# Patient Record
Sex: MALE | Race: BLACK OR AFRICAN AMERICAN | Employment: FULL TIME
[De-identification: names, ages, dates, MRNs, and addresses within clinical notes are randomized per-mention and may not be internally consistent; named-entity substitution may affect disease eponyms.]

---

## 2017-01-04 ENCOUNTER — OFFICE VISIT (OUTPATIENT)
Dept: FAMILY MEDICINE CLINIC | Facility: CLINIC | Age: 37
End: 2017-01-04

## 2017-01-04 VITALS
DIASTOLIC BLOOD PRESSURE: 93 MMHG | BODY MASS INDEX: 25.24 KG/M2 | HEART RATE: 71 BPM | WEIGHT: 170.4 LBS | TEMPERATURE: 97.6 F | SYSTOLIC BLOOD PRESSURE: 149 MMHG | HEIGHT: 69 IN

## 2017-01-04 DIAGNOSIS — J45.50 SEVERE PERSISTENT ASTHMA IN ADULT WITHOUT COMPLICATION: Primary | ICD-10-CM

## 2017-01-04 PROCEDURE — 99213 OFFICE O/P EST LOW 20 MIN: CPT | Performed by: FAMILY MEDICINE

## 2017-01-04 RX ORDER — ALBUTEROL SULFATE 90 UG/1
AEROSOL, METERED RESPIRATORY (INHALATION)
Qty: 18 G | Refills: 3 | Status: SHIPPED | OUTPATIENT
Start: 2017-01-04 | End: 2017-01-17

## 2017-01-04 ASSESSMENT — PATIENT HEALTH QUESTIONNAIRE - PHQ9
SUM OF ALL RESPONSES TO PHQ9 QUESTIONS 1 & 2: 0
2. FEELING DOWN, DEPRESSED OR HOPELESS: 0
SUM OF ALL RESPONSES TO PHQ QUESTIONS 1-9: 0
1. LITTLE INTEREST OR PLEASURE IN DOING THINGS: 0

## 2017-01-04 ASSESSMENT — ENCOUNTER SYMPTOMS
GASTROINTESTINAL NEGATIVE: 1
WHEEZING: 0
COUGH: 0
SHORTNESS OF BREATH: 0

## 2017-02-13 ENCOUNTER — HOSPITAL ENCOUNTER (EMERGENCY)
Age: 37
Discharge: HOME | End: 2017-02-13
Attending: EMERGENCY MEDICINE
Payer: COMMERCIAL

## 2017-02-13 VITALS
OXYGEN SATURATION: 93 % | WEIGHT: 170 LBS | SYSTOLIC BLOOD PRESSURE: 141 MMHG | RESPIRATION RATE: 16 BRPM | BODY MASS INDEX: 25.18 KG/M2 | HEART RATE: 114 BPM | TEMPERATURE: 97.3 F | HEIGHT: 69 IN | DIASTOLIC BLOOD PRESSURE: 87 MMHG

## 2017-02-13 DIAGNOSIS — J45.901 ASTHMA EXACERBATION: Primary | ICD-10-CM

## 2017-02-13 PROCEDURE — 94640 AIRWAY INHALATION TREATMENT: CPT

## 2017-02-13 PROCEDURE — 6370000000 HC RX 637 (ALT 250 FOR IP): Performed by: EMERGENCY MEDICINE

## 2017-02-13 PROCEDURE — 94664 DEMO&/EVAL PT USE INHALER: CPT

## 2017-02-13 PROCEDURE — 6360000002 HC RX W HCPCS: Performed by: EMERGENCY MEDICINE

## 2017-02-13 PROCEDURE — 99284 EMERGENCY DEPT VISIT MOD MDM: CPT

## 2017-02-13 RX ORDER — ALBUTEROL SULFATE 90 UG/1
2 AEROSOL, METERED RESPIRATORY (INHALATION)
Status: DISCONTINUED | OUTPATIENT
Start: 2017-02-13 | End: 2017-02-14 | Stop reason: HOSPADM

## 2017-02-13 RX ORDER — ALBUTEROL SULFATE 90 UG/1
2 AEROSOL, METERED RESPIRATORY (INHALATION) EVERY 6 HOURS PRN
Qty: 1 INHALER | Refills: 2 | Status: SHIPPED | OUTPATIENT
Start: 2017-02-13 | End: 2018-07-31

## 2017-02-13 RX ORDER — PREDNISONE 20 MG/1
TABLET ORAL
Qty: 15 TABLET | Refills: 0 | Status: SHIPPED | OUTPATIENT
Start: 2017-02-13 | End: 2017-02-23

## 2017-02-13 RX ORDER — ALBUTEROL SULFATE 2.5 MG/3ML
5 SOLUTION RESPIRATORY (INHALATION)
Status: DISCONTINUED | OUTPATIENT
Start: 2017-02-13 | End: 2017-02-14 | Stop reason: HOSPADM

## 2017-02-13 RX ORDER — IPRATROPIUM BROMIDE AND ALBUTEROL SULFATE 2.5; .5 MG/3ML; MG/3ML
1 SOLUTION RESPIRATORY (INHALATION)
Status: DISCONTINUED | OUTPATIENT
Start: 2017-02-13 | End: 2017-02-14 | Stop reason: HOSPADM

## 2017-02-13 RX ORDER — PREDNISONE 20 MG/1
60 TABLET ORAL ONCE
Status: COMPLETED | OUTPATIENT
Start: 2017-02-13 | End: 2017-02-13

## 2017-02-13 RX ORDER — DEXAMETHASONE SODIUM PHOSPHATE 10 MG/ML
10 INJECTION INTRAMUSCULAR; INTRAVENOUS ONCE
Status: DISCONTINUED | OUTPATIENT
Start: 2017-02-13 | End: 2017-02-13

## 2017-02-13 RX ADMIN — ALBUTEROL SULFATE 5 MG: 5 SOLUTION RESPIRATORY (INHALATION) at 22:35

## 2017-02-13 RX ADMIN — IPRATROPIUM BROMIDE 0.5 MG: 0.5 SOLUTION RESPIRATORY (INHALATION) at 22:35

## 2017-02-13 RX ADMIN — PREDNISONE 60 MG: 20 TABLET ORAL at 23:06

## 2017-02-13 ASSESSMENT — ENCOUNTER SYMPTOMS
CHEST TIGHTNESS: 1
SHORTNESS OF BREATH: 1
COUGH: 0
ABDOMINAL PAIN: 0
DIARRHEA: 0
WHEEZING: 1
CONSTIPATION: 0
VOMITING: 0
NAUSEA: 0
SORE THROAT: 0

## 2017-03-05 ENCOUNTER — HOSPITAL ENCOUNTER (EMERGENCY)
Age: 37
Discharge: HOME | End: 2017-03-05
Attending: EMERGENCY MEDICINE

## 2017-03-05 VITALS
HEIGHT: 69 IN | WEIGHT: 170 LBS | BODY MASS INDEX: 25.18 KG/M2 | DIASTOLIC BLOOD PRESSURE: 90 MMHG | RESPIRATION RATE: 16 BRPM | HEART RATE: 94 BPM | OXYGEN SATURATION: 98 % | SYSTOLIC BLOOD PRESSURE: 133 MMHG | TEMPERATURE: 98.2 F

## 2017-03-05 DIAGNOSIS — J45.901 ASTHMA EXACERBATION: Primary | ICD-10-CM

## 2017-03-05 PROCEDURE — 6360000002 HC RX W HCPCS: Performed by: EMERGENCY MEDICINE

## 2017-03-05 PROCEDURE — 6370000000 HC RX 637 (ALT 250 FOR IP): Performed by: EMERGENCY MEDICINE

## 2017-03-05 PROCEDURE — 99284 EMERGENCY DEPT VISIT MOD MDM: CPT

## 2017-03-05 PROCEDURE — 94640 AIRWAY INHALATION TREATMENT: CPT

## 2017-03-05 PROCEDURE — 94664 DEMO&/EVAL PT USE INHALER: CPT

## 2017-03-05 RX ORDER — ALBUTEROL SULFATE 2.5 MG/3ML
5 SOLUTION RESPIRATORY (INHALATION)
Status: DISCONTINUED | OUTPATIENT
Start: 2017-03-05 | End: 2017-03-05 | Stop reason: HOSPADM

## 2017-03-05 RX ORDER — ALBUTEROL SULFATE 90 UG/1
1-2 AEROSOL, METERED RESPIRATORY (INHALATION) EVERY 4 HOURS PRN
Qty: 1 INHALER | Refills: 0 | Status: SHIPPED | OUTPATIENT
Start: 2017-03-05 | End: 2017-03-07 | Stop reason: SDUPTHER

## 2017-03-05 RX ORDER — PREDNISONE 20 MG/1
20 TABLET ORAL DAILY
Qty: 20 TABLET | Refills: 0 | Status: SHIPPED | OUTPATIENT
Start: 2017-03-05 | End: 2018-07-02

## 2017-03-05 RX ORDER — ALBUTEROL SULFATE 90 UG/1
2 AEROSOL, METERED RESPIRATORY (INHALATION)
Status: DISCONTINUED | OUTPATIENT
Start: 2017-03-05 | End: 2017-03-05

## 2017-03-05 RX ORDER — PREDNISONE 20 MG/1
60 TABLET ORAL ONCE
Status: COMPLETED | OUTPATIENT
Start: 2017-03-05 | End: 2017-03-05

## 2017-03-05 RX ORDER — IPRATROPIUM BROMIDE AND ALBUTEROL SULFATE 2.5; .5 MG/3ML; MG/3ML
1 SOLUTION RESPIRATORY (INHALATION)
Status: DISCONTINUED | OUTPATIENT
Start: 2017-03-05 | End: 2017-03-05

## 2017-03-05 RX ORDER — ALBUTEROL SULFATE 90 UG/1
2 AEROSOL, METERED RESPIRATORY (INHALATION)
Status: DISCONTINUED | OUTPATIENT
Start: 2017-03-05 | End: 2017-03-05 | Stop reason: HOSPADM

## 2017-03-05 RX ADMIN — ALBUTEROL SULFATE 10 MG: 5 SOLUTION RESPIRATORY (INHALATION) at 18:07

## 2017-03-05 RX ADMIN — IPRATROPIUM BROMIDE 0.5 MG: 0.5 SOLUTION RESPIRATORY (INHALATION) at 18:08

## 2017-03-05 RX ADMIN — PREDNISONE 60 MG: 20 TABLET ORAL at 18:33

## 2017-03-05 ASSESSMENT — ENCOUNTER SYMPTOMS
WHEEZING: 1
SHORTNESS OF BREATH: 1
VOMITING: 0
RHINORRHEA: 0
NAUSEA: 0

## 2017-03-07 ENCOUNTER — OFFICE VISIT (OUTPATIENT)
Dept: FAMILY MEDICINE CLINIC | Facility: CLINIC | Age: 37
End: 2017-03-07

## 2017-03-07 VITALS
HEIGHT: 69 IN | DIASTOLIC BLOOD PRESSURE: 85 MMHG | WEIGHT: 166.2 LBS | HEART RATE: 90 BPM | SYSTOLIC BLOOD PRESSURE: 122 MMHG | BODY MASS INDEX: 24.62 KG/M2 | TEMPERATURE: 98.6 F

## 2017-03-07 DIAGNOSIS — J45.50 SEVERE PERSISTENT ASTHMA IN ADULT WITHOUT COMPLICATION: Primary | ICD-10-CM

## 2017-03-07 PROCEDURE — 99213 OFFICE O/P EST LOW 20 MIN: CPT | Performed by: FAMILY MEDICINE

## 2017-03-07 RX ORDER — MONTELUKAST SODIUM 10 MG/1
10 TABLET ORAL DAILY
Qty: 30 TABLET | Refills: 3 | Status: SHIPPED | OUTPATIENT
Start: 2017-03-07 | End: 2017-11-20 | Stop reason: SDUPTHER

## 2017-03-07 RX ORDER — ALBUTEROL SULFATE 90 UG/1
1-2 AEROSOL, METERED RESPIRATORY (INHALATION) EVERY 4 HOURS PRN
Qty: 1 INHALER | Refills: 3 | Status: SHIPPED | OUTPATIENT
Start: 2017-03-07 | End: 2017-11-20 | Stop reason: SDUPTHER

## 2017-03-07 ASSESSMENT — ENCOUNTER SYMPTOMS
GASTROINTESTINAL NEGATIVE: 1
COUGH: 1
WHEEZING: 1
SHORTNESS OF BREATH: 1

## 2017-03-17 ENCOUNTER — HOSPITAL ENCOUNTER (EMERGENCY)
Age: 37
Discharge: HOME | End: 2017-03-17
Attending: EMERGENCY MEDICINE

## 2017-03-17 VITALS
HEIGHT: 69 IN | TEMPERATURE: 97.3 F | WEIGHT: 170 LBS | SYSTOLIC BLOOD PRESSURE: 149 MMHG | RESPIRATION RATE: 18 BRPM | DIASTOLIC BLOOD PRESSURE: 92 MMHG | HEART RATE: 90 BPM | OXYGEN SATURATION: 98 % | BODY MASS INDEX: 25.18 KG/M2

## 2017-03-17 DIAGNOSIS — K29.00 ACUTE GASTRITIS WITHOUT HEMORRHAGE, UNSPECIFIED GASTRITIS TYPE: Primary | ICD-10-CM

## 2017-03-17 LAB
ABSOLUTE EOS #: 0.1 K/UL (ref 0–0.4)
ABSOLUTE LYMPH #: 3.3 K/UL (ref 1–4.8)
ABSOLUTE MONO #: 0.7 K/UL (ref 0.1–1.2)
ALBUMIN SERPL-MCNC: 4.5 G/DL (ref 3.5–5.2)
ALBUMIN/GLOBULIN RATIO: 1.9 (ref 1–2.5)
ALP BLD-CCNC: 43 U/L (ref 40–129)
ALT SERPL-CCNC: 22 U/L (ref 5–41)
ANION GAP SERPL CALCULATED.3IONS-SCNC: 14 MMOL/L (ref 9–17)
AST SERPL-CCNC: 18 U/L
BASOPHILS # BLD: 0 % (ref 0–2)
BASOPHILS ABSOLUTE: 0 K/UL (ref 0–0.2)
BILIRUB SERPL-MCNC: 0.41 MG/DL (ref 0.3–1.2)
BILIRUBIN DIRECT: 0.13 MG/DL
BILIRUBIN, INDIRECT: 0.28 MG/DL (ref 0–1)
BUN BLDV-MCNC: 15 MG/DL (ref 6–20)
BUN/CREAT BLD: ABNORMAL (ref 9–20)
CALCIUM SERPL-MCNC: 10.5 MG/DL (ref 8.6–10.4)
CHLORIDE BLD-SCNC: 98 MMOL/L (ref 98–107)
CO2: 28 MMOL/L (ref 20–31)
CREAT SERPL-MCNC: 1.28 MG/DL (ref 0.7–1.2)
DIFFERENTIAL TYPE: NORMAL
EOSINOPHILS RELATIVE PERCENT: 1 % (ref 1–4)
GFR AFRICAN AMERICAN: >60 ML/MIN
GFR NON-AFRICAN AMERICAN: >60 ML/MIN
GFR SERPL CREATININE-BSD FRML MDRD: ABNORMAL ML/MIN/{1.73_M2}
GFR SERPL CREATININE-BSD FRML MDRD: ABNORMAL ML/MIN/{1.73_M2}
GLOBULIN: NORMAL G/DL (ref 1.5–3.8)
GLUCOSE BLD-MCNC: 96 MG/DL (ref 70–99)
HCT VFR BLD CALC: 45.4 % (ref 41–53)
HEMOGLOBIN: 15.9 G/DL (ref 13.5–17.5)
LIPASE: 36 U/L (ref 13–60)
LYMPHOCYTES # BLD: 31 % (ref 24–44)
MCH RBC QN AUTO: 28 PG (ref 26–34)
MCHC RBC AUTO-ENTMCNC: 35 G/DL (ref 31–37)
MCV RBC AUTO: 80 FL (ref 80–100)
MONOCYTES # BLD: 7 % (ref 2–11)
PDW BLD-RTO: 13.8 % (ref 12.5–15.4)
PLATELET # BLD: 244 K/UL (ref 140–450)
PLATELET ESTIMATE: NORMAL
PMV BLD AUTO: 10.2 FL (ref 6–12)
POTASSIUM SERPL-SCNC: 4.4 MMOL/L (ref 3.7–5.3)
RBC # BLD: 5.67 M/UL (ref 4.5–5.9)
RBC # BLD: NORMAL 10*6/UL
SEG NEUTROPHILS: 61 % (ref 36–66)
SEGMENTED NEUTROPHILS ABSOLUTE COUNT: 6.5 K/UL (ref 1.8–7.7)
SODIUM BLD-SCNC: 140 MMOL/L (ref 135–144)
TOTAL PROTEIN: 6.9 G/DL (ref 6.4–8.3)
WBC # BLD: 10.6 K/UL (ref 3.5–11)
WBC # BLD: NORMAL 10*3/UL

## 2017-03-17 PROCEDURE — 83690 ASSAY OF LIPASE: CPT

## 2017-03-17 PROCEDURE — 80048 BASIC METABOLIC PNL TOTAL CA: CPT

## 2017-03-17 PROCEDURE — 6370000000 HC RX 637 (ALT 250 FOR IP): Performed by: EMERGENCY MEDICINE

## 2017-03-17 PROCEDURE — 85025 COMPLETE CBC W/AUTO DIFF WBC: CPT

## 2017-03-17 PROCEDURE — 80076 HEPATIC FUNCTION PANEL: CPT

## 2017-03-17 PROCEDURE — G0383 LEV 4 HOSP TYPE B ED VISIT: HCPCS

## 2017-03-17 RX ORDER — FAMOTIDINE 20 MG/1
20 TABLET, FILM COATED ORAL ONCE
Status: COMPLETED | OUTPATIENT
Start: 2017-03-17 | End: 2017-03-17

## 2017-03-17 RX ORDER — FAMOTIDINE 20 MG/1
20 TABLET, FILM COATED ORAL 2 TIMES DAILY
Qty: 60 TABLET | Refills: 0 | Status: SHIPPED | OUTPATIENT
Start: 2017-03-17 | End: 2017-11-28

## 2017-03-17 RX ADMIN — FAMOTIDINE 20 MG: 20 TABLET, FILM COATED ORAL at 16:52

## 2017-03-17 ASSESSMENT — ENCOUNTER SYMPTOMS
VOMITING: 0
NAUSEA: 0
DIARRHEA: 0
CHEST TIGHTNESS: 0
CONSTIPATION: 0
SORE THROAT: 0
SHORTNESS OF BREATH: 0
BLOOD IN STOOL: 0
ABDOMINAL PAIN: 1

## 2017-03-17 ASSESSMENT — PAIN DESCRIPTION - LOCATION: LOCATION: ABDOMEN

## 2017-03-17 ASSESSMENT — PAIN DESCRIPTION - PAIN TYPE: TYPE: ACUTE PAIN

## 2017-03-17 ASSESSMENT — PAIN DESCRIPTION - DESCRIPTORS: DESCRIPTORS: SHARP

## 2017-03-17 ASSESSMENT — PAIN SCALES - GENERAL: PAINLEVEL_OUTOF10: 8

## 2017-03-27 DIAGNOSIS — J45.30 MILD PERSISTENT ASTHMA: ICD-10-CM

## 2017-03-28 RX ORDER — ALBUTEROL SULFATE 2.5 MG/3ML
SOLUTION RESPIRATORY (INHALATION)
Qty: 300 VIAL | Refills: 6 | Status: SHIPPED | OUTPATIENT
Start: 2017-03-28 | End: 2018-07-31

## 2017-04-24 DIAGNOSIS — J45.50 SEVERE PERSISTENT ASTHMA IN ADULT WITHOUT COMPLICATION: Primary | ICD-10-CM

## 2017-04-24 RX ORDER — FLUTICASONE FUROATE AND VILANTEROL 100; 25 UG/1; UG/1
1 POWDER RESPIRATORY (INHALATION) DAILY
Qty: 1 EACH | Refills: 3 | Status: SHIPPED | OUTPATIENT
Start: 2017-04-24 | End: 2017-05-04 | Stop reason: ALTCHOICE

## 2017-05-04 DIAGNOSIS — J45.50 SEVERE PERSISTENT ASTHMA IN ADULT WITHOUT COMPLICATION: Primary | ICD-10-CM

## 2017-07-07 ENCOUNTER — HOSPITAL ENCOUNTER (EMERGENCY)
Age: 37
Discharge: HOME | End: 2017-07-07
Attending: EMERGENCY MEDICINE
Payer: COMMERCIAL

## 2017-07-07 VITALS
SYSTOLIC BLOOD PRESSURE: 139 MMHG | RESPIRATION RATE: 16 BRPM | DIASTOLIC BLOOD PRESSURE: 97 MMHG | OXYGEN SATURATION: 96 % | HEIGHT: 69 IN | WEIGHT: 170 LBS | HEART RATE: 77 BPM | BODY MASS INDEX: 25.18 KG/M2 | TEMPERATURE: 97.9 F

## 2017-07-07 DIAGNOSIS — S05.02XA CORNEAL ABRASION, LEFT, INITIAL ENCOUNTER: Primary | ICD-10-CM

## 2017-07-07 PROCEDURE — 99283 EMERGENCY DEPT VISIT LOW MDM: CPT

## 2017-07-07 PROCEDURE — 6370000000 HC RX 637 (ALT 250 FOR IP): Performed by: EMERGENCY MEDICINE

## 2017-07-07 RX ORDER — ERYTHROMYCIN 5 MG/G
OINTMENT OPHTHALMIC ONCE
Status: COMPLETED | OUTPATIENT
Start: 2017-07-07 | End: 2017-07-07

## 2017-07-07 RX ORDER — PROPARACAINE HYDROCHLORIDE 5 MG/ML
SOLUTION/ DROPS OPHTHALMIC
Status: DISCONTINUED
Start: 2017-07-07 | End: 2017-07-07 | Stop reason: HOSPADM

## 2017-07-07 RX ADMIN — ERYTHROMYCIN: 5 OINTMENT OPHTHALMIC at 15:48

## 2017-07-07 ASSESSMENT — ENCOUNTER SYMPTOMS
RHINORRHEA: 0
EYE PAIN: 1
ABDOMINAL PAIN: 0
EYE DISCHARGE: 1
SORE THROAT: 0
EYE REDNESS: 1
COUGH: 0
VOMITING: 0
DIARRHEA: 0
SHORTNESS OF BREATH: 0
CONSTIPATION: 0
NAUSEA: 0
WHEEZING: 0
PHOTOPHOBIA: 1

## 2017-07-07 ASSESSMENT — PAIN DESCRIPTION - FREQUENCY: FREQUENCY: CONTINUOUS

## 2017-07-07 ASSESSMENT — PAIN SCALES - GENERAL: PAINLEVEL_OUTOF10: 4

## 2017-07-07 ASSESSMENT — PAIN DESCRIPTION - PAIN TYPE: TYPE: ACUTE PAIN

## 2017-07-07 ASSESSMENT — PAIN DESCRIPTION - ORIENTATION: ORIENTATION: LEFT

## 2017-07-07 ASSESSMENT — PAIN DESCRIPTION - DESCRIPTORS: DESCRIPTORS: BURNING

## 2017-07-07 ASSESSMENT — PAIN DESCRIPTION - LOCATION: LOCATION: EYE

## 2017-07-28 RX ORDER — BUDESONIDE AND FORMOTEROL FUMARATE DIHYDRATE 160; 4.5 UG/1; UG/1
AEROSOL RESPIRATORY (INHALATION)
Qty: 10.6 INHALER | Refills: 3 | Status: SHIPPED | OUTPATIENT
Start: 2017-07-28 | End: 2017-11-20 | Stop reason: SDUPTHER

## 2017-07-31 DIAGNOSIS — J45.50 SEVERE PERSISTENT ASTHMA IN ADULT WITHOUT COMPLICATION: ICD-10-CM

## 2017-07-31 RX ORDER — FLUTICASONE FUROATE AND VILANTEROL 100; 25 UG/1; UG/1
POWDER RESPIRATORY (INHALATION) DAILY
Status: CANCELLED | OUTPATIENT
Start: 2017-07-31

## 2017-10-04 ENCOUNTER — TELEPHONE (OUTPATIENT)
Dept: FAMILY MEDICINE CLINIC | Age: 37
End: 2017-10-04

## 2017-11-20 ENCOUNTER — OFFICE VISIT (OUTPATIENT)
Dept: FAMILY MEDICINE CLINIC | Age: 37
End: 2017-11-20
Payer: COMMERCIAL

## 2017-11-20 VITALS
HEART RATE: 77 BPM | DIASTOLIC BLOOD PRESSURE: 82 MMHG | HEIGHT: 69 IN | TEMPERATURE: 96.7 F | SYSTOLIC BLOOD PRESSURE: 138 MMHG | WEIGHT: 178.2 LBS | BODY MASS INDEX: 26.39 KG/M2

## 2017-11-20 DIAGNOSIS — J45.50 SEVERE PERSISTENT ASTHMA IN ADULT WITHOUT COMPLICATION: Primary | ICD-10-CM

## 2017-11-20 PROCEDURE — 99213 OFFICE O/P EST LOW 20 MIN: CPT | Performed by: FAMILY MEDICINE

## 2017-11-20 PROCEDURE — G8419 CALC BMI OUT NRM PARAM NOF/U: HCPCS | Performed by: FAMILY MEDICINE

## 2017-11-20 PROCEDURE — G8427 DOCREV CUR MEDS BY ELIG CLIN: HCPCS | Performed by: FAMILY MEDICINE

## 2017-11-20 PROCEDURE — G8484 FLU IMMUNIZE NO ADMIN: HCPCS | Performed by: FAMILY MEDICINE

## 2017-11-20 PROCEDURE — 1036F TOBACCO NON-USER: CPT | Performed by: FAMILY MEDICINE

## 2017-11-20 RX ORDER — ALBUTEROL SULFATE 2.5 MG/3ML
2.5 SOLUTION RESPIRATORY (INHALATION) ONCE
Status: COMPLETED | OUTPATIENT
Start: 2017-11-20 | End: 2017-11-20

## 2017-11-20 RX ORDER — MONTELUKAST SODIUM 10 MG/1
10 TABLET ORAL DAILY
Qty: 30 TABLET | Refills: 3 | Status: SHIPPED | OUTPATIENT
Start: 2017-11-20 | End: 2018-07-31

## 2017-11-20 RX ORDER — NEBULIZER ACCESSORIES
1 KIT MISCELLANEOUS DAILY PRN
Qty: 1 KIT | Refills: 0 | Status: SHIPPED | OUTPATIENT
Start: 2017-11-20

## 2017-11-20 RX ORDER — BUDESONIDE AND FORMOTEROL FUMARATE DIHYDRATE 160; 4.5 UG/1; UG/1
AEROSOL RESPIRATORY (INHALATION)
Qty: 10.6 INHALER | Refills: 3 | Status: SHIPPED | OUTPATIENT
Start: 2017-11-20 | End: 2018-03-16 | Stop reason: SDUPTHER

## 2017-11-20 RX ORDER — ALBUTEROL SULFATE 90 UG/1
1-2 AEROSOL, METERED RESPIRATORY (INHALATION) EVERY 4 HOURS PRN
Qty: 1 INHALER | Refills: 3 | Status: SHIPPED | OUTPATIENT
Start: 2017-11-20 | End: 2018-03-16 | Stop reason: SDUPTHER

## 2017-11-20 RX ADMIN — ALBUTEROL SULFATE 2.5 MG: 2.5 SOLUTION RESPIRATORY (INHALATION) at 11:19

## 2017-11-20 ASSESSMENT — ENCOUNTER SYMPTOMS
RHINORRHEA: 0
NAUSEA: 0
COUGH: 0
CHEST TIGHTNESS: 0
SHORTNESS OF BREATH: 0
ABDOMINAL PAIN: 0
WHEEZING: 1
VOMITING: 0

## 2017-11-20 NOTE — PROGRESS NOTES
Subjective:      Presented to the office today for:  Chief Complaint   Patient presents with    Asthma     out of inhailer        HPI  Patient is here today with complaints of wheezing. He states this started overnight. Patient has run out of his inhalers, and has been out of it for nearly 2 days. He is currently taking symbicort, singulair, and albuterol as needed. Denies any SOB, dyspnea, or chest tightness. States that he feels \"wheezy. \" he does not have a nebulizer machine at home; states that he misplaced it a year back after he moved. Denies any cough, fevers, chills, nausea, or vomiting. Review of Systems   Constitutional: Negative for chills, diaphoresis and fever. HENT: Negative for congestion and rhinorrhea. Eyes: Negative for visual disturbance. Respiratory: Positive for wheezing. Negative for cough, chest tightness and shortness of breath. Cardiovascular: Negative for palpitations and leg swelling. Gastrointestinal: Negative for abdominal pain, nausea and vomiting. Endocrine: Negative for polydipsia and polyuria. Genitourinary: Negative for dysuria. Musculoskeletal: Negative for arthralgias. Neurological: Negative for dizziness and light-headedness. Objective:    BP (!) 148/95 (Site: Left Arm, Position: Sitting, Cuff Size: Small Adult)   Pulse 77   Temp 96.7 °F (35.9 °C) (Temporal)   Ht 5' 9.02\" (1.753 m)   Wt 178 lb 3.2 oz (80.8 kg)   BMI 26.30 kg/m²    BP Readings from Last 3 Encounters:   11/20/17 (!) 148/95   07/07/17 (!) 139/97   03/17/17 (!) 149/92     Physical Exam   Constitutional: He is oriented to person, place, and time. He appears well-developed and well-nourished. HENT:   Head: Normocephalic and atraumatic. Cardiovascular: Normal rate, regular rhythm and normal heart sounds. Pulmonary/Chest: Effort normal. He has wheezes. He exhibits no tenderness. Wheezes on bilateral lung fields; good exchange noted   Musculoskeletal: He exhibits no edema. Lymphadenopathy:     He has no cervical adenopathy. Neurological: He is alert and oriented to person, place, and time. Lab Results   Component Value Date    WBC 10.6 03/17/2017    HGB 15.9 03/17/2017    HCT 45.4 03/17/2017     03/17/2017    ALT 22 03/17/2017    AST 18 03/17/2017     03/17/2017    K 4.4 03/17/2017    CL 98 03/17/2017    CREATININE 1.28 (H) 03/17/2017    BUN 15 03/17/2017    CO2 28 03/17/2017     Lab Results   Component Value Date    CALCIUM 10.5 (H) 03/17/2017     No results found for: LDLCALC, LDLCHOLESTEROL, LDLDIRECT    Assessment and Plan:    1. Severe persistent asthma in adult without complication  -will give aerosol treatment in the office while patient is here   - albuterol sulfate HFA (PROVENTIL HFA) 108 (90 Base) MCG/ACT inhaler; Inhale 1-2 puffs into the lungs every 4 hours as needed for Wheezing or Shortness of Breath (Space out to every 6 hours as symptoms improve)  Dispense: 1 Inhaler; Refill: 3  - budesonide-formoterol (SYMBICORT) 160-4.5 MCG/ACT AERO; **EITHER THIS OR DULERA ARE COVERED* 2 puffs by mouth twice a day  Dispense: 10.6 Inhaler; Refill: 3  - montelukast (SINGULAIR) 10 MG tablet; Take 1 tablet by mouth daily  Dispense: 30 tablet; Refill: 3  - Respiratory Therapy Supplies (NEBULIZER/TUBING/MOUTHPIECE) KIT; 1 kit by Does not apply route daily as needed (wheezing)  Dispense: 1 kit; Refill: 0  - albuterol (PROVENTIL;VENTOLIN) 2 MG/5ML syrup; Take 5 mLs by mouth 4 times daily  Dispense: 1 Bottle; Refill: 3  - albuterol (PROVENTIL) nebulizer solution 2.5 mg; Take 3 mLs by nebulization once    Pt declined flu, pneumo, and tdap today. [place] received counseling on the following healthy behaviors: nutrition, exercise and medication adherence    Discussed use, benefit, and side effects of prescribed medications. Barriers to medication compliance addressed. All patient questions answered. Pt voiced understanding.      Return in about 3 months

## 2017-11-20 NOTE — PROGRESS NOTES
Visit Information    Have you changed or started any medications since your last visit including any over-the-counter medicines, vitamins, or herbal medicines? no   Have you stopped taking any of your medications? Is so, why? -  no  Are you having any side effects from any of your medications? - no    Have you seen any other physician or provider since your last visit?  no   Have you had any other diagnostic tests since your last visit?  no   Have you been seen in the emergency room and/or had an admission in a hospital since we last saw you?  no   Have you had your routine dental cleaning in the past 6 months?  no     Do you have an active MyChart account? If no, what is the barrier?   No: declined     Patient Care Team:  Kevin Galicia MD as PCP - General (Family Medicine)    Medical History Review  Past Medical, Family, and Social History reviewed and does contribute to the patient presenting condition    Health Maintenance   Topic Date Due    HIV screen  01/17/1996    DTaP/Tdap/Td vaccine (1 - Tdap) 01/17/2000    Pneumococcal med risk (1 of 1 - PPSV23) 01/17/2000    Flu vaccine (1) 09/01/2017

## 2017-11-28 ENCOUNTER — HOSPITAL ENCOUNTER (EMERGENCY)
Age: 37
Discharge: HOME | End: 2017-11-28
Attending: EMERGENCY MEDICINE
Payer: COMMERCIAL

## 2017-11-28 VITALS
WEIGHT: 170 LBS | SYSTOLIC BLOOD PRESSURE: 130 MMHG | TEMPERATURE: 97.5 F | RESPIRATION RATE: 16 BRPM | DIASTOLIC BLOOD PRESSURE: 95 MMHG | HEIGHT: 69 IN | BODY MASS INDEX: 25.18 KG/M2 | OXYGEN SATURATION: 96 % | HEART RATE: 77 BPM

## 2017-11-28 DIAGNOSIS — A64 STI (SEXUALLY TRANSMITTED INFECTION): Primary | ICD-10-CM

## 2017-11-28 PROCEDURE — 6370000000 HC RX 637 (ALT 250 FOR IP): Performed by: STUDENT IN AN ORGANIZED HEALTH CARE EDUCATION/TRAINING PROGRAM

## 2017-11-28 PROCEDURE — 99283 EMERGENCY DEPT VISIT LOW MDM: CPT

## 2017-11-28 PROCEDURE — 96372 THER/PROPH/DIAG INJ SC/IM: CPT

## 2017-11-28 PROCEDURE — 6360000002 HC RX W HCPCS: Performed by: STUDENT IN AN ORGANIZED HEALTH CARE EDUCATION/TRAINING PROGRAM

## 2017-11-28 PROCEDURE — 87491 CHLMYD TRACH DNA AMP PROBE: CPT

## 2017-11-28 PROCEDURE — 87591 N.GONORRHOEAE DNA AMP PROB: CPT

## 2017-11-28 RX ORDER — AZITHROMYCIN 250 MG/1
1000 TABLET, FILM COATED ORAL ONCE
Status: COMPLETED | OUTPATIENT
Start: 2017-11-28 | End: 2017-11-28

## 2017-11-28 RX ORDER — CEFTRIAXONE SODIUM 250 MG/1
250 INJECTION, POWDER, FOR SOLUTION INTRAMUSCULAR; INTRAVENOUS ONCE
Status: COMPLETED | OUTPATIENT
Start: 2017-11-28 | End: 2017-11-28

## 2017-11-28 RX ADMIN — AZITHROMYCIN 1000 MG: 250 TABLET, FILM COATED ORAL at 10:35

## 2017-11-28 RX ADMIN — CEFTRIAXONE SODIUM 250 MG: 250 INJECTION, POWDER, FOR SOLUTION INTRAMUSCULAR; INTRAVENOUS at 10:35

## 2017-11-28 ASSESSMENT — PAIN SCALES - GENERAL: PAINLEVEL_OUTOF10: 3

## 2017-11-28 ASSESSMENT — PAIN DESCRIPTION - PAIN TYPE: TYPE: ACUTE PAIN

## 2017-11-29 LAB
C. TRACHOMATIS DNA ,URINE: ABNORMAL
N. GONORRHOEAE DNA, URINE: NEGATIVE

## 2017-12-06 NOTE — ED PROVIDER NOTES
budesonide-formoterol (SYMBICORT) 160-4.5 MCG/ACT AERO **EITHER THIS OR DULERA ARE COVERED* 2 puffs by mouth twice a day 11/20/17   Nely Blanco MD   montelukast (SINGULAIR) 10 MG tablet Take 1 tablet by mouth daily 11/20/17   Nely Blanco MD   Respiratory Therapy Supplies (NEBULIZER/TUBING/MOUTHPIECE) KIT 1 kit by Does not apply route daily as needed (wheezing) 11/20/17   Nely Blanco MD   albuterol (PROVENTIL;VENTOLIN) 2 MG/5ML syrup Take 5 mLs by mouth 4 times daily 11/20/17   Nely Blanco MD   albuterol (PROVENTIL) (2.5 MG/3ML) 0.083% nebulizer solution inhale contents of 1 vial in nebulizer four times a day if needed 3/28/17   Castillo Reilly MD   predniSONE (DELTASONE) 20 MG tablet Take 1 tablet by mouth daily Take 3 tablets for the first 3 days. Take 2 tablets for the next 3 days. Take 1 tablet for the next 3 days. Take half a tablet for the last 3 days.  3/5/17   Martinez Muñoz MD   albuterol sulfate HFA (PROAIR HFA) 108 (90 BASE) MCG/ACT inhaler Inhale 2 puffs into the lungs every 6 hours as needed for Wheezing 2/13/17 2/20/17  Shirley Schaffer MD   naproxen (NAPROSYN) 375 MG tablet Take 1 tablet by mouth 2 times daily (with meals) 6/23/16   Dequan Bowen PA-C   loratadine (CLARITIN) 10 MG tablet Take 1 tablet by mouth daily 9/3/15   Beulah Owusu MD       REVIEW OF SYSTEMS    (2-9 systems for level 4, 10 or more for level 5)      Review of Systems    PHYSICAL EXAM   (up to 7 for level 4, 8 or more for level 5)      BP (!) 130/95   Pulse 77   Temp 97.5 °F (36.4 °C) (Oral)   Resp 16   Ht 5' 9\" (1.753 m)   Wt 170 lb (77.1 kg)   SpO2 96%   BMI 25.10 kg/m²     Physical Exam    DIFFERENTIAL  DIAGNOSIS     PLAN (LABS / IMAGING / EKG):  Orders Placed This Encounter   Procedures    C.trachomatis N.gonorrhoeae DNA, Urine       MEDICATIONS ORDERED:  Orders Placed This Encounter   Medications    cefTRIAXone (ROCEPHIN) injection 250 mg    azithromycin (ZITHROMAX) tablet 1,000 mg Male  DDX:   phimosis, paraphimosis, priapism, balanitis, posthitis, spermatocele, hydrocele, varicocele, epididymitis, orchitis, prostatitis, testicular torsion, testicular cancer, indirect inguinal hernia, sivan's gangrene, sexually transmitted infections. DIAGNOSTIC RESULTS / EMERGENCY DEPARTMENT COURSE / MDM     LABS:  Results for orders placed or performed during the hospital encounter of 11/28/17   C.trachomatis N.gonorrhoeae DNA, Urine   Result Value Ref Range    C. trachomatis DNA ,Urine (A) NEG     POSITIVE: CHLAMYDIA TRACHOMATIS DNA detected by nucleic acid amplification.     N. gonorrhoeae DNA, Urine NEGATIVE NEG       IMPRESSION:   Exposure to STI    RADIOLOGY:  Not indicated    EMERGENCY DEPARTMENT COURSE:  Physical Exam  Urine GC    PROCEDURES:  None    CONSULTS:  None    FINAL IMPRESSION      1. STI (sexually transmitted infection)         DISPOSITION / PLAN     Disposition: Home    PATIENT REFERRED TO:  Sean Toribio MD  [address] [place] [address] [address] [facility] [facility]. [phone]    Schedule an appointment as soon as possible for a visit   As needed, If symptoms worsen    DISCHARGE MEDICATIONS:  Discharge Medication List as of 11/28/2017 10:26 Margy Ramos MD  Emergency Medicine Resident    (Please note that portions of this note were completed with a voice recognition program.  Efforts were made to edit the dictations but occasionally words are mis-transcribed.)       Samuel Douglas MD  Resident  12/06/17 2750

## 2018-03-16 DIAGNOSIS — J45.50 SEVERE PERSISTENT ASTHMA IN ADULT WITHOUT COMPLICATION: ICD-10-CM

## 2018-03-20 RX ORDER — BUDESONIDE AND FORMOTEROL FUMARATE DIHYDRATE 160; 4.5 UG/1; UG/1
AEROSOL RESPIRATORY (INHALATION)
Qty: 10.2 G | Refills: 3 | Status: SHIPPED | OUTPATIENT
Start: 2018-03-20 | End: 2018-07-02

## 2018-07-01 DIAGNOSIS — J45.50 SEVERE PERSISTENT ASTHMA IN ADULT WITHOUT COMPLICATION: ICD-10-CM

## 2018-07-02 ENCOUNTER — HOSPITAL ENCOUNTER (EMERGENCY)
Age: 38
Discharge: HOME | End: 2018-07-02
Attending: EMERGENCY MEDICINE
Payer: COMMERCIAL

## 2018-07-02 VITALS
DIASTOLIC BLOOD PRESSURE: 96 MMHG | TEMPERATURE: 97.5 F | WEIGHT: 170 LBS | SYSTOLIC BLOOD PRESSURE: 128 MMHG | RESPIRATION RATE: 16 BRPM | OXYGEN SATURATION: 96 % | BODY MASS INDEX: 25.18 KG/M2 | HEART RATE: 76 BPM | HEIGHT: 69 IN

## 2018-07-02 DIAGNOSIS — J45.901 EXACERBATION OF ASTHMA, UNSPECIFIED ASTHMA SEVERITY, UNSPECIFIED WHETHER PERSISTENT: Primary | ICD-10-CM

## 2018-07-02 DIAGNOSIS — J45.50 SEVERE PERSISTENT ASTHMA IN ADULT WITHOUT COMPLICATION: ICD-10-CM

## 2018-07-02 PROCEDURE — 6370000000 HC RX 637 (ALT 250 FOR IP): Performed by: EMERGENCY MEDICINE

## 2018-07-02 PROCEDURE — 94640 AIRWAY INHALATION TREATMENT: CPT

## 2018-07-02 PROCEDURE — 99284 EMERGENCY DEPT VISIT MOD MDM: CPT

## 2018-07-02 PROCEDURE — 94664 DEMO&/EVAL PT USE INHALER: CPT

## 2018-07-02 PROCEDURE — 6360000002 HC RX W HCPCS: Performed by: EMERGENCY MEDICINE

## 2018-07-02 PROCEDURE — S9441 ASTHMA EDUCATION: HCPCS

## 2018-07-02 RX ORDER — PREDNISONE 10 MG/1
TABLET ORAL
Qty: 20 TABLET | Refills: 0 | Status: SHIPPED | OUTPATIENT
Start: 2018-07-02 | End: 2018-07-12

## 2018-07-02 RX ORDER — BUDESONIDE AND FORMOTEROL FUMARATE DIHYDRATE 160; 4.5 UG/1; UG/1
2 AEROSOL RESPIRATORY (INHALATION) 2 TIMES DAILY
Qty: 10.2 G | Refills: 0 | Status: SHIPPED | OUTPATIENT
Start: 2018-07-02 | End: 2018-07-31

## 2018-07-02 RX ORDER — ALBUTEROL SULFATE 90 UG/1
2 AEROSOL, METERED RESPIRATORY (INHALATION) EVERY 6 HOURS PRN
Status: DISCONTINUED | OUTPATIENT
Start: 2018-07-02 | End: 2018-07-02 | Stop reason: HOSPADM

## 2018-07-02 RX ORDER — PREDNISONE 20 MG/1
60 TABLET ORAL ONCE
Status: COMPLETED | OUTPATIENT
Start: 2018-07-02 | End: 2018-07-02

## 2018-07-02 RX ADMIN — ALBUTEROL SULFATE 5 MG: 5 SOLUTION RESPIRATORY (INHALATION) at 12:11

## 2018-07-02 RX ADMIN — PREDNISONE 60 MG: 20 TABLET ORAL at 12:02

## 2018-07-02 RX ADMIN — ALBUTEROL SULFATE 5 MG: 5 SOLUTION RESPIRATORY (INHALATION) at 12:20

## 2018-07-02 RX ADMIN — Medication 2 PUFF: at 12:28

## 2018-07-02 ASSESSMENT — ENCOUNTER SYMPTOMS
SHORTNESS OF BREATH: 1
ABDOMINAL PAIN: 0

## 2018-07-02 NOTE — ED PROVIDER NOTES
Kacie Ashley MD   budesonide-formoterol (SYMBICORT) 160-4.5 MCG/ACT AERO Inhale 2 puffs into the lungs 2 times daily 7/2/18  Yes Yola Lynn MD   VENTOLIN  (90 Base) MCG/ACT inhaler inhale 1 to 2 puffs by mouth every 4 hours if needed for wheezing shortness of breath (SPACE OUT TO EVERY 6 HOURS AS SYMPTOMS IMPROVE) 3/20/18   Lulu Lerma MD   montelukast (SINGULAIR) 10 MG tablet Take 1 tablet by mouth daily 11/20/17   Lulu Lerma MD   Respiratory Therapy Supplies (NEBULIZER/TUBING/MOUTHPIECE) KIT 1 kit by Does not apply route daily as needed (wheezing) 11/20/17   Lulu Lerma MD   albuterol (PROVENTIL;VENTOLIN) 2 MG/5ML syrup Take 5 mLs by mouth 4 times daily 11/20/17   Lulu Lerma MD   albuterol (PROVENTIL) (2.5 MG/3ML) 0.083% nebulizer solution inhale contents of 1 vial in nebulizer four times a day if needed 3/28/17   Germán Garrison MD   albuterol sulfate HFA (PROAIR HFA) 108 (90 BASE) MCG/ACT inhaler Inhale 2 puffs into the lungs every 6 hours as needed for Wheezing 2/13/17 2/20/17  Raegan Meraz MD       REVIEW OF SYSTEMS    (2-9 systems for level 4, 10 or more for level 5)      Review of Systems   Constitutional: Negative for chills and fever. Respiratory: Positive for shortness of breath. Cardiovascular: Negative for chest pain. Gastrointestinal: Negative for abdominal pain. Skin: Negative for wound. Allergic/Immunologic: Positive for environmental allergies. Negative for food allergies. Neurological: Negative for weakness. PHYSICAL EXAM   (up to 7 for level 4, 8 or more for level 5)      INITIAL VITALS:   BP (!) 128/96   Pulse 76   Temp 97.5 °F (36.4 °C) (Oral)   Resp 16   Ht 5' 9\" (1.753 m)   Wt 170 lb (77.1 kg)   SpO2 96%   BMI 25.10 kg/m²     Physical Exam   Constitutional: He is oriented to person, place, and time. He appears well-developed and well-nourished. No distress. HENT:   Head: Normocephalic and atraumatic.    Mouth/Throat: Oropharynx is

## 2018-07-02 NOTE — PROGRESS NOTES
Emergency Department Bronchodilator Assessment    Patient Assessment complete. No admission diagnoses are documented for this encounter. .   Vitals:    07/02/18 1200   BP: (!) 128/96   Pulse:    Resp:    Temp:    SpO2: 96%   . Patients home meds are   Prior to Admission medications    Medication Sig Start Date End Date Taking? Authorizing Provider   SYMBICORT 160-4.5 MCG/ACT AERO inhale 2 puffs by mouth twice a day 3/20/18   Luis Angel Isaac MD   VENTOLIN  (90 Base) MCG/ACT inhaler inhale 1 to 2 puffs by mouth every 4 hours if needed for wheezing shortness of breath (SPACE OUT TO EVERY 6 HOURS AS SYMPTOMS IMPROVE) 3/20/18   Luis Angel Isaac MD   montelukast (SINGULAIR) 10 MG tablet Take 1 tablet by mouth daily 11/20/17   Luis Angel Isaac MD   Respiratory Therapy Supplies (NEBULIZER/TUBING/MOUTHPIECE) KIT 1 kit by Does not apply route daily as needed (wheezing) 11/20/17   Luis Angel Isaac MD   albuterol (PROVENTIL;VENTOLIN) 2 MG/5ML syrup Take 5 mLs by mouth 4 times daily 11/20/17   Luis Angel Isaac MD   albuterol (PROVENTIL) (2.5 MG/3ML) 0.083% nebulizer solution inhale contents of 1 vial in nebulizer four times a day if needed 3/28/17   Mary Patel MD   albuterol sulfate HFA (PROAIR HFA) 108 (90 BASE) MCG/ACT inhaler Inhale 2 puffs into the lungs every 6 hours as needed for Wheezing 2/13/17 2/20/17  Jayden Butler MD   .     Patient's predicted PF is:  541 L/min  Last Charted: Less than 50% = 4    PF before 1st TX PF after 1st TX PF after 2nd TX PF after 3rd TX   150 [] >75% 210 [] >75% 250 [] >75% n/a [] >75%    [] 50-75%  [] 50-75%  [x] 50-75%  [] 50-75%    [x] <50%  [x] <50%  [] <50%  [] <50%       HHN treatment(s) given x 2. Breath Sounds:   LUNG FIELD Pre-Treatment Post-Treatment   RUL whezzw Clear b/l to slight exp wheeze t.o.    RML  wheeze     RLL  wheeze     ROSALINE  wheeze     LLL  wheeze       COMMENTS:  Ran out of Albuterol and Symbicort. Last attact 5 years ago.  Last time taken home meds was

## 2018-07-03 NOTE — TELEPHONE ENCOUNTER
Please address the medication refill and close the encounter. If I can be of assistance, please route to the applicable pool. Thank you. Next Visit Date:  No future appointments.     Health Maintenance   Topic Date Due    HIV screen  01/17/1996    DTaP/Tdap/Td vaccine (1 - Tdap) 01/17/2000    Pneumococcal med risk (1 of 1 - PPSV23) 01/17/2000    Flu vaccine (1) 09/01/2018       No results found for: LABA1C          ( goal A1C is < 7)   No results found for: LABMICR  No results found for: LDLCHOLESTEROL, LDLCALC    (goal LDL is <100)   AST (U/L)   Date Value   03/17/2017 18     ALT (U/L)   Date Value   03/17/2017 22     BUN (mg/dL)   Date Value   03/17/2017 15     BP Readings from Last 3 Encounters:   07/02/18 (!) 128/96   11/28/17 (!) 130/95   11/20/17 138/82          (goal 120/80)    All Future Testing planned in [facility]              Patient Active Problem List:     Severe persistent asthma in adult without complication

## 2018-07-30 DIAGNOSIS — J45.50 SEVERE PERSISTENT ASTHMA IN ADULT WITHOUT COMPLICATION: ICD-10-CM

## 2018-07-31 ENCOUNTER — HOSPITAL ENCOUNTER (EMERGENCY)
Age: 38
Discharge: HOME | End: 2018-07-31
Attending: EMERGENCY MEDICINE
Payer: COMMERCIAL

## 2018-07-31 VITALS
WEIGHT: 170 LBS | HEIGHT: 69 IN | TEMPERATURE: 98.2 F | RESPIRATION RATE: 20 BRPM | OXYGEN SATURATION: 92 % | SYSTOLIC BLOOD PRESSURE: 146 MMHG | HEART RATE: 89 BPM | BODY MASS INDEX: 25.18 KG/M2 | DIASTOLIC BLOOD PRESSURE: 98 MMHG

## 2018-07-31 DIAGNOSIS — J45.30 MILD PERSISTENT ASTHMA: ICD-10-CM

## 2018-07-31 DIAGNOSIS — J45.41 MODERATE PERSISTENT ASTHMA WITH ACUTE EXACERBATION: Primary | ICD-10-CM

## 2018-07-31 DIAGNOSIS — J45.50 SEVERE PERSISTENT ASTHMA IN ADULT WITHOUT COMPLICATION: ICD-10-CM

## 2018-07-31 PROCEDURE — 94640 AIRWAY INHALATION TREATMENT: CPT

## 2018-07-31 PROCEDURE — 99284 EMERGENCY DEPT VISIT MOD MDM: CPT

## 2018-07-31 PROCEDURE — 6370000000 HC RX 637 (ALT 250 FOR IP): Performed by: EMERGENCY MEDICINE

## 2018-07-31 PROCEDURE — 6360000002 HC RX W HCPCS: Performed by: EMERGENCY MEDICINE

## 2018-07-31 RX ORDER — ALBUTEROL SULFATE 90 UG/1
2 AEROSOL, METERED RESPIRATORY (INHALATION)
Status: DISCONTINUED | OUTPATIENT
Start: 2018-07-31 | End: 2018-07-31

## 2018-07-31 RX ORDER — PREDNISONE 20 MG/1
60 TABLET ORAL ONCE
Status: COMPLETED | OUTPATIENT
Start: 2018-07-31 | End: 2018-07-31

## 2018-07-31 RX ORDER — ALBUTEROL SULFATE 90 UG/1
2 AEROSOL, METERED RESPIRATORY (INHALATION)
Status: DISCONTINUED | OUTPATIENT
Start: 2018-07-31 | End: 2018-07-31 | Stop reason: HOSPADM

## 2018-07-31 RX ORDER — MONTELUKAST SODIUM 10 MG/1
10 TABLET ORAL DAILY
Qty: 30 TABLET | Refills: 0 | Status: SHIPPED | OUTPATIENT
Start: 2018-07-31 | End: 2018-10-26 | Stop reason: SDUPTHER

## 2018-07-31 RX ORDER — IPRATROPIUM BROMIDE AND ALBUTEROL SULFATE 2.5; .5 MG/3ML; MG/3ML
1 SOLUTION RESPIRATORY (INHALATION)
Status: DISCONTINUED | OUTPATIENT
Start: 2018-07-31 | End: 2018-07-31

## 2018-07-31 RX ORDER — ALBUTEROL SULFATE 2.5 MG/3ML
5 SOLUTION RESPIRATORY (INHALATION)
Status: DISCONTINUED | OUTPATIENT
Start: 2018-07-31 | End: 2018-07-31 | Stop reason: HOSPADM

## 2018-07-31 RX ORDER — BUDESONIDE AND FORMOTEROL FUMARATE DIHYDRATE 160; 4.5 UG/1; UG/1
2 AEROSOL RESPIRATORY (INHALATION) 2 TIMES DAILY
Qty: 1 INHALER | Refills: 0 | Status: SHIPPED | OUTPATIENT
Start: 2018-07-31 | End: 2018-07-31

## 2018-07-31 RX ORDER — ALBUTEROL SULFATE 2.5 MG/3ML
SOLUTION RESPIRATORY (INHALATION)
Qty: 300 VIAL | Refills: 0 | Status: SHIPPED | OUTPATIENT
Start: 2018-07-31 | End: 2018-10-26 | Stop reason: SDUPTHER

## 2018-07-31 RX ORDER — BUDESONIDE AND FORMOTEROL FUMARATE DIHYDRATE 160; 4.5 UG/1; UG/1
AEROSOL RESPIRATORY (INHALATION)
Qty: 10.2 G | Refills: 0 | Status: SHIPPED | OUTPATIENT
Start: 2018-07-31 | End: 2018-09-20 | Stop reason: SDUPTHER

## 2018-07-31 RX ORDER — PREDNISONE 50 MG/1
50 TABLET ORAL DAILY
Qty: 4 TABLET | Refills: 0 | Status: SHIPPED | OUTPATIENT
Start: 2018-07-31 | End: 2018-10-26

## 2018-07-31 RX ORDER — ALBUTEROL SULFATE 90 UG/1
2 AEROSOL, METERED RESPIRATORY (INHALATION) EVERY 6 HOURS PRN
Qty: 1 INHALER | Refills: 0 | Status: SHIPPED | OUTPATIENT
Start: 2018-07-31 | End: 2018-10-26

## 2018-07-31 RX ADMIN — ALBUTEROL SULFATE 10 MG: 5 SOLUTION RESPIRATORY (INHALATION) at 09:34

## 2018-07-31 RX ADMIN — PREDNISONE 60 MG: 20 TABLET ORAL at 10:10

## 2018-07-31 RX ADMIN — ALBUTEROL SULFATE 10 MG: 5 SOLUTION RESPIRATORY (INHALATION) at 09:22

## 2018-07-31 RX ADMIN — IPRATROPIUM BROMIDE 0.5 MG: 0.5 SOLUTION RESPIRATORY (INHALATION) at 09:22

## 2018-07-31 RX ADMIN — ALBUTEROL SULFATE 2 PUFF: 90 AEROSOL, METERED RESPIRATORY (INHALATION) at 10:51

## 2018-07-31 NOTE — ED PROVIDER NOTES
°C), Pulse: 89, Resp: 20  Physical Exam   Constitutional: He is oriented to person, place, and time. He appears well-developed and well-nourished. HENT:   Head: Normocephalic and atraumatic. Right Ear: External ear normal.   Left Ear: External ear normal.   Eyes: Pupils are equal, round, and reactive to light. Right eye exhibits no discharge. Left eye exhibits no discharge. No scleral icterus. Neck: Normal range of motion. No JVD present. No tracheal deviation present. Cardiovascular: Normal rate and normal heart sounds. Exam reveals no gallop and no friction rub. No murmur heard. Pulmonary/Chest: Effort normal. No respiratory distress. He has wheezes (end expiratory). He has no rales. He exhibits no tenderness. Musculoskeletal: He exhibits no edema or tenderness. Neurological: He is oriented to person, place, and time. Coordination normal.   Skin: Skin is warm and dry. He is not diaphoretic. No pallor. Psychiatric: He has a normal mood and affect. His behavior is normal.       Comments  The pt wheezing improving with breathing treatments known asthma with out inhaler the pt will need doses for home      Vazquez Mc, DO, RDMS.   Attending Emergency Physician          [address] DO  07/31/18 1007

## 2018-07-31 NOTE — PROGRESS NOTES
Inhaler / Aerosol Education        [x] Served spacer    [x] Provided and reviewed booklet   [x] Good return demonstration per patient   [] Aerosolized Medications:     Verbal education has been provided in the use, benefits and possible adverse reactions of aerosolized medications used in the treatment of this patient.     [] Other:

## 2018-07-31 NOTE — TELEPHONE ENCOUNTER
Next Visit Date:  No future appointments. Health Maintenance   Topic Date Due    HIV screen  01/17/1996    DTaP/Tdap/Td vaccine (1 - Tdap) 01/17/2000    Pneumococcal med risk (1 of 1 - PPSV23) 01/17/2000    Flu vaccine (1) 09/01/2018       No results found for: LABA1C          ( goal A1C is < 7)   No results found for: LABMICR  No results found for: LDLCHOLESTEROL, LDLCALC    (goal LDL is <100)   AST (U/L)   Date Value   03/17/2017 18     ALT (U/L)   Date Value   03/17/2017 22     BUN (mg/dL)   Date Value   03/17/2017 15     BP Readings from Last 3 Encounters:   07/31/18 (!) 146/98   07/02/18 (!) 128/96   11/28/17 (!) 130/95          (goal 120/80)    All Future Testing planned in [facility]              Patient Active Problem List:     Severe persistent asthma in adult without complication         Please address the medication refill and close the encounter. If I can be of assistance, please route to the applicable pool. Thank you.

## 2018-08-02 NOTE — ED PROVIDER NOTES
[facility]  ED  Emergency Department Encounter  Emergency Medicine Resident     Pt Name: Elen Dodge  MRN: [id number]  [place] 1980  Date of evaluation: 8/2/18  PCP:  Bean Laboy MD    [address] [place] [address]       Chief Complaint   Patient presents with    Respiratory Distress     out of inhaler since yesterday       HISTORY OF PRESENT ILLNESS  (Location/Symptom, Timing/Onset, Context/Setting, Quality, Duration, Modifying Factors, Severity.)      Elen Dodge is a 41 y.o. male who presents with complaints of worsening shortness of breath and wheezing. Pt has a hx of asthma. He normally has daily steroid inhaler that he uses, plus a albuterol inhaler as needed, albuterol nebulizer at home, Singulair. Patient is a  who works around dust a lot. Usually this causes his asthma to flareup. Patient has been using all of his medications, and ran out of his albuterol inhaler yesterday so it is reported presenting for evaluation. He reports that this exacerbation has been worsening for the past couple days. He has no more albuterol for his home nebulizer either. He reports no fevers or chills no nausea vomiting. Normal urination and defecation. No productive cough. He otherwise feels normal.  He reports at baseline he does not have wheezing, but he currently has extensive wheezing and subjective shortness of breath. PAST MEDICAL / SURGICAL / SOCIAL / FAMILY HISTORY      has a past medical history of Asthma. has no past surgical history on file. Social History     Social History    Marital status:      Spouse name: N/A    Number of children: N/A    Years of education: N/A     Occupational History    Not on file.      Social History Main Topics    Smoking status: Former Smoker     Types: Cigarettes     Quit date: 2/6/2000    Smokeless tobacco: Never Used    Alcohol use Yes      Comment: 1 24oz can of beer daily    Drug use: No    Sexual for environmental allergies and food allergies. Neurological: Negative for syncope and weakness. Hematological: Negative for adenopathy. Does not bruise/bleed easily. Psychiatric/Behavioral: Negative for confusion. The patient is not nervous/anxious. PHYSICAL EXAM   (up to 7 for level 4, 8 or more for level 5)      INITIAL VITALS:   BP (!) 146/98   Pulse 89   Temp 98.2 °F (36.8 °C)   Resp 20   Ht 5' 9\" (1.753 m)   Wt 170 lb (77.1 kg)   SpO2 92%   BMI 25.10 kg/m²     Physical Exam   Constitutional: He is oriented to person, place, and time. He appears well-developed and well-nourished. No distress. HENT:   Head: Normocephalic and atraumatic. Eyes: Conjunctivae and EOM are normal. Pupils are equal, round, and reactive to light. Neck: Normal range of motion. Neck supple. Cardiovascular: Normal rate, regular rhythm, normal heart sounds and intact distal pulses. Exam reveals no gallop and no friction rub. No murmur heard. Pulmonary/Chest: Effort normal. No respiratory distress. He has no wheezes. He has no rhonchi. He has rales (inspiratory and expiratory). Abdominal: Soft. Bowel sounds are normal. He exhibits no distension. There is no tenderness. There is no rebound and no guarding. Musculoskeletal: Normal range of motion. He exhibits no edema or tenderness. Neurological: He is alert and oriented to person, place, and time. Skin: Skin is warm and dry. No rash noted. Psychiatric: He has a normal mood and affect.  His behavior is normal.       DIFFERENTIAL  DIAGNOSIS     PLAN (LABS / IMAGING / EKG):  Orders Placed This Encounter   Procedures    Initiate ED Aerosol Protocol       MEDICATIONS ORDERED:  Orders Placed This Encounter   Medications    DISCONTD: albuterol (PROVENTIL) nebulizer solution 5 mg    DISCONTD: ipratropium-albuterol (DUONEB) nebulizer solution 1 ampule    DISCONTD: albuterol (PROVENTIL) nebulizer solution 5 mg    DISCONTD: albuterol sulfate  (90 2 additional neb treatments given to the pt per RT. Pt still with slight expiratory wheezing on examination, but he reports he feels comfortable and would like to be discharged home. His O2 sats have improved to mid 90s. Discussed findings and treatment plan to pt who voiced understanding and in agreement with the plan. Pt stable and ready for discharge with steroid pulse, singulair, and albuterol. PROCEDURES:  None    CONSULTS:  None    CRITICAL CARE:  None    FINAL IMPRESSION      1. Moderate persistent asthma with acute exacerbation    2. Mild persistent asthma    3. Severe persistent asthma in adult without complication          DISPOSITION / PLAN     DISPOSITION Decision To Discharge 07/31/2018 10:10:15 AM      PATIENT REFERRED TO:  Laura Heart MD  [place] [address]    Call in 2 days  For follow-up and medication refills.       DISCHARGE MEDICATIONS:  Discharge Medication List as of 7/31/2018 10:14 AM      START taking these medications    Details   predniSONE (DELTASONE) 50 MG tablet Take 1 tablet by mouth daily, Disp-4 tablet, R-0Print             Zuleika Russell MD  Emergency Medicine Resident    (Please note that portions of this note were completed with a voice recognition program.  Efforts were made to edit the dictations but occasionally words are mis-transcribed.)       Zuleika Russell MD  Resident  08/07/18 [id number]

## 2018-08-07 ASSESSMENT — ENCOUNTER SYMPTOMS
COUGH: 1
EYE PAIN: 0
SORE THROAT: 0
NAUSEA: 0
VOMITING: 0
WHEEZING: 1
ABDOMINAL PAIN: 0
DIARRHEA: 0
SHORTNESS OF BREATH: 1

## 2018-09-20 DIAGNOSIS — J45.50 SEVERE PERSISTENT ASTHMA IN ADULT WITHOUT COMPLICATION: ICD-10-CM

## 2018-09-20 RX ORDER — BUDESONIDE AND FORMOTEROL FUMARATE DIHYDRATE 160; 4.5 UG/1; UG/1
AEROSOL RESPIRATORY (INHALATION)
Qty: 10.2 G | Refills: 0 | Status: SHIPPED | OUTPATIENT
Start: 2018-09-20 | End: 2018-10-26 | Stop reason: SDUPTHER

## 2018-09-20 NOTE — TELEPHONE ENCOUNTER
Please address the medication refill and close the encounter. If I can be of assistance, please route to the applicable pool. Thank you. Last visit:  Last Med refill:    Next Visit Date:  No future appointments.     Health Maintenance   Topic Date Due    HIV screen  01/17/1996    DTaP/Tdap/Td vaccine (1 - Tdap) 01/17/2000    Pneumococcal med risk (1 of 1 - PPSV23) 01/17/2000    Flu vaccine (1) 09/01/2018       No results found for: LABA1C          ( goal A1C is < 7)   No results found for: LABMICR  No results found for: LDLCHOLESTEROL, LDLCALC    (goal LDL is <100)   AST (U/L)   Date Value   03/17/2017 18     ALT (U/L)   Date Value   03/17/2017 22     BUN (mg/dL)   Date Value   03/17/2017 15     BP Readings from Last 3 Encounters:   07/31/18 (!) 146/98   07/02/18 (!) 128/96   11/28/17 (!) 130/95          (goal 120/80)    All Future Testing planned in [facility]              Patient Active Problem List:     Severe persistent asthma in adult without complication

## 2018-10-26 ENCOUNTER — OFFICE VISIT (OUTPATIENT)
Dept: FAMILY MEDICINE CLINIC | Age: 38
End: 2018-10-26
Payer: COMMERCIAL

## 2018-10-26 VITALS
TEMPERATURE: 97.9 F | DIASTOLIC BLOOD PRESSURE: 88 MMHG | BODY MASS INDEX: 24.17 KG/M2 | HEIGHT: 69 IN | SYSTOLIC BLOOD PRESSURE: 133 MMHG | WEIGHT: 163.2 LBS

## 2018-10-26 DIAGNOSIS — J45.50 SEVERE PERSISTENT ASTHMA IN ADULT WITHOUT COMPLICATION: ICD-10-CM

## 2018-10-26 PROCEDURE — G8420 CALC BMI NORM PARAMETERS: HCPCS | Performed by: STUDENT IN AN ORGANIZED HEALTH CARE EDUCATION/TRAINING PROGRAM

## 2018-10-26 PROCEDURE — 1036F TOBACCO NON-USER: CPT | Performed by: STUDENT IN AN ORGANIZED HEALTH CARE EDUCATION/TRAINING PROGRAM

## 2018-10-26 PROCEDURE — 99213 OFFICE O/P EST LOW 20 MIN: CPT | Performed by: STUDENT IN AN ORGANIZED HEALTH CARE EDUCATION/TRAINING PROGRAM

## 2018-10-26 PROCEDURE — G8427 DOCREV CUR MEDS BY ELIG CLIN: HCPCS | Performed by: STUDENT IN AN ORGANIZED HEALTH CARE EDUCATION/TRAINING PROGRAM

## 2018-10-26 PROCEDURE — G8484 FLU IMMUNIZE NO ADMIN: HCPCS | Performed by: STUDENT IN AN ORGANIZED HEALTH CARE EDUCATION/TRAINING PROGRAM

## 2018-10-26 RX ORDER — ALBUTEROL SULFATE 2.5 MG/3ML
SOLUTION RESPIRATORY (INHALATION)
Qty: 300 VIAL | Refills: 0 | Status: SHIPPED | OUTPATIENT
Start: 2018-10-26 | End: 2019-08-26 | Stop reason: SDUPTHER

## 2018-10-26 RX ORDER — MONTELUKAST SODIUM 10 MG/1
10 TABLET ORAL DAILY
Qty: 30 TABLET | Refills: 3 | Status: SHIPPED | OUTPATIENT
Start: 2018-10-26 | End: 2019-08-26 | Stop reason: SDUPTHER

## 2018-10-26 RX ORDER — BUDESONIDE AND FORMOTEROL FUMARATE DIHYDRATE 160; 4.5 UG/1; UG/1
2 AEROSOL RESPIRATORY (INHALATION) 2 TIMES DAILY
Qty: 1 INHALER | Refills: 3 | Status: SHIPPED | OUTPATIENT
Start: 2018-10-26 | End: 2019-02-18 | Stop reason: SDUPTHER

## 2018-10-26 RX ORDER — ALBUTEROL SULFATE 90 UG/1
2 AEROSOL, METERED RESPIRATORY (INHALATION) EVERY 6 HOURS PRN
Qty: 1 INHALER | Refills: 3 | Status: SHIPPED | OUTPATIENT
Start: 2018-10-26 | End: 2019-03-28 | Stop reason: SDUPTHER

## 2018-10-26 ASSESSMENT — PATIENT HEALTH QUESTIONNAIRE - PHQ9
2. FEELING DOWN, DEPRESSED OR HOPELESS: 0
1. LITTLE INTEREST OR PLEASURE IN DOING THINGS: 0
SUM OF ALL RESPONSES TO PHQ QUESTIONS 1-9: 0
SUM OF ALL RESPONSES TO PHQ9 QUESTIONS 1 & 2: 0
SUM OF ALL RESPONSES TO PHQ QUESTIONS 1-9: 0

## 2018-10-26 ASSESSMENT — ENCOUNTER SYMPTOMS
SHORTNESS OF BREATH: 1
WHEEZING: 0
ABDOMINAL PAIN: 0
COUGH: 1
NAUSEA: 0
STRIDOR: 0
DIARRHEA: 0

## 2019-02-18 DIAGNOSIS — J45.50 SEVERE PERSISTENT ASTHMA IN ADULT WITHOUT COMPLICATION: ICD-10-CM

## 2019-02-19 RX ORDER — BUDESONIDE AND FORMOTEROL FUMARATE DIHYDRATE 160; 4.5 UG/1; UG/1
AEROSOL RESPIRATORY (INHALATION)
Qty: 10.2 G | Refills: 0 | Status: SHIPPED | OUTPATIENT
Start: 2019-02-19 | End: 2019-03-28 | Stop reason: SDUPTHER

## 2019-03-28 ENCOUNTER — OFFICE VISIT (OUTPATIENT)
Dept: FAMILY MEDICINE CLINIC | Age: 39
End: 2019-03-28
Payer: COMMERCIAL

## 2019-03-28 VITALS
BODY MASS INDEX: 24.29 KG/M2 | DIASTOLIC BLOOD PRESSURE: 86 MMHG | HEIGHT: 69 IN | SYSTOLIC BLOOD PRESSURE: 118 MMHG | WEIGHT: 164 LBS

## 2019-03-28 DIAGNOSIS — J45.50 SEVERE PERSISTENT ASTHMA IN ADULT WITHOUT COMPLICATION: Primary | ICD-10-CM

## 2019-03-28 PROCEDURE — G8420 CALC BMI NORM PARAMETERS: HCPCS | Performed by: STUDENT IN AN ORGANIZED HEALTH CARE EDUCATION/TRAINING PROGRAM

## 2019-03-28 PROCEDURE — G8427 DOCREV CUR MEDS BY ELIG CLIN: HCPCS | Performed by: STUDENT IN AN ORGANIZED HEALTH CARE EDUCATION/TRAINING PROGRAM

## 2019-03-28 PROCEDURE — 99211 OFF/OP EST MAY X REQ PHY/QHP: CPT | Performed by: STUDENT IN AN ORGANIZED HEALTH CARE EDUCATION/TRAINING PROGRAM

## 2019-03-28 PROCEDURE — G8484 FLU IMMUNIZE NO ADMIN: HCPCS | Performed by: STUDENT IN AN ORGANIZED HEALTH CARE EDUCATION/TRAINING PROGRAM

## 2019-03-28 PROCEDURE — 99213 OFFICE O/P EST LOW 20 MIN: CPT | Performed by: STUDENT IN AN ORGANIZED HEALTH CARE EDUCATION/TRAINING PROGRAM

## 2019-03-28 PROCEDURE — 1036F TOBACCO NON-USER: CPT | Performed by: STUDENT IN AN ORGANIZED HEALTH CARE EDUCATION/TRAINING PROGRAM

## 2019-03-28 RX ORDER — BUDESONIDE AND FORMOTEROL FUMARATE DIHYDRATE 160; 4.5 UG/1; UG/1
AEROSOL RESPIRATORY (INHALATION)
Qty: 10.2 G | Refills: 0 | Status: SHIPPED | OUTPATIENT
Start: 2019-03-28 | End: 2019-03-28 | Stop reason: SDUPTHER

## 2019-03-28 RX ORDER — ALBUTEROL SULFATE 90 UG/1
2 AEROSOL, METERED RESPIRATORY (INHALATION) EVERY 6 HOURS PRN
Qty: 1 INHALER | Refills: 3 | Status: SHIPPED | OUTPATIENT
Start: 2019-03-28 | End: 2019-08-26 | Stop reason: SDUPTHER

## 2019-03-28 ASSESSMENT — ENCOUNTER SYMPTOMS
ABDOMINAL PAIN: 0
DIARRHEA: 0
CHEST TIGHTNESS: 0
STRIDOR: 0
COUGH: 1
APNEA: 0
ABDOMINAL DISTENTION: 0
PHOTOPHOBIA: 0
SHORTNESS OF BREATH: 1
NAUSEA: 0
EYE DISCHARGE: 0
CHOKING: 0
VOMITING: 0
WHEEZING: 1
CONSTIPATION: 0

## 2019-03-28 ASSESSMENT — PATIENT HEALTH QUESTIONNAIRE - PHQ9
1. LITTLE INTEREST OR PLEASURE IN DOING THINGS: 0
SUM OF ALL RESPONSES TO PHQ9 QUESTIONS 1 & 2: 0
SUM OF ALL RESPONSES TO PHQ QUESTIONS 1-9: 0
SUM OF ALL RESPONSES TO PHQ QUESTIONS 1-9: 0
2. FEELING DOWN, DEPRESSED OR HOPELESS: 0

## 2019-08-26 ENCOUNTER — HOSPITAL ENCOUNTER (EMERGENCY)
Age: 39
Discharge: HOME | End: 2019-08-26
Attending: EMERGENCY MEDICINE

## 2019-08-26 VITALS
HEART RATE: 86 BPM | OXYGEN SATURATION: 94 % | RESPIRATION RATE: 19 BRPM | TEMPERATURE: 98.2 F | SYSTOLIC BLOOD PRESSURE: 125 MMHG | DIASTOLIC BLOOD PRESSURE: 82 MMHG

## 2019-08-26 DIAGNOSIS — J45.50 SEVERE PERSISTENT ASTHMA IN ADULT WITHOUT COMPLICATION: ICD-10-CM

## 2019-08-26 DIAGNOSIS — J45.21 MILD INTERMITTENT ASTHMA WITH EXACERBATION: Primary | ICD-10-CM

## 2019-08-26 PROCEDURE — 6370000000 HC RX 637 (ALT 250 FOR IP): Performed by: STUDENT IN AN ORGANIZED HEALTH CARE EDUCATION/TRAINING PROGRAM

## 2019-08-26 PROCEDURE — 94640 AIRWAY INHALATION TREATMENT: CPT

## 2019-08-26 PROCEDURE — 99284 EMERGENCY DEPT VISIT MOD MDM: CPT

## 2019-08-26 PROCEDURE — 94664 DEMO&/EVAL PT USE INHALER: CPT

## 2019-08-26 RX ORDER — IPRATROPIUM BROMIDE AND ALBUTEROL SULFATE 2.5; .5 MG/3ML; MG/3ML
1 SOLUTION RESPIRATORY (INHALATION)
Status: DISCONTINUED | OUTPATIENT
Start: 2019-08-26 | End: 2019-08-26 | Stop reason: HOSPADM

## 2019-08-26 RX ORDER — ALBUTEROL SULFATE 90 UG/1
2 AEROSOL, METERED RESPIRATORY (INHALATION) EVERY 6 HOURS PRN
Qty: 1 INHALER | Refills: 3 | Status: SHIPPED | OUTPATIENT
Start: 2019-08-26 | End: 2020-03-04 | Stop reason: SDUPTHER

## 2019-08-26 RX ORDER — ALBUTEROL SULFATE 2.5 MG/3ML
SOLUTION RESPIRATORY (INHALATION)
Qty: 300 VIAL | Refills: 0 | Status: SHIPPED | OUTPATIENT
Start: 2019-08-26 | End: 2020-03-04

## 2019-08-26 RX ORDER — MONTELUKAST SODIUM 10 MG/1
10 TABLET ORAL DAILY
Qty: 30 TABLET | Refills: 0 | Status: SHIPPED | OUTPATIENT
Start: 2019-08-26 | End: 2020-03-04 | Stop reason: SDUPTHER

## 2019-08-26 RX ORDER — ALBUTEROL SULFATE 90 UG/1
2 AEROSOL, METERED RESPIRATORY (INHALATION)
Status: DISCONTINUED | OUTPATIENT
Start: 2019-08-26 | End: 2019-08-26 | Stop reason: HOSPADM

## 2019-08-26 RX ADMIN — IPRATROPIUM BROMIDE AND ALBUTEROL SULFATE 1 AMPULE: .5; 3 SOLUTION RESPIRATORY (INHALATION) at 15:24

## 2019-08-26 RX ADMIN — ALBUTEROL SULFATE 2 PUFF: 90 AEROSOL, METERED RESPIRATORY (INHALATION) at 16:27

## 2019-08-26 ASSESSMENT — ENCOUNTER SYMPTOMS
SHORTNESS OF BREATH: 1
ABDOMINAL PAIN: 0
BACK PAIN: 0
VOMITING: 0
NAUSEA: 0
WHEEZING: 1

## 2019-08-26 NOTE — ED PROVIDER NOTES
suicidal ideas. PHYSICAL EXAM   (up to 7 for level 4, 8 or more for level 5)      INITIAL VITALS:  /82   Pulse 86   Temp 98.2 °F (36.8 °C)   Resp 19   SpO2 94%     Physical Exam   Constitutional: He is oriented to person, place, and time. He appears well-developed and well-nourished. No distress. HENT:   Head: Normocephalic and atraumatic. Cardiovascular: Normal rate, regular rhythm and normal heart sounds. No murmur heard. Pulmonary/Chest: Effort normal. No respiratory distress. He has wheezes. Abdominal: Soft. He exhibits no distension. There is no tenderness. Musculoskeletal: Normal range of motion. Neurological: He is alert and oriented to person, place, and time. No cranial nerve deficit. Skin: Skin is warm and dry. He is not diaphoretic. Psychiatric: He has a normal mood and affect. Vitals reviewed.       DIFFERENTIAL  DIAGNOSIS     PLAN (LABS / IMAGING / EKG):  Orders Placed This Encounter   Procedures    Initiate ED Aerosol Protocol    Respiratory care evaluation only    HHN Treatment    HHN Treatment    MDI Treatment       MEDICATIONS ORDERED:  Orders Placed This Encounter   Medications    albuterol (PROVENTIL) nebulizer solution 5 mg    ipratropium-albuterol (DUONEB) nebulizer solution 1 ampule    albuterol sulfate  (90 Base) MCG/ACT inhaler 2 puff    albuterol sulfate HFA (VENTOLIN HFA) 108 (90 Base) MCG/ACT inhaler     Sig: Inhale 2 puffs into the lungs every 6 hours as needed for Wheezing     Dispense:  1 Inhaler     Refill:  3    albuterol (PROVENTIL) (2.5 MG/3ML) 0.083% nebulizer solution     Sig: inhale contents of 1 vial in nebulizer four times a day if needed     Dispense:  300 vial     Refill:  0    montelukast (SINGULAIR) 10 MG tablet     Sig: Take 1 tablet by mouth daily     Dispense:  30 tablet     Refill:  0       DDX: Asthma exacerbation, bronchitis, URI,    DIAGNOSTIC RESULTS / DEPARTMENT COURSE / MDM     LABS:  No results found for this visit on 08/26/19. IMPRESSION: Patient is a well-appearing 60-year-old male presents with wheezing and shortness of breath. Will give breathing treatment provide patient with albuterol inhaler has social work contact the patient for assistance in getting medications and discharge patient home. Patient requesting discharge. Patient was given written and verbalinstructions prior to discharge. Patient understood and agreed. The patient had no further questions. RADIOLOGY:  No results found. EKG  None    All EKG's are interpreted by the Emergency Department Physician who either signs or Co-signsthis chart in the absence of a cardiologist.    EMERGENCY DEPARTMENT COURSE:    Patient reevaluated resting comfortably in bed no acute distress. Lungs clear. Patient provided with Coupons for prescriptions by social work. Patient discharged home with clinic list.    PROCEDURES:  None    CONSULTS:  None      FINAL IMPRESSION      1. Mild intermittent asthma with exacerbation    2.  Severe persistent asthma in adult without complication          DISPOSITION / PLAN     DISPOSITION Decision To Discharge    PATIENT REFERRED TO:  [facility]  [address] [address]  [phone]    As needed      DISCHARGE MEDICATIONS:  Discharge Medication List as of 8/26/2019  4:17 PM          Teodora Deshpande DO  Emergency Medicine Resident    (Please note thatportions of this note were completed with a voice recognition program.  Efforts were made to edit the dictations but occasionally words are mis-transcribed.)       Teodora Deshpande DO  Resident  08/26/19 9525

## 2020-03-04 ENCOUNTER — OFFICE VISIT (OUTPATIENT)
Dept: FAMILY MEDICINE CLINIC | Age: 40
End: 2020-03-04
Payer: MEDICAID

## 2020-03-04 VITALS
SYSTOLIC BLOOD PRESSURE: 118 MMHG | WEIGHT: 165.4 LBS | OXYGEN SATURATION: 96 % | HEART RATE: 85 BPM | HEIGHT: 69 IN | DIASTOLIC BLOOD PRESSURE: 75 MMHG | BODY MASS INDEX: 24.5 KG/M2 | TEMPERATURE: 97.6 F

## 2020-03-04 PROCEDURE — 1036F TOBACCO NON-USER: CPT | Performed by: STUDENT IN AN ORGANIZED HEALTH CARE EDUCATION/TRAINING PROGRAM

## 2020-03-04 PROCEDURE — G8427 DOCREV CUR MEDS BY ELIG CLIN: HCPCS | Performed by: STUDENT IN AN ORGANIZED HEALTH CARE EDUCATION/TRAINING PROGRAM

## 2020-03-04 PROCEDURE — 99213 OFFICE O/P EST LOW 20 MIN: CPT | Performed by: STUDENT IN AN ORGANIZED HEALTH CARE EDUCATION/TRAINING PROGRAM

## 2020-03-04 PROCEDURE — G8484 FLU IMMUNIZE NO ADMIN: HCPCS | Performed by: STUDENT IN AN ORGANIZED HEALTH CARE EDUCATION/TRAINING PROGRAM

## 2020-03-04 PROCEDURE — G8420 CALC BMI NORM PARAMETERS: HCPCS | Performed by: STUDENT IN AN ORGANIZED HEALTH CARE EDUCATION/TRAINING PROGRAM

## 2020-03-04 RX ORDER — MONTELUKAST SODIUM 10 MG/1
10 TABLET ORAL DAILY
Qty: 30 TABLET | Refills: 3 | Status: SHIPPED | OUTPATIENT
Start: 2020-03-04 | End: 2020-06-20 | Stop reason: SDUPTHER

## 2020-03-04 RX ORDER — ALBUTEROL SULFATE 90 UG/1
2 AEROSOL, METERED RESPIRATORY (INHALATION) EVERY 6 HOURS PRN
Qty: 1 INHALER | Refills: 3 | Status: SHIPPED | OUTPATIENT
Start: 2020-03-04 | End: 2020-06-16 | Stop reason: SDUPTHER

## 2020-03-04 RX ORDER — ALBUTEROL SULFATE 2.5 MG/3ML
SOLUTION RESPIRATORY (INHALATION)
Qty: 300 VIAL | Refills: 0 | Status: CANCELLED | OUTPATIENT
Start: 2020-03-04

## 2020-03-04 RX ORDER — BUDESONIDE AND FORMOTEROL FUMARATE DIHYDRATE 160; 4.5 UG/1; UG/1
AEROSOL RESPIRATORY (INHALATION)
Qty: 10.2 G | Refills: 5 | Status: SHIPPED | OUTPATIENT
Start: 2020-03-04 | End: 2020-06-15 | Stop reason: SDUPTHER

## 2020-03-04 ASSESSMENT — PATIENT HEALTH QUESTIONNAIRE - PHQ9
SUM OF ALL RESPONSES TO PHQ QUESTIONS 1-9: 0
1. LITTLE INTEREST OR PLEASURE IN DOING THINGS: 0
2. FEELING DOWN, DEPRESSED OR HOPELESS: 0
SUM OF ALL RESPONSES TO PHQ QUESTIONS 1-9: 0
SUM OF ALL RESPONSES TO PHQ9 QUESTIONS 1 & 2: 0

## 2020-03-04 NOTE — PROGRESS NOTES
Visit Information    Have you changed or started any medications since your last visit including any over-the-counter medicines, vitamins, or herbal medicines? no   Have you stopped taking any of your medications? Is so, why? -  no  Are you having any side effects from any of your medications? - no    Have you seen any other physician or provider since your last visit?  no   Have you had any other diagnostic tests since your last visit?  no   Have you been seen in the emergency room and/or had an admission in a hospital since we last saw you?  no   Have you had your routine dental cleaning in the past 6 months?  no     Do you have an active MyChart account? If no, what is the barrier?   No: declined    Patient Care Team:  Sae Lucas MD as PCP - General (Family Medicine)    Medical History Review  Past Medical, Family, and Social History reviewed and does contribute to the patient presenting condition    Health Maintenance   Topic Date Due    Varicella vaccine (1 of 2 - 2-dose childhood series) 01/17/1982    Pneumococcal 0-64 years Vaccine (1 of 1 - PPSV23) 01/17/1987    DTaP/Tdap/Td vaccine (1 - Tdap) 01/17/1992    HIV screen  01/17/1996    Flu vaccine (1) 09/01/2019    Shingles Vaccine (1 of 2) 01/17/2031    Hepatitis A vaccine  Aged Out    Hepatitis B vaccine  Aged Out    Hib vaccine  Aged Out    Meningococcal (ACWY) vaccine  Aged Out

## 2020-03-04 NOTE — PATIENT INSTRUCTIONS
Thank you for letting us take care of you today. We hope all your questions were addressed. If a question was overlooked or something else comes to mind after you return home, please contact a member of your Care Team listed below. Please make sure you have a routine office visit set up to follow-up on [address]. Your Care Team at [place] [address] is Team #4  Montrell Bishop MD (Faculty)  Baudilio Mckenzie MD (Faculty  Gin MD Zeus (Resident)  Ryan Crocker MD (Resident)  Colletta Littles, MD (Resident)  Rebecca Landis MD (Resident)  Angie Stephen MD (Resident)  DAVID Allen. ,NABIL OBANDO,NABIL HAWTHORNE, NABIL Moe ([address] [facility] office)  [facility] office)  [facility] office)  Joaquim Gonzalez, [place] ([address] )  Sully Llanos [facility] (Clinical Pharmacist)       Office phone number: [phone]    If you need to get in right away due to illness, please be advised we have \"Same Day\" appointments available Monday-Friday. Please call us at [phone] option #3 to schedule your \"Same Day\" appointment.

## 2020-03-04 NOTE — PROGRESS NOTES
Subjective:    Miguel Hughes is a 45 y.o. male with  has a past medical history of Asthma. 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 Current medication regimen: albuterol and symbicort and singulair  Allergy medications: none currently  Using nebulizer/spacer: yes   Albuterol/LISSETTE use per week: 3x/week, Symbicort 2x/day but not using it regularly  Nighttime awakenings per week: none  Interference with activity: sometimes when playing basketball  Total ER and Urgent Care visits since last routine asthma visit: None since August 2019 was provided with breathing treatments and had been provided with albuterol inhalers and contact for assistance with getting the medications. Intubations (total): none  Pulmonology involved in care: none  Last PFT's/Spirometry was many years ago at the hospital  His Peak Flow at the Hospital in August 2019 was 280. Smoking - none; he had smoked 1PPD from 1467-1715 but none since with no exposure to any smoke. Health Maintenance   Depression-PHQ2- 0     Review of Systems  Review of Systems   Constitutional: Negative for activity change, appetite change, chills, diaphoresis, fatigue, fever and unexpected weight change. HENT: Negative for sinus pressure, sinus pain, sore throat and trouble swallowing. Respiratory: Negative for cough, shortness of breath and wheezing. Cardiovascular: Negative for chest pain, palpitations and leg swelling. Gastrointestinal: Negative for abdominal pain, diarrhea, nausea and vomiting. Endocrine: Negative for cold intolerance, polydipsia, polyphagia and polyuria. Genitourinary: Negative for difficulty urinating, flank pain and frequency. Musculoskeletal: Negative for gait problem and joint swelling. Negative for back pain, neck pain and neck stiffness. Skin: Negative for color change and wound. Negative for pallor and rash. Allergic/Immunologic: Negative for environmental allergies and food allergies. Neurological: Negative for light-headedness, numbness and headaches. Psychiatric/Behavioral: Negative for sleep disturbance. Negative for confusion and suicidal ideas. Objective:    /75 (Site: Left Upper Arm, Position: Sitting, Cuff Size: Medium Adult) Comment: machine  Pulse 85   Temp 97.6 °F (36.4 °C) (Oral)   Ht 5' 9.02\" (1.753 m)   Wt 165 lb 6.4 oz (75 kg)   SpO2 96%   BMI 24.41 kg/m²    BP Readings from Last 3 Encounters:   03/04/20 118/75   08/26/19 125/82   03/28/19 118/86       Physical Exam  Constitutional: Patient is oriented to person, place, and time. Patient appears well-developed and well-nourished. No distress. HENT: Head: Normocephalic and atraumatic. Eyes: Pupils are equal, round, and reactive to light. Conjunctivae are normal. Right eye exhibits no discharge. Left eye exhibits no discharge. Cardiovascular: Normal rate, regular rhythm and normal heart sounds. Pulmonary/Chest: Effort normal and breath sounds normal. No respiratory distress. Patient has slight wheezing to posterior lung fields bilaterally. Abdominal: Soft. Bowel sounds are normal. Patient exhibits no distension. There is no tenderness. Musculoskeletal:  Patient exhibits no edema and tenderness. Patient exhibits no deformity. Neurological: Patient is alert and oriented to person, place, and time. Skin: Skin is warm and dry. Patient is not diaphoretic.    Psychiatric: Patient's speech is normal and behavior is normal. Thought content normal. Patient's mood appears anxious. Vitals reviewed. Lab Results   Component Value Date    WBC 10.6 03/17/2017    HGB 15.9 03/17/2017    HCT 45.4 03/17/2017     03/17/2017    ALT 22 03/17/2017    AST 18 03/17/2017     03/17/2017    K 4.4 03/17/2017    CL 98 03/17/2017    CREATININE 1.28 (H) 03/17/2017    BUN 15 03/17/2017    CO2 28 03/17/2017     Lab Results   Component Value Date    CALCIUM 10.5 (H) 03/17/2017     No results found for: LDLCALC, LDLCHOLESTEROL, LDLDIRECT      Assessment and Plan:    1. Severe persistent asthma in adult without complication  - budesonide-formoterol (SYMBICORT) 160-4.5 MCG/ACT AERO; inhale 2 puffs by mouth twice a day  Dispense: 10.2 g; Refill: 5  - montelukast (SINGULAIR) 10 MG tablet; Take 1 tablet by mouth daily  Dispense: 30 tablet; Refill: 3  - albuterol sulfate HFA (VENTOLIN HFA) 108 (90 Base) MCG/ACT inhaler; Inhale 2 puffs into the lungs every 6 hours as needed for Wheezing  Dispense: 1 Inhaler; Refill: 3  - Full PFT Study With Bronchodilator;  Future  -Patient also set up an Asthma Action Plan    Requested Prescriptions     Signed Prescriptions Disp Refills    budesonide-formoterol (SYMBICORT) 160-4.5 MCG/ACT AERO 10.2 g 5     Sig: inhale 2 puffs by mouth twice a day    montelukast (SINGULAIR) 10 MG tablet 30 tablet 3     Sig: Take 1 tablet by mouth daily    albuterol sulfate HFA (VENTOLIN HFA) 108 (90 Base) MCG/ACT inhaler 1 Inhaler 3     Sig: Inhale 2 puffs into the lungs every 6 hours as needed for Wheezing       Medications Discontinued During This Encounter   Medication Reason    albuterol (PROVENTIL) (2.5 MG/3ML) 0.083% nebulizer solution LIST CLEANUP    SYMBICORT 160-4.5 MCG/ACT AERO REORDER    montelukast (SINGULAIR) 10 MG tablet REORDER    albuterol sulfate HFA (VENTOLIN HFA) 108 (90 Base) MCG/ACT inhaler NITAORDER       Austin received counseling on the following healthy behaviors: nutrition, exercise and medication adherence    Discussed use, benefit, and side effects of prescribed medications. Barriers to medication compliance addressed. All patient questions answered. Pt voiced understanding, with use of teach-back method. Return in about 3 months (around 6/4/2020), or if symptoms worsen or fail to improve, for f/u asthma. Health maintenance was discussed with the patient, risk benefits as well as possibly death from not having immunizations up-to-date was discussed and the patient does not want all of their immunizations provided today. Patient is concerned about the cost and will follow up with the insurance company prior to getting any HIV screen or any immunizations including PNA/tetanus and flu vaccines.

## 2020-03-05 NOTE — PROGRESS NOTES
Attending Physician Statement    Wt Readings from Last 3 Encounters:   03/04/20 165 lb 6.4 oz (75 kg)   03/28/19 164 lb (74.4 kg)   10/26/18 163 lb 3.2 oz (74 kg)     Temp Readings from Last 3 Encounters:   03/04/20 97.6 °F (36.4 °C) (Oral)   08/26/19 98.2 °F (36.8 °C)   10/26/18 97.9 °F (36.6 °C) (Temporal)     BP Readings from Last 3 Encounters:   03/04/20 118/75   08/26/19 125/82   03/28/19 118/86     Pulse Readings from Last 3 Encounters:   03/04/20 85   08/26/19 86   07/31/18 89         I have discussed the care of Barnhart Memos, including pertinent history and exam findings with the resident. I have reviewed the key elements of all parts of the encounter with the resident. I agree with the assessment, plan and orders as documented by the resident.   (GE Modifier)

## 2020-03-14 ENCOUNTER — TELEPHONE (OUTPATIENT)
Dept: FAMILY MEDICINE CLINIC | Age: 40
End: 2020-03-14

## 2020-06-16 RX ORDER — ALBUTEROL SULFATE 90 UG/1
AEROSOL, METERED RESPIRATORY (INHALATION)
Qty: 18 G | Refills: 3 | Status: SHIPPED | OUTPATIENT
Start: 2020-06-16 | End: 2020-11-05 | Stop reason: SDUPTHER

## 2020-06-16 NOTE — TELEPHONE ENCOUNTER
Please address the medication refill and close the encounter. If I can be of assistance, please route to the applicable pool. Thank you. Last visit: 03/04/20  Last Med refill: 06/15/20  Does patient have enough medication for 72 hours: No:     Next Visit Date:  No future appointments.     Health Maintenance   Topic Date Due    Varicella vaccine (1 of 2 - 2-dose childhood series) 01/17/1982    Pneumococcal 0-64 years Vaccine (1 of 1 - PPSV23) 01/17/1987    HIV screen  01/17/1996    DTaP/Tdap/Td vaccine (1 - Tdap) 01/17/2000    Flu vaccine (Season Ended) 09/01/2020    Hepatitis A vaccine  Aged Out    Hepatitis B vaccine  Aged Out    Hib vaccine  Aged Out    Meningococcal (ACWY) vaccine  Aged Out       No results found for: LABA1C          ( goal A1C is < 7)   No results found for: LABMICR  No results found for: LDLCHOLESTEROL, LDLCALC    (goal LDL is <100)   AST (U/L)   Date Value   03/17/2017 18     ALT (U/L)   Date Value   03/17/2017 22     BUN (mg/dL)   Date Value   03/17/2017 15     BP Readings from Last 3 Encounters:   03/04/20 118/75   08/26/19 125/82   03/28/19 118/86          (goal 120/80)    All Future Testing planned in [facility]  Lab Frequency Next Occurrence   Full PFT Study With Bronchodilator Once 03/04/2020               Patient Active Problem List:     Severe persistent asthma in adult without complication

## 2020-06-20 ENCOUNTER — HOSPITAL ENCOUNTER (EMERGENCY)
Age: 40
Discharge: HOME | DRG: 141 | End: 2020-06-20
Attending: EMERGENCY MEDICINE
Payer: MEDICAID

## 2020-06-20 VITALS
HEIGHT: 69 IN | DIASTOLIC BLOOD PRESSURE: 100 MMHG | TEMPERATURE: 98 F | OXYGEN SATURATION: 99 % | WEIGHT: 165 LBS | RESPIRATION RATE: 18 BRPM | HEART RATE: 97 BPM | SYSTOLIC BLOOD PRESSURE: 152 MMHG | BODY MASS INDEX: 24.44 KG/M2

## 2020-06-20 PROCEDURE — 94660 CPAP INITIATION&MGMT: CPT

## 2020-06-20 PROCEDURE — 6360000002 HC RX W HCPCS: Performed by: STUDENT IN AN ORGANIZED HEALTH CARE EDUCATION/TRAINING PROGRAM

## 2020-06-20 PROCEDURE — 5A1935Z RESPIRATORY VENTILATION, LESS THAN 24 CONSECUTIVE HOURS: ICD-10-PCS | Performed by: FAMILY MEDICINE

## 2020-06-20 PROCEDURE — 94644 CONT INHLJ TX 1ST HOUR: CPT

## 2020-06-20 PROCEDURE — 6370000000 HC RX 637 (ALT 250 FOR IP): Performed by: STUDENT IN AN ORGANIZED HEALTH CARE EDUCATION/TRAINING PROGRAM

## 2020-06-20 PROCEDURE — 99284 EMERGENCY DEPT VISIT MOD MDM: CPT

## 2020-06-20 PROCEDURE — 94640 AIRWAY INHALATION TREATMENT: CPT

## 2020-06-20 PROCEDURE — 96372 THER/PROPH/DIAG INJ SC/IM: CPT

## 2020-06-20 PROCEDURE — 94645 CONT INHLJ TX EACH ADDL HOUR: CPT

## 2020-06-20 PROCEDURE — 94664 DEMO&/EVAL PT USE INHALER: CPT

## 2020-06-20 PROCEDURE — 94761 N-INVAS EAR/PLS OXIMETRY MLT: CPT

## 2020-06-20 PROCEDURE — 2700000000 HC OXYGEN THERAPY PER DAY

## 2020-06-20 PROCEDURE — 0BH17EZ INSERTION OF ENDOTRACHEAL AIRWAY INTO TRACHEA, VIA NATURAL OR ARTIFICIAL OPENING: ICD-10-PCS | Performed by: FAMILY MEDICINE

## 2020-06-20 PROCEDURE — 96365 THER/PROPH/DIAG IV INF INIT: CPT

## 2020-06-20 PROCEDURE — 6360000002 HC RX W HCPCS

## 2020-06-20 RX ORDER — BUDESONIDE AND FORMOTEROL FUMARATE DIHYDRATE 160; 4.5 UG/1; UG/1
AEROSOL RESPIRATORY (INHALATION)
Qty: 10.2 G | Refills: 0 | Status: ON HOLD | OUTPATIENT
Start: 2020-06-20 | End: 2020-06-24 | Stop reason: SDUPTHER

## 2020-06-20 RX ORDER — ALBUTEROL SULFATE 2.5 MG/3ML
2.5 SOLUTION RESPIRATORY (INHALATION) EVERY 6 HOURS PRN
Status: DISCONTINUED | OUTPATIENT
Start: 2020-06-20 | End: 2020-06-20

## 2020-06-20 RX ORDER — ALBUTEROL SULFATE 90 UG/1
2 AEROSOL, METERED RESPIRATORY (INHALATION) EVERY 4 HOURS PRN
Qty: 18 G | Refills: 0 | Status: SHIPPED | OUTPATIENT
Start: 2020-06-20 | End: 2021-04-28

## 2020-06-20 RX ORDER — TERBUTALINE SULFATE 1 MG/ML
0.25 INJECTION, SOLUTION SUBCUTANEOUS ONCE
Status: COMPLETED | OUTPATIENT
Start: 2020-06-20 | End: 2020-06-20

## 2020-06-20 RX ORDER — PREDNISONE 20 MG/1
60 TABLET ORAL ONCE
Status: COMPLETED | OUTPATIENT
Start: 2020-06-20 | End: 2020-06-20

## 2020-06-20 RX ORDER — MAGNESIUM SULFATE 1 G/100ML
INJECTION INTRAVENOUS
Status: COMPLETED
Start: 2020-06-20 | End: 2020-06-20

## 2020-06-20 RX ORDER — ALBUTEROL SULFATE 90 UG/1
2 AEROSOL, METERED RESPIRATORY (INHALATION) EVERY 6 HOURS PRN
Status: DISCONTINUED | OUTPATIENT
Start: 2020-06-20 | End: 2020-06-20 | Stop reason: HOSPADM

## 2020-06-20 RX ORDER — MONTELUKAST SODIUM 10 MG/1
10 TABLET ORAL DAILY
Qty: 30 TABLET | Refills: 0 | Status: SHIPPED | OUTPATIENT
Start: 2020-06-20 | End: 2021-04-28

## 2020-06-20 RX ORDER — PREDNISONE 10 MG/1
TABLET ORAL
Qty: 20 TABLET | Refills: 0 | Status: ON HOLD | OUTPATIENT
Start: 2020-06-20 | End: 2020-06-24 | Stop reason: SDUPTHER

## 2020-06-20 RX ORDER — ALBUTEROL SULFATE 2.5 MG/3ML
5 SOLUTION RESPIRATORY (INHALATION) EVERY 6 HOURS PRN
Status: DISCONTINUED | OUTPATIENT
Start: 2020-06-20 | End: 2020-06-20 | Stop reason: HOSPADM

## 2020-06-20 RX ORDER — IPRATROPIUM BROMIDE AND ALBUTEROL SULFATE 2.5; .5 MG/3ML; MG/3ML
1 SOLUTION RESPIRATORY (INHALATION) ONCE
Status: DISCONTINUED | OUTPATIENT
Start: 2020-06-20 | End: 2020-06-20

## 2020-06-20 RX ADMIN — MAGNESIUM SULFATE 2 G: 1 INJECTION INTRAVENOUS at 08:20

## 2020-06-20 RX ADMIN — TERBUTALINE SULFATE 0.25 MG: 1 INJECTION SUBCUTANEOUS at 08:33

## 2020-06-20 RX ADMIN — IPRATROPIUM BROMIDE 0.5 MG: 0.5 SOLUTION RESPIRATORY (INHALATION) at 08:02

## 2020-06-20 RX ADMIN — PREDNISONE 60 MG: 20 TABLET ORAL at 07:38

## 2020-06-20 RX ADMIN — ALBUTEROL SULFATE 2 PUFF: 90 AEROSOL, METERED RESPIRATORY (INHALATION) at 10:20

## 2020-06-20 RX ADMIN — ALBUTEROL SULFATE 15 MG/HR: 5 SOLUTION RESPIRATORY (INHALATION) at 08:32

## 2020-06-20 RX ADMIN — ALBUTEROL SULFATE 5 MG: 2.5 SOLUTION RESPIRATORY (INHALATION) at 08:10

## 2020-06-20 RX ADMIN — ALBUTEROL SULFATE 5 MG: 2.5 SOLUTION RESPIRATORY (INHALATION) at 08:04

## 2020-06-20 ASSESSMENT — ENCOUNTER SYMPTOMS
ABDOMINAL PAIN: 0
SORE THROAT: 0
BACK PAIN: 0
VOMITING: 0
NAUSEA: 0
COUGH: 0
SHORTNESS OF BREATH: 1

## 2020-06-20 ASSESSMENT — PULMONARY FUNCTION TESTS
PEFR_L/MIN: 250
PEFR_L/MIN: 240

## 2020-06-20 NOTE — ED PROVIDER NOTES
[facility]  Emergency Department  Faculty Attestation     I performed a history and physical examination of the patient and discussed management with the resident. I reviewed the residents note and agree with the documented findings and plan of care. Any areas of disagreement are noted on the chart. I was personally present for the key portions of any procedures. I have documented in the chart those procedures where I was not present during the key portions. I have reviewed the emergency nurses triage note. I agree with the chief complaint, past medical history, past surgical history, allergies, medications, social and family history as documented unless otherwise noted below. For Physician Assistant/ Nurse Practitioner cases/documentation I have personally evaluated this patient and have completed at least one if not all key elements of the E/M (history, physical exam, and MDM). Additional findings are as noted. Primary Care Physician:  Brian Mitchell MD    Screenings:  [unfilled]    CHIEF COMPLAINT       Chief Complaint   Patient presents with    Shortness of Breath     c/o worsening SOB x2 days stating hx of asthma and has been out of his albuterol and symbicort       RECENT VITALS:   Temp: 98 °F (36.7 °C),  Pulse: 99, Resp: 18, BP: (!) 152/100    LABS:  Labs Reviewed - No data to display    Radiology  No orders to display         Attending Physician Additional  Notes    Dry cough shortness of breath wheezing. No sputum hemoptysis fever chills or sweats. He has a long history of asthma since childhood. He has exacerbations during the summer likely related to pollen. He is out of all his medications. He does have a spacer. No leg pain calf swelling immobility. No history of VTE. On exam he is tachypneic and has mild to moderate rest or distress with accessory muscle use and diffuse wheezing. Expiratory prolongation noted. Skin is warm and dry.   No JVD or gallop. Impression is asthma exacerbation. Plan is prednisone, bronchodilators, reassess. Hardik 54.  Ryan Hightower MD, [facility]  Attending Emergency  Physician                Kerri Jones MD  06/20/20 9324

## 2020-06-20 NOTE — PROGRESS NOTES
Asthma Education        [x] Disease Process    [x] Management   [x] Risk Factors   [x] Signs and Symptoms        [] Control Medications  [x] Rescue Medications   [x] Correct inhaler/spacer technique    [] Written home asthma action plan    Peak Flow management intsructed to patient with zones. Rescue Inhaler and Peak Flow Meter given to patient.

## 2020-06-20 NOTE — PROGRESS NOTES
Emergency Department Bronchodilator Assessment    Patient Assessment complete. No admission diagnoses are documented for this encounter. .   Vitals:    06/20/20 0837   BP:    Pulse: 97   Resp:    Temp:    SpO2: 99%   . Patients home meds are   Prior to Admission medications    Medication Sig Start Date End Date Taking? Authorizing Provider   predniSONE (DELTASONE) 10 MG tablet Take 4 tablets by mouth once daily for 5 days 6/20/20 6/30/20 Yes Kassy Louder, DO   albuterol sulfate  (90 Base) MCG/ACT inhaler Inhale 2 puffs into the lungs every 4 hours as needed for Wheezing 6/20/20  Yes Kassy Louder, DO   budesonide-formoterol [facility]) 160-4.5 MCG/ACT AERO inhale 2 puffs by mouth twice a day 6/20/20  Yes [place] Louder, DO   montelukast (SINGULAIR) 10 MG tablet Take 1 tablet by mouth daily 6/20/20  Yes [place] Louder, DO   albuterol sulfate  (90 Base) MCG/ACT inhaler inhale 2 puffs by mouth every 6 hours if needed for wheezing 6/16/20   Karina Shore MD   Respiratory Therapy Supplies (NEBULIZER/TUBING/MOUTHPIECE) KIT 1 kit by Does not apply route daily as needed (wheezing) 11/20/17   Karina Mckay MD   .     Patient's predicted PF is:  560 L/min  Last Charted: not applicable = 0    PF before 1st TX PF after 1st TX PF after 2nd [address] PF after 3rd TX    [] >75%  [] >75%   250 [] >75%    [] 50-75%  [] 50-75%    [x] 50-75%    [] <50%  [] <50%    [] <50%       HHN treatment(s) given x 2. Placed on continuous aerosol. Condition worsened.     Breath Sounds:   LUNG FIELD Pre-Treatment Post-Treatment   RUL Breath Sounds Pre-Tx RUL: Expiratory Wheezes, Inspiratory Wheezes Breath Sounds Post-Tx RUL: Expiratory Wheezes, Inspiratory Wheezes   RML Breath Sounds Pre-Tx RML: Expiratory Wheezes, Inspiratory Wheezes Breath Sounds Post-Tx RML: Expiratory Wheezes, Inspiratory Wheezes   RLL Breath Sounds Pre-Tx RLL: Expiratory Wheezes, Inspiratory Wheezes Breath Sounds Post-Tx RLL: Expiratory Wheezes, Inspiratory Wheezes   ROSALINE Breath Sounds Pre-Tx ROSALINE: Expiratory Wheezes, Inspiratory Wheezes Breath Sounds Post-Tx ROSALINE: Expiratory Wheezes, Inspiratory Wheezes   LLL Breath Sounds Pre-Tx LLL: Expiratory Wheezes, Inspiratory Wheezes Breath Sounds Post-Tx LLL: Expiratory Wheezes, Inspiratory Wheezes     COMMENTS:  Tri Pod position with poor air exchange  Bronchodilator assessment at level  4  [x]    Bronchodilator Assessment    BRONCHODILATOR ASSESSMENT SCORE  Score 1 2 3 4   Breath Sounds   []  Clear []  Mild Wheezing with good aeration []  Moderate I/E wheezing with adequate aeration [x]  Poor Aeration or diffuse wheezing   Respiratory Rate []  Less than 20 []  20-25 []  Greater than 25  [x]  Greater than 35    Dyspnea []  No SOB  []  SOB with minimal activity []  Speaking in partial sentences []  Acute/ At rest   Peakflow (asthma) []  80 % or greater predicted/PB  []  Unable []  70% or greater predicted/PB  []  Unable []  51%-70% predicted/PB  []  Unable []  Less than 50% predicted/PB  [x]  Unable due to distress   FEV1 % Predicted []  Greater than 69%  []  Unable  []  Less than 50%-69%  []  Unable  []  Less than 35%-49%  []  Unable  []  Less than 35%  []  Unable due to distress     Predicted Peak Expiratory Flow Rate                                       Height (in)  Female       Height (in) Male           Age 06 14 81 95 15 58 83 79 Age            25 529 347 234 069 960 600 516 563  16 13 89 61 32 08 44 68 84   73 284 522 919 029 165 861 789 194 76 291 849 719 599 604 595 286 367 933   72 446 824 455 132 832 884 658 414 88 326 477 985 352 299 237 576 019 075   49 279 549 216 196 390 236 342 809 32 443 836 585 064 032 692 316 128 456   19 270 551 046 745 092 467 001 713 65 917 497 783 060 958 891 536 014 600   44 058 821 881 262 362 825 598 547 58 151 098 364 344 883 296 776 380 057   96 594 738 047 144 873 112 210 065 09 808 970 503 446 293 637 335 323 199   34 244 512 114 046 513 285 387 670 62 658 365 194 825 520 541 005 429 593   21 776 024 983 120 192 235 075 094 64 633 417 089 180 298 795 159 263 728   17 568 932 832 851 580 132 740 607 12 617 949 118 424 810 676 786 050 019   70 153 609 299 314 329 344 359 374 70 353 382 410 439 468 496 525 554 583   75 261 274 289 305 319 334 348 364 75 344 372 400 429 458 487 515 544 573   80 253 266 282 296 312 327 342 356 80 335 362 390 419 448 [phone] 562

## 2020-06-20 NOTE — ED NOTES
Pt sitting in chair, BIPAP remains on. Pt stating his breathing has improved. Call light within reach, will continue to monitor.         Ayana Alas RN  06/20/20 3545

## 2020-06-20 NOTE — ED PROVIDER NOTES
[facility]  ED  Emergency Department Encounter  Emergency Medicine Resident     Pt Name: Silvia Davis  MRN: [id number]  [place] 1980  Date of evaluation: 20  PCP:  Seda Braswell MD    [address] [place] [address]       Chief Complaint   Patient presents with    Shortness of Breath     c/o worsening SOB x2 days stating hx of asthma and has been out of his albuterol and symbicort       HISTORY OFPRESENT ILLNESS  (Location/Symptom, Timing/Onset, Context/Setting, Quality, Duration, Modifying Factors,Severity.)      Silvia Davis is a 45 y.o. male who presents with complaints of shortness of breath. Patient with past medical history of asthma. He states that he has been out of his albuterol and Symbicort for the last 2 days. He states that when he has a Symbicort he rarely ever has to use his rescue inhaler. Denies ever having to be intubated for asthma exacerbation. He denies cough, chest pain, fever, lightheadedness, dizziness, abdominal pain, nausea, vomiting, lower extremity swelling or calf pain. Patient denies any history of DVT/PE, recent surgery, immobilization, hormone replacement, hemoptysis. PAST MEDICAL / SURGICAL / SOCIAL / FAMILY HISTORY      has a past medical history of Asthma. has no past surgical history on file.      Social History     Socioeconomic History    Marital status:      Spouse name: Not on file    Number of children: Not on file    Years of education: Not on file    Highest education level: Not on file   Occupational History    Not on file   Social Needs    Financial resource strain: Not on file    Food insecurity     Worry: Not on file     Inability: Not on file    Transportation needs     Medical: Not on file     Non-medical: Not on file   Tobacco Use    Smoking status: Former Smoker     Types: Cigarettes     Last attempt to quit: 2000     Years since quittin.3    Smokeless tobacco: Never Used   Substance and Sexual Activity    Alcohol use: Yes     Comment: 1 24oz can of beer daily    Drug use: No    Sexual activity: Yes     Partners: Female   Lifestyle    Physical activity     Days per week: Not on file     Minutes per session: Not on file    Stress: Not on file   Relationships    Social connections     Talks on phone: Not on file     Gets together: Not on file     Attends Nondenominational service: Not on file     Active member of club or organization: Not on file     Attends meetings of clubs or organizations: Not on file     Relationship status: Not on file    Intimate partner violence     Fear of current or ex partner: Not on file     Emotionally abused: Not on file     Physically abused: Not on file     Forced sexual activity: Not on file   Other Topics Concern    Not on file   Social History Narrative    Not on file       Family History   Problem Relation Age of Onset    High Blood Pressure Mother     Stroke Father         Allergies:  Patient has no known allergies. Home Medications:  Prior to Admission medications    Medication Sig Start Date End Date Taking?  Authorizing Provider   predniSONE (DELTASONE) 10 MG tablet Take 4 tablets by mouth once daily for 5 days 6/20/20 6/30/20 Yes Bony Mask, DO   albuterol sulfate  (90 Base) MCG/ACT inhaler Inhale 2 puffs into the lungs every 4 hours as needed for Wheezing 6/20/20  Yes Bony Mask, DO   budesonide-formoterol [facility]) 160-4.5 MCG/ACT AERO inhale 2 puffs by mouth twice a day 6/20/20  Yes Bony Mask, DO   montelukast (SINGULAIR) 10 MG tablet Take 1 tablet by mouth daily 6/20/20  Yes Bony Mask, DO   albuterol sulfate  (90 Base) MCG/ACT inhaler inhale 2 puffs by mouth every 6 hours if needed for wheezing 6/16/20   Ina Holm MD   Respiratory Therapy Supplies (NEBULIZER/TUBING/MOUTHPIECE) KIT 1 kit by Does not apply route daily as needed (wheezing) 11/20/17   Jennifer Snell MD       REVIEW OFSYSTEMS    (2-9 systems for level 4, 10 or more for level 5) Review of Systems   Constitutional: Negative for activity change, appetite change, chills, fatigue and fever. HENT: Negative for congestion and sore throat. Eyes: Negative for visual disturbance. Respiratory: Positive for shortness of breath. Negative for cough. Cardiovascular: Negative for chest pain. Gastrointestinal: Negative for abdominal pain, nausea and vomiting. Musculoskeletal: Negative for back pain. Skin: Negative for wound. Neurological: Negative for dizziness, syncope, weakness, light-headedness, numbness and headaches. PHYSICAL EXAM   (up to 7 for level 4, 8 or more forlevel 5)      INITIAL VITALS:   ED Triage Vitals [06/20/20 0731]   BP Temp Temp Source Pulse Resp SpO2 Height Weight   (!) 152/100 98 °F (36.7 °C) Oral 99 18 95 % 5' 9\" (1.753 m) 165 lb (74.8 kg)       Physical Exam  Vitals signs and nursing note reviewed. Constitutional:       General: He is not in acute distress. Appearance: Normal appearance. He is well-developed. He is not diaphoretic. HENT:      Head: Normocephalic and atraumatic. Neck:      Musculoskeletal: Normal range of motion and neck supple. Cardiovascular:      Rate and Rhythm: Normal rate and regular rhythm. Heart sounds: Normal heart sounds. Pulmonary:      Effort: Pulmonary effort is normal. Tachypnea present. Breath sounds: Wheezing present. Comments: Diffuse bilateral expiratory wheezing  Abdominal:      General: There is no distension. Palpations: Abdomen is soft. Tenderness: There is no abdominal tenderness. There is no guarding. Musculoskeletal: Normal range of motion. General: No swelling or tenderness. Skin:     General: Skin is warm and dry. Neurological:      Mental Status: He is alert. Mental status is at baseline. Psychiatric:         Behavior: Behavior normal.         Thought Content: Thought content normal.         DIFFERENTIAL  DIAGNOSIS     PLAN (LABS / IMAGING / EKG):   No orders of the defined types were placed in this encounter. MEDICATIONS ORDERED:  Orders Placed This Encounter   Medications    DISCONTD: ipratropium-albuterol (DUONEB) nebulizer solution 1 ampule    predniSONE (DELTASONE) tablet 60 mg    DISCONTD: albuterol sulfate  (90 Base) MCG/ACT inhaler 2 puff    DISCONTD: albuterol (PROVENTIL) nebulizer solution 2.5 mg    ipratropium (ATROVENT) 0.02 % nebulizer solution 0.5 mg    DISCONTD: albuterol (PROVENTIL) nebulizer solution 5 mg    DISCONTD: magnesium sulfate 2 g in dextrose 5 % 100 mL IVPB    magnesium sulfate 1-5 GM/100ML-% IVPB (premix)     CHARLES MOREIRA: cabinet override    terbutaline (BRETHINE) injection 0.25 mg    DISCONTD: albuterol (PROVENTIL) nebulizer solution    predniSONE (DELTASONE) 10 MG tablet     Sig: Take 4 tablets by mouth once daily for 5 days     Dispense:  20 tablet     Refill:  0    albuterol sulfate  (90 Base) MCG/ACT inhaler     Sig: Inhale 2 puffs into the lungs every 4 hours as needed for Wheezing     Dispense:  18 g     Refill:  0    budesonide-formoterol (SYMBICORT) 160-4.5 MCG/ACT AERO     Sig: inhale 2 puffs by mouth twice a day     Dispense:  10.2 g     Refill:  0    montelukast (SINGULAIR) 10 MG tablet     Sig: Take 1 tablet by mouth daily     Dispense:  30 tablet     Refill:  0         Initial MDM/Plan/ED COURSE:    45 y.o. male who presents with complaints of shortness of breath. On exam patient is well-appearing, nontoxic. Vital signs are within normal limits. On physical exam abdomen is soft, nontender, nondistended. Lungs expiratory wheezing bilaterally, tachypneic. Patient still talking in full sentences. Cardiac regular rate and rhythm. No lower extremity swelling or calf tenderness. Impression is asthma exacerbation. Will have respiratory therapy evaluate the patient for treatment. Will give prednisone.   If symptoms improve we will plan to discharge home with refills of his Symbicort and albuterol, otherwise if no improvement plan for admission. ED Course as of Jun 20 1311   Sat Jun 20, 2020   0814 Called to the room by respiratory. Patient states that he is feeling worse since the breathing treatment started. We will plan for IV placement and magnesium. Patient did receive the prednisone.    [LW]   9275 Patient reevaluated he states he feels much better at this time, would like to try and it off BiPAP and see how he feels. [LW]   [id number] Patient off BiPAP, states that he feels much better, getting another breathing treatment at this time. [LW]   [id number] Patient on continuous treatment at this time. Did recommend to the patient admission to the hospital for asthma exacerbation as she still has wheezing on exam, peak flow less than 50%. Did explain to the patient that we are concerned if he goes home that symptoms may get worse. Patient states that he would still like to try going home. We will plan to have patient ambulate off oxygen and see if he desats. Will give patient refills on his prescriptions including albuterol, Symbicort. We will give him a rescue inhaler on discharge as well as steroid course. Patient understanding of the risks of going, had shared decision making and still requesting discharge at this time. [LW]   [phone] Patient ambulated with RN, sats 92% on room air, 95% at rest.    [LW]      ED Course User Index  [LW] Israel Heart DO      Patient reevaluated just prior to discharge she is talking in full sentences, states that he feels much better and again is comfortable with discharge home. Patient provided with rescue inhaler in hand prior to discharge as well as refills on his prescriptions. He was discharged home with a course of steroids and instructed to complete the entire course.   He was given strict return precautions including any worsening or new symptoms such as fever, worsening shortness of breath, difficulty speaking full sentences or any other new or concerning symptoms. Patient instructed to call PCP and schedule a follow appointment in the next week for reevaluation. Patient agreeable for discharge at this time, questions answered. Patient discharged home in stable condition. DIAGNOSTIC RESULTS / EMERGENCYDEPARTMENT COURSE / MDM     LABS:  Labs Reviewed - No data to display        PROCEDURES:  None    CONSULTS:  None    CRITICAL CARE:  Please see attending note    FINAL IMPRESSION      1. Moderate asthma with exacerbation, unspecified whether persistent    2.  Severe persistent asthma in adult without complication          DISPOSITION / PLAN     DISPOSITION Decision To Discharge 06/20/2020 10:24:05 AM      PATIENT REFERRED TO:  [facility] ED  [address] [facility] [address]  [phone]  Go to   If symptoms worsen    Kirsten Grossman MD  [address] [id number]    In 3 days        DISCHARGE MEDICATIONS:  Discharge Medication List as of 6/20/2020 10:24 AM      START taking these medications    Details   predniSONE (DELTASONE) 10 MG tablet Take 4 tablets by mouth once daily for 5 days, Disp-20 tablet, R-0Print             Adrianne Clemente DO  Emergency Medicine Resident    (Please note that portions of this note were completed with a voice recognition program.Efforts were made to edit the dictations but occasionally words are mis-transcribed.)        Adrianne Clemente DO  Resident  06/20/20 8103

## 2020-06-20 NOTE — PROGRESS NOTES
I was assigned to this patient in error. I did not see this patient or participate in their care.     Cristino Queen DO  6/20/2020 7:28 AM

## 2020-06-22 ENCOUNTER — HOSPITAL ENCOUNTER (INPATIENT)
Age: 40
LOS: 2 days | Discharge: HOME | DRG: 141 | End: 2020-06-24
Attending: EMERGENCY MEDICINE | Admitting: INTERNAL MEDICINE
Payer: MEDICAID

## 2020-06-22 ENCOUNTER — APPOINTMENT (OUTPATIENT)
Dept: CT IMAGING | Age: 40
DRG: 141 | End: 2020-06-22
Payer: MEDICAID

## 2020-06-22 ENCOUNTER — APPOINTMENT (OUTPATIENT)
Dept: GENERAL RADIOLOGY | Age: 40
DRG: 141 | End: 2020-06-22
Payer: MEDICAID

## 2020-06-22 PROBLEM — R41.82 ALTERED MENTAL STATUS: Status: ACTIVE | Noted: 2020-06-22

## 2020-06-22 LAB
ABSOLUTE EOS #: 0.37 K/UL (ref 0–0.4)
ABSOLUTE IMMATURE GRANULOCYTE: 0 K/UL (ref 0–0.3)
ABSOLUTE LYMPH #: 4.96 K/UL (ref 1–4.8)
ABSOLUTE MONO #: 0.87 K/UL (ref 0.1–0.8)
ACETAMINOPHEN LEVEL: <5 UG/ML (ref 10–30)
ALBUMIN SERPL-MCNC: 4.2 G/DL (ref 3.5–5.2)
ALBUMIN/GLOBULIN RATIO: 1.6 (ref 1–2.5)
ALLEN TEST: ABNORMAL
ALLEN TEST: POSITIVE
ALP BLD-CCNC: 55 U/L (ref 40–129)
ALT SERPL-CCNC: 54 U/L (ref 5–41)
AMMONIA: 107 UMOL/L (ref 16–60)
AMPHETAMINE SCREEN URINE: NEGATIVE
ANION GAP SERPL CALCULATED.3IONS-SCNC: 24 MMOL/L (ref 9–17)
ANION GAP: 13 MMOL/L (ref 7–16)
AST SERPL-CCNC: 126 U/L
BARBITURATE SCREEN URINE: NEGATIVE
BASOPHILS # BLD: 0 % (ref 0–2)
BASOPHILS ABSOLUTE: 0 K/UL (ref 0–0.2)
BENZODIAZEPINE SCREEN, URINE: NEGATIVE
BETA-HYDROXYBUTYRATE: 0.06 MMOL/L (ref 0.02–0.27)
BILIRUB SERPL-MCNC: 0.32 MG/DL (ref 0.3–1.2)
BUN BLDV-MCNC: 13 MG/DL (ref 6–20)
BUN/CREAT BLD: ABNORMAL (ref 9–20)
BUPRENORPHINE URINE: ABNORMAL
CALCIUM SERPL-MCNC: 8.9 MG/DL (ref 8.6–10.4)
CANNABINOID SCREEN URINE: POSITIVE
CHLORIDE BLD-SCNC: 100 MMOL/L (ref 98–107)
CO2: 12 MMOL/L (ref 20–31)
COCAINE METABOLITE, URINE: NEGATIVE
CREAT SERPL-MCNC: 1.48 MG/DL (ref 0.7–1.2)
DIFFERENTIAL TYPE: ABNORMAL
EOSINOPHILS RELATIVE PERCENT: 3 % (ref 1–4)
ETHANOL PERCENT: <0.01 %
ETHANOL: <10 MG/DL
FIO2: 40
FIO2: ABNORMAL
GFR AFRICAN AMERICAN: >60 ML/MIN
GFR NON-AFRICAN AMERICAN: 43 ML/MIN
GFR NON-AFRICAN AMERICAN: 53 ML/MIN
GFR SERPL CREATININE-BSD FRML MDRD: 52 ML/MIN
GFR SERPL CREATININE-BSD FRML MDRD: ABNORMAL ML/MIN/{1.73_M2}
GLUCOSE BLD-MCNC: 193 MG/DL (ref 74–100)
GLUCOSE BLD-MCNC: 239 MG/DL (ref 70–99)
HCO3 VENOUS: 22.7 MMOL/L (ref 22–29)
HCT VFR BLD CALC: 46.9 % (ref 40.7–50.3)
HEMOGLOBIN: 15.7 G/DL (ref 13–17)
IMMATURE GRANULOCYTES: 0 %
LACTIC ACID, WHOLE BLOOD: 1.5 MMOL/L (ref 0.7–2.1)
LACTIC ACID, WHOLE BLOOD: 3.3 MMOL/L (ref 0.7–2.1)
LYMPHOCYTES # BLD: 40 % (ref 24–44)
MCH RBC QN AUTO: 27.6 PG (ref 25.2–33.5)
MCHC RBC AUTO-ENTMCNC: 33.5 G/DL (ref 28.4–34.8)
MCV RBC AUTO: 82.6 FL (ref 82.6–102.9)
MDMA URINE: ABNORMAL
METHADONE SCREEN, URINE: NEGATIVE
METHAMPHETAMINE, URINE: ABNORMAL
MODE: ABNORMAL
MODE: ABNORMAL
MONOCYTES # BLD: 7 % (ref 1–7)
MORPHOLOGY: NORMAL
NEGATIVE BASE EXCESS, ART: ABNORMAL (ref 0–2)
NEGATIVE BASE EXCESS, VEN: 7 (ref 0–2)
NRBC AUTOMATED: 0 PER 100 WBC
O2 DEVICE/FLOW/%: ABNORMAL
O2 DEVICE/FLOW/%: ABNORMAL
O2 SAT, VEN: 80 % (ref 60–85)
OPIATES, URINE: NEGATIVE
OXYCODONE SCREEN URINE: NEGATIVE
PATIENT TEMP: ABNORMAL
PATIENT TEMP: ABNORMAL
PCO2, VEN: 60.5 MM HG (ref 41–51)
PDW BLD-RTO: 13.1 % (ref 11.8–14.4)
PH VENOUS: 7.18 (ref 7.32–7.43)
PHENCYCLIDINE, URINE: NEGATIVE
PLATELET # BLD: 285 K/UL (ref 138–453)
PLATELET ESTIMATE: ABNORMAL
PMV BLD AUTO: 13 FL (ref 8.1–13.5)
PO2, VEN: 55.6 MM HG (ref 30–50)
POC CHLORIDE: 109 MMOL/L (ref 98–107)
POC CREATININE: 1.77 MG/DL (ref 0.51–1.19)
POC HCO3: 24.1 MMOL/L (ref 21–28)
POC HEMATOCRIT: 45 % (ref 41–53)
POC HEMOGLOBIN: 15.5 G/DL (ref 13.5–17.5)
POC IONIZED CALCIUM: 1.23 MMOL/L (ref 1.15–1.33)
POC LACTIC ACID: 6.25 MMOL/L (ref 0.56–1.39)
POC O2 SATURATION: 100 % (ref 94–98)
POC PCO2 TEMP: ABNORMAL MM HG
POC PCO2 TEMP: ABNORMAL MM HG
POC PCO2: 36.3 MM HG (ref 35–48)
POC PH TEMP: ABNORMAL
POC PH TEMP: ABNORMAL
POC PH: 7.43 (ref 7.35–7.45)
POC PO2 TEMP: ABNORMAL MM HG
POC PO2 TEMP: ABNORMAL MM HG
POC PO2: 202.2 MM HG (ref 83–108)
POC POTASSIUM: 4.3 MMOL/L (ref 3.5–4.5)
POC SODIUM: 145 MMOL/L (ref 138–146)
POSITIVE BASE EXCESS, ART: 0 (ref 0–3)
POSITIVE BASE EXCESS, VEN: ABNORMAL (ref 0–3)
POTASSIUM SERPL-SCNC: 4.7 MMOL/L (ref 3.7–5.3)
PROPOXYPHENE, URINE: ABNORMAL
RBC # BLD: 5.68 M/UL (ref 4.21–5.77)
RBC # BLD: ABNORMAL 10*6/UL
SALICYLATE LEVEL: <1 MG/DL (ref 3–10)
SAMPLE SITE: ABNORMAL
SAMPLE SITE: ABNORMAL
SARS-COV-2, PCR: NORMAL
SARS-COV-2, RAPID: NOT DETECTED
SARS-COV-2: NORMAL
SEG NEUTROPHILS: 50 % (ref 36–66)
SEGMENTED NEUTROPHILS ABSOLUTE COUNT: 6.2 K/UL (ref 1.8–7.7)
SERUM OSMOLALITY: 309 MOSM/KG (ref 275–295)
SODIUM BLD-SCNC: 136 MMOL/L (ref 135–144)
SOURCE: NORMAL
TCO2 (CALC), ART: 25 MMOL/L (ref 22–29)
TEST INFORMATION: ABNORMAL
TOTAL CK: 894 U/L (ref 39–308)
TOTAL CO2, VENOUS: 25 MMOL/L (ref 23–30)
TOTAL PROTEIN: 6.9 G/DL (ref 6.4–8.3)
TOXIC TRICYCLIC SC,BLOOD: NEGATIVE
TRICYCLIC ANTIDEPRESSANTS, UR: ABNORMAL
TROPONIN INTERP: NORMAL
TROPONIN T: NORMAL NG/ML
TROPONIN, HIGH SENSITIVITY: 14 NG/L (ref 0–22)
TROPONIN, HIGH SENSITIVITY: 19 NG/L (ref 0–22)
TROPONIN, HIGH SENSITIVITY: <6 NG/L (ref 0–22)
WBC # BLD: 12.4 K/UL (ref 3.5–11.3)
WBC # BLD: ABNORMAL 10*3/UL

## 2020-06-22 PROCEDURE — 6360000002 HC RX W HCPCS

## 2020-06-22 PROCEDURE — 72125 CT NECK SPINE W/O DYE: CPT

## 2020-06-22 PROCEDURE — 82140 ASSAY OF AMMONIA: CPT

## 2020-06-22 PROCEDURE — 2700000000 HC OXYGEN THERAPY PER DAY

## 2020-06-22 PROCEDURE — 2580000003 HC RX 258: Performed by: STUDENT IN AN ORGANIZED HEALTH CARE EDUCATION/TRAINING PROGRAM

## 2020-06-22 PROCEDURE — 71045 X-RAY EXAM CHEST 1 VIEW: CPT

## 2020-06-22 PROCEDURE — 36415 COLL VENOUS BLD VENIPUNCTURE: CPT

## 2020-06-22 PROCEDURE — 99291 CRITICAL CARE FIRST HOUR: CPT | Performed by: INTERNAL MEDICINE

## 2020-06-22 PROCEDURE — 85025 COMPLETE CBC W/AUTO DIFF WBC: CPT

## 2020-06-22 PROCEDURE — 96366 THER/PROPH/DIAG IV INF ADDON: CPT

## 2020-06-22 PROCEDURE — 94002 VENT MGMT INPAT INIT DAY: CPT

## 2020-06-22 PROCEDURE — 6360000002 HC RX W HCPCS: Performed by: STUDENT IN AN ORGANIZED HEALTH CARE EDUCATION/TRAINING PROGRAM

## 2020-06-22 PROCEDURE — 6370000000 HC RX 637 (ALT 250 FOR IP): Performed by: INTERNAL MEDICINE

## 2020-06-22 PROCEDURE — 80053 COMPREHEN METABOLIC PANEL: CPT

## 2020-06-22 PROCEDURE — 84484 ASSAY OF TROPONIN QUANT: CPT

## 2020-06-22 PROCEDURE — 31500 INSERT EMERGENCY AIRWAY: CPT

## 2020-06-22 PROCEDURE — 82947 ASSAY GLUCOSE BLOOD QUANT: CPT

## 2020-06-22 PROCEDURE — 2500000003 HC RX 250 WO HCPCS

## 2020-06-22 PROCEDURE — 84132 ASSAY OF SERUM POTASSIUM: CPT

## 2020-06-22 PROCEDURE — 99285 EMERGENCY DEPT VISIT HI MDM: CPT

## 2020-06-22 PROCEDURE — 82803 BLOOD GASES ANY COMBINATION: CPT

## 2020-06-22 PROCEDURE — 70450 CT HEAD/BRAIN W/O DYE: CPT

## 2020-06-22 PROCEDURE — 6360000004 HC RX CONTRAST MEDICATION

## 2020-06-22 PROCEDURE — 80307 DRUG TEST PRSMV CHEM ANLYZR: CPT

## 2020-06-22 PROCEDURE — 87641 MR-STAPH DNA AMP PROBE: CPT

## 2020-06-22 PROCEDURE — 94640 AIRWAY INHALATION TREATMENT: CPT

## 2020-06-22 PROCEDURE — U0002 COVID-19 LAB TEST NON-CDC: HCPCS

## 2020-06-22 PROCEDURE — 2000000000 HC ICU R&B

## 2020-06-22 PROCEDURE — 81001 URINALYSIS AUTO W/SCOPE: CPT

## 2020-06-22 PROCEDURE — 94770 HC ETCO2 MONITOR DAILY: CPT

## 2020-06-22 PROCEDURE — 82565 ASSAY OF CREATININE: CPT

## 2020-06-22 PROCEDURE — 82330 ASSAY OF CALCIUM: CPT

## 2020-06-22 PROCEDURE — 84295 ASSAY OF SERUM SODIUM: CPT

## 2020-06-22 PROCEDURE — 36600 WITHDRAWAL OF ARTERIAL BLOOD: CPT

## 2020-06-22 PROCEDURE — G0480 DRUG TEST DEF 1-7 CLASSES: HCPCS

## 2020-06-22 PROCEDURE — 83605 ASSAY OF LACTIC ACID: CPT

## 2020-06-22 PROCEDURE — 71260 CT THORAX DX C+: CPT

## 2020-06-22 PROCEDURE — 82010 KETONE BODYS QUAN: CPT

## 2020-06-22 PROCEDURE — 82435 ASSAY OF BLOOD CHLORIDE: CPT

## 2020-06-22 PROCEDURE — 83930 ASSAY OF BLOOD OSMOLALITY: CPT

## 2020-06-22 PROCEDURE — 85014 HEMATOCRIT: CPT

## 2020-06-22 PROCEDURE — 93005 ELECTROCARDIOGRAM TRACING: CPT | Performed by: STUDENT IN AN ORGANIZED HEALTH CARE EDUCATION/TRAINING PROGRAM

## 2020-06-22 PROCEDURE — 80074 ACUTE HEPATITIS PANEL: CPT

## 2020-06-22 PROCEDURE — 96365 THER/PROPH/DIAG IV INF INIT: CPT

## 2020-06-22 PROCEDURE — 82550 ASSAY OF CK (CPK): CPT

## 2020-06-22 PROCEDURE — 94761 N-INVAS EAR/PLS OXIMETRY MLT: CPT

## 2020-06-22 PROCEDURE — 87040 BLOOD CULTURE FOR BACTERIA: CPT

## 2020-06-22 RX ORDER — ACETAMINOPHEN 325 MG/1
650 TABLET ORAL EVERY 6 HOURS PRN
Status: DISCONTINUED | OUTPATIENT
Start: 2020-06-22 | End: 2020-06-24 | Stop reason: HOSPADM

## 2020-06-22 RX ORDER — LORAZEPAM 2 MG/ML
INJECTION INTRAMUSCULAR
Status: DISPENSED
Start: 2020-06-22 | End: 2020-06-22

## 2020-06-22 RX ORDER — PROPOFOL 10 MG/ML
10 INJECTION, EMULSION INTRAVENOUS
Status: DISCONTINUED | OUTPATIENT
Start: 2020-06-22 | End: 2020-06-22

## 2020-06-22 RX ORDER — ETOMIDATE 2 MG/ML
20 INJECTION INTRAVENOUS ONCE
Status: DISCONTINUED | OUTPATIENT
Start: 2020-06-22 | End: 2020-06-23

## 2020-06-22 RX ORDER — IPRATROPIUM BROMIDE AND ALBUTEROL SULFATE 2.5; .5 MG/3ML; MG/3ML
1 SOLUTION RESPIRATORY (INHALATION) 4 TIMES DAILY
Status: DISCONTINUED | OUTPATIENT
Start: 2020-06-22 | End: 2020-06-22

## 2020-06-22 RX ORDER — METHYLPREDNISOLONE SODIUM SUCCINATE 40 MG/ML
40 INJECTION, POWDER, LYOPHILIZED, FOR SOLUTION INTRAMUSCULAR; INTRAVENOUS EVERY 8 HOURS
Status: DISCONTINUED | OUTPATIENT
Start: 2020-06-22 | End: 2020-06-24

## 2020-06-22 RX ORDER — PROPOFOL 10 MG/ML
INJECTION, EMULSION INTRAVENOUS
Status: COMPLETED
Start: 2020-06-22 | End: 2020-06-22

## 2020-06-22 RX ORDER — ONDANSETRON 2 MG/ML
INJECTION INTRAMUSCULAR; INTRAVENOUS
Status: DISPENSED
Start: 2020-06-22 | End: 2020-06-22

## 2020-06-22 RX ORDER — ALBUTEROL SULFATE 2.5 MG/3ML
2.5 SOLUTION RESPIRATORY (INHALATION)
Status: DISCONTINUED | OUTPATIENT
Start: 2020-06-22 | End: 2020-06-24 | Stop reason: HOSPADM

## 2020-06-22 RX ORDER — PROMETHAZINE HYDROCHLORIDE 25 MG/1
12.5 TABLET ORAL EVERY 6 HOURS PRN
Status: DISCONTINUED | OUTPATIENT
Start: 2020-06-22 | End: 2020-06-24 | Stop reason: HOSPADM

## 2020-06-22 RX ORDER — HEPARIN SODIUM 5000 [USP'U]/ML
5000 INJECTION, SOLUTION INTRAVENOUS; SUBCUTANEOUS EVERY 8 HOURS SCHEDULED
Status: DISCONTINUED | OUTPATIENT
Start: 2020-06-22 | End: 2020-06-24 | Stop reason: HOSPADM

## 2020-06-22 RX ORDER — FENTANYL CITRATE 50 UG/ML
INJECTION, SOLUTION INTRAMUSCULAR; INTRAVENOUS
Status: DISPENSED
Start: 2020-06-22 | End: 2020-06-22

## 2020-06-22 RX ORDER — SODIUM CHLORIDE 0.9 % (FLUSH) 0.9 %
10 SYRINGE (ML) INJECTION EVERY 12 HOURS SCHEDULED
Status: DISCONTINUED | OUTPATIENT
Start: 2020-06-22 | End: 2020-06-24 | Stop reason: HOSPADM

## 2020-06-22 RX ORDER — ONDANSETRON 2 MG/ML
4 INJECTION INTRAMUSCULAR; INTRAVENOUS EVERY 6 HOURS PRN
Status: DISCONTINUED | OUTPATIENT
Start: 2020-06-22 | End: 2020-06-24 | Stop reason: HOSPADM

## 2020-06-22 RX ORDER — SODIUM CHLORIDE 0.9 % (FLUSH) 0.9 %
10 SYRINGE (ML) INJECTION PRN
Status: DISCONTINUED | OUTPATIENT
Start: 2020-06-22 | End: 2020-06-24 | Stop reason: HOSPADM

## 2020-06-22 RX ORDER — POLYETHYLENE GLYCOL 3350 17 G/17G
17 POWDER, FOR SOLUTION ORAL DAILY PRN
Status: DISCONTINUED | OUTPATIENT
Start: 2020-06-22 | End: 2020-06-24 | Stop reason: HOSPADM

## 2020-06-22 RX ORDER — MIDAZOLAM HYDROCHLORIDE 1 MG/ML
INJECTION INTRAMUSCULAR; INTRAVENOUS
Status: COMPLETED
Start: 2020-06-22 | End: 2020-06-22

## 2020-06-22 RX ORDER — IPRATROPIUM BROMIDE AND ALBUTEROL SULFATE 2.5; .5 MG/3ML; MG/3ML
1 SOLUTION RESPIRATORY (INHALATION) 4 TIMES DAILY
Status: DISCONTINUED | OUTPATIENT
Start: 2020-06-22 | End: 2020-06-24

## 2020-06-22 RX ORDER — ACETAMINOPHEN 650 MG/1
650 SUPPOSITORY RECTAL EVERY 6 HOURS PRN
Status: DISCONTINUED | OUTPATIENT
Start: 2020-06-22 | End: 2020-06-24 | Stop reason: HOSPADM

## 2020-06-22 RX ORDER — SUCCINYLCHOLINE CHLORIDE 20 MG/ML
100 INJECTION INTRAMUSCULAR; INTRAVENOUS ONCE
Status: DISCONTINUED | OUTPATIENT
Start: 2020-06-22 | End: 2020-06-23

## 2020-06-22 RX ORDER — ACETAMINOPHEN 325 MG/1
650 TABLET ORAL EVERY 4 HOURS PRN
Status: DISCONTINUED | OUTPATIENT
Start: 2020-06-22 | End: 2020-06-24 | Stop reason: HOSPADM

## 2020-06-22 RX ORDER — SODIUM CHLORIDE 9 MG/ML
INJECTION, SOLUTION INTRAVENOUS CONTINUOUS
Status: DISCONTINUED | OUTPATIENT
Start: 2020-06-22 | End: 2020-06-24

## 2020-06-22 RX ORDER — IPRATROPIUM BROMIDE AND ALBUTEROL SULFATE 2.5; .5 MG/3ML; MG/3ML
1 SOLUTION RESPIRATORY (INHALATION) EVERY 6 HOURS
Status: DISCONTINUED | OUTPATIENT
Start: 2020-06-22 | End: 2020-06-22

## 2020-06-22 RX ADMIN — Medication 25 MCG/HR: at 08:09

## 2020-06-22 RX ADMIN — PROPOFOL INJECTABLE EMULSION 50 MCG/KG/MIN: 10 INJECTION, EMULSION INTRAVENOUS at 09:29

## 2020-06-22 RX ADMIN — IPRATROPIUM BROMIDE AND ALBUTEROL SULFATE 1 AMPULE: .5; 3 SOLUTION RESPIRATORY (INHALATION) at 19:36

## 2020-06-22 RX ADMIN — MIDAZOLAM HYDROCHLORIDE 2 MG: 1 INJECTION, SOLUTION INTRAMUSCULAR; INTRAVENOUS at 07:26

## 2020-06-22 RX ADMIN — IOHEXOL 75 ML: 350 INJECTION, SOLUTION INTRAVENOUS at 09:01

## 2020-06-22 RX ADMIN — IPRATROPIUM BROMIDE AND ALBUTEROL SULFATE 1 AMPULE: .5; 3 SOLUTION RESPIRATORY (INHALATION) at 14:02

## 2020-06-22 RX ADMIN — SODIUM CHLORIDE: 9 INJECTION, SOLUTION INTRAVENOUS at 11:12

## 2020-06-22 RX ADMIN — PROPOFOL 30 MCG/KG/MIN: 10 INJECTION, EMULSION INTRAVENOUS at 07:30

## 2020-06-22 RX ADMIN — PROPOFOL INJECTABLE EMULSION 30 MCG/KG/MIN: 10 INJECTION, EMULSION INTRAVENOUS at 13:34

## 2020-06-22 RX ADMIN — METHYLPREDNISOLONE SODIUM SUCCINATE 40 MG: 40 INJECTION, POWDER, FOR SOLUTION INTRAMUSCULAR; INTRAVENOUS at 21:06

## 2020-06-22 RX ADMIN — HEPARIN SODIUM 5000 UNITS: 5000 INJECTION INTRAVENOUS; SUBCUTANEOUS at 21:06

## 2020-06-22 RX ADMIN — METHYLPREDNISOLONE SODIUM SUCCINATE 40 MG: 40 INJECTION, POWDER, FOR SOLUTION INTRAMUSCULAR; INTRAVENOUS at 12:56

## 2020-06-22 RX ADMIN — IPRATROPIUM BROMIDE AND ALBUTEROL SULFATE 1 AMPULE: .5; 3 SOLUTION RESPIRATORY (INHALATION) at 15:41

## 2020-06-22 RX ADMIN — PROPOFOL INJECTABLE EMULSION 30 MCG/KG/MIN: 10 INJECTION, EMULSION INTRAVENOUS at 07:30

## 2020-06-22 RX ADMIN — HEPARIN SODIUM 5000 UNITS: 5000 INJECTION INTRAVENOUS; SUBCUTANEOUS at 14:02

## 2020-06-22 RX ADMIN — SODIUM CHLORIDE: 9 INJECTION, SOLUTION INTRAVENOUS at 21:04

## 2020-06-22 ASSESSMENT — ENCOUNTER SYMPTOMS
SINUS PRESSURE: 0
SHORTNESS OF BREATH: 0
ABDOMINAL PAIN: 0
NAUSEA: 0
EYE ITCHING: 0
WHEEZING: 0
COUGH: 0
EYE REDNESS: 0
VOMITING: 0

## 2020-06-22 ASSESSMENT — PAIN SCALES - GENERAL
PAINLEVEL_OUTOF10: 0
PAINLEVEL_OUTOF10: 0

## 2020-06-22 ASSESSMENT — PULMONARY FUNCTION TESTS
PIF_VALUE: 20
PIF_VALUE: 40

## 2020-06-22 NOTE — ED PROVIDER NOTES
[facility]  ED  Emergency Department Encounter  Emergency Medicine Resident     Pt Name: Keyanna Zambrano  MRN: [id number]  [place] 1980  Date ofevaluation: 6/22/20  PCP:  David Phillips MD    [address] [place] [address]       Chief Complaint   Patient presents with    Altered Mental Status     HISTORY OF PRESENT ILLNESS  (Location/Symptom, Timing/Onset, Context/Setting, Quality, Duration, Modifying Factors, Severity.)      Keyanna Zambrano is a 45 y.o. male who presents with EMS for altered mental status. Per EMS report patient woke up complaining of shortness of breath, family went to call EMS when they returned they found patient unresponsive. Patient was given 2 mg of intranasal Narcan followed by 2 mg of IV Narcan by EMS for possible drug overdose. Patient also with questionable seizure-like activity and was given 2 mg of Versed. On arrival patient is awake however not following commands, agitated, with abnormal breathing pattern. He is moaning, moving all extremities however no purposeful movements. Only history of asthma per chart. REVIEW OF SYSTEMS    (2-9 systems for level 4, 10 or more for level 5)      Review of Systems   Unable to perform ROS: Mental status change     PAST MEDICAL / SURGICAL /SOCIAL / FAMILY HISTORY      has a past medical history of Asthma. has no past surgical history on file.     Social History     Socioeconomic History    Marital status:      Spouse name: Not on file    Number of children: Not on file    Years of education: Not on file    Highest education level: Not on file   Occupational History    Not on file   Social Needs    Financial resource strain: Not on file    Food insecurity     Worry: Not on file     Inability: Not on file    Transportation needs     Medical: Not on file     Non-medical: Not on file   Tobacco Use    Smoking status: Former Smoker     Types: Cigarettes     Last attempt to quit: 2/6/2000     Years since quitting: 20.3    Smokeless tobacco: Never Used   Substance and Sexual Activity    Alcohol use: Yes     Comment: 1 24oz can of beer daily    Drug use: No    Sexual activity: Yes     Partners: Female   Lifestyle    Physical activity     Days per week: Not on file     Minutes per session: Not on file    Stress: Not on file   Relationships    Social connections     Talks on phone: Not on file     Gets together: Not on file     Attends Sikhism service: Not on file     Active member of club or organization: Not on file     Attends meetings of clubs or organizations: Not on file     Relationship status: Not on file    Intimate partner violence     Fear of current or ex partner: Not on file     Emotionally abused: Not on file     Physically abused: Not on file     Forced sexual activity: Not on file   Other Topics Concern    Not on file   Social History Narrative    Not on file     Family History   Problem Relation Age of Onset    High Blood Pressure Mother     Stroke Father      Portions of the past medical history, past surgical history, social history, and family history were discussed and reviewed with thepatient/family and is included in HPI if pertinent. ALLERGIES / IMMUNIZATIONS / HOME MEDICATIONS     Allergies: Patient has no known allergies. IMMUNIZATIONS      There is no immunization history on file for this patient. Home Medications:  Prior to Admission medications    Medication Sig Start Date End Date Taking?  Authorizing Provider   predniSONE (DELTASONE) 10 MG tablet Take 4 tablets by mouth once daily for 5 days 6/20/20 6/30/20  Devonte Hollins DO   albuterol sulfate  (90 Base) MCG/ACT inhaler Inhale 2 puffs into the lungs every 4 hours as needed for Wheezing 6/20/20   Devonte Hollins DO   budesonide-formoterol [facility]) 160-4.5 MCG/ACT AERO inhale 2 puffs by mouth twice a day 6/20/20   Devonte Hollins DO   montelukast (SINGULAIR) 10 MG tablet Take 1 tablet by mouth daily 6/20/20   Devonte Hollins DO   albuterol sulfate  (90 Base) MCG/ACT inhaler inhale 2 puffs by mouth every 6 hours if needed for wheezing 6/16/20   Jodi Ward MD   Respiratory Therapy Supplies (NEBULIZER/TUBING/MOUTHPIECE) KIT 1 kit by Does not apply route daily as needed (wheezing) 11/20/17   Marilee Tracy MD     PHYSICAL EXAM   (up to 7 for level 4, 8 or more for level 5)      INITIAL VITALS:    height is 5' 9\" (1.753 m) and weight is 176 lb 5.9 oz (80 kg). His axillary temperature is 97.9 °F (36.6 °C). His blood pressure is 128/94 (abnormal) and his pulse is 77. His respiration is 17 and oxygen saturation is 100%. Physical Exam  Vitals signs reviewed. Constitutional:       Appearance: He is well-developed. He is diaphoretic. Comments: Eyes open however not tracking, moving all extremities however no purposeful movements, moaning, agitated   HENT:      Head: Normocephalic and atraumatic. Eyes:      Comments: Icteric sclera   Neck:      Musculoskeletal: Neck supple. Cardiovascular:      Rate and Rhythm: Regular rhythm. Tachycardia present. Heart sounds: Normal heart sounds. Pulmonary:      Comments: Abnormal respiratory pattern w/ shallow breathing, lungs w/  mild intermittent end expiratory wheezes  Abdominal:      Palpations: Abdomen is soft. There is no mass. Tenderness: There is no abdominal tenderness. Musculoskeletal:         General: No deformity. Skin:     General: Skin is warm.    Neurological:      Comments: Eyes open w/ no tracking, agitated, not responding to visual or verbal stimuli, moving all extremities however non purposeful, not following commands, moaning       Vitals:    Vitals:    06/22/20 0946 06/22/20 0951 06/22/20 0956 06/22/20 1021   BP: 132/80 134/79 132/80 (!) 128/94   Pulse: 73 74 70 77   Resp: 16 16 16 17   Temp:       TempSrc:       SpO2: 100% 100% 100% 100%   Weight:       Height:         DIFFERENTIAL  DIAGNOSIS     PLAN (LABS / IMAGING / EKG):  Orders Placed This Encounter   Procedures    CT HEAD WO CONTRAST    CT CERVICAL SPINE WO CONTRAST    XR CHEST PORTABLE    CT CHEST W CONTRAST    CBC WITH AUTO DIFFERENTIAL    Comprehensive Metabolic Panel    Troponin    TOX SCR, BLD, ED    DRUG SCREEN MULTI URINE    AMMONIA    Hemoglobin and hematocrit, blood    SODIUM (POC)    POTASSIUM (POC)    CHLORIDE (POC)    CALCIUM, IONIC (POC)    COVID-19    Beta-Hydroxybutyrate    Osmolality    URINALYSIS WITH MICROSCOPIC    Inpatient consult to Critical Care    Mechanical ventilation    End Tidal CO2 Continuous    Pulse oximetry, continuous    Initiate Oxygen Therapy Protocol    ABG draw    POC Blood Gas and Chemistry    Venous Blood Gas, POC    Creatinine W/GFR Point of Care    Lactic Acid, POC    POCT Glucose    Anion Gap (Calc) POC    EKG 12 Lead    PATIENT STATUS (FROM ED OR OR/PROCEDURAL) Inpatient    Restraints non-violent or non-self-destructive     Plan of care is reviewed and discussed with the family/ patient when able. Family/ Patient consents to treatment and plan if able to do so.     MEDICATIONS ORDERED:  Orders Placed This Encounter   Medications    LORazepam (ATIVAN) 2 MG/ML injection     ZEUS MILLER: cabinet override    propofol 1000 MG/100ML injection     Deandra Mina: cabinet override    succinylcholine (ANECTINE) injection 100 mg    etomidate (AMIDATE) injection 20 mg    propofol injection    ondansetron (ZOFRAN) 4 MG/2ML injection     Marii Trinidad: cabinet override    fentaNYL (SUBLIMAZE) 100 MCG/2ML injection     Jesus Qiu: cabinet override    DISCONTD: midazolam (VERSED) 1 mg/mL in D5W infusion    midazolam (VERSED) 2 MG/2ML injection     Marii Trinidad: cabinet override    fentaNYL (SUBLIMAZE) 100 MCG/2ML injection     Whitney MOREIRA: cabinet override    fentaNYL 20 mcg/mL Infusion    iohexol (OMNIPAQUE 350) solution 75 mL    propofol 1000 MG/100ML injection     Sawthi Arrington: cabinet override     DIAGNOSTIC RESULTS     LABS:  Results for orders placed or performed during the hospital encounter of 06/22/20   CBC WITH AUTO DIFFERENTIAL   Result Value Ref Range    WBC 12.4 (H) 3.5 - 11.3 k/uL    RBC 5.68 4.21 - 5.77 m/uL    Hemoglobin 15.7 13.0 - 17.0 g/dL    Hematocrit 46.9 40.7 - 50.3 %    MCV 82.6 82.6 - 102.9 fL    MCH 27.6 25.2 - 33.5 pg    MCHC 33.5 28.4 - 34.8 g/dL    RDW 13.1 11.8 - 14.4 %    Platelets 677 540 - 449 k/uL    MPV 13.0 8.1 - 13.5 fL    NRBC Automated 0.0 0.0 per 100 WBC    Differential Type NOT REPORTED     WBC Morphology NOT REPORTED     RBC Morphology NOT REPORTED     Platelet Estimate NOT REPORTED     Immature Granulocytes 0 0 %    Seg Neutrophils 50 36 - 66 %    Lymphocytes 40 24 - 44 %    Monocytes 7 1 - 7 %    Eosinophils % 3 1 - 4 %    Basophils 0 0 - 2 %    Absolute Immature Granulocyte 0.00 0.00 - 0.30 k/uL    Segs Absolute 6.20 1.8 - 7.7 k/uL    Absolute Lymph # 4.96 (H) 1.0 - 4.8 k/uL    Absolute Mono # 0.87 (H) 0.1 - 0.8 k/uL    Absolute Eos # 0.37 0.0 - 0.4 k/uL    Basophils Absolute 0.00 0.0 - 0.2 k/uL    Morphology Normal    Comprehensive Metabolic Panel   Result Value Ref Range    Glucose 239 (H) 70 - 99 mg/dL    BUN 13 6 - 20 mg/dL    CREATININE 1.48 (H) 0.70 - 1.20 mg/dL    Bun/Cre Ratio NOT REPORTED 9 - 20    Calcium 8.9 8.6 - 10.4 mg/dL    Sodium 136 135 - 144 mmol/L    Potassium 4.7 3.7 - 5.3 mmol/L    Chloride 100 98 - 107 mmol/L    CO2 12 (L) 20 - 31 mmol/L    Anion Gap 24 (H) 9 - 17 mmol/L    Alkaline Phosphatase 55 40 - 129 U/L    ALT 54 (H) 5 - 41 U/L     (H) <40 U/L    Total Bilirubin 0.32 0.3 - 1.2 mg/dL    Total Protein 6.9 6.4 - 8.3 g/dL    Alb 4.2 3.5 - 5.2 g/dL    Albumin/Globulin Ratio 1.6 1.0 - 2.5    GFR Non-African American 53 (L) >60 mL/min    GFR African American >60 >60 mL/min    GFR Comment          GFR Staging NOT REPORTED    Troponin   Result Value Ref Range    Troponin, High Sensitivity <6 0 - 22 ng/L    Troponin T NOT REPORTED <0.03 ng/mL    Troponin Interp NOT REPORTED    TOX SCR, BLD, ED   Result Value Ref Range    Acetaminophen Level <5 (L) 10 - 30 ug/mL    Ethanol <10 <10 mg/dL    Ethanol percent <8.665 <5.716 %    Salicylate Lvl <1 (L) 3 - 10 mg/dL    Toxic Tricyclic Sc,Blood NEGATIVE NEGATIVE   DRUG SCREEN MULTI URINE   Result Value Ref Range    Amphetamine Screen, Ur NEGATIVE NEGATIVE    Barbiturate Screen, Ur NEGATIVE NEGATIVE    Benzodiazepine Screen, Urine NEGATIVE NEGATIVE    Cocaine Metabolite, Urine NEGATIVE NEGATIVE    Methadone Screen, Urine NEGATIVE NEGATIVE    Opiates, Urine NEGATIVE NEGATIVE    Phencyclidine, Urine NEGATIVE NEGATIVE    Propoxyphene, Urine NOT REPORTED NEGATIVE    Cannabinoid Scrn, Ur POSITIVE (A) NEGATIVE    Oxycodone Screen, Ur NEGATIVE NEGATIVE    Methamphetamine, Urine NOT REPORTED NEGATIVE    Tricyclic Antidepressants, Urine NOT REPORTED NEGATIVE    MDMA, Urine NOT REPORTED NEGATIVE    Buprenorphine Urine NOT REPORTED NEGATIVE    Test Information       Assay provides medical screening only. The absence of expected drug(s) and/or metabolite(s) may indicate diluted or adulterated urine, limitations of testing or timing of collection.    AMMONIA   Result Value Ref Range    Ammonia 107 (H) 16 - 60 umol/L   Hemoglobin and hematocrit, blood   Result Value Ref Range    POC Hemoglobin 15.5 13.5 - 17.5 g/dL    POC Hematocrit 45 41 - 53 %   SODIUM (POC)   Result Value Ref Range    POC Sodium 145 138 - 146 mmol/L   POTASSIUM (POC)   Result Value Ref Range    POC Potassium 4.3 3.5 - 4.5 mmol/L   CHLORIDE (POC)   Result Value Ref Range    POC Chloride 109 (H) 98 - 107 mmol/L   CALCIUM, IONIC (POC)   Result Value Ref Range    POC Ionized Calcium 1.23 1.15 - 1.33 mmol/L   Osmolality   Result Value Ref Range    Serum Osmolality 309 (H) 275 - 295 mOsm/kg   Venous Blood Gas, POC   Result Value Ref Range    pH, Rafael 7.183 (LL) 7.320 - 7.430    pCO2, Rafael 60.5 (H) 41.0 - 51.0 mm Hg    pO2, Rafael 55.6 (H) 30.0 - 50.0 mm Hg    HCO3, Venous 22.7 22.0 - 29.0 mmol/L    Total CO2, Venous 25 23.0 - 30.0 mmol/L    Negative Base Excess, Rafael 7 (H) 0.0 - 2.0    Positive Base Excess, Rafael NOT REPORTED 0.0 - 3.0    O2 Sat, Rafael 80 60.0 - 85.0 %    O2 Device/Flow/% NOT REPORTED     Deejay Test NOT REPORTED     Sample Site NOT REPORTED     Mode NOT REPORTED     FIO2 NOT REPORTED     Pt Temp NOT REPORTED     POC pH Temp NOT REPORTED     POC pCO2 Temp NOT REPORTED mm Hg    POC pO2 Temp NOT REPORTED mm Hg   Creatinine W/GFR Point of Care   Result Value Ref Range    POC Creatinine 1.77 (H) 0.51 - 1.19 mg/dL    GFR Comment 52 (L) >60 mL/min    GFR Non- 43 (L) >60 mL/min    GFR Comment         Lactic Acid, POC   Result Value Ref Range    POC Lactic Acid 6.25 (H) 0.56 - 1.39 mmol/L   POCT Glucose   Result Value Ref Range    POC Glucose 193 (H) 74 - 100 mg/dL   Anion Gap (Calc) POC   Result Value Ref Range    Anion Gap 13 7 - 16 mmol/L     Labs Reviewed   CBC WITH AUTO DIFFERENTIAL - Abnormal; Notable for the following components:       Result Value    WBC 12.4 (*)     Absolute Lymph # 4.96 (*)     Absolute Mono # 0.87 (*)     All other components within normal limits   COMPREHENSIVE METABOLIC PANEL - Abnormal; Notable for the following components:    Glucose 239 (*)     CREATININE 1.48 (*)     CO2 12 (*)     Anion Gap 24 (*)     ALT 54 (*)      (*)     GFR Non- 53 (*)     All other components within normal limits   TOX SCR, BLD, ED - Abnormal; Notable for the following components:    Acetaminophen Level <5 (*)     Salicylate Lvl <1 (*)     All other components within normal limits   URINE DRUG SCREEN - Abnormal; Notable for the following components:    Cannabinoid Scrn, Ur POSITIVE (*)     All other components within normal limits   AMMONIA - Abnormal; Notable for the following components:    Ammonia 107 (*)     All other components within normal limits   CHLORIDE (POC) - Abnormal; Notable for the following components:    POC Chloride 109 (*) All other components within normal limits   OSMOLALITY - Abnormal; Notable for the following components:    Serum Osmolality 309 (*)     All other components within normal limits   VENOUS BLOOD GAS, POINT OF CARE - Abnormal; Notable for the following components:    pH, Rafael 7.183 (*)     pCO2, Rafael 60.5 (*)     pO2, Rafael 55.6 (*)     Negative Base Excess, Rafael 7 (*)     All other components within normal limits   CREATININE W/GFR POINT OF CARE - Abnormal; Notable for the following components:    POC Creatinine 1.77 (*)     GFR Comment 52 (*)     GFR Non- 43 (*)     All other components within normal limits   LACTIC ACID,POINT OF CARE - Abnormal; Notable for the following components:    POC Lactic Acid 6.25 (*)     All other components within normal limits   POCT GLUCOSE - Abnormal; Notable for the following components:    POC Glucose 193 (*)     All other components within normal limits   TROPONIN   HGB/HCT   SODIUM (POC)   POTASSIUM (POC)   CALCIUM, IONIC (POC)   COVID-19   BETA-HYDROXYBUTYRATE   URINALYSIS WITH MICROSCOPIC   POC BLOOD GAS AND CHEMISTRY   ANION GAP (CALC) POC     RADIOLOGY:  Ct Head Wo Contrast    Result Date: 6/22/2020  EXAMINATION: CT OF THE HEAD WITHOUT CONTRAST  6/22/2020 7:58 am TECHNIQUE: CT of the head was performed without the administration of intravenous contrast. Dose modulation, iterative reconstruction, and/or weight based adjustment of the mA/kV was utilized to reduce the radiation dose to as low as reasonably achievable. COMPARISON: None. HISTORY: ORDERING SYSTEM PROVIDED HISTORY: AMS TECHNOLOGIST PROVIDED HISTORY: AMS FINDINGS: BRAIN/VENTRICLES: The ventricular system is normal in appearance. No evidence of mass effect or midline shift. No area of abnormal attenuation of brain parenchyma is identified. No abnormal extra-axial fluid collections are identified. ORBITS: The visualized portion of the orbits demonstrate no acute abnormality.  SINUSES: The visualized paranasal sinuses and mastoid air cells demonstrate no acute abnormality. There is minimal mucosal thickening within the left maxillary sinus with 1.3 cm rounded area of low attenuation inferiorly. The rounded lesion may represent mucous retention cyst or polyp. SOFT TISSUES/SKULL:  No acute abnormality of the visualized skull or soft tissues. Endotracheal and nasogastric tubes are in place. No evidence of acute intracranial abnormality. Ct Chest W Contrast    Result Date: 6/22/2020  EXAMINATION: CT OF THE CHEST WITH CONTRAST 6/22/2020 8:55 am TECHNIQUE: CT of the chest was performed with the administration of intravenous contrast. Multiplanar reformatted images are provided for review. Dose modulation, iterative reconstruction, and/or weight based adjustment of the mA/kV was utilized to reduce the radiation dose to as low as reasonably achievable. 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 Given its stability, benign etiology such as esophageal duplication cyst or bronchogenic cyst is considered. There is mass effect upon the esophagus. Lungs/pleura: Lungs are clear without focal airspace consolidation, sizeable pleural effusion or pneumothorax. No discrete nodules or masses. Virgilio Brinks Upper Abdomen: Unremarkable Soft Tissues/Bones: No acute osseous or soft tissue abnormality identified. Stable superior soft renal mass as compared to previous study of 03/17/2006. Given its stability, benign etiology such as esophageal duplication cyst or bronchogenic cyst is considered. There is similar mass effect upon the esophagus.      Ct Cervical Spine Wo mild deviation of the trachea towards the right. Consider chest CT correlation. EKG  EKG Interpretation    Interpreted by emergency department physician    Rhythm: sinus tachycardia  Rate: normal  Axis: normal  Ectopy: none  Conduction: normal  ST Segments: no acute change  T Waves: no acute change  Q Waves: none    Clinical Impression: non-specific EKG and sinus tachycardia    Jake Gould    All EKG's are interpreted by the Emergency Department Physician who either signs or Co-signs this chart in the absence of a cardiologist.    ED BEDSIDE ULTRASOUND:   Not indicated    Shamir Neves / MDM   26-year-old male brought in by EMS for altered mental status. Was given versed by EMS for questionable seizure activity. On arrival patient is awake however unresponsive, not tracking, is moving all extremities however no purposeful movements, is moaning and agitated, he is very diaphoretic, with abnormal breathing pattern, shallow breathing. Patient was observed for short period of time however decision was made to intubate patient as he continued to breathe abnormally and was a danger to himself, requiring multiple nurses to hold him down due to agitation. Will obtain CT head CT cervical spine, chest x-ray, altered mental status work-up/labs. Had a conversation with patient's sister who is here. She states that patient lives with her family. Patient ran out of the room and told pts mother to call EMS and stated that he could not breathe. When mother returned to the room she found patient on the floor facedown unresponsive. EMS instructed them to perform CPR which was done by patient's 6year-old niece. Patient did have an episode of urinary incontinence, she states that they also noticed some blood in his mouth. Patient does have a history of asthma otherwise he has no other medical history. He does not use any drugs to her knowledge.   She states that patient does drink alcohol very frequently however she is unsure of the quantity, she states that he typically does have a few beers after work. He did not have any alcohol yesterday. No history of seizures. Work-up reveals elevated AST and ammonia, likely due to chronic alcohol intake. Otherwise no acute findings to point to a clinical etiology of his presentation. Drug screen is negative other than marijuana. Ethanol level is negative. CT head, cervical spine are negative. Chest x-ray shows a superior mediastinal mass, a CT was obtained for further evaluation, no acute findings. Patient admitted to critical care. PROCEDURES:  Procedures   Patient Name: Tasha Hurd   Medical Record Number: [id number]  Date: 6/22/2020    Room/Bed: JENNIFER/JENNIFER  Intubation Procedure Note  Indication: potential airway compromise    Consent: Unable to be obtained due to patient's condition. Medications Used: etomidate intravenously and succinycholine intravenously    Procedure: The patient was placed in the appropriate position. Cricoid pressure was not required. Intubation was performed by indirect laryngoscopy using a bronchoscope and a 7.5 cuffed endotracheal tube. The cuff was then inflated and the tube was secured appropriately at a distance of 25 cm to the lower lip. Initial confirmation of placement included bilateral breath sounds, an end tidal CO2 detector, tube fogging and adequate pulse oximetry reading. A chest x-ray to verify correct placement of the tube showed appropriate tube position. The patient tolerated the procedure well. Complications: None    CONSULTS:  IP CONSULT TO CRITICAL CARE    CRITICAL CARE:  See attending note    IMPRESSION      1. Altered mental status, unspecified altered mental status type        DISPOSITION / [address] Admitted 06/22/2020 09:55:51 AM    This patient was evaluated in the Emergency Department for symptoms described in the history of present illness.  The patient was evaluated in the context of the global COVID-19 pandemic, which necessitated consideration that the patient might be at risk for infection with the SARS-CoV-2 virus that causes COVID-19. Institutional protocols and algorithms that pertain to the evaluation of patients at risk for COVID-19 are in a state of rapid change based on information released by regulatory bodies including the CDC and federal and state organizations. These policies and algorithms were followed during the patient's care in the ED. If discharged, the patient was instructed to return to the emergency department with any worsening symptoms, if new symptoms arise, or if they have any other concerns. Patient was instructed not to drive home if discharged today and received pain medications or other mind-altering medications while here.     PATIENT REFERRED TO:  Jairo Aleman MD  [place] [address]  [phone]          DISCHARGE MEDICATIONS:  New Prescriptions    No medications on file     Montrell Younger DO  Emergency Medicine Resident    (Please note that portions of this note were completed with a voice recognition program.  Efforts were made to edit the dictations but occasionally words are mis-transcribed.)      Montrell Younger DO  06/22/20 2271

## 2020-06-22 NOTE — ED TRIAGE NOTES
Pt presents to ED via EMS for AMS. Per report pt told family this morning he can't breath to call 911. After family called 46 and went back to pt, he was found face down, unresponsive. Per EMS they pt 2 narcan IN and 2 narcan IV, also gave 2 of versed for what they thought was seizure activity. On arrival pt remains altered, not responding and thrashing around. Dr. Orquidea Lowry and Dr. Loli Danielle at bedside.

## 2020-06-22 NOTE — ED NOTES
Patient intubated with 7.5 ETT 25 @lip. Good color change with fogging in tube and bilateral breath sounds.      Ronda Contreras RN  06/22/20 [id number]

## 2020-06-22 NOTE — ED NOTES
Fentanyl 100 mcg given per verbal order from Dr. Rodríguez Simons.       Amos Tracy, RN  06/22/20 8670

## 2020-06-22 NOTE — PLAN OF CARE
Problem: Nutrition  Goal: Optimal nutrition therapy  Outcome: Ongoing  Note: Nutrition Problem: Inadequate oral intake  Intervention: Food and/or Nutrient Delivery: Start nutrition as able. If TF needed, suggest Semi-elemental formula goal 55 mL/hr while on propofol at current rate.    Nutritional Goals: meet % of estimated nutrition needs

## 2020-06-22 NOTE — PROGRESS NOTES
Order obtained for extubation. Positive leak test.  SpO2 of 100 on 30% FiO2. Patient extubated and placed on room air. Post extubation SpO2 is 99% with HR  104 bpm and RR 14 breaths/min. Patient had strong cough that was productive of clear  and white sputum. Extubation Well tolerated by patient. .   Breath Sounds: wheezes    RAN ANDRADE   2:25 PM   RAN ANDRADE, PPatient Assessment complete. Altered mental status [R41.82] . Vitals:    06/22/20 1404   BP:    Pulse:    Resp: 12   Temp:    SpO2: 100%   . Patients home meds are   Prior to Admission medications    Medication Sig Start Date End Date Taking?  Authorizing Provider   predniSONE (DELTASONE) 10 MG tablet Take 4 tablets by mouth once daily for 5 days 6/20/20 6/30/20  Lola Dinashley, DO   albuterol sulfate  (90 Base) MCG/ACT inhaler Inhale 2 puffs into the lungs every 4 hours as needed for Wheezing 6/20/20   Lola Saeed, DO   budesonide-formoterol [facility]) 160-4.5 MCG/ACT AERO inhale 2 puffs by mouth twice a day 6/20/20   Lola Dinashley, DO   montelukast (SINGULAIR) 10 MG tablet Take 1 tablet by mouth daily 6/20/20   Lolarahat Saeed, DO   albuterol sulfate  (90 Base) MCG/ACT inhaler inhale 2 puffs by mouth every 6 hours if needed for wheezing 6/16/20   Jessee Modi MD   Respiratory Therapy Supplies (NEBULIZER/TUBING/MOUTHPIECE) KIT 1 kit by Does not apply route daily as needed (wheezing) 11/20/17   Nuris Madden MD   .      Assessment       RR 14  Breath Sounds: wheezes      · Bronchodilator assessment at level  3  · Hyperinflation assessment at level   · Secretion Management assessment at level    ·   · []    Bronchodilator Assessment  BRONCHODILATOR ASSESSMENT SCORE  Score 0 1 2 3 4 5   Breath Sounds   []  Patient Baseline []  No Wheeze good aeration []  Faint, scattered wheezing, good aeration [x]  Expiratory Wheezing and or moderately diminished []  Insp/Exp wheeze and/or very diminished []  Insp/Exp and/ or marked

## 2020-06-22 NOTE — ED PROVIDER NOTES
[facility] ED  eMERGENCY dEPARTMENT eNCOUnter   Attending Attestation     Pt Name: Jovani Michel  MRN: [id number]  Valariegfsorin 1980  Date of evaluation: 6/22/20       Jovani Michel is a 45 y.o. male who presents with Altered Mental Status      History: Patient presents with altered mental status. Patient apparently woke up and then was not acting appropriately and may have had a seizure for EMS. Patient presents to the emergency department with abnormal breathing pattern. Patient is occasionally moaning and moving around. Patient is not making any purposeful movements or actions. Patient is not responding. Patient has no history of seizure. Patient only history of asthma per chart. Exam: Heart rate elevated. Lungs are wheezy bilaterally. Abdomen is soft, nontender. Patient is extremely diaphoretic. Patient seems to be taking short shallow respirations    Patient intubated after short period of observation. Patient was a danger to himself and was not protecting his airway. Plan for altered mental status work-up including head CT neck CT, urine, chest x-ray, ammonia,  plan for admission. I performed a history and physical examination of the patient and discussed management with the resident. I reviewed the residents note and agree with the documented findings and plan of care. Any areas of disagreement are noted on the chart. I was personally present for the key portions of any procedures. I have documented in the chart those procedures where I was not present during the key portions. I have personally reviewed all images and agree with the resident's interpretation. I have reviewed the emergency nurses triage note. I agree with the chief complaint, past medical history, past surgical history, allergies, medications, social and family history as documented unless otherwise noted below.  Documentation of the HPI, Physical Exam and Medical Decision Making performed by medical students or scribes is based on my personal performance of the HPI, PE and MDM. For Phys Assistant/ Nurse Practitioner cases/documentation I have had a face to face evaluation of this patient and have completed at least one if not all key elements of the E/M (history, physical exam, and MDM). Additional findings are as noted. For APC cases I have personally evaluated and examined the patient in conjunction with the APC and agree with the treatment plan and disposition of the patient as recorded by the APC.     Ella Mckeon MD  Attending Emergency  Physician       Nichole Denton MD  06/22/20 9756

## 2020-06-22 NOTE — H&P
Critical Care - History and Physical Examination    Patient's name:  Poppy [facility] Record Number: [id number]  Patient's account/billing number: [de-identified]  Patient's YOB: 1980  Age: 45 y.o. Date of Admission: 6/22/2020  6:45 AM  Reason of ICU admission:   Date of History and Physical Examination: 6/22/2020      Primary Care Physician: David Phillips MD  Attending Physician:    Code Status: Full Code    Chief complaint:     Reason for ICU admission: Acute respiratory failure      History Of Present Illness:   History was obtained from chart review. PMH: Asthma, on Symbicort, albuterol, Singulair    Keyanna Zambrano is a 45 y.o. male presented to the ER with altered mental status. As per chart review the patient woke up this morning, complained of shortness of breath, family called the EMS, when the EMS arrived the patient was found unresponsive, he was given 2 mg of intranasal Narcan followed by 2 mg of IV Narcan for possible drug overdose. Patient also had possible seizure-like activity, had bladder incontinence, was given 2 mg of Versed. On arrival to ED the patient was agitated, was not following commands, had abdominal breathing pattern, was intubated for airway protection. In the ER patient's labs were sodium 136, potassium 4.7, anion gap metabolic acidosis with anion gap 24, bicarb 12, creatinine 1.48, glucose 239, troponin XIX, ALT 54, , beta hydroxybutyrate 0.06, tox, blood group negative, U tox positive for cannabinoids, mild leukocytosis WBC 12.4, rapid COVID 19 negative. 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 In the ICU patient has been extubated. Doing fine on room air.   As per the patient he reported that he had sudden onset of shortness of breath, reports having seasonal allergies, reports being similar to his previous asthma attack. Denied any fever, cough, chest pain, shortness of breath. Past Medical History:        Diagnosis Date    Asthma        Past Surgical History:  No past surgical history on file. Allergies:    No Known Allergies      Home Medications:   Prior to Admission medications    Medication Sig Start Date End Date Taking? Authorizing Provider   predniSONE (DELTASONE) 10 MG tablet Take 4 tablets by mouth once daily for 5 days 6/20/20 6/30/20  Rosalino Horowitz, DO   albuterol sulfate  (90 Base) MCG/ACT inhaler Inhale 2 puffs into the lungs every 4 hours as needed for Wheezing 6/20/20   [facility] DO   budesonide-formoterol [facility]) 160-4.5 MCG/ACT AERO inhale 2 puffs by mouth twice a day 6/20/20   Rosalino Horowitz, DO   montelukast (SINGULAIR) 10 MG tablet Take 1 tablet by mouth daily 6/20/20   [facility], DO   albuterol sulfate  (90 Base) MCG/ACT inhaler inhale 2 puffs by mouth every 6 hours if needed for wheezing 6/16/20   Jairo Aleman MD   Respiratory Therapy Supplies (NEBULIZER/TUBING/MOUTHPIECE) KIT 1 kit by Does not apply route daily as needed (wheezing) 11/20/17   Thanh Williamson MD       Social History:   TOBACCO:   reports that he quit smoking about 20 years ago. His smoking use included cigarettes. He has never used smokeless tobacco.  ETOH:   reports current alcohol use. DRUGS:  reports no history of drug use. OCCUPATION:      Family History:       Problem Relation Age of Onset    High Blood Pressure Mother     Stroke Father            REVIEW OF SYSTEMS (ROS):  Review of Systems   Constitutional: Negative for chills and fever. HENT: Negative for sinus pressure. Eyes: Negative for redness and itching. Respiratory: Negative for cough, shortness of breath and wheezing. Cardiovascular: Negative for chest pain. Gastrointestinal: Negative for abdominal pain, nausea and vomiting. Musculoskeletal: Negative. Neurological: Negative for tremors, weakness, light-headedness and headaches. Physical Exam:    Vitals: BP (!) 136/93   Pulse 66   Temp 98.2 °F (36.8 °C) (Oral)   Resp 12   Ht 5' 9\" (1.753 m)   Wt 162 lb 0.6 oz (73.5 kg)   SpO2 100%   BMI 23.93 kg/m²     Body weight:   Wt Readings from Last 3 Encounters:   06/22/20 162 lb 0.6 oz (73.5 kg)   06/20/20 165 lb (74.8 kg)   03/04/20 165 lb 6.4 oz (75 kg)       Body Mass Index : Body mass index is 23.93 kg/m². PHYSICAL EXAMINATION :  Constitutional: Resting comfortably on room air   EENT: PERRLA,   Neck: Supple, symmetrical, trachea midline, no adenopathy, thyroid symmetric, no jvd skin normal  Respiratory: clear to auscultation, no wheezes   Cardiovascular: regular rate and rhythm, normal S1, S2, no murmur noted and 2+ pulses throughout  Abdomen: soft, nontender, nondistended, no masses or organomegaly  Extremities:  peripheral pulses normal, no pedal edema, no clubbing or cyanosis      Laboratory findings:-    CBC:   Recent Labs     06/22/20  0711   WBC 12.4*   HGB 15.7        BMP:    Recent Labs     06/22/20  0711 06/22/20  0727     --    K 4.7  --      --    CO2 12*  --    BUN 13  --    CREATININE 1.48* 1.77*   GLUCOSE 239*  --      S. Calcium:  Recent Labs     06/22/20  0711   CALCIUM 8.9     S. Ionized Calcium:No results for input(s): IONCA in the last 72 hours. S. Magnesium:No results for input(s): MG in the last 72 hours. S. Phosphorus:No results for input(s): PHOS in the last 72 hours. S. Glucose:  Recent Labs     06/22/20  0727   POCGLU 193*     Glycosylated hemoglobin A1C: No results for input(s): LABA1C in the last 72 hours. INR: No results for input(s): INR in the last 72 hours. Hepatic functions:   Recent Labs     06/22/20  0711   ALKPHOS 55   ALT 54*   *   PROT 6.9   BILITOT 0.32   LABALBU 4.2     Pancreatic functions:No results for input(s): LACTA, AMYLASE in the last 72 hours.   S. Lactic Acid: No results for input(s): LACTA in the last 72 hours. Cardiac enzymes:  Recent Labs     06/22/20  1214   CKTOTAL 894*     BNP:No results for input(s): BNP in the last 72 hours. Lipid profile: No results for input(s): CHOL, TRIG, HDL, LDLCALC in the last 72 hours. Invalid input(s): LDL  Blood Gases: No results found for: PH, PCO2, PO2, HCO3, O2SAT  Thyroid functions: No results found for: TSH     Urinalysis:     Microbiology:  Cultures during this admission:     Blood cultures:                 [] None drawn      [] Negative             []  Positive (Details:  )  Urine Culture:                   [] None drawn      [] Negative             []  Positive (Details:  )  Sputum Culture:               [] None drawn       [] Negative             []  Positive (Details:  )   Endotracheal aspirate:     [] None drawn       [] Negative             []  Positive (Details:  )         -----------------------------------------------------------------         Assessment and Plan     Patient Active Problem List   Diagnosis    Severe persistent asthma in adult without complication    Altered mental status             Plan:  · Acute asthma exacerbation  Continue albuterol, bronchodilators, Solu-Medrol 40 q8 hrly      Lactic acidosis  IV fluids, trend lactic acid    Mild leukocytosis  Afebrile  F/u blood culture, urine culture    U tox negative    DULCE MARIA  Continue IVf, avoid nephrotoxic drugs.     Elroy Claire MD  Internal Medicine, PGY2  [facility]               6/22/2020, 2:48 PM

## 2020-06-22 NOTE — PROGRESS NOTES
Date: 6/22/2020  Time: 0658  Patient identity confirmed:  Yes  Indications: AMS  Preoxygenation: BVM with 100% O2  Laryngoscope size and type Glidescope  Airway introducer used: No  Evac: Yes  Tube size:a 7.5 cuffed  Number of attempts:1   Cords visualized:  [x] Clearly  [] Poorly  Breath sounds present bilaterally: Yes - bilateral expiratory wheezing  ETCO2   [x] Positive   Tube secured at 25 at bottom lip  BP: BP: (!) 154/79    Notes:     Procedure performed by: Dr Govind Leung  7:09 AM

## 2020-06-22 NOTE — ED PROVIDER NOTES
[facility]  FACULTY HANDOFF       Handoff taken on the following patient from prior Attending Physician:  Pt Name: Gaurav Sotomayor  PCP:  Isidro Chaudhari MD    Attestation  I was available and discussed any additional care issues that arose and coordinated the management plans with the resident(s) caring for the patient during my duty period. Any areas of disagreement with resident's documentation of care or procedures are noted on the chart. I was personally present for the key portions of any/all procedures during my duty period. I have documented in the chart those procedures where I was not present during the key portions.          CHIEF COMPLAINT       Chief Complaint   Patient presents with    Altered Mental Status         CURRENT MEDICATIONS     Previous Medications  Previous Medications    ALBUTEROL SULFATE  (90 BASE) MCG/ACT INHALER    inhale 2 puffs by mouth every 6 hours if needed for wheezing    ALBUTEROL SULFATE  (90 BASE) MCG/ACT INHALER    Inhale 2 puffs into the lungs every 4 hours as needed for Wheezing    BUDESONIDE-FORMOTEROL (SYMBICORT) 160-4.5 MCG/ACT AERO    inhale 2 puffs by mouth twice a day    MONTELUKAST (SINGULAIR) 10 MG TABLET    Take 1 tablet by mouth daily    PREDNISONE (DELTASONE) 10 MG TABLET    Take 4 tablets by mouth once daily for 5 days    RESPIRATORY THERAPY SUPPLIES (NEBULIZER/TUBING/MOUTHPIECE) KIT    1 kit by Does not apply route daily as needed (wheezing)       Encounter Medications  Orders Placed This Encounter   Medications    LORazepam (ATIVAN) 2 MG/ML injection     ZUES MILLER: cabinet override    propofol 1000 MG/100ML injection     Deandra Mina: cabinet override    succinylcholine (ANECTINE) injection 100 mg    etomidate (AMIDATE) injection 20 mg    propofol injection    ondansetron (ZOFRAN) 4 MG/2ML injection     Jonny Giles: cabinet override    fentaNYL (SUBLIMAZE) 100 MCG/2ML injection     Ekaterina Mirza: cabinet override    DISCONTD: midazolam (VERSED) 1 mg/mL in D5W infusion    midazolam (VERSED) 2 MG/2ML injection     Zuleyka Mistry: cabinet override    fentaNYL (SUBLIMAZE) 100 MCG/2ML injection     MOREIRA, CHARLES: cabinet override    fentaNYL 20 mcg/mL Infusion       ALLERGIES     has No Known Allergies. RECENT VITALS:    ,  Pulse: 119, Resp: 24, BP: 127/83    RADIOLOGY:   XR CHEST PORTABLE   Preliminary Result   1. No acute cardiopulmonary process   2. Endotracheal tube and enteric tube appear adequately positioned   3. Superior mediastinal mass appears to have mildly increased in size   compared to previous exam of September 2016. Consider chest CT correlation.          CT HEAD WO CONTRAST    (Results Pending)   CT CERVICAL SPINE WO CONTRAST    (Results Pending)       LABS:  Labs Reviewed   CBC WITH AUTO DIFFERENTIAL - Abnormal; Notable for the following components:       Result Value    WBC 12.4 (*)     All other components within normal limits   COMPREHENSIVE METABOLIC PANEL - Abnormal; Notable for the following components:    Glucose 239 (*)     CREATININE 1.48 (*)     CO2 12 (*)     Anion Gap 24 (*)     ALT 54 (*)      (*)     GFR Non- 53 (*)     All other components within normal limits   TOX SCR, BLD, ED - Abnormal; Notable for the following components:    Acetaminophen Level <5 (*)     Salicylate Lvl <1 (*)     All other components within normal limits   URINE DRUG SCREEN - Abnormal; Notable for the following components:    Cannabinoid Scrn, Ur POSITIVE (*)     All other components within normal limits   AMMONIA - Abnormal; Notable for the following components:    Ammonia 107 (*)     All other components within normal limits   CHLORIDE (POC) - Abnormal; Notable for the following components:    POC Chloride 109 (*)     All other components within normal limits   VENOUS BLOOD GAS, POINT OF CARE - Abnormal; Notable for the following components:    pH, Rafael 7.183 (*)     pCO2, Rafael 60.5 (*)     pO2, Rafael 55.6 (*)     Negative Base Excess, Rafael 7 (*)     All other components within normal limits   CREATININE W/GFR POINT OF CARE - Abnormal; Notable for the following components:    POC Creatinine 1.77 (*)     GFR Comment 52 (*)     GFR Non- 43 (*)     All other components within normal limits   LACTIC ACID,POINT OF CARE - Abnormal; Notable for the following components:    POC Lactic Acid 6.25 (*)     All other components within normal limits   POCT GLUCOSE - Abnormal; Notable for the following components:    POC Glucose 193 (*)     All other components within normal limits   TROPONIN   HGB/HCT   SODIUM (POC)   POTASSIUM (POC)   CALCIUM, IONIC (POC)   POC BLOOD GAS AND CHEMISTRY   ANION GAP (CALC) POC           PLAN/ TASKS OUTSTANDING           (Please note that portions of this note were completed with a voice recognition program.  Efforts were made to edit the dictations but occasionally words are mis-transcribed.)    Zhou MD, F.A.C.E.P.   Attending Emergency Physician       Navneet Fernández MD  06/22/20 1604

## 2020-06-22 NOTE — PLAN OF CARE
Problem: OXYGENATION/RESPIRATORY FUNCTION  Goal: Patient will maintain patent airway  Outcome: Ongoing  Goal: Patient will achieve/maintain normal respiratory rate/effort  Respiratory rate and effort will be within normal limits for the patient  Outcome: Ongoing    Problem: MECHANICAL VENTILATION  Goal: Patient will maintain patent airway  Outcome: Ongoing  Goal: Oral health is maintained or improved  Outcome: Ongoing  Goal: ET tube will be managed safely  Outcome: Ongoing  Goal: Ability to express needs and understand communication  Outcome: Ongoing  Goal: Mobility/activity is maintained at optimum level for patient  Outcome: Ongoing    Problem: ASPIRATION PRECAUTIONS  Goal: Patients risk of aspiration is minimized  Outcome: Ongoing    Problem: SKIN INTEGRITY  Goal: Skin integrity is maintained or improved  Outcome: Ongoing                   BRONCHOSPASM/BRONCHOCONSTRICTION     [x]         IMPROVE AERATION/BREATH SOUNDS  [x]   ADMINISTER BRONCHODILATOR THERAPY AS APPROPRIATE  [x]   ASSESS BREATH SOUNDS  [x]   IMPLEMENT AEROSOL/MDI PROTOCOL  [x]   PATIENT EDUCATION AS NEEDED   Patient admitted on Mechanical Ventilator Protocol. Patients height measured at 69\" for an IBW 70.7    Patient placed on the ventilator on settings as charted on flowsheeet. Ventilator Bronchodilator assessment    Breath sounds: clear, diminished  Inspiratory Pressure: 25  Plateau Pressure: 22    Patient assessed at level 2          []    Bronchodilator Assessment    BRONCHODILATOR ASSESSMENT SCORE  Score 0 (Home) 1 2 3 4   Breath Sounds   []  Chronic Ventilator: Patient at baseline []  Mild Wheezes/ Clear [x]  Intermittent wheezes with good air entry []  Bilateral/unilateral wheezing with diminished air entry []  Insp/Exp wheeze and/or poor aeration   Ventilator Pressures   []  Chronic Ventilator []  Insp. Pressure less than 25 cm H20 []  Insp. Pressure less than 25 cm H20 [x]  Insp. Pressure exceeds 25 cm H20 []  Insp.  Pressure exceeds 30 cm H20   Plateau Pressure []  NA   [x]  Plateau Pressure less than 4  [x]  Plateau Pressure less than or equal to 5 []  Plateau Pressure greater than or equal to 6 []  Plateau Pressure greater than or equal to [address]  10:47 AM

## 2020-06-22 NOTE — PLAN OF CARE
Problem: Restraint Use - Nonviolent/Non-Self-Destructive Behavior:  Goal: Absence of restraint-related injury  Description: Absence of restraint-related injury  Outcome: Met This Shift     Problem: Nutrition  Goal: Optimal nutrition therapy  6/22/2020 1745 by Christina Gentile RN  Outcome: Ongoing     Problem: Discharge Planning:  Goal: Discharged to appropriate level of care  Description: Discharged to appropriate level of care  Outcome: Ongoing     Problem:  Activity Intolerance:  Goal: Ability to perform activities of daily living will improve  Description: Ability to perform activities of daily living will improve  Outcome: Ongoing

## 2020-06-22 NOTE — PROGRESS NOTES
Nutrition Assessment     Type and Reason for Visit: Initial(vent )    Nutrition Recommendations: Start nutrition as able- if TF, suggest Semi-elemental formula with goal rate of 55 mL/hr while on propofol at current rate. Will continue to follow/monitor plans. Nutrition Assessment: Pt admitted with PATITO BLACKBURN. Pt is currently intubated. Meds/Labs reviewed. Noted hyperglycemia; pt is currently on solu-medrol.      Malnutrition Assessment:  · Malnutrition Status: Insufficient data    Nutrition Risk Level: High    Nutrition Needs:  · Estimated Daily Total Kcal: 3387-2082 kcal/day   · Estimated Daily Protein (g): 110 g pro/day     Nutrition Diagnosis:   · Problem: Inadequate oral intake  · Etiology: related to Impaired respiratory function-inability to consume food     Signs and symptoms:  as evidenced by NPO status due to medical condition    Objective Information:  · Current Nutrition Therapies:  · Oral Diet Orders: NPO   · Tube Feeding (TF) Orders: None   · Additional Calories: Propofol at 13.2 ml/lt=466 kcal/day   · Anthropometric Measures:  · Ht: 5' 9\" (175.3 cm)   · Current Body Wt: 162 lb 0.6 oz (73.5 kg)  · Ideal Body Wt: 160 lb (72.6 kg), % Ideal Body 101%  · BMI Classification: BMI 18.5 - 24.9 Normal Weight    Nutrition Interventions:   Continue NPO  Continued Inpatient Monitoring, Education Not Indicated    Nutrition Evaluation:   · Evaluation: Goals set   · Goals: meet % of estimated nutrition needs    · Monitoring: Nutrition Progression, Weight, Pertinent Labs      Electronically signed by Carson Ferrell RD, LD on 6/22/20 at 1:58 PM EDT    Contact Number: [phone]

## 2020-06-23 ENCOUNTER — APPOINTMENT (OUTPATIENT)
Dept: GENERAL RADIOLOGY | Age: 40
DRG: 141 | End: 2020-06-23
Payer: MEDICAID

## 2020-06-23 PROBLEM — J45.901 ACUTE ASTHMA EXACERBATION: Status: ACTIVE | Noted: 2020-06-23

## 2020-06-23 PROBLEM — N17.9 AKI (ACUTE KIDNEY INJURY) (HCC): Status: ACTIVE | Noted: 2020-06-23

## 2020-06-23 LAB
-: ABNORMAL
ABSOLUTE EOS #: <0.03 K/UL (ref 0–0.44)
ABSOLUTE IMMATURE GRANULOCYTE: 0.05 K/UL (ref 0–0.3)
ABSOLUTE LYMPH #: 1.19 K/UL (ref 1.1–3.7)
ABSOLUTE MONO #: 0.35 K/UL (ref 0.1–1.2)
AMORPHOUS: ABNORMAL
ANION GAP SERPL CALCULATED.3IONS-SCNC: 13 MMOL/L (ref 9–17)
BACTERIA: ABNORMAL
BASOPHILS # BLD: 0 % (ref 0–2)
BASOPHILS ABSOLUTE: <0.03 K/UL (ref 0–0.2)
BILIRUBIN URINE: NEGATIVE
BUN BLDV-MCNC: 7 MG/DL (ref 6–20)
BUN/CREAT BLD: ABNORMAL (ref 9–20)
CALCIUM SERPL-MCNC: 8.3 MG/DL (ref 8.6–10.4)
CASTS UA: ABNORMAL /LPF (ref 0–8)
CHLORIDE BLD-SCNC: 106 MMOL/L (ref 98–107)
CO2: 19 MMOL/L (ref 20–31)
COLOR: YELLOW
CREAT SERPL-MCNC: 1.04 MG/DL (ref 0.7–1.2)
CRYSTALS, UA: ABNORMAL /HPF
DIFFERENTIAL TYPE: ABNORMAL
DIRECT EXAM: NORMAL
EKG ATRIAL RATE: 115 BPM
EKG P AXIS: 70 DEGREES
EKG P-R INTERVAL: 140 MS
EKG Q-T INTERVAL: 348 MS
EKG QRS DURATION: 92 MS
EKG QTC CALCULATION (BAZETT): 481 MS
EKG R AXIS: 68 DEGREES
EKG T AXIS: 51 DEGREES
EKG VENTRICULAR RATE: 115 BPM
EOSINOPHILS RELATIVE PERCENT: 0 % (ref 1–4)
EPITHELIAL CELLS UA: ABNORMAL /HPF (ref 0–5)
GFR AFRICAN AMERICAN: >60 ML/MIN
GFR NON-AFRICAN AMERICAN: >60 ML/MIN
GFR SERPL CREATININE-BSD FRML MDRD: ABNORMAL ML/MIN/{1.73_M2}
GFR SERPL CREATININE-BSD FRML MDRD: ABNORMAL ML/MIN/{1.73_M2}
GLUCOSE BLD-MCNC: 135 MG/DL (ref 70–99)
GLUCOSE URINE: ABNORMAL
HAV IGM SER IA-ACNC: NONREACTIVE
HCT VFR BLD CALC: 42.7 % (ref 40.7–50.3)
HEMOGLOBIN: 14.3 G/DL (ref 13–17)
HEPATITIS B CORE IGM ANTIBODY: NONREACTIVE
HEPATITIS B SURFACE ANTIGEN: NONREACTIVE
HEPATITIS C ANTIBODY: NONREACTIVE
IMMATURE GRANULOCYTES: 1 %
KETONES, URINE: NEGATIVE
LACTIC ACID, WHOLE BLOOD: 1.3 MMOL/L (ref 0.7–2.1)
LACTIC ACID, WHOLE BLOOD: 1.4 MMOL/L (ref 0.7–2.1)
LEUKOCYTE ESTERASE, URINE: NEGATIVE
LYMPHOCYTES # BLD: 15 % (ref 24–43)
Lab: NORMAL
MCH RBC QN AUTO: 27.8 PG (ref 25.2–33.5)
MCHC RBC AUTO-ENTMCNC: 33.5 G/DL (ref 28.4–34.8)
MCV RBC AUTO: 82.9 FL (ref 82.6–102.9)
MONOCYTES # BLD: 4 % (ref 3–12)
MUCUS: ABNORMAL
NITRITE, URINE: NEGATIVE
NRBC AUTOMATED: 0 PER 100 WBC
OTHER OBSERVATIONS UA: ABNORMAL
PDW BLD-RTO: 13.3 % (ref 11.8–14.4)
PH UA: 5 (ref 5–8)
PLATELET # BLD: 186 K/UL (ref 138–453)
PLATELET ESTIMATE: ABNORMAL
PMV BLD AUTO: 12.3 FL (ref 8.1–13.5)
POTASSIUM SERPL-SCNC: 4.6 MMOL/L (ref 3.7–5.3)
PROTEIN UA: ABNORMAL
RBC # BLD: 5.15 M/UL (ref 4.21–5.77)
RBC # BLD: ABNORMAL 10*6/UL
RBC UA: ABNORMAL /HPF (ref 0–4)
RENAL EPITHELIAL, UA: ABNORMAL /HPF
SEG NEUTROPHILS: 80 % (ref 36–65)
SEGMENTED NEUTROPHILS ABSOLUTE COUNT: 6.45 K/UL (ref 1.5–8.1)
SODIUM BLD-SCNC: 138 MMOL/L (ref 135–144)
SPECIFIC GRAVITY UA: 1.02 (ref 1–1.03)
SPECIMEN DESCRIPTION: NORMAL
TRICHOMONAS: ABNORMAL
TURBIDITY: ABNORMAL
URINE HGB: ABNORMAL
UROBILINOGEN, URINE: NORMAL
WBC # BLD: 8 K/UL (ref 3.5–11.3)
WBC # BLD: ABNORMAL 10*3/UL
WBC UA: ABNORMAL /HPF (ref 0–5)
YEAST: ABNORMAL

## 2020-06-23 PROCEDURE — 2580000003 HC RX 258: Performed by: STUDENT IN AN ORGANIZED HEALTH CARE EDUCATION/TRAINING PROGRAM

## 2020-06-23 PROCEDURE — 71045 X-RAY EXAM CHEST 1 VIEW: CPT

## 2020-06-23 PROCEDURE — 93010 ELECTROCARDIOGRAM REPORT: CPT | Performed by: INTERNAL MEDICINE

## 2020-06-23 PROCEDURE — 99223 1ST HOSP IP/OBS HIGH 75: CPT | Performed by: FAMILY MEDICINE

## 2020-06-23 PROCEDURE — 80048 BASIC METABOLIC PNL TOTAL CA: CPT

## 2020-06-23 PROCEDURE — 6370000000 HC RX 637 (ALT 250 FOR IP): Performed by: STUDENT IN AN ORGANIZED HEALTH CARE EDUCATION/TRAINING PROGRAM

## 2020-06-23 PROCEDURE — 6360000002 HC RX W HCPCS: Performed by: STUDENT IN AN ORGANIZED HEALTH CARE EDUCATION/TRAINING PROGRAM

## 2020-06-23 PROCEDURE — 6370000000 HC RX 637 (ALT 250 FOR IP): Performed by: INTERNAL MEDICINE

## 2020-06-23 PROCEDURE — 83605 ASSAY OF LACTIC ACID: CPT

## 2020-06-23 PROCEDURE — 2700000000 HC OXYGEN THERAPY PER DAY

## 2020-06-23 PROCEDURE — 6360000002 HC RX W HCPCS: Performed by: INTERNAL MEDICINE

## 2020-06-23 PROCEDURE — 94640 AIRWAY INHALATION TREATMENT: CPT

## 2020-06-23 PROCEDURE — 94761 N-INVAS EAR/PLS OXIMETRY MLT: CPT

## 2020-06-23 PROCEDURE — 85025 COMPLETE CBC W/AUTO DIFF WBC: CPT

## 2020-06-23 PROCEDURE — 36415 COLL VENOUS BLD VENIPUNCTURE: CPT

## 2020-06-23 PROCEDURE — 2060000000 HC ICU INTERMEDIATE R&B

## 2020-06-23 PROCEDURE — 99233 SBSQ HOSP IP/OBS HIGH 50: CPT | Performed by: INTERNAL MEDICINE

## 2020-06-23 RX ORDER — MAGNESIUM SULFATE 1 G/100ML
1 INJECTION INTRAVENOUS ONCE
Status: COMPLETED | OUTPATIENT
Start: 2020-06-23 | End: 2020-06-23

## 2020-06-23 RX ORDER — MONTELUKAST SODIUM 10 MG/1
10 TABLET ORAL DAILY
Status: DISCONTINUED | OUTPATIENT
Start: 2020-06-23 | End: 2020-06-24 | Stop reason: HOSPADM

## 2020-06-23 RX ORDER — BUDESONIDE AND FORMOTEROL FUMARATE DIHYDRATE 160; 4.5 UG/1; UG/1
2 AEROSOL RESPIRATORY (INHALATION) 2 TIMES DAILY
Status: DISCONTINUED | OUTPATIENT
Start: 2020-06-23 | End: 2020-06-24 | Stop reason: HOSPADM

## 2020-06-23 RX ORDER — GUAIFENESIN 600 MG/1
600 TABLET, EXTENDED RELEASE ORAL 2 TIMES DAILY
Status: DISCONTINUED | OUTPATIENT
Start: 2020-06-23 | End: 2020-06-24 | Stop reason: HOSPADM

## 2020-06-23 RX ADMIN — MAGNESIUM SULFATE HEPTAHYDRATE 1 G: 1 INJECTION, SOLUTION INTRAVENOUS at 03:43

## 2020-06-23 RX ADMIN — HEPARIN SODIUM 5000 UNITS: 5000 INJECTION INTRAVENOUS; SUBCUTANEOUS at 08:05

## 2020-06-23 RX ADMIN — BUDESONIDE AND FORMOTEROL FUMARATE DIHYDRATE 2 PUFF: 160; 4.5 AEROSOL RESPIRATORY (INHALATION) at 11:05

## 2020-06-23 RX ADMIN — METHYLPREDNISOLONE SODIUM SUCCINATE 40 MG: 40 INJECTION, POWDER, FOR SOLUTION INTRAMUSCULAR; INTRAVENOUS at 21:24

## 2020-06-23 RX ADMIN — SODIUM CHLORIDE, PRESERVATIVE FREE 10 ML: 5 INJECTION INTRAVENOUS at 21:34

## 2020-06-23 RX ADMIN — IPRATROPIUM BROMIDE AND ALBUTEROL SULFATE 1 AMPULE: .5; 3 SOLUTION RESPIRATORY (INHALATION) at 15:47

## 2020-06-23 RX ADMIN — IPRATROPIUM BROMIDE AND ALBUTEROL SULFATE 1 AMPULE: .5; 3 SOLUTION RESPIRATORY (INHALATION) at 20:30

## 2020-06-23 RX ADMIN — GUAIFENESIN 600 MG: 600 TABLET, EXTENDED RELEASE ORAL at 21:25

## 2020-06-23 RX ADMIN — BUDESONIDE AND FORMOTEROL FUMARATE DIHYDRATE 2 PUFF: 160; 4.5 AEROSOL RESPIRATORY (INHALATION) at 20:32

## 2020-06-23 RX ADMIN — METHYLPREDNISOLONE SODIUM SUCCINATE 40 MG: 40 INJECTION, POWDER, FOR SOLUTION INTRAMUSCULAR; INTRAVENOUS at 14:19

## 2020-06-23 RX ADMIN — SODIUM CHLORIDE, PRESERVATIVE FREE 10 ML: 5 INJECTION INTRAVENOUS at 08:05

## 2020-06-23 RX ADMIN — MONTELUKAST SODIUM 10 MG: 10 TABLET, FILM COATED ORAL at 14:17

## 2020-06-23 RX ADMIN — HEPARIN SODIUM 5000 UNITS: 5000 INJECTION INTRAVENOUS; SUBCUTANEOUS at 21:24

## 2020-06-23 RX ADMIN — GUAIFENESIN 600 MG: 600 TABLET, EXTENDED RELEASE ORAL at 10:00

## 2020-06-23 RX ADMIN — SODIUM CHLORIDE, PRESERVATIVE FREE 10 ML: 5 INJECTION INTRAVENOUS at 21:25

## 2020-06-23 RX ADMIN — METHYLPREDNISOLONE SODIUM SUCCINATE 40 MG: 40 INJECTION, POWDER, FOR SOLUTION INTRAMUSCULAR; INTRAVENOUS at 03:40

## 2020-06-23 RX ADMIN — IPRATROPIUM BROMIDE AND ALBUTEROL SULFATE 1 AMPULE: .5; 3 SOLUTION RESPIRATORY (INHALATION) at 11:05

## 2020-06-23 RX ADMIN — ALBUTEROL SULFATE 2.5 MG: 2.5 SOLUTION RESPIRATORY (INHALATION) at 03:08

## 2020-06-23 RX ADMIN — IPRATROPIUM BROMIDE AND ALBUTEROL SULFATE 1 AMPULE: .5; 3 SOLUTION RESPIRATORY (INHALATION) at 07:48

## 2020-06-23 RX ADMIN — HEPARIN SODIUM 5000 UNITS: 5000 INJECTION INTRAVENOUS; SUBCUTANEOUS at 15:00

## 2020-06-23 ASSESSMENT — ENCOUNTER SYMPTOMS
DIARRHEA: 0
ABDOMINAL PAIN: 0
WHEEZING: 1
SHORTNESS OF BREATH: 0
NAUSEA: 0
VOMITING: 0

## 2020-06-23 ASSESSMENT — PAIN SCALES - GENERAL: PAINLEVEL_OUTOF10: 0

## 2020-06-23 NOTE — PROGRESS NOTES
Corky Signs, DO   albuterol sulfate  (90 Base) MCG/ACT inhaler Inhale 2 puffs into the lungs every 4 hours as needed for Wheezing 6/20/20  Yes Kassy Germainer, DO   budesonide-formoterol [facility]) 160-4.5 MCG/ACT AERO inhale 2 puffs by mouth twice a day 6/20/20  Yes Ple[place] Louder, DO   montelukast (SINGULAIR) 10 MG tablet Take 1 tablet by mouth daily 6/20/20  Yes Kassy Louder, DO   albuterol sulfate  (90 Base) MCG/ACT inhaler inhale 2 puffs by mouth every 6 hours if needed for wheezing 6/16/20   Karina Shore MD   Respiratory Therapy Supplies (NEBULIZER/TUBING/MOUTHPIECE) KIT 1 kit by Does not apply route daily as needed (wheezing) 11/20/17   Karina Mckay MD       ALLERGIES:      Patient has no known allergies. SOCIAL HISTORY:      reports that he quit smoking about 20 years ago. His smoking use included cigarettes. He has never used smokeless tobacco. He reports current alcohol use. He reports that he does not use drugs. REVIEW OF SYSTEMS:     Review of Systems   Constitutional: Negative for chills and fever. HENT: Negative. Respiratory: Positive for wheezing. Negative for shortness of breath. Cardiovascular: Negative for chest pain. Gastrointestinal: Negative for abdominal pain, diarrhea, nausea and vomiting. Genitourinary: Negative for difficulty urinating and dysuria. Musculoskeletal: Negative. Neurological: Negative for dizziness and headaches. PHYSICAL EXAM:     Vitals:    06/23/20 0308 06/23/20 0400 06/23/20 0523 06/23/20 0750   BP:  131/83     Pulse:  83     Resp: 12 14  13   Temp:       TempSrc:       SpO2: 93% 97%  91%   Weight:   161 lb 13.1 oz (73.4 kg)    Height:             Intake/Output Summary (Last 24 hours) at 6/23/2020 0920  Last data filed at 6/23/2020 0800  Gross per 24 hour   Intake 1820.62 ml   Output 2165 ml   Net -344.38 ml       Physical Exam  Vitals signs and nursing note reviewed. Constitutional:       Appearance: Normal appearance.    HENT: Head: Normocephalic and atraumatic. Eyes:      Extraocular Movements: Extraocular movements intact. Comments: Subconjunctival hemorrhage left eye   Cardiovascular:      Rate and Rhythm: Normal rate and regular rhythm. Pulses: Normal pulses. Heart sounds: Normal heart sounds. No murmur. No gallop. Pulmonary:      Effort: Pulmonary effort is normal. No respiratory distress. Breath sounds: Wheezing (Bilateral expiratory wheezes in all lung fields) present. No rales. Abdominal:      General: Bowel sounds are normal. There is no distension. Palpations: Abdomen is soft. Tenderness: There is no abdominal tenderness. There is no guarding. Musculoskeletal:      Right lower leg: No edema. Left lower leg: No edema. Neurological:      General: No focal deficit present. Mental Status: He is alert. Comments: Patient is alert oriented, conversational and appropriate           DIAGNOSTICS:      Laboratory Testing:    Recent Results (from the past 24 hour(s))   COVID-19    Collection Time: 06/22/20 10:26 AM   Result Value Ref Range    SARS-CoV-2          SARS-CoV-2, Rapid Not Detected Not Detected    Source . NASOPHARYNGEAL SWAB     SARS-CoV-2, PCR         Arterial Blood Gas, POC    Collection Time: 06/22/20 11:13 AM   Result Value Ref Range    POC pH 7.431 7.350 - 7.450    POC pCO2 36.3 35.0 - 48.0 mm Hg    POC PO2 202.2 (H) 83.0 - 108.0 mm Hg    POC HCO3 24.1 21.0 - 28.0 mmol/L    TCO2 (calc), Art 25 22.0 - 29.0 mmol/L    Negative Base Excess, Art NOT REPORTED 0.0 - 2.0    Positive Base Excess, Art 0 0.0 - 3.0    POC O2  (H) 94.0 - 98.0 %    O2 Device/Flow/% Adult Ventilator     Deejay Test POSITIVE     Sample Site Left Radial Artery     Mode NOT REPORTED     FIO2 40.0     Pt Temp NOT REPORTED     POC pH Temp NOT REPORTED     POC pCO2 Temp NOT REPORTED mm Hg    POC pO2 Temp NOT REPORTED mm Hg   Troponin    Collection Time: 06/22/20 12:14 PM   Result Value Ref Range Troponin, High Sensitivity 19 0 - 22 ng/L    Troponin T NOT REPORTED <0.03 ng/mL    Troponin Interp NOT REPORTED    CK    Collection Time: 06/22/20 12:14 PM   Result Value Ref Range    Total  (H) 39 - 308 U/L   Culture, Blood 1    Collection Time: 06/22/20 12:14 PM   Result Value Ref Range    Specimen Description . BLOOD     Special Requests RT HAND 1 ML     Culture NO GROWTH 17 HOURS    MRSA by PCR    Collection Time: 06/22/20  1:31 PM   Result Value Ref Range    Specimen Description . NASAL SWAB     Special Requests NOT REPORTED     Direct Exam       NEGATIVE:  MRSA DNA not detected by nucleic acid amplification. Results should be used as an adjunct to nosocomial control efforts to identify patients needing enhanced precautions. The test is not intended to identify patients with staphylococcal infections. Results should not be used to guide or monitor treatment for MRSA infections.    Troponin    Collection Time: 06/22/20  2:52 PM   Result Value Ref Range    Troponin, High Sensitivity 14 0 - 22 ng/L    Troponin T NOT REPORTED <0.03 ng/mL    Troponin Interp NOT REPORTED    LACTIC ACID, WHOLE BLOOD    Collection Time: 06/22/20  5:43 PM   Result Value Ref Range    Lactic Acid, Whole Blood 3.3 (H) 0.7 - 2.1 mmol/L   LACTIC ACID, WHOLE BLOOD    Collection Time: 06/22/20 11:40 PM   Result Value Ref Range    Lactic Acid, Whole Blood 1.5 0.7 - 2.1 mmol/L   LACTIC ACID, WHOLE BLOOD    Collection Time: 06/23/20  4:12 AM   Result Value Ref Range    Lactic Acid, Whole Blood 1.4 0.7 - 2.1 mmol/L   Basic Metabolic Panel w/ Reflex to MG    Collection Time: 06/23/20  4:12 AM   Result Value Ref Range    Glucose 135 (H) 70 - 99 mg/dL    BUN 7 6 - 20 mg/dL    CREATININE 1.04 0.70 - 1.20 mg/dL    Bun/Cre Ratio NOT REPORTED 9 - 20    Calcium 8.3 (L) 8.6 - 10.4 mg/dL    Sodium 138 135 - 144 mmol/L    Potassium 4.6 3.7 - 5.3 mmol/L    Chloride 106 98 - 107 mmol/L    CO2 19 (L) 20 - the orbits demonstrate no acute abnormality. SINUSES: The visualized paranasal sinuses and mastoid air cells demonstrate no acute abnormality. There is minimal mucosal thickening within the left maxillary sinus with 1.3 cm rounded area of low attenuation inferiorly. The rounded lesion may represent mucous retention cyst or polyp. SOFT TISSUES/SKULL:  No acute abnormality of the visualized skull or soft tissues. Endotracheal and nasogastric tubes are in place. No evidence of acute intracranial abnormality. Ct Chest W Contrast    Result Date: 6/22/2020  EXAMINATION: CT OF THE CHEST WITH CONTRAST 6/22/2020 8:55 am TECHNIQUE: CT of the chest was performed with the administration of intravenous contrast. Multiplanar reformatted images are provided for review. Dose modulation, iterative reconstruction, and/or weight based adjustment of the mA/kV was utilized to reduce the radiation dose to as low as reasonably achievable. 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 Given its stability, benign etiology such as esophageal duplication cyst or bronchogenic cyst is considered. There is mass effect upon the esophagus. Lungs/pleura: Lungs are clear without focal airspace consolidation, sizeable pleural effusion or pneumothorax. No discrete nodules or masses. Angelo Lianne Upper Abdomen: Unremarkable Soft Tissues/Bones: No acute osseous or soft tissue abnormality identified. Stable superior soft renal mass as compared to previous study of 03/17/2006. Given its stability, benign etiology such as esophageal duplication cyst or bronchogenic cyst is considered.   There is similar mass effect upon the esophagus. Ct Cervical Spine Wo Contrast    Result Date: 6/22/2020  EXAMINATION: CT OF THE CERVICAL SPINE WITHOUT CONTRAST 6/22/2020 7:58 am TECHNIQUE: CT of the cervical spine was performed without the administration of intravenous contrast. Multiplanar reformatted images are provided for review. Dose modulation, iterative reconstruction, and/or weight based adjustment of the mA/kV was utilized to reduce the radiation dose to as low as reasonably achievable. COMPARISON: None. HISTORY: ORDERING SYSTEM PROVIDED HISTORY: AMS TECHNOLOGIST PROVIDED HISTORY: AMS FINDINGS: No acute fracture. No subluxation. No significant degenerative changes. Endotracheal and nasogastric tubes are noted. No definite prevertebral soft tissue swelling. Paratracheal mass is incompletely evaluated. No acute traumatic findings within the cervical spine. Paratracheal mass is incompletely imaged and may represent adenopathy. Xr Chest Portable    Result Date: 6/22/2020  EXAMINATION: ONE XRAY VIEW OF THE CHEST 6/22/2020 7:54 am COMPARISON: September 11, 2016 HISTORY: ORDERING SYSTEM PROVIDED HISTORY: AMS, verify tube placement TECHNOLOGIST PROVIDED HISTORY: AMS, verify tube placement Reason for Exam: AMS, verify tube placement FINDINGS: Endotracheal tube is positioned 5.3 cm above the level of the sergey. Enteric tube extends below the diaphragm, with distal tip in the region of the gastric body and side port below the gastroesophageal junction. The cardiomediastinal silhouette is normal in size. There is redemonstration of a superior mediastinal mass, which appears mildly increased in size compared to the prior exam and exerts some mass effect on the trachea. The lungs are clear. No pleural effusion or pneumothorax is present. 1. No acute cardiopulmonary process. 2. Endotracheal tube and enteric tube appear adequately positioned.  3. Superior mediastinal mass appears to have mildly increased in size compared to previous exam of September 2016. This results in mild deviation of the trachea towards the right. Consider chest CT correlation. ASSESSMENT:       Principal Problem:    Altered mental status  Active Problems:    Acute asthma exacerbation    DULCE MARIA (acute kidney injury) ([address]  Resolved Problems:    * No resolved hospital problems.  *      PLAN:     Patient status: Transfer from ICU to progressive Unit    *Hypoxic, hypercapnic respiratory failure secondary to asthma exacerbation  - Extubated 6/22/2020  -IV Solu-Medrol 40 mg every 8 hours  -DuoNeb 4 times daily, albuterol PRN  -Restarted home Symbicort 2 puffs twice daily  -Singulair 10 mg daily  -status post 1 dose of magnesium  - on nasal cannula  - Respiratory culture pending    *DULCE MARIA secondary to rhabdomyolysis  -Creatinine 1.04 today, down from 1.48 yesterday  -moderate urine hemoglobin on urinalysis  -normal saline 100 cc an hour    *Acute toxic metabolic encephalopathy-improving  -Likely secondary to respiratory failure  -blood cultures no growth to date  -lactic acid initially elevated greater than 3 has been trending down    *Alcohol use  -Patient drinks 1 tall can of beer daily  -No history of alcohol withdrawal      DVT prophylaxis: heparin (porcine) injection 5,000 TID  GI prophylaxis: Protonix 40 mg daily      Consultations:   Consults: IP CONSULT TO CRITICAL CARE  PT/OT    Nursing:  Vital signs per unit routine  Continuous Pulse Oximetry   BIPAP as needed  RT Evaluation & Treat   DIET GENERAL     The patient's medical condition (specify asthma exacerbation and respiratory failure) would be significantly and directly threatened if care was provided in a less intensive setting      Above plan discussed with the patient , who agreed to the above plan     Plan will be discussed with the attending, MD Gladys Melendez Aas, MD  Family Medicine Resident  6/23/2020 9:20 AM   Attending Physician Statement  I have discussed the care of Austin Kwon, including pertinent history and exam findings,  with the resident. I have seen and examined the patient and the key elements of all parts of the encounter have been performed by me. I agree with the assessment, plan and orders as documented by the resident. (Suraj Conde)  Patient seen and examined along with the resident physicians. Admitted for Hypoxic and Hypercapnic Respiratory failure secondary to Asthma exacerbation and admitted under Critical care. Initially Intubated and then extubated and today care transferred to [facility]. Also has Acute kidney injury and mild Rhabdomylisis and slowly improving. Continue Oxygen/IV steroids/beta Agonists and breathing treatment and gentle Hydration. Hopefully Discharge planning for Tomorrow if doing good and issues resolved. Cultures negative so far. Vinny Davis.

## 2020-06-23 NOTE — PROGRESS NOTES
SUZE FOSTER, [facility] Assessment complete. Altered mental status [R41.82] . Vitals:    06/23/20 0750   BP:    Pulse:    Resp: 13   Temp:    SpO2: 91%   . Patients home meds are   Prior to Admission medications    Medication Sig Start Date End Date Taking?  Authorizing Provider   predniSONE (DELTASONE) 10 MG tablet Take 4 tablets by mouth once daily for 5 days 6/20/20 6/30/20 Yes Lola Dinning, DO   albuterol sulfate  (90 Base) MCG/ACT inhaler Inhale 2 puffs into the lungs every 4 hours as needed for Wheezing 6/20/20  Yes Lola Dinning, DO   budesonide-formoterol [facility]) 160-4.5 MCG/ACT AERO inhale 2 puffs by mouth twice a day 6/20/20  Yes Lola Dinning, DO   montelukast (SINGULAIR) 10 MG tablet Take 1 tablet by mouth daily 6/20/20  Yes Lola Dinning, DO   albuterol sulfate  (90 Base) MCG/ACT inhaler inhale 2 puffs by mouth every 6 hours if needed for wheezing 6/16/20   Jessee Modi MD   Respiratory Therapy Supplies (NEBULIZER/TUBING/MOUTHPIECE) KIT 1 kit by Does not apply route daily as needed (wheezing) 11/20/17   Nuris Madden MD   .  Recent Surgical History: noneAssessment BS end exp.wheeze        [id number] Sounds:end exp. wheeze    · Bronchodilator assessment at level  2  · Hyperinflation assessment at level   · Secretion Management assessment at level    ·   · []    Bronchodilator Assessment  BRONCHODILATOR ASSESSMENT SCORE  Score 0 1 2 3 4 5   Breath Sounds   []  Patient Baseline []  No Wheeze good aeration [x]  Faint, scattered wheezing, good aeration []  Expiratory Wheezing and or moderately diminished []  Insp/Exp wheeze and/or very diminished []  Insp/Exp and/ or marked distress   Respiratory Rate   []  Patient Baseline []  Less than 20 [x]  Less than 20 []  20-25 []  Greater than 25 []  Greater than 25   Peak flow % of Pred or PB []  NA   []  Greater than 90%  []  81-90% []  71-80% []  Less than or equal to 70%  or unable to perform []  Unable due to Respiratory Distress   Dyspnea re []  Patient Baseline []  No SOB [x]  No SOB []  SOB on exertion []  SOB min activity []  At rest/acute   e FEV% Predicted       []  NA []  Above 69%  []  Unable []  Above 60-69%  []  Unable []  Above 50-59%  []  Unable []  Above 35-49%  []  Unable []  Less than 35%  []  Unable                 []  Hyperinflation Assessment  Score 1 2 3   CXR and Breath Sounds   []  Clear []  No atelectasis  Basilar aeration []  Atelectasis or absent basilar breath sounds   Incentive Spirometry Volume  (Per IBW)   []  Greater than or equal to 15ml/Kg []  less than 15ml/Kg []  less than 15ml/Kg   Surgery within last 2 weeks []  None or general   []  Abdominal or thoracic surgery  []  Abdominal or thoracic   Chronic Pulmonary Historyre []  No []  Yes []  Yes     []  Secretion Management Assessment  Score 1 2 3   Bilateral Breath Sounds   []  Occasional Rhonchi []  Scattered Rhonchi []  Course Rhonchi and/or poor aeration   Sputum    []  Small amount of thin secretions []  Moderate amount of viscous secretions []  Copius, Viscious Yellow/ Secretions   CXR as reported by physician []  clear  []  Unavailable []  Infiltrates and/or consolidation  []  Unavailable []  Mucus Plugging and or lobar consolidation  []  Unavailable   Cough []  Strong, productive cough []  Weak productive cough []  No cough or weak non-productive cough   SUZE FOSTER  9:11 AM                            FEMALE                                  MALE                            FEV1 Predicted Normal Values                        FEV1 Predicted Normal Values          Age                                     Height in Feet and Inches       Age                                     Height in Feet and Inches       4' 11\" 5' 1\" 5' 3\" 5' 5\" 5' 7\" 5' 9\" 5' 11\" 6' 1\"  4' 11\" 5' 1\" 5' 3\" 5' 5\" 5' 7\" 5' 9\" 5' 11\" 6' 1\"   42 - 45 2.49 2.66 2.84 3.03 3.22 3.42 3.62 3.83 42 - 45 2.82 3.03 3.26 3.49 3.72 3.96 4.22 4.47   46 - 49 2.40 2.57 2.76 2.94 3.14 3.33 3.54 3.75 46 - 49 2.70 2. 92 3.14 3.37 3.61 3.85 4.10 4.36   50 - 53 2.31 2.48 2.66 2.85 3.04 3.24 3.45 3.66 50 - 53 2.58 2.80 3.02 3.25 3.49 3.73 3.98 4.24   54 - 57 2.21 2.38 2.57 2.75 2.95 3.14 3.35 3.56 54 - 57 2.46 2.67 2.89 3.12 3.36 3.60 3.85 4.11   58 - 61 2.10 2.28 2.46 2.65 2.84 3.04 3.24 3.45 58 - 61 2.32 2.54 2.76 2.99 3.23 3.47 3.72 3.98   62 - 65 1.99 2.17 2.35 2.54 2.73 2.93 3.13 3.34 62 - 65 2.19 2.40 2.62 2.85 3.09 3.33 3.58 3.84   66 - 69 1.88 2.05 2.23 2.42 2.61 2.81 3.02 3.23 66 - 69 2.04 2.26 2.48 2.71 2.95 3.19 3.44 3.70   70+ 1.82 1.99 2.17 2.36 2.55 2.75 2.95 3.16 70+ 1.97 2.19 2.41 2.64 2.87 3.12 3.37 3.62             Predicted Peak Expiratory Flow Rate                                       Height (in)  Female       Height (in) Male           Age 64 62 64 63 57 71 78 74 Age            21 344 357 372 387 402 417 432 446  60 62 64 66 68 70 72 74 76   25 337 352 366 381 396 411 426 441 25 447 476 505 533 562 591 619 648 677   30 329 344 359 374 389 404 419 434 30 437 466 494 523 552 580 609 638 667   35 322 337 351 366 381 396 411 426 35 426 455 484 512 541 570 598 627 657   40 314 329 344 359 374 389 404 419 40 416 445 473 502 531 559 588 617 647   45 307 322 336 351 366 381 396 411 45 405 434 463 491 520 549 577 606 636   50 299 314 329 344 359 374 389 404 50 395 424 452 481 510 538 567 596 625   55 292 307 321 336 351 366 381 396 55 384 413 442 470 499 528 556 585 615   60 284 299 314 329 344 359 374 389 60 374 403 431 460 489 517 546 575 605   65 277 292 306 321 336 351 366 381 65 363 392 421 449 478 507 535 564 594   70 269 284 299 314 329 344 359 374 70 353 382 410 439 468 496 525 554 583   75 261 274 289 305 319 334 348 364 75 344 372 400 429 458 487 515 544 573   80 253 266 282 296 312 327 342 356 80 335 362 390 419 448 476 505 534 562           BRONCHOSPASM/BRONCHOCONSTRICTION     [x]         IMPROVE AERATION/BREATH SOUNDS  [x]   ADMINISTER BRONCHODILATOR THERAPY AS APPROPRIATE  [x]   ASSESS BREATH SOUNDS  [x]   IMPLEMENT AEROSOL/MDI PROTOCOL  [x]   PATIENT EDUCATION AS NEEDED

## 2020-06-23 NOTE — DISCHARGE INSTR - COC
Continuity of Care Form    Patient Name: Amos Javed   :  1980  MRN:  [id number]    Admit date:  2020  Discharge date:  ***    Code Status Order: Full Code   Advance Directives:   Advance Care Flowsheet Documentation     Date/Time Healthcare Directive Type of Healthcare Directive Copy in [address] Agent's Name Healthcare Agent's Phone Number    20 1739  No, patient does not have an advance directive for healthcare treatment -- -- -- -- --          Admitting Physician:  Catherine Valenzuela MD  PCP: Jodi Ward MD    Discharging Nurse: [facility] Unit/Room#: 7095/2505-60  Discharging Unit Phone Number: ***    Emergency Contact:   Extended Emergency Contact Information  Primary Emergency Contact: Ingris Holman  Address: [address] [place] [address], [address] [place] [address]  Home Phone: [phone]  Relation: Parent  Secondary Emergency Contact: Laya Perales  Home Phone: [phone]  Relation: Brother/Sister    Past Surgical History:  No past surgical history on file. Immunization History: There is no immunization history on file for this patient. Active Problems:  Patient Active Problem List   Diagnosis Code    Severe persistent asthma in adult without complication I63.37    Altered mental status R41.82    Acute asthma exacerbation J45. 0    DULCE MARIA (acute kidney injury) ([facility] N17.9       Isolation/Infection:   Isolation          No Isolation        Patient Infection Status     Infection Onset Added Last Indicated Last Indicated By Review Planned Expiration Resolved Resolved By    None active    Resolved    COVID-19 Rule Out 20 COVID-19 (Ordered)   20 Rule-Out Test Resulted          Nurse Assessment:  Last Vital Signs: /83   Pulse 83   Temp 99 °F (37.2 °C) (Oral)   Resp 13   Ht 5' 9\" (1.753 m)   Wt 161 lb 13.1 oz (73.4 kg)   SpO2 91%   BMI 23.90 kg/m²     Last documented pain score (0-10 scale): Pain Level: 0  Last Weight:   Wt Readings from Last 1 Encounters:   20 161 lb 13.1 oz (73.4 kg)     Mental Status:  {IP PT MENTAL STATUS:}    IV Access:  { SUMEET IV ACCESS:242459869}    Nursing Mobility/ADLs:  Walking   {CHP DME LMCX:180015248}  Transfer  {CHP DME ACDK:800934992}  Bathing  {CHP DME AVB}  Dressing  {CHP DME TXNU:487634724}  Toileting  {CHP DME TPAM:818519329}  Feeding  {CHP DME FQAX:306630226}  Med Admin  {P DME THCR:553476957}  Med Delivery   { SUMEET MED Delivery:238598178}    Wound Care Documentation and Therapy:        Elimination:  Continence:   · Bowel: {YES / P}  · Bladder: {YES / LO:09853}  Urinary Catheter: {Urinary Catheter:218733639}   Colostomy/Ileostomy/Ileal Conduit: {YES / UR:27080}       Date of Last BM: ***    Intake/Output Summary (Last 24 hours) at 2020 1058  Last data filed at 2020 0800  Gross per 24 hour   Intake 1820.62 ml   Output 2165 ml   Net -344.38 ml     I/O last 3 completed shifts: In: 1820.6 [P.O.:400;  I.V.:1420.6]  Out: 1465 [Urine:1065; Emesis/NG output:400]    Safety Concerns:     [employer] Safety Concerns:032202131}    Impairments/Disabilities:      [employer] Impairments/Disabilities:173878976}    Nutrition Therapy:  Current Nutrition Therapy:   [employer] Diet List:137136178}    Routes of Feeding: {P DME Other Feedings:764089199}  Liquids: {Slp liquid thickness:63534}  Daily Fluid Restriction: {CHP DME Yes amt example:917423802}  Last Modified Barium Swallow with Video (Video Swallowing Test): {Done Not Done PJXS:765991171}    Treatments at the Time of Hospital Discharge:   Respiratory Treatments: ***  Oxygen Therapy:  {Therapy; copd oxygen:24692}  Ventilator:    { CC Vent GEFK:457990008}    Rehab Therapies: {THERAPEUTIC INTERVENTION:1831811809}  Weight Bearing Status/Restrictions: [address] Weight Bearin}  Other Medical Equipment (for information only, NOT a DME order):  {EQUIPMENT:733917859}  Other Treatments: ***    Patient's personal belongings (please select all that are sent with patient):  {CHP DME Belongings:941098835}    RN SIGNATURE:  {Esignature:757127061}    CASE MANAGEMENT/SOCIAL WORK SECTION    Inpatient Status Date: ***    Readmission Risk Assessment Score:  Readmission Risk              Risk of Unplanned Readmission:        14           Discharging to Facility/ Agency   · Name:   · Address:  · Phone:  · Fax:    Dialysis Facility (if applicable)   · Name:  · Address:  · Dialysis Schedule:  · Phone:  · Fax:    / signature: {Esignature:886123313}    PHYSICIAN SECTION    Prognosis: Fair    Condition at Discharge: Stable    Rehab Potential (if transferring to Rehab): Fair    Recommended Labs or Other Treatments After Discharge:     Physician Certification: I certify the above information and transfer of Maria R Loyd  is necessary for the continuing treatment of the diagnosis listed and that he requires [address] for greater 30 days.      Update Admission H&P: No change in H&P    PHYSICIAN SIGNATURE:  Electronically signed by Marilyn Angulo MD on 6/23/20 at 10:59 AM EDT

## 2020-06-23 NOTE — PROGRESS NOTES
Critical care team - Resident sign-out to medicine service      Date and time: 6/23/2020 3:22 AM  Patient's name:  [address] Record Number: [id number]  Patient's account/billing number: [de-identified]  Patient's YOB: 1980  Age: 45 y.o. Date of Admission: 6/22/2020  6:45 AM  Length of stay during current admission: 1    Primary Care Physician: Yadira Newton MD    Code Status: Full Code    Mode of physician to physician communication:        [x] Via telephone   [] In person     Date and time of sign-out: 6/23/2020 3:22 AM    Accepting Internal Medicine resident:    Accepting Medicine team: IM Team Intermed    Accepting team's attending: Dr. Weathers    Patient's current ICU Bed:  115     Patient's assigned bed on floor:  421        [] Med-Surg Monitored [x] Step-down       [] Psychiatry ICU       [] Psych floor     Reason for ICU admission:     Intentional drug overdose    ICU course summary:     76-year-old male came to the ER with altered mentation. As per the EMS, patient unresponsive given total of 4 mg IV Narcan with response. Patient also had possible seizure-like activity, bladder incontinence. Patient also agitated and not following commands and intubated to protect airway. Initial labs significant for anion gap metabolic acidosis, AKA with creatinine 1.48, U tox positive for cannabinoids. CT head and chest unremarkable. Later this afternoon patient got extubated. Weaned off ventilation successfully. Currently on nasal cannula for asthma exacerbation. Patient stable enough to go to the stepdown.       Procedures during patient's ICU stay:     None    Current Vitals:     BP (!) 146/94   Pulse 93   Temp 99 °F (37.2 °C) (Oral)   Resp 12   Ht 5' 9\" (1.753 m)   Wt 162 lb 0.6 oz (73.5 kg)   SpO2 93%   BMI 23.93 kg/m²       Cultures:     Blood cultures:                 [] None drawn      [] Negative             []  Positive (Details:  )  Urine Culture:                   [] None drawn      [] Negative             []  Positive (Details:  )  Sputum Culture:               [] None drawn       [] Negative             []  Positive (Details:  )   Endotracheal aspirate:     [] None drawn       [] Negative             []  Positive (Details:  )       Consults:     None    Assessment:     Patient Active Problem List    Diagnosis Date Noted    Altered mental status 06/22/2020    Severe persistent asthma in adult without complication 89/09/1871         Recommended Follow-up:     1. Acute toxic metabolic encephalopathy: Resolved. Likely secondary to hypoxemia and hypercapnia. Intubated to preserve the airway. Extubated successfully. On nasal cannula. 2. Acute asthma exacerbation: Continue Solu-Medrol every 8 hours, continue breathing treatments DuoNeb 4 times daily and albuterol as needed. Given 1 dose of mag. Currently on nasal cannula. Can escalate to BiPAP if needed. 3. Chronic superior mediastinal mass: Can be monitored for now  4. DULCE MARIA: Likely due to rhabdomyolysis, decreased oral intake. Continue to trend creatinine. Avoid nephrotoxins. Strict intake output. Resolving. 5. Chronic alcohol use: Currently on folate and multivitamins. Continue to monitor for alcohol withdrawal.      Above mentioned assessment and plan was discussed by me with the admitting medicine resident. The medicine team assigned to the patient by medicine admitting resident will be following up the patient from now onwards on the floor.          Jose Williamson MD, PGY-1  Internal Medicine Residency Program  [facility]  6/23/2020 3:51 AM

## 2020-06-23 NOTE — PROGRESS NOTES
purulent sputum  Denies chest pain hemoptysis fever night sweats  No nausea vomiting diarrhea abdominal pain and no GERD symptoms. Positive for nasal congestion postnasal drip.       Review of Systems -   CONSTITUTIONAL:  negative for  fevers, chills, sweats, fatigue, anorexia, and weight loss  EYES:  negative for  double vision, blurred vision, dry eyes, eye discharge, visual disturbance, irritation, redness, and icterus  HEENT:  negative for  tinnitus, earaches, nasal congestion, epistaxis, sore mouth, sore throat, hoarseness, and voice change  RESPIRATORY: Positive for  dry cough, dyspnea, negative for cough with sputum, wheezing, hemoptysis, chest pain, pleuritic pain, and cyanosis  CARDIOVASCULAR:  negative for  chest pain, dyspnea on exertion, palpitations, orthopnea, PND, exertional chest pressure/discomfort, fatigue, early saiety, edema, syncope  GASTROINTESTINAL:  negative for nausea, vomiting, diarrhea, constipation, abdominal pain, jaundice, dysphagia, reflux, odynophagia, hematemesis, and hemtochezia  GENITOURINARY:  negative for frequency, dysuria, urinary incontinence, and hematuria  HEMATOLOGIC/LYMPHATIC:  negative for easy bruising, bleeding, lymphadenopathy, and petechiae  ALLERGIC/IMMUNOLOGIC:  negative for recurrent infections, urticaria, hay fever, angioedema, and anaphylaxis  ENDOCRINE:  negative for heat intolerance, cold intolerance, tremor, weight changes, and change in bowel habits  MUSCULOSKELETAL:  negative for  myalgias, arthralgias, joint swelling, stiff joints, decreased range of motion, and muscle weakness  NEUROLOGICAL:  negative for headaches, dizziness, seizures, memory problems, speech problems, visual disturbance, coordination problems, gait problems, tremor, dysphagia, weakness, numbness, syncope, and tingling  BEHAVIOR/PSYCH:  negative for poor appetite, decreased sleep, increased sleep, decreased energy level, increased energy level, poor concentration, increased agitation, and anxiety        Physical Exam:  Vitals: BP (!) 144/94   Pulse 78   Temp 99.7 °F (37.6 °C) (Temporal)   Resp 14   Ht 5' 9\" (1.753 m)   Wt 161 lb 13.1 oz (73.4 kg)   SpO2 98%   BMI 23.90 kg/m²   24 hour intake/output:    Intake/Output Summary (Last 24 hours) at 6/23/2020 1753  Last data filed at 6/23/2020 0800  Gross per 24 hour   Intake 1178.62 ml   Output 1400 ml   Net -221.38 ml     Last 3 weights:   Wt Readings from Last 3 Encounters:   06/23/20 161 lb 13.1 oz (73.4 kg)   06/20/20 165 lb (74.8 kg)   03/04/20 165 lb 6.4 oz (75 kg)       General appearance: alert and cooperative with exam  Physical Examination:   General appearance - alert, well appearing, and in no distress, normal appearing weight and acyanotic, in no respiratory distress  Mental status - alert, oriented to person, place, and time  Eyes -he has subconjunctival hemorrhage present in left eye, pupils equal and reactive, extraocular eye movements intact, sclera anicteric  Ears - right ear normal, left ear normal  Nose - normal and patent, no erythema, discharge or polyps  Mouth - mucous membranes moist, pharynx normal without lesions  Neck - supple, no significant adenopathy  Chest - no tachypnea, retractions or cyanosis, bilateral symmetrical chest movement, normal resonance on percussion, air entry is bilaterally present with prolonged expiration and mild expiratory wheezing bilaterally no crackles present  Heart - normal rate, regular rhythm, normal S1, S2, no murmurs, rubs, clicks or gallops  Abdomen - soft, nontender, nondistended, no masses or organomegaly  Neurological - alert, oriented, normal speech, no focal findings or movement disorder noted}  Extremities - peripheral pulses normal, no pedal edema, no clubbing or cyanosis  Skin - normal coloration and turgor, no rashes, no suspicious skin lesions noted     Diet:  DIET GENERAL;    Medications:Current Inpatient    Scheduled Meds:   guaiFENesin  600 mg Oral BID    montelukast  10 mg PH, PCO2, PO2, HCO3, O2SAT  Thyroid: No results found for: TSH   Urinalysis:   Recent Labs     06/22/20  0711   BACTERIA NOT REPORTED   COLORU YELLOW   PHUR 5.0   PROTEINU 2+*   RBCUA 0 TO 2   SPECGRAV 1.023   BILIRUBINUR NEGATIVE   NITRU NEGATIVE   WBCUA 10 TO 20   LEUKOCYTESUR NEGATIVE   GLUCOSEU 2+*       CULTURES:    CXR  06/22/2020  No infiltrate endotracheal tube about 5 cm above sergey      CT Scans  CT scan chest 06/22/2020. No infiltrate no consolidation no pneumothorax or pleural effusion. Assessment and Plan:    Status asthmaticus/near fatal asthma. Acute asthma exacerbation. Acute hypoxic hypercapnic respiratory failure. Persistent asthma severity is not known likely at least moderate persistent asthma. Allergic rhinitis. DULCE MARIA resolved. Lactic acidosis secondary to acute exacerbation resolved. History of non-compliance with ICS/LABA  Poor insight of asthma. Plan and recommendation:    Continue with IV steroids today. Resume Symbicort 160/4.52 puff twice daily. Continue with Singulair. Resume Claritin as an outpatient. Continue with DuoNeb aerosol at this time. Wean O2 to keep saturation greater than 92%. Okay for ambulation. Continue with IV fluid today. No need to follow-up lactic acid. Flonase nasal spray and Claritin as needed.     I have a long discussion with patient about asthma, severity, compliance with medication role of inhaled corticosteroids in controlling asthma and asthma control and risk of untreated asthma especially in his case as he likely have near fatal asthma requiring intubation, I have discussed with him about use of inhalers technique and that he has to use Symbicort every day 2 puff twice daily and to use albuterol as needed, I have also discussed with him about using Flonase nasal spray continue Singulair daily and to use Claritin as needed and avoid allergens and triggers and irritants and smoke advised regular follow-up with his physician after discharge    Jud Corral MD      6/23/2020, 5:53 PM    Pulmonary & Critical Care    Please note that this chart was generated using voice recognition Dragon dictation software. Although every effort was made to ensure the accuracy of this automated transcription, some errors in transcription may have occurred.

## 2020-06-24 VITALS
OXYGEN SATURATION: 95 % | HEART RATE: 82 BPM | HEIGHT: 69 IN | WEIGHT: 160.72 LBS | BODY MASS INDEX: 23.8 KG/M2 | RESPIRATION RATE: 18 BRPM | SYSTOLIC BLOOD PRESSURE: 135 MMHG | TEMPERATURE: 98.4 F | DIASTOLIC BLOOD PRESSURE: 104 MMHG

## 2020-06-24 LAB
ABSOLUTE EOS #: <0.03 K/UL (ref 0–0.44)
ABSOLUTE EOS #: NORMAL K/UL (ref 0–0.44)
ABSOLUTE IMMATURE GRANULOCYTE: 0.08 K/UL (ref 0–0.3)
ABSOLUTE IMMATURE GRANULOCYTE: NORMAL K/UL (ref 0–0.3)
ABSOLUTE LYMPH #: 1.42 K/UL (ref 1.1–3.7)
ABSOLUTE LYMPH #: NORMAL K/UL (ref 1.1–3.7)
ABSOLUTE MONO #: 0.78 K/UL (ref 0.1–1.2)
ABSOLUTE MONO #: NORMAL K/UL (ref 0.1–1.2)
ANION GAP SERPL CALCULATED.3IONS-SCNC: 13 MMOL/L (ref 9–17)
BASOPHILS # BLD: 0 % (ref 0–2)
BASOPHILS # BLD: NORMAL % (ref 0–2)
BASOPHILS ABSOLUTE: <0.03 K/UL (ref 0–0.2)
BASOPHILS ABSOLUTE: NORMAL K/UL (ref 0–0.2)
BUN BLDV-MCNC: 12 MG/DL (ref 6–20)
BUN/CREAT BLD: ABNORMAL (ref 9–20)
CALCIUM SERPL-MCNC: 8.9 MG/DL (ref 8.6–10.4)
CHLORIDE BLD-SCNC: 102 MMOL/L (ref 98–107)
CO2: 21 MMOL/L (ref 20–31)
CREAT SERPL-MCNC: 1.15 MG/DL (ref 0.7–1.2)
DIFFERENTIAL TYPE: ABNORMAL
DIFFERENTIAL TYPE: NORMAL
EOSINOPHILS RELATIVE PERCENT: 0 % (ref 1–4)
EOSINOPHILS RELATIVE PERCENT: NORMAL % (ref 1–4)
GFR AFRICAN AMERICAN: >60 ML/MIN
GFR NON-AFRICAN AMERICAN: >60 ML/MIN
GFR SERPL CREATININE-BSD FRML MDRD: ABNORMAL ML/MIN/{1.73_M2}
GFR SERPL CREATININE-BSD FRML MDRD: ABNORMAL ML/MIN/{1.73_M2}
GLUCOSE BLD-MCNC: 116 MG/DL (ref 70–99)
HCT VFR BLD CALC: 44.4 % (ref 40.7–50.3)
HCT VFR BLD CALC: NORMAL % (ref 40.7–50.3)
HEMOGLOBIN: 15.2 G/DL (ref 13–17)
HEMOGLOBIN: NORMAL G/DL (ref 13–17)
IMMATURE GRANULOCYTES: 1 %
IMMATURE GRANULOCYTES: NORMAL %
LYMPHOCYTES # BLD: 15 % (ref 24–43)
LYMPHOCYTES # BLD: NORMAL % (ref 24–43)
MCH RBC QN AUTO: 27.7 PG (ref 25.2–33.5)
MCH RBC QN AUTO: NORMAL PG (ref 25.2–33.5)
MCHC RBC AUTO-ENTMCNC: 34.2 G/DL (ref 28.4–34.8)
MCHC RBC AUTO-ENTMCNC: NORMAL G/DL (ref 28.4–34.8)
MCV RBC AUTO: 80.9 FL (ref 82.6–102.9)
MCV RBC AUTO: NORMAL FL (ref 82.6–102.9)
MONOCYTES # BLD: 8 % (ref 3–12)
MONOCYTES # BLD: NORMAL % (ref 3–12)
NRBC AUTOMATED: 0 PER 100 WBC
NRBC AUTOMATED: NORMAL PER 100 WBC
PDW BLD-RTO: 13.1 % (ref 11.8–14.4)
PDW BLD-RTO: NORMAL % (ref 11.8–14.4)
PLATELET # BLD: 257 K/UL (ref 138–453)
PLATELET # BLD: NORMAL K/UL (ref 138–453)
PLATELET ESTIMATE: ABNORMAL
PLATELET ESTIMATE: NORMAL
PLATELET, FLUORESCENCE: NORMAL K/UL (ref 138–453)
PLATELET, IMMATURE FRACTION: NORMAL % (ref 1.1–10.3)
PMV BLD AUTO: 11.9 FL (ref 8.1–13.5)
PMV BLD AUTO: NORMAL FL (ref 8.1–13.5)
POTASSIUM SERPL-SCNC: 4.9 MMOL/L (ref 3.7–5.3)
RBC # BLD: 5.49 M/UL (ref 4.21–5.77)
RBC # BLD: ABNORMAL 10*6/UL
RBC # BLD: NORMAL 10*6/UL
RBC # BLD: NORMAL M/UL (ref 4.21–5.77)
SEG NEUTROPHILS: 76 % (ref 36–65)
SEG NEUTROPHILS: NORMAL % (ref 36–65)
SEGMENTED NEUTROPHILS ABSOLUTE COUNT: 7.32 K/UL (ref 1.5–8.1)
SEGMENTED NEUTROPHILS ABSOLUTE COUNT: NORMAL K/UL (ref 1.5–8.1)
SODIUM BLD-SCNC: 136 MMOL/L (ref 135–144)
TOTAL CK: 509 U/L (ref 39–308)
WBC # BLD: 9.6 K/UL (ref 3.5–11.3)
WBC # BLD: ABNORMAL 10*3/UL
WBC # BLD: NORMAL 10*3/UL
WBC # BLD: NORMAL K/UL (ref 3.5–11.3)

## 2020-06-24 PROCEDURE — 99239 HOSP IP/OBS DSCHRG MGMT >30: CPT | Performed by: FAMILY MEDICINE

## 2020-06-24 PROCEDURE — 85025 COMPLETE CBC W/AUTO DIFF WBC: CPT

## 2020-06-24 PROCEDURE — 6370000000 HC RX 637 (ALT 250 FOR IP): Performed by: STUDENT IN AN ORGANIZED HEALTH CARE EDUCATION/TRAINING PROGRAM

## 2020-06-24 PROCEDURE — 2580000003 HC RX 258: Performed by: STUDENT IN AN ORGANIZED HEALTH CARE EDUCATION/TRAINING PROGRAM

## 2020-06-24 PROCEDURE — 82550 ASSAY OF CK (CPK): CPT

## 2020-06-24 PROCEDURE — 6360000002 HC RX W HCPCS: Performed by: STUDENT IN AN ORGANIZED HEALTH CARE EDUCATION/TRAINING PROGRAM

## 2020-06-24 PROCEDURE — 85055 RETICULATED PLATELET ASSAY: CPT

## 2020-06-24 PROCEDURE — 6370000000 HC RX 637 (ALT 250 FOR IP): Performed by: INTERNAL MEDICINE

## 2020-06-24 PROCEDURE — 94640 AIRWAY INHALATION TREATMENT: CPT

## 2020-06-24 PROCEDURE — 99232 SBSQ HOSP IP/OBS MODERATE 35: CPT | Performed by: INTERNAL MEDICINE

## 2020-06-24 PROCEDURE — 36415 COLL VENOUS BLD VENIPUNCTURE: CPT

## 2020-06-24 PROCEDURE — 80048 BASIC METABOLIC PNL TOTAL CA: CPT

## 2020-06-24 PROCEDURE — 94761 N-INVAS EAR/PLS OXIMETRY MLT: CPT

## 2020-06-24 RX ORDER — IPRATROPIUM BROMIDE AND ALBUTEROL SULFATE 2.5; .5 MG/3ML; MG/3ML
1 SOLUTION RESPIRATORY (INHALATION) EVERY 4 HOURS PRN
Status: DISCONTINUED | OUTPATIENT
Start: 2020-06-24 | End: 2020-06-24 | Stop reason: HOSPADM

## 2020-06-24 RX ORDER — BUDESONIDE AND FORMOTEROL FUMARATE DIHYDRATE 160; 4.5 UG/1; UG/1
AEROSOL RESPIRATORY (INHALATION)
Qty: 10.2 G | Refills: 1 | Status: SHIPPED | OUTPATIENT
Start: 2020-06-24 | End: 2020-12-01

## 2020-06-24 RX ORDER — SIMETHICONE 80 MG
80 TABLET,CHEWABLE ORAL EVERY 6 HOURS PRN
Status: DISCONTINUED | OUTPATIENT
Start: 2020-06-24 | End: 2020-06-24 | Stop reason: HOSPADM

## 2020-06-24 RX ORDER — GUAIFENESIN 600 MG/1
600 TABLET, EXTENDED RELEASE ORAL 2 TIMES DAILY
Qty: 20 TABLET | Refills: 0 | Status: SHIPPED | OUTPATIENT
Start: 2020-06-24 | End: 2020-07-04

## 2020-06-24 RX ORDER — SIMETHICONE 80 MG
80 TABLET,CHEWABLE ORAL EVERY 6 HOURS PRN
Qty: 120 TABLET | Refills: 0 | Status: SHIPPED | OUTPATIENT
Start: 2020-06-24 | End: 2020-07-24

## 2020-06-24 RX ORDER — PREDNISONE 20 MG/1
40 TABLET ORAL DAILY
Status: DISCONTINUED | OUTPATIENT
Start: 2020-06-24 | End: 2020-06-24 | Stop reason: HOSPADM

## 2020-06-24 RX ORDER — PREDNISONE 10 MG/1
TABLET ORAL
Qty: 20 TABLET | Refills: 0 | Status: SHIPPED | OUTPATIENT
Start: 2020-06-24 | End: 2020-07-02

## 2020-06-24 RX ADMIN — BUDESONIDE AND FORMOTEROL FUMARATE DIHYDRATE 2 PUFF: 160; 4.5 AEROSOL RESPIRATORY (INHALATION) at 07:35

## 2020-06-24 RX ADMIN — IPRATROPIUM BROMIDE AND ALBUTEROL SULFATE 1 AMPULE: .5; 3 SOLUTION RESPIRATORY (INHALATION) at 07:35

## 2020-06-24 RX ADMIN — SODIUM CHLORIDE, PRESERVATIVE FREE 10 ML: 5 INJECTION INTRAVENOUS at 10:13

## 2020-06-24 RX ADMIN — SIMETHICONE 80 MG: 80 TABLET, CHEWABLE ORAL at 16:26

## 2020-06-24 RX ADMIN — PREDNISONE 40 MG: 20 TABLET ORAL at 12:40

## 2020-06-24 RX ADMIN — MONTELUKAST SODIUM 10 MG: 10 TABLET, FILM COATED ORAL at 10:12

## 2020-06-24 RX ADMIN — SODIUM CHLORIDE, PRESERVATIVE FREE 10 ML: 5 INJECTION INTRAVENOUS at 10:14

## 2020-06-24 RX ADMIN — GUAIFENESIN 600 MG: 600 TABLET, EXTENDED RELEASE ORAL at 10:12

## 2020-06-24 RX ADMIN — HEPARIN SODIUM 5000 UNITS: 5000 INJECTION INTRAVENOUS; SUBCUTANEOUS at 06:23

## 2020-06-24 RX ADMIN — METHYLPREDNISOLONE SODIUM SUCCINATE 40 MG: 40 INJECTION, POWDER, FOR SOLUTION INTRAMUSCULAR; INTRAVENOUS at 06:23

## 2020-06-24 ASSESSMENT — ENCOUNTER SYMPTOMS
ABDOMINAL PAIN: 0
WHEEZING: 0
SHORTNESS OF BREATH: 0
NAUSEA: 0
EYE PAIN: 0
VOMITING: 0
COUGH: 0
DIARRHEA: 0
EYE REDNESS: 1

## 2020-06-24 ASSESSMENT — PAIN SCALES - GENERAL
PAINLEVEL_OUTOF10: 0
PAINLEVEL_OUTOF10: 0

## 2020-06-24 NOTE — DISCHARGE SUMMARY
Department of [address] [place] [address]    Discharge Summary      NAME:  Amos Javed  :  1980  MRN:  [id number]    Admit date:  2020  Discharge date:  2020    Admitting Physician:  Catherine Valenzuela MD    Primary Diagnosis on Admission:   Present on Admission:   Altered mental status   Acute asthma exacerbation   DULCE MARIA (acute kidney injury) ([facility] [address]      Secondary Diagnoses:  does not have any pertinent problems on file. Admission Condition:  serious     Discharged Condition: good    A NOTE FOR PCP: This patient had 1 reading of elevated blood pressure. Please follow-up and check blood pressure in the office. He does not have a history of hypertension. Hospital Course: The patient was admitted for the management of status asthmaticus. Patient was found passed out in his room by his mother. He was brought to the ED, patient was agitated and combative and was intubated to protect his airway. He was extubated later that same day and moved out of ICU, he was transferred to the family medicine service. Patient states that he has asthma, only takes his Symbicort as needed. States that the pollen lately has been making his breathing very difficult, when he was at home he could not breathe and passed out and hit his head. Patient was treated with IV Solu-Medrol, Symbicort, duo nebs. He was restarted on his Singulair. Patient will go home with prednisone taper. Patient also had DULCE MARIA secondary to rhabdo from the fall. Patient was on IV normal saline, kidney function improved to baseline. Today on day of discharge pt feels better with no further complaints. Vitals and Labs are at pts baseline. All consultants involved during this admission are agreeable to d/c.     Consults:  pulmonary/intensive care    Significant Diagnostic/theraputic interventions: Intubation, IV steroids      Disposition:   home    Instructions to Patient: Follow-up primary care, take all meds as prescribed. Take Symbicort every day. Follow up with Marva Elliott MD in  1 week    Discharge Medications:     Allyssa Melendez   Home Medication Instructions EVV:[id number]    Printed on:06/24/20 1915   Medication Information                      albuterol sulfate  (90 Base) MCG/ACT inhaler  inhale 2 puffs by mouth every 6 hours if needed for wheezing             albuterol sulfate  (90 Base) MCG/ACT inhaler  Inhale 2 puffs into the lungs every 4 hours as needed for Wheezing             budesonide-formoterol (SYMBICORT) 160-4.5 MCG/ACT AERO  inhale 2 puffs by mouth twice a day             guaiFENesin (MUCINEX) 600 MG extended release tablet  Take 1 tablet by mouth 2 times daily for 10 days             montelukast (SINGULAIR) 10 MG tablet  Take 1 tablet by mouth daily             predniSONE (DELTASONE) 10 MG tablet  Take 4 tablets by mouth daily for 2 days, THEN 3 tablets daily for 2 days, THEN 2 tablets daily for 2 days, THEN 1 tablet daily for 2 days. Respiratory Therapy Supplies (NEBULIZER/TUBING/MOUTHPIECE) KIT  1 kit by Does not apply route daily as needed (wheezing)             simethicone (MYLICON) 80 MG chewable tablet  Take 1 tablet by mouth every 6 hours as needed for Flatulence               Send Copies to: Marva Elliott MD      Note that over 30 minutes was spent in preparing discharge papers, discussing discharge with patient and family, medication review, etc.    Signed:   Ju Sim MD Family Medicine Resident  6/24/2020, 12:26 PM

## 2020-06-24 NOTE — PLAN OF CARE
Problem: Nutrition  Goal: Optimal nutrition therapy  Outcome: Completed     Problem: Discharge Planning:  Goal: Discharged to appropriate level of care  Description: Discharged to appropriate level of care  Outcome: Completed     Problem:  Activity Intolerance:  Goal: Ability to perform activities of daily living will improve  Description: Ability to perform activities of daily living will improve  Outcome: Completed

## 2020-06-24 NOTE — PLAN OF CARE
Pt remains free from physical injury this shift. Nonskid socks in place, bed locked and in lowest position, adequate lighting provided. Pt up with assist any time she needs to exit bed. Adequate fluid intake provided, intake encouraged during hourly rounding. Pt's pain assessed frequently with hourly rounding; assessed all pain characteristics including level, type, location, frequency, and onset. Pt medicated by RN per PRN orders. Non-pharmacologic interventions offered to pt as well. Pt states pain is tolerable at this time. Full skin assessment completed this shift. No new skin breakdown at this time. Pt repositioned q2h and prn. Pt kept clean and dry. Gel mattress in place on bed, heels floated. Pt remains free from accidental physical injury at this time. Pt assessed for risk for falls, fall precautions in place. Safe environment maintained. Bed locked and in lowest position, call light within reach. ID band correct and in place. Pt educated on safe transfer methods, maintains steady gait during independent ambulation. Pt instructed on safety devices, voiced complete understanding. Pt's airway remains patent this shift. Pt positioned with HOB elevated for maximum ventilatory efficiency. Breath sounds auscultated in all lobes; lungs clear and diminished at this time. Adequate fluid intake provided to help liquify secretions. Intake and output monitored. Pt encouraged to cough and deep breathe and use IS. Will continue to monitor.   Electronically signed by Dirk Shone, RN on 6/24/2020 at 4:27 AM

## 2020-06-24 NOTE — PROGRESS NOTES
3. 61 3.85 4.10 4.36   50 - 53 2.31 2.48 2.66 2.85 3.04 3.24 3.45 3.66 50 - 53 2.58 2.80 3.02 3.25 3.49 3.73 3.98 4.24   54 - 57 2.21 2.38 2.57 2.75 2.95 3.14 3.35 3.56 54 - 57 2.46 2.67 2.89 3.12 3.36 3.60 3.85 4.11   58 - 61 2.10 2.28 2.46 2.65 2.84 3.04 3.24 3.45 58 - 61 2.32 2.54 2.76 2.99 3.23 3.47 3.72 3.98   62 - 65 1.99 2.17 2.35 2.54 2.73 2.93 3.13 3.34 62 - 65 2.19 2.40 2.62 2.85 3.09 3.33 3.58 3.84   66 - 69 1.88 2.05 2.23 2.42 2.61 2.81 3.02 3.23 66 - 69 2.04 2.26 2.48 2.71 2.95 3.19 3.44 3.70   70+ 1.82 1.99 2.17 2.36 2.55 2.75 2.95 3.16 70+ 1.97 2.19 2.41 2.64 2.87 3.12 3.37 3.62             Predicted Peak Expiratory Flow Rate                                       Height (in)  Female       Height (in) Male           Age 64 62 64 63 57 71 78 74 Age            21 344 357 372 387 402 417 432 446  60 62 64 66 68 70 72 74 76   25 337 352 366 381 396 411 426 441 25 447 476 505 533 562 591 619 648 677   30 329 344 359 374 389 404 419 434 30 437 466 494 523 552 580 609 638 667   35 322 337 351 366 381 396 411 426 35 426 455 484 512 541 570 598 627 657   40 314 329 344 359 374 389 404 419 40 416 445 473 502 531 559 588 617 647   45 307 322 336 351 366 381 396 411 45 405 434 463 491 520 549 577 606 636   50 299 314 329 344 359 374 389 404 50 395 424 452 481 510 538 567 596 625   55 292 307 321 336 351 366 381 396 55 384 413 442 470 499 528 556 585 615   60 284 299 314 329 344 359 374 389 60 374 403 431 460 489 517 546 575 605   65 277 292 306 321 336 351 366 381 65 363 392 421 449 478 507 535 564 594   70 269 284 299 314 329 344 359 374 70 353 382 410 439 468 496 525 554 583   75 261 274 289 305 319 334 348 364 75 344 372 400 429 458 487 515 544 573   80 253 266 282 296 312 327 342 356 80 335 362 390 419 448 476 505 534 562           BRONCHOSPASM/BRONCHOCONSTRICTION     [x]         IMPROVE AERATION/BREATH SOUNDS  [x]   ADMINISTER BRONCHODILATOR THERAPY AS APPROPRIATE  [x]   ASSESS BREATH SOUNDS  [x]   IMPLEMENT AEROSOL/MDI PROTOCOL  [x]   PATIENT EDUCATION AS NEEDED

## 2020-06-24 NOTE — CARE COORDINATION
Consult received this date for d/c planning  Chart reviewed. Noted +tox screen for marijuana  Pt present with AMS,SOB  Met with pt this date was alert and oriented  Pt states he smokes marijuana 2-3x/weekly and drinks 1 tall can of beer every other day. No prior rehab. Pt declined rehab and resources at this time. Insurance is [employer].
Discharge [facility] Case Management Department  Written by: Kike Monae RN    Patient Name: Ghazal Avery  Attending Provider: Derrell Loera MD  Admit Date: 2020  6:45 AM  MRN: [id number]  Account: [de-identified]                     : 1980  Discharge Date:  20       Disposition: home    Kike Monae RN
ICU Transfer    Sign out received from Dr. Mayito Celeste, ICU resident. Briefly, this is a 45 yr old male who presented with intentional drug OD. Received 2mg Narcan x2 with intermittent improvement-- patient required intubation for airway protection. Was extubated a few hours later. Urine Tox: positive for cannabinoids only  DULCE MARIA on presentation and mild rhabdo: improved with IV fluid resuscitation    Patient does have history of asthma, developed expiratory wheezing and mild tachypnea a few hours ago. Received IV Mag and is currently on IV solumedrol, placed on 2L NC-- no hypoxia, oxygen maintained at 90-99%. Patient has history of daily alcohol use: no s/s of withdrawal at present, can add CIWA protocol if/when needed. Is on thiamine/folic acid/mtv    Previous days notes, labs, and vitals reviewed. Stable at time of transfer. Full H&P to follow from our service.
Altered mental status [R41.82]     Treatment Plan of Care:     Tests/Procedures still needed:     Barriers to Discharge:     Readmission Risk              Risk of Unplanned Readmission:        14            Patient goals/Treatment Preferences/Transitional Plan:     Referrals Made:     Follow Up needed:

## 2020-06-24 NOTE — PROGRESS NOTES
PULMONARY PROGRESS NOTE      Patient:  Albertina Mccann  YOB: 1980    MRN: [id number]     Acct: [de-identified]     Admit date: 6/22/2020    REASON FOR INITIAL CONSULT:-   Acute hypoxic respiratory failure/status asthmaticus/near fatal asthma    Pt seen and Chart reviewed. Patient was initially admitted to medical ICU with history of acute shortness of breath/altered mental status of/syncope presented to emergency room had Narcan intubated in the emergency room, acute hypoxemic hypercapnic respiratory failure likely secondary to status asthmaticus asthma exacerbation initial labs were consistent with metabolic acidosis/lactic acidosis there was also concern about seizure, DULCE MARIA with mildly elevated CK, urine drug screen was positive for cannabinoids only, lactic acid improved with hydration, initial venous blood gas shows 7.1 8/60/56 he was extubated in the ICU the same day and was treated with bronchodilators IV steroids, he does have a history of alcohol use but denies heavy alcohol use. 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 He denies smoking cigarettes    He is hemodynamically stable he is off oxygen and maintaining saturation 96% on RA. He is able to ambulate to the bathroom without getting shortness of breath.     Subjective:   Shortness of breath is much better denies shortness of breath at rest and on ambulation to bathroom. He denies wheezing and denies distress. He denies sputum production, mild cough. Denies purulent sputum  Denies chest pain hemoptysis fever night sweats  No nausea vomiting diarrhea abdominal pain and no GERD symptoms. Positive for nasal congestion postnasal drip.       Review of Systems -   CONSTITUTIONAL:  negative for  fevers, chills, sweats, fatigue, anorexia, and weight loss  EYES:  negative for  double vision, blurred vision, dry eyes, eye discharge, visual disturbance, irritation, redness, and icterus  HEENT:  negative for  tinnitus, earaches, nasal congestion, epistaxis, sore mouth, sore throat, hoarseness, and voice change  RESPIRATORY: Positive for mild dry cough, dyspnea, negative for cough with sputum, wheezing, hemoptysis, chest pain, pleuritic pain, and cyanosis  CARDIOVASCULAR:  negative for  chest pain, dyspnea on exertion, palpitations, orthopnea, PND, exertional chest pressure/discomfort, fatigue, early saiety, edema, syncope  GASTROINTESTINAL:  negative for nausea, vomiting, diarrhea, constipation, abdominal pain, jaundice, dysphagia, reflux, odynophagia, hematemesis, and hemtochezia  GENITOURINARY:  negative for frequency, dysuria, urinary incontinence, and hematuria  HEMATOLOGIC/LYMPHATIC:  negative for easy bruising, bleeding, lymphadenopathy, and petechiae  ALLERGIC/IMMUNOLOGIC:  negative for recurrent infections, urticaria, hay fever, angioedema, and anaphylaxis  ENDOCRINE:  negative for heat intolerance, cold intolerance, tremor, weight changes, and change in bowel habits  MUSCULOSKELETAL:  negative for  myalgias, arthralgias, joint swelling, stiff joints, decreased range of motion, and muscle weakness  NEUROLOGICAL:  negative for headaches, dizziness, seizures, memory problems, speech problems, visual disturbance, coordination problems, gait problems, tremor, dysphagia, weakness, numbness, syncope, and tingling  BEHAVIOR/PSYCH:  negative for poor appetite, decreased sleep, increased sleep, decreased energy level, increased energy level, poor concentration, increased agitation, and anxiety        Physical Exam:  Vitals: /81   Pulse 77   Temp 98.3 °F (36.8 °C) (Oral)   Resp 12   Ht 5' 9\" (1.753 m)   Wt 160 lb 11.5 oz (72.9 kg)   SpO2 99%   BMI 23.73 kg/m²   24 hour intake/output:    Intake/Output Summary (Last 24 hours) at 6/24/2020 1125  Last data filed at 6/24/2020 0630  Gross per 24 hour   Intake 680 ml   Output 950 ml   Net -270 ml     Last 3 weights:   Wt Readings from Last 3 Encounters:   06/24/20 160 lb 11.5 oz (72.9 kg)   06/20/20 165 lb (74.8 kg)   03/04/20 165 lb 6.4 oz (75 kg)       General appearance: alert and cooperative with exam  Physical Examination:   General appearance - alert, well appearing, and in no distress, normal appearing weight and acyanotic, in no respiratory distress  Mental status - alert, oriented to person, place, and time  Eyes -he has subconjunctival hemorrhage present in left eye, pupils equal and reactive, extraocular eye movements intact, sclera anicteric  Ears - right ear normal, left ear normal  Nose - normal and patent, no erythema, discharge or polyps  Mouth - mucous membranes moist, pharynx normal without lesions  Neck - supple, no significant adenopathy  Chest - no tachypnea, retractions or cyanosis, bilateral symmetrical chest movement, normal resonance on percussion, air entry is bilaterally present with prolonged expiration and no expiratory wheezing bilaterally no crackles present  Heart - normal rate, regular rhythm, normal S1, S2, no murmurs, rubs, clicks or gallops  Abdomen - soft, nontender, nondistended, no masses or organomegaly  Neurological - alert, oriented, normal speech, no focal findings or movement disorder noted  Extremities - peripheral pulses normal, no pedal edema, no clubbing or cyanosis  Skin - normal coloration and turgor, no rashes, no suspicious skin lesions noted     Diet:  DIET GENERAL;    Medications:Current Inpatient    Scheduled Meds:   predniSONE  40 mg Oral Daily    guaiFENesin  600 mg Oral BID    montelukast  10 mg Oral Daily    budesonide-formoterol  2 puff Inhalation BID    sodium chloride flush  10 mL Intravenous 2 times per day    sodium chloride flush  10 mL Intravenous 2 times per day    heparin (porcine)  5,000 Units Subcutaneous 3 times per day     Continuous Infusions:    PRN Meds:ipratropium-albuterol, sodium chloride flush, acetaminophen, sodium chloride flush, acetaminophen **OR** acetaminophen, polyethylene glycol, promethazine **OR** ondansetron, albuterol    Objective:    CBC:   Recent Labs     06/23/20  0412 06/24/20  0640 06/24/20  0728   WBC 8.0 DISREGARD RESULT. SPECIMEN CLOTTED. 9.6   HGB 14.3 DISREGARD RESULT. SPECIMEN CLOTTED. 15.2    DISREGARD RESULT. SPECIMEN CLOTTED. 257     BMP:    Recent Labs     06/22/20  0711 06/22/20  0727 06/23/20  0412 06/24/20  0640     --  138 136   K 4.7  --  4.6 4.9     --  106 102   CO2 12*  --  19* 21   BUN 13  --  7 12   CREATININE 1.48* 1.77* 1.04 1.15   GLUCOSE 239*  --  135* 116*     Calcium:  Recent Labs     06/24/20  0640   CALCIUM 8.9     Ionized Calcium:No results for input(s): IONCA in the last 72 hours. Magnesium:No results for input(s): MG in the last 72 hours. Phosphorus:No results for input(s): PHOS in the last 72 hours. BNP:No results for input(s): BNP in the last 72 hours. Glucose:  Recent Labs     06/22/20  0727   POCGLU 193*     HgbA1C: No results for input(s): LABA1C in the last 72 hours. INR: No results for input(s): INR in the last 72 hours. Hepatic:   Recent Labs     06/22/20  0711   ALKPHOS 55   ALT 54*   *   PROT 6.9   BILITOT 0.32   LABALBU 4.2     Amylase and Lipase:No results for input(s): LACTA, AMYLASE in the last 72 hours. Lactic Acid: No results for input(s): LACTA in the last 72 hours.   CARDIAC ENZYMES:  Recent Labs     06/22/20  1214 06/24/20  5042 CKTOTAL 894* 509*     BNP: No results for input(s): BNP in the last 72 hours. Lipids: No results for input(s): CHOL, TRIG, HDL, LDLCALC in the last 72 hours. Invalid input(s): LDL  ABGs: No results found for: PH, PCO2, PO2, HCO3, O2SAT  Thyroid: No results found for: TSH   Urinalysis:   Recent Labs     06/22/20  0711   BACTERIA NOT REPORTED   COLORU YELLOW   PHUR 5.0   PROTEINU 2+*   RBCUA 0 TO 2   SPECGRAV 1.023   BILIRUBINUR NEGATIVE   NITRU NEGATIVE   WBCUA 10 TO 20   LEUKOCYTESUR NEGATIVE   GLUCOSEU 2+*       CULTURES:    CXR  06/22/2020  No infiltrate endotracheal tube about 5 cm above sergey      CT Scans  CT scan chest 06/22/2020. No infiltrate no consolidation no pneumothorax or pleural effusion. Assessment and Plan:    Status asthmaticus/near fatal asthma. Acute asthma exacerbation. Acute hypoxic hypercapnic respiratory failure. Persistent asthma severity is not known likely at least moderate persistent asthma. Allergic rhinitis. DULCE MARIA resolved. Lactic acidosis secondary to acute exacerbation resolved. History of non-compliance with ICS/LABA  Poor insight of asthma. Plan and recommendation:    Continue with oral steroids and oral prednisone taper. Continue Symbicort 160/4.52 puff twice daily. Continue with Singulair. Resume Claritin as an outpatient. Continue with DuoNeb aerosol at this time and on discharge albuterol as needed. He is maintaining saturation on room air  Okay for ambulation. IV fluids if needed per primary service  Flonase nasal spray daily and Claritin as needed.     I have a long discussion with patient about asthma today again, severity, compliance with medication role of inhaled corticosteroids in controlling asthma and asthma control and risk of untreated asthma especially in his case as he likely have near fatal asthma requiring intubation, I have discussed with him about use of inhalers technique and that he has to use Symbicort every day 2 puff twice daily and to use albuterol as needed, I have also discussed with him about using Flonase nasal spray continue Singulair daily and to use Claritin as needed and avoid allergens and triggers and irritants and smoke advised regular follow-up with his physician after discharge    Okay to discharge from pulmonary standpoint, discussed with primary team.     Geraldo Gotti MD      6/24/2020, 11:25 AM    Pulmonary & Critical Care    Please note that this chart was generated using voice recognition Dragon dictation software. Although every effort was made to ensure the accuracy of this automated transcription, some errors in transcription may have occurred.

## 2020-06-24 NOTE — PROGRESS NOTES
Department of Family Medicine  Daily Progress Note  [facility].    Date:   6/24/2020  Patient name:  Vinay Sarkar  Date of admission:  6/22/2020  6:45 AM  MRN:   [id number]  YOB: 1980    SUBJECTIVE     Patient was seen and examined at bedside this AM. No acute events overnight. Patient denied any CP, SOB, fever, chills, nausea, vomiting or diarrhea. He is breathing comfortably on room air. Tolerating diet, good urine output. Patient denied any new complaints or concerns. Case was discussed with nursing staff. Review of Systems   Constitutional: Negative for chills, diaphoresis and fever. HENT: Negative. Eyes: Positive for redness (Subconjunctival hemorrhage status post fall). Negative for pain and visual disturbance. Respiratory: Negative for cough, shortness of breath and wheezing. Cardiovascular: Negative for chest pain. Gastrointestinal: Negative for abdominal pain, diarrhea, nausea and vomiting. Genitourinary: Negative for difficulty urinating and dysuria. Musculoskeletal: Negative. Neurological: Negative for dizziness and headaches. OBJECTIVE   /83   Pulse 77   Temp 99.6 °F (37.6 °C) (Temporal)   Resp 17   Ht 5' 9\" (1.753 m)   Wt 160 lb 11.5 oz (72.9 kg)   SpO2 97%   BMI 23.73 kg/m²      Physical Exam  Vitals signs and nursing note reviewed. Constitutional:       General: He is not in acute distress. Appearance: Normal appearance. He is not ill-appearing or diaphoretic. HENT:      Head: Normocephalic and atraumatic. Eyes:      Extraocular Movements: Extraocular movements intact. Comments: Left eye subconjunctival hemorrhage   Cardiovascular:      Rate and Rhythm: Normal rate and regular rhythm. Pulses: Normal pulses. Heart sounds: Normal heart sounds. No murmur. No gallop. Pulmonary:      Effort: Pulmonary effort is normal. No respiratory distress. Breath sounds: Rhonchi present.  No wheezing or respiratory failure- resolved    DVT: Heparin subcu  GI ppx: Protonix  Code: Full  Diet: General  Dispo: Planning to discharge today    This plan will be discussed with the rounding attending: Marilyn Angulo MD.      Ila Neville MD   Family Medicine Resident Physician  6/24/2020 7:28 AM Attending Physician Statement  I have discussed the care of Austin Kwon, including pertinent history and exam findings,  with the resident. I have seen and examined the patient and the key elements of all parts of the encounter have been performed by me. I agree with the assessment, plan and orders as documented by the resident. ([address] [place] [address])  Patient seen and examined along with the resident physicians. Discussed his Asthma with the Pulmonologist.  Was admitted for Respiratory failure due to Asthma exacerbation,Acute Kidney injury with mild Rhabdomylisis which are all improved. Would be discharged Home today on oral medications with oral steroids in tapering doses and advised to follow-up with his PCP as Outpatient in 01 weeks time. Marilyn Angulo.

## 2020-06-24 NOTE — PLAN OF CARE
Problem: RESPIRATORY  Intervention: Respiratory assessment  Note: BRONCHOSPASM/BRONCHOCONSTRICTION   [x]  IMPROVE AERATION/BREATH SOUNDS  [x]   ADMINISTER BRONCHODILATOR THERAPY AS APPROPRIATE  [x]   ASSESS BREATH SOUNDS  [x]   IMPLEMENT AEROSOL/MDI PROTOCOL  [x]   PATIENT EDUCATION AS NEEDED

## 2020-06-25 ENCOUNTER — TELEPHONE (OUTPATIENT)
Dept: FAMILY MEDICINE CLINIC | Age: 40
End: 2020-06-25

## 2020-06-25 NOTE — TELEPHONE ENCOUNTER
[facility] Transitions Initial Follow Up Call    Call within 2 business days of discharge: Yes     Patient: Emerita Simmons Patient : 1980 MRN: [id number]    [unfilled]    RARS: Readmission Risk Score: 12       Spoke with: First attempt to rech pt phone rings busy times 2 tries. Will try again. Discharge department/facility: [address] [place] [address] services provided:  Obtained and reviewed discharge summary and/or continuity of care documents    Follow Up  No future appointments.     Susannah Blackmon LPN

## 2020-06-28 LAB
CULTURE: NORMAL
CULTURE: NORMAL
Lab: NORMAL
Lab: NORMAL
SPECIMEN DESCRIPTION: NORMAL
SPECIMEN DESCRIPTION: NORMAL

## 2020-07-10 RX ORDER — ALBUTEROL SULFATE 90 UG/1
AEROSOL, METERED RESPIRATORY (INHALATION)
Qty: 18 G | Refills: 0 | OUTPATIENT
Start: 2020-07-10

## 2020-08-04 RX ORDER — BUDESONIDE AND FORMOTEROL FUMARATE DIHYDRATE 160; 4.5 UG/1; UG/1
AEROSOL RESPIRATORY (INHALATION)
Qty: 10.2 G | Refills: 1 | OUTPATIENT
Start: 2020-08-04

## 2020-09-21 RX ORDER — MONTELUKAST SODIUM 10 MG/1
10 TABLET ORAL DAILY
Qty: 30 TABLET | Refills: 0 | OUTPATIENT
Start: 2020-09-21

## 2020-11-05 RX ORDER — ALBUTEROL SULFATE 90 UG/1
AEROSOL, METERED RESPIRATORY (INHALATION)
Qty: 18 G | Refills: 5 | Status: SHIPPED | OUTPATIENT
Start: 2020-11-05 | End: 2021-04-07

## 2020-12-01 RX ORDER — BUDESONIDE AND FORMOTEROL FUMARATE DIHYDRATE 160; 4.5 UG/1; UG/1
AEROSOL RESPIRATORY (INHALATION)
Qty: 10.2 G | Refills: 1 | Status: SHIPPED | OUTPATIENT
Start: 2020-12-01 | Stop reason: SDUPTHER

## 2020-12-01 NOTE — TELEPHONE ENCOUNTER
Escribe Request for pending medication. Last Visit Date: 3/4/2020  Next Visit Date:  No future appointments. Health Maintenance   Topic Date Due    Varicella vaccine (1 of 2 - 2-dose childhood series) 01/17/1982    HIV screen  01/17/1996    DTaP/Tdap/Td vaccine (1 - Tdap) 01/17/2000    Flu vaccine (1) 09/01/2020    Hepatitis A vaccine  Aged Out    Hepatitis B vaccine  Aged Out    Hib vaccine  Aged Out    Meningococcal (ACWY) vaccine  Aged Out    Pneumococcal 0-64 years Vaccine  Aged Out       No results found for: LABA1C          ( goal A1C is < 7)   No results found for: LABMICR  No results found for: LDLCHOLESTEROL, LDLCALC    (goal LDL is <100)   AST (U/L)   Date Value   06/22/2020 126 (H)     ALT (U/L)   Date Value   06/22/2020 54 (H)     BUN (mg/dL)   Date Value   06/24/2020 12     BP Readings from Last 3 Encounters:   06/24/20 (!) 135/104   06/20/20 (!) 152/100   03/04/20 118/75          (goal 120/80)    All Future Testing planned in [facility]  Lab Frequency Next Occurrence   Full PFT Study With Bronchodilator Once 03/04/2020       Next Visit Date:  No future appointments.       Patient Active Problem List:     Severe persistent asthma in adult without complication     Altered mental status     Acute asthma exacerbation     DULCE MARIA (acute kidney injury) ([facility] [address]

## 2020-12-15 RX ORDER — MONTELUKAST SODIUM 10 MG/1
10 TABLET ORAL DAILY
Qty: 30 TABLET | Refills: 0 | OUTPATIENT
Start: 2020-12-15

## 2020-12-21 RX ORDER — MONTELUKAST SODIUM 10 MG/1
10 TABLET ORAL DAILY
Qty: 30 TABLET | Refills: 0 | OUTPATIENT
Start: 2020-12-21

## 2021-01-11 DIAGNOSIS — J45.50 SEVERE PERSISTENT ASTHMA IN ADULT WITHOUT COMPLICATION: ICD-10-CM

## 2021-01-12 RX ORDER — BUDESONIDE AND FORMOTEROL FUMARATE DIHYDRATE 160; 4.5 UG/1; UG/1
AEROSOL RESPIRATORY (INHALATION)
Qty: 10.2 G | Refills: 1 | Status: SHIPPED | OUTPATIENT
Start: 2021-01-12 | End: 2021-03-02

## 2021-01-12 NOTE — TELEPHONE ENCOUNTER
Last visit:   Last Med refill:   Does patient have enough medication for 72 hours: No:     Next Visit Date:  No future appointments. Health Maintenance   Topic Date Due    Varicella vaccine (1 of 2 - 2-dose childhood series) 01/17/1982    Pneumococcal 0-64 years Vaccine (1 of 1 - PPSV23) 01/17/1987    HIV screen  01/17/1996    DTaP/Tdap/Td vaccine (1 - Tdap) 01/17/2000    Flu vaccine (1) 09/01/2020    Hepatitis C screen  Completed    Hepatitis A vaccine  Aged Out    Hepatitis B vaccine  Aged Out    Hib vaccine  Aged Out    Meningococcal (ACWY) vaccine  Aged Out       No results found for: LABA1C          ( goal A1C is < 7)   No results found for: LABMICR  No results found for: LDLCHOLESTEROL, LDLCALC    (goal LDL is <100)   AST (U/L)   Date Value   06/22/2020 126 (H)     ALT (U/L)   Date Value   06/22/2020 54 (H)     BUN (mg/dL)   Date Value   06/24/2020 12     BP Readings from Last 3 Encounters:   06/24/20 (!) 135/104   06/20/20 (!) 152/100   03/04/20 118/75          (goal 120/80)    All Future Testing planned in [facility]  Lab Frequency Next Occurrence   Full PFT Study With Bronchodilator Once 03/04/2020               Patient Active Problem List:     Severe persistent asthma in adult without complication     Altered mental status     Acute asthma exacerbation     DULCE MARIA (acute kidney injury) ([facility] [address]           Please address the medication refill and close the encounter. If I can be of assistance, please route to the applicable pool. Thank you.

## 2021-03-02 DIAGNOSIS — J45.50 SEVERE PERSISTENT ASTHMA IN ADULT WITHOUT COMPLICATION: ICD-10-CM

## 2021-03-02 RX ORDER — BUDESONIDE AND FORMOTEROL FUMARATE DIHYDRATE 160; 4.5 UG/1; UG/1
AEROSOL RESPIRATORY (INHALATION)
Qty: 10.2 G | Refills: 1 | Status: SHIPPED | OUTPATIENT
Start: 2021-03-02 | End: 2021-04-07

## 2021-04-07 DIAGNOSIS — J45.50 SEVERE PERSISTENT ASTHMA IN ADULT WITHOUT COMPLICATION: ICD-10-CM

## 2021-04-07 RX ORDER — ALBUTEROL SULFATE 90 UG/1
AEROSOL, METERED RESPIRATORY (INHALATION)
Qty: 18 G | Refills: 5 | Status: SHIPPED | OUTPATIENT
Start: 2021-04-07 | End: 2021-05-04 | Stop reason: SDUPTHER

## 2021-04-07 RX ORDER — BUDESONIDE AND FORMOTEROL FUMARATE DIHYDRATE 160; 4.5 UG/1; UG/1
AEROSOL RESPIRATORY (INHALATION)
Qty: 10.2 G | Refills: 1 | Status: SHIPPED | OUTPATIENT
Start: 2021-04-07 | End: 2021-05-04 | Stop reason: SDUPTHER

## 2021-04-07 NOTE — TELEPHONE ENCOUNTER
Last visit:   Last Med refill:   Does patient have enough medication for 72 hours: No:     Next Visit Date:  No future appointments. Health Maintenance   Topic Date Due    Varicella vaccine (1 of 2 - 2-dose childhood series) Never done    Pneumococcal 0-64 years Vaccine (1 of 1 - PPSV23) Never done    HIV screen  Never done    COVID-19 Vaccine (1) Never done    DTaP/Tdap/Td vaccine (1 - Tdap) Never done    Lipid screen  Never done    Flu vaccine (Season Ended) 09/01/2021    Hepatitis C screen  Completed    Hepatitis A vaccine  Aged Out    Hepatitis B vaccine  Aged Out    Hib vaccine  Aged Out    Meningococcal (ACWY) vaccine  Aged Out       No results found for: LABA1C          ( goal A1C is < 7)   No results found for: LABMICR  No results found for: LDLCHOLESTEROL, LDLCALC    (goal LDL is <100)   AST (U/L)   Date Value   06/22/2020 126 (H)     ALT (U/L)   Date Value   06/22/2020 54 (H)     BUN (mg/dL)   Date Value   06/24/2020 12     BP Readings from Last 3 Encounters:   06/24/20 (!) 135/104   06/20/20 (!) 152/100   03/04/20 118/75          (goal 120/80)    All Future Testing planned in [facility]  Lab Frequency Next Occurrence               Patient Active Problem List:     Severe persistent asthma in adult without complication     Altered mental status     Acute asthma exacerbation     DULCE MARIA (acute kidney injury) ([facility] [address]           Please address the medication refill and close the encounter. If I can be of assistance, please route to the applicable pool. Thank you.

## 2021-04-28 ENCOUNTER — HOSPITAL ENCOUNTER (EMERGENCY)
Age: 41
Discharge: HOME | End: 2021-04-28
Attending: EMERGENCY MEDICINE
Payer: MEDICAID

## 2021-04-28 ENCOUNTER — APPOINTMENT (OUTPATIENT)
Dept: GENERAL RADIOLOGY | Age: 41
End: 2021-04-28
Payer: MEDICAID

## 2021-04-28 VITALS
TEMPERATURE: 97.9 F | HEART RATE: 100 BPM | RESPIRATION RATE: 18 BRPM | BODY MASS INDEX: 26.43 KG/M2 | WEIGHT: 179 LBS | OXYGEN SATURATION: 97 %

## 2021-04-28 DIAGNOSIS — J45.41 MODERATE PERSISTENT ASTHMA WITH EXACERBATION: Primary | ICD-10-CM

## 2021-04-28 DIAGNOSIS — J45.50 SEVERE PERSISTENT ASTHMA IN ADULT WITHOUT COMPLICATION: ICD-10-CM

## 2021-04-28 PROCEDURE — 71045 X-RAY EXAM CHEST 1 VIEW: CPT

## 2021-04-28 PROCEDURE — 99283 EMERGENCY DEPT VISIT LOW MDM: CPT

## 2021-04-28 PROCEDURE — 6370000000 HC RX 637 (ALT 250 FOR IP): Performed by: EMERGENCY MEDICINE

## 2021-04-28 PROCEDURE — 94640 AIRWAY INHALATION TREATMENT: CPT

## 2021-04-28 PROCEDURE — 94664 DEMO&/EVAL PT USE INHALER: CPT

## 2021-04-28 RX ORDER — ALBUTEROL SULFATE 90 UG/1
2 AEROSOL, METERED RESPIRATORY (INHALATION) EVERY 6 HOURS PRN
Status: DISCONTINUED | OUTPATIENT
Start: 2021-04-28 | End: 2021-04-28 | Stop reason: HOSPADM

## 2021-04-28 RX ORDER — PREDNISONE 20 MG/1
40 TABLET ORAL ONCE
Status: COMPLETED | OUTPATIENT
Start: 2021-04-28 | End: 2021-04-28

## 2021-04-28 RX ORDER — PREDNISONE 20 MG/1
40 TABLET ORAL DAILY
Qty: 8 TABLET | Refills: 0 | Status: SHIPPED | OUTPATIENT
Start: 2021-04-28 | End: 2021-05-02

## 2021-04-28 RX ORDER — ALBUTEROL SULFATE 90 UG/1
1-2 AEROSOL, METERED RESPIRATORY (INHALATION) EVERY 4 HOURS PRN
Qty: 1 INHALER | Refills: 5 | Status: SHIPPED | OUTPATIENT
Start: 2021-04-28 | End: 2021-05-04 | Stop reason: SDUPTHER

## 2021-04-28 RX ORDER — MONTELUKAST SODIUM 10 MG/1
10 TABLET ORAL NIGHTLY
Qty: 30 TABLET | Refills: 0 | Status: SHIPPED | OUTPATIENT
Start: 2021-04-28 | End: 2021-05-25

## 2021-04-28 RX ORDER — IPRATROPIUM BROMIDE AND ALBUTEROL SULFATE 2.5; .5 MG/3ML; MG/3ML
1 SOLUTION RESPIRATORY (INHALATION) EVERY 4 HOURS PRN
Status: DISCONTINUED | OUTPATIENT
Start: 2021-04-28 | End: 2021-04-28 | Stop reason: HOSPADM

## 2021-04-28 RX ADMIN — IPRATROPIUM BROMIDE AND ALBUTEROL SULFATE 1 AMPULE: .5; 3 SOLUTION RESPIRATORY (INHALATION) at 16:53

## 2021-04-28 RX ADMIN — PREDNISONE 40 MG: 20 TABLET ORAL at 17:04

## 2021-04-28 ASSESSMENT — ENCOUNTER SYMPTOMS
CONSTIPATION: 0
ABDOMINAL PAIN: 0
SHORTNESS OF BREATH: 1
VOMITING: 0
NAUSEA: 0
DIARRHEA: 0
SORE THROAT: 0

## 2021-04-28 NOTE — ED PROVIDER NOTES
[facility] ED  Emergency Department Encounter  EmergencyMedicine Resident     Pt Name:Austin Wilson  MRN: [id number]  [place] 1980  Date of evaluation: 4/28/21  PCP:  Kristan López MD    [address] [place] [address]       Chief Complaint   Patient presents with    Shortness of Breath     hx of asthma, requesting advair       HISTORY OF PRESENT ILLNESS  (Location/Symptom, Timing/Onset, Context/Setting, Quality, Duration, Modifying Factors, Severity.)      Caryl Wilson is a 37 y.o. male who presents to the emergency department with shortness of breath with history of asthma. 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 He was attempting to get his inhaler because he was feeling shortness of breath for the past couple of days. He denies fever, chills, HEENT symptoms, vision changes, chest pain, abdominal pain, nausea or vomiting, or problems with urination or bowel movements or any other recent sick symptoms. History of prior asthma exacerbation necessitating intubation back in June of last year and the patient states his primary trigger is pollen. He has been out of his Singulair home medication for \"a while\" and has not seen a primary care doctor in a long time. He does not smoke. PAST MEDICAL / SURGICAL / SOCIAL / FAMILY HISTORY      has a past medical history of DULCE MARIA (acute kidney injury) ([facility] [address] and Asthma. has no past surgical history on file.     Social History     Socioeconomic History    Marital status:      Spouse name: Not on file    Number of children: Not on file    Years of education: Not on file    Highest education level: Not on file   Occupational History    Not on file   Social Needs    Financial resource strain: Not on file    Food insecurity     Worry: Not on file     Inability: Not on file   [employer] needs     Medical: Not on file     Non-medical: Not on file   Tobacco Use    Smoking status: Former Smoker     Types: Cigarettes     Quit date: 2000     Years since quittin.2    Smokeless tobacco: Never Used   Substance and Sexual Activity    Alcohol use: Yes     Comment: 1 24oz can of beer daily    Drug use: No    Sexual activity: Yes     Partners: Female   Lifestyle    Physical activity     Days per week: Not on file     Minutes per session: Not on file    Stress: Not on file   Relationships    Social connections     Talks on phone: Not on file     Gets together: Not on file     Attends Voodoo service: Not on file     Active member of club or organization: Not on file     Attends meetings of clubs or organizations: Not on file     Relationship status: Not on file    Intimate partner violence     Fear of current or ex partner: Not on file     Emotionally abused: Not on file     Physically abused: Not on file     Forced sexual activity: Not on file   Other Topics Concern    Not on file   Social History Narrative    Not on file       Family History   Problem Relation Age of Onset    High Blood Pressure Mother     Stroke Father        Allergies:  Patient has no known allergies. Home Medications:  Prior to Admission medications    Medication Sig Start Date End Date Taking? Authorizing Provider   predniSONE (DELTASONE) 20 MG tablet Take 2 tablets by mouth daily for 4 days 21 Yes Susan Martinez MD   montelukast (SINGULAIR) 10 MG tablet Take 1 tablet by mouth nightly 21 Yes Susan Martinez MD   albuterol sulfate HFA (PROVENTIL HFA) 108 (90 Base) MCG/ACT inhaler Inhale 1-2 puffs into the lungs every 4 hours as needed for Wheezing or Shortness of Breath (Space out to every 6 hours as symptoms improve) Space out to every 6 hours as symptoms improve.  21 Yes Susan Martinez MD   albuterol sulfate  (90 Base) MCG/ACT inhaler inhale 2 movements intact. Conjunctiva/sclera: Conjunctivae normal.   Neck:      Musculoskeletal: Normal range of motion and neck supple. Trachea: No tracheal deviation. Cardiovascular:      Rate and Rhythm: Normal rate and regular rhythm. Heart sounds: Normal heart sounds. No murmur. No friction rub. No gallop. Comments: Patient is initially tachycardic during interview but his resolved and heart rate is in the 80s during auscultation  Pulmonary:      Effort: Pulmonary effort is normal. No respiratory distress. Breath sounds: Wheezing present. Comments: Audible wheezes from across the room with the patient speaking in 5-7 word sentences before stopping to take of breath. He does not desaturate while he is sitting up at the edge of the bed. Auscultation of the lungs demonstrates reduced air movement with diffuse wheezes bilaterally. Abdominal:      General: Abdomen is flat. There is no distension. Palpations: Abdomen is soft. There is no mass. Tenderness: There is no abdominal tenderness. There is no guarding or rebound. Musculoskeletal: Normal range of motion. General: No swelling, deformity or signs of injury. Skin:     General: Skin is warm and dry. Capillary Refill: Capillary refill takes less than 2 seconds. Coloration: Skin is not jaundiced. Findings: No bruising or lesion. Neurological:      General: No focal deficit present. Mental Status: He is alert and oriented to person, place, and time. Mental status is at baseline. Motor: No abnormal muscle tone.          DIFFERENTIAL  DIAGNOSIS     PLAN (LABS / IMAGING / EKG):  Orders Placed This Encounter   Procedures    XR CHEST PORTABLE    Respiratory care evaluation only    Initiate ED RT Aerosol protocol    HHN Treatment    MDI Treatment       MEDICATIONS ORDERED:  Orders Placed This Encounter   Medications    predniSONE (DELTASONE) tablet 40 mg    predniSONE (DELTASONE) 20 MG tablet     Sig: Take 2 tablets by mouth daily for 4 days     Dispense:  8 tablet     Refill:  0    montelukast (SINGULAIR) 10 MG tablet     Sig: Take 1 tablet by mouth nightly     Dispense:  30 tablet     Refill:  0    albuterol sulfate HFA (PROVENTIL HFA) 108 (90 Base) MCG/ACT inhaler     Sig: Inhale 1-2 puffs into the lungs every 4 hours as needed for Wheezing or Shortness of Breath (Space out to every 6 hours as symptoms improve) Space out to every 6 hours as symptoms improve. Dispense:  1 Inhaler     Refill:  5    ipratropium-albuterol (DUONEB) nebulizer solution 1 ampule    albuterol sulfate  (90 Base) MCG/ACT inhaler 2 puff       DDX: Shannon Mitchell is a 37 y.o. male who presents to the emergency department with shortness of breath and wheezing. Differential diagnosis includes asthma exacerbation, pneumonia    DIAGNOSTIC RESULTS / EMERGENCY DEPARTMENT COURSE / [facility]   LAB RESULTS:  No results found for this visit on 04/28/21. IMPRESSION: Shannon Mitchell is a 37 y.o. male who presents to the emergency department with shortness of breath and wheezing. On examination he is afebrile, vital signs demonstrate resolved tachycardia and no tachypnea and examination demonstrates a well-appearing male of stated age with normal heart sounds and diffuse wheezes bilaterally with reduced air movement. Patient is speaking in full sentences but symptoms do appear consistent with asthma exacerbation. We will obtain chest x-ray to exclude focal consolidation, treat with nebulizers and steroids, anticipate discharge if patient improves. RADIOLOGY:  Xr Chest Portable    Result Date: 4/28/2021  EXAMINATION: ONE XRAY VIEW OF THE CHEST 4/28/2021 4:39 pm COMPARISON: 06/23/2020 HISTORY: ORDERING SYSTEM PROVIDED HISTORY: SOA, asthma TECHNOLOGIST PROVIDED HISTORY: SOA, asthma FINDINGS: Single portable frontal view of the chest is submitted for review. The cardiac silhouette is normal in size.   Lung parenchyma is clear without focal airspace consolidation, sizeable pleural effusion, or pneumothorax. Trachea is midline. Visualized osseous structures and soft tissues are grossly intact. No acute cardiopulmonary pathology. EKG  None    All EKG's are interpreted by the Emergency Department Physician who either signs or co-signs this chart in the absence of a cardiologist.    EMERGENCY DEPARTMENT COURSE:  ED Course as of Apr 28 1747 Wed Apr 28, 2021   1659 On reevaluation patient resting comfortably, taking an albuterol treatment and oxygen saturation has already improved from 95 to 98%. [TS]   4506 Auscultation of the lungs demonstrates improved breath sounds with some persistent wheezing. Counseled patient extensively on return precautions, verbalized understanding and agreement with plan. Will discharge patient with prescription for singular, albuterol, prednisone. [TS]      ED Course User Index  [TS] Megan Lawrence MD       PROCEDURES:  None    CONSULTS:  None    CRITICAL CARE:  Please see attending note. FINAL IMPRESSION      1. Moderate persistent asthma with exacerbation    2. Severe persistent asthma in adult without complication          DISPOSITION / PLAN     DISPOSITION Decision To Discharge 04/28/2021 05:37:39 PM      PATIENT REFERRED TO:  Melissa Lew MD  [address] [place] [address]  [phone]    Schedule an appointment as soon as possible for a visit in 1 week  For followup    [facility] ED  [address] [facility] [address] [phone]  Go to   As needed, If symptoms worsen      DISCHARGE MEDICATIONS:  New Prescriptions    ALBUTEROL SULFATE HFA (PROVENTIL HFA) 108 (90 BASE) MCG/ACT INHALER    Inhale 1-2 puffs into the lungs every 4 hours as needed for Wheezing or Shortness of Breath (Space out to every 6 hours as symptoms improve) Space out to every 6 hours as symptoms improve.     MONTELUKAST (SINGULAIR) 10 MG TABLET    Take 1 tablet by mouth nightly PREDNISONE (DELTASONE) 20 MG TABLET    Take 2 tablets by mouth daily for 4 days       Magali Elizabeth MD  Emergency Medicine Resident    This patient was evaluated in the Emergency Department for symptoms described in the history of present illness. He/she was evaluated in the context of the global COVID-19 pandemic, which necessitated consideration that the patient might be at risk for infection with the SARS-CoV-2 virus that causes COVID-19. Institutional protocols and algorithms that pertain to the evaluation of patients at risk for COVID-19 are in a state of rapid change based on information released by regulatory bodies including the CDC and federal and state organizations. These policies and algorithms were followed during the patient's care in the ED.     (Please note that portions of thisnote were completed with a voice recognition program.  Efforts were made to edit the dictations but occasionally words are mis-transcribed.)       Magali Elizabeth MD  Resident  04/28/21 0173

## 2021-04-28 NOTE — ED PROVIDER NOTES
[facility]     Emergency Department     Faculty Attestation    I performed a history and physical examination of the patient and discussed management with the resident. I reviewed the residents note and agree with the documented findings and plan of care. Any areas of disagreement are noted on the chart. I was personally present for the key portions of any procedures. I have documented in the chart those procedures where I was not present during the key portions. I have reviewed the emergency nurses triage note. I agree with the chief complaint, past medical history, past surgical history, allergies, medications, social and family history as documented unless otherwise noted below. For Physician Assistant/ Nurse Practitioner cases/documentation I have personally evaluated this patient and have completed at least one if not all key elements of the E/M (history, physical exam, and MDM). Additional findings are as noted. I have personally seen and evaluated the patient. I find the patient's history and physical exam are consistent with the NP/PA documentation. I agree with the care provided, treatment rendered, disposition and follow-up plan. 46-year-old male with a history of asthma, previously well controlled on Advair (no longer covered by his insurance), now on Symbicort and albuterol presenting with shortness of breath. 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 No cough or fever. Patient did require intubation last year. He is on Singulair for allergies, which typically exacerbate his asthma. Exam:  General: Sitting on the bed, awake, alert and in no acute distress  CV: normal rate and regular rhythm  Lungs: Expiratory wheezing across all lung fields. Speaking in sentences, no conversational dyspnea. Saturating 95% on room air.     Plan:  Steroids, aerosol therapy, chest x-ray    Signed out to oncoming physician pending reevaluation after aerosol therapy. Please see their note for the remainder of this patient's plan of care.         Darylene Perch, MD   Attending Emergency  Physician    (Please note that portions of this note were completed with a voice recognition program. Efforts were made to edit the dictations but occasionally words are mis-transcribed.)              Darylene Perch, MD  04/28/21 2988

## 2021-05-03 DIAGNOSIS — J45.50 SEVERE PERSISTENT ASTHMA IN ADULT WITHOUT COMPLICATION: ICD-10-CM

## 2021-05-03 NOTE — TELEPHONE ENCOUNTER
E-scribe request for budasonide-formoterol. Please review and e-scribe if applicable.      Last Visit Date:  03/04/2020  Next Visit Date:  Visit date not found    No results found for: LABA1C          ( goal A1C is < 7)   No results found for: LABMICR  No results found for: LDLCHOLESTEROL, LDLCALC    (goal LDL is <100)   AST (U/L)   Date Value   06/22/2020 126 (H)     ALT (U/L)   Date Value   06/22/2020 54 (H)     BUN (mg/dL)   Date Value   06/24/2020 12     BP Readings from Last 3 Encounters:   06/24/20 (!) 135/104   06/20/20 (!) 152/100   03/04/20 118/75          (goal 120/80)        Patient Active Problem List:     Severe persistent asthma in adult without complication     Altered mental status     Acute asthma exacerbation     DULCE MARIA (acute kidney injury) ([facility] [address]      ----Vadim Navarrete

## 2021-05-04 ENCOUNTER — OFFICE VISIT (OUTPATIENT)
Dept: FAMILY MEDICINE CLINIC | Age: 41
End: 2021-05-04
Payer: MEDICAID

## 2021-05-04 VITALS
TEMPERATURE: 98.2 F | BODY MASS INDEX: 26.57 KG/M2 | HEART RATE: 89 BPM | WEIGHT: 179.4 LBS | OXYGEN SATURATION: 92 % | DIASTOLIC BLOOD PRESSURE: 92 MMHG | SYSTOLIC BLOOD PRESSURE: 128 MMHG | HEIGHT: 69 IN

## 2021-05-04 DIAGNOSIS — Z13.1 ENCOUNTER FOR SCREENING FOR DIABETES MELLITUS: ICD-10-CM

## 2021-05-04 DIAGNOSIS — J45.50 SEVERE PERSISTENT ASTHMA IN ADULT WITHOUT COMPLICATION: ICD-10-CM

## 2021-05-04 DIAGNOSIS — Z13.220 SCREENING FOR HYPERLIPIDEMIA: ICD-10-CM

## 2021-05-04 PROCEDURE — 99213 OFFICE O/P EST LOW 20 MIN: CPT | Performed by: STUDENT IN AN ORGANIZED HEALTH CARE EDUCATION/TRAINING PROGRAM

## 2021-05-04 PROCEDURE — G8419 CALC BMI OUT NRM PARAM NOF/U: HCPCS | Performed by: STUDENT IN AN ORGANIZED HEALTH CARE EDUCATION/TRAINING PROGRAM

## 2021-05-04 PROCEDURE — 99211 OFF/OP EST MAY X REQ PHY/QHP: CPT | Performed by: FAMILY MEDICINE

## 2021-05-04 PROCEDURE — G8427 DOCREV CUR MEDS BY ELIG CLIN: HCPCS | Performed by: STUDENT IN AN ORGANIZED HEALTH CARE EDUCATION/TRAINING PROGRAM

## 2021-05-04 PROCEDURE — 1036F TOBACCO NON-USER: CPT | Performed by: STUDENT IN AN ORGANIZED HEALTH CARE EDUCATION/TRAINING PROGRAM

## 2021-05-04 RX ORDER — ALBUTEROL SULFATE 90 UG/1
1-2 AEROSOL, METERED RESPIRATORY (INHALATION) EVERY 4 HOURS PRN
Qty: 1 INHALER | Refills: 5 | Status: SHIPPED | OUTPATIENT
Start: 2021-05-04 | End: 2021-09-21 | Stop reason: SDUPTHER

## 2021-05-04 RX ORDER — BUDESONIDE AND FORMOTEROL FUMARATE DIHYDRATE 160; 4.5 UG/1; UG/1
AEROSOL RESPIRATORY (INHALATION)
Qty: 10.2 G | Refills: 1 | Status: SHIPPED | OUTPATIENT
Start: 2021-05-04 | End: 2021-09-21

## 2021-05-04 RX ORDER — ALBUTEROL SULFATE 90 UG/1
2 AEROSOL, METERED RESPIRATORY (INHALATION) EVERY 4 HOURS PRN
Qty: 18 G | Refills: 5 | Status: SHIPPED | OUTPATIENT
Start: 2021-05-04

## 2021-05-04 ASSESSMENT — PATIENT HEALTH QUESTIONNAIRE - PHQ9
SUM OF ALL RESPONSES TO PHQ QUESTIONS 1-9: 0
2. FEELING DOWN, DEPRESSED OR HOPELESS: 0
SUM OF ALL RESPONSES TO PHQ QUESTIONS 1-9: 0
SUM OF ALL RESPONSES TO PHQ9 QUESTIONS 1 & 2: 0

## 2021-05-04 ASSESSMENT — ENCOUNTER SYMPTOMS
STRIDOR: 0
APNEA: 0
ALLERGIC/IMMUNOLOGIC COMMENTS: POLLENS
CHEST TIGHTNESS: 1
SHORTNESS OF BREATH: 1
WHEEZING: 1
CHOKING: 0
GASTROINTESTINAL NEGATIVE: 1
COUGH: 1

## 2021-05-04 NOTE — PROGRESS NOTES
Subjective:    Gino Montoya is a 37 y.o. male with  has a past medical history of DULCE MARIA (acute kidney injury) ([address] and Asthma. Family History   Problem Relation Age of Onset    High Blood Pressure Mother     Stroke Father        Presented tothe office today for:  Chief Complaint   Patient presents with    Asthma     trouble breathing, went [facility] on  4/28/21     Orders     need for nebulizer machine     Health Maintenance     declined varicella,covid, tdap and  pneumo vaccine        Asthma  He complains of cough, shortness of breath and wheezing. Pertinent negatives include no appetite change or fever. His past medical history is significant for asthma. 37years old male patient with past medical Hx of Asthma came to the office today for Symbicort inhaler prescription after losing it at work on 28 April 2021. He went to the ER on that same day complaining of shortness of breathing where he received breathing treatments with subsequent relief of his symptoms. Chest x-ray was negative at that visit. Patient has not been not using his inhaler since losing it and had shortness of breathing and wheezing associated with chest tightness and dry coughing as a result for the past few days. Patient was not in distress on presentation here in the clinic and was to able converse without any issues. Audible wheezing was noted. No chest pain, no fever, no chills, no abdominal pain, no nausea no vomiting no change in bowel habit negative urinary symptoms. No leg swelling no calf pain. Review of Systems   Constitutional: Negative for activity change, appetite change, chills, diaphoresis, fatigue, fever and unexpected weight change. HENT: Negative. Respiratory: Positive for cough, chest tightness, shortness of breath and wheezing. Negative for apnea, choking and stridor. Cardiovascular: Negative. Gastrointestinal: Negative. Allergic/Immunologic: Positive for environmental allergies. Pollens        Objective:    BP (!) 128/92 (Site: Left Upper Arm, Position: Sitting, Cuff Size: Medium Adult) Comment: manual  Pulse 89   Temp 98.2 °F (36.8 °C) (Oral)   Ht 5' 9\" (1.753 m)   Wt 179 lb 6.4 oz (81.4 kg)   SpO2 92%   BMI 26.49 kg/m²    BP Readings from Last 3 Encounters:   05/04/21 (!) 128/92   06/24/20 (!) 135/104   06/20/20 (!) 152/100     Physical Exam  Constitutional:       General: He is not in acute distress. Appearance: Normal appearance. He is normal weight. He is not ill-appearing, toxic-appearing or diaphoretic. HENT:      Nose: No congestion or rhinorrhea. Neck:      Musculoskeletal: Normal range of motion and neck supple. Cardiovascular:      Rate and Rhythm: Normal rate and regular rhythm. Heart sounds: No murmur. No friction rub. No gallop. Pulmonary:      Effort: Pulmonary effort is normal. No respiratory distress. Breath sounds: No stridor. Wheezing present. No rhonchi or rales. Chest:      Chest wall: No tenderness. Abdominal:      General: Abdomen is flat. There is no distension. Palpations: Abdomen is soft. Tenderness: There is no abdominal tenderness. Lymphadenopathy:      Cervical: No cervical adenopathy. Neurological:      Mental Status: He is alert. Lab Results   Component Value Date    WBC 9.6 06/24/2020    HGB 15.2 06/24/2020    HCT 44.4 06/24/2020     06/24/2020    ALT 54 (H) 06/22/2020     (H) 06/22/2020     06/24/2020    K 4.9 06/24/2020     06/24/2020    CREATININE 1.15 06/24/2020    BUN 12 06/24/2020    CO2 21 06/24/2020     Lab Results   Component Value Date    CALCIUM 8.9 06/24/2020     No results found for: LDLCALC, LDLCHOLESTEROL, LDLDIRECT    and Plan:      1. Severe persistent asthma in adult without complication    -No signs of distress, no fever, no concern for pneumonia or other lung etiologies. Bilateral wheezing on chest auscultation.  Lost his inhaler (symbicort) and has not been using. Refilling of his prescription as below:  - budesonide-formoterol (SYMBICORT) 160-4.5 MCG/ACT AERO; inhale 2 puffs by mouth twice a day  Dispense: 10.2 g; Refill: 1  - albuterol sulfate  (90 Base) MCG/ACT inhaler; Inhale 2 puffs into the lungs every 4 hours as needed for Wheezing  Dispense: 18 g; Refill: 5    2. Screening for hyperlipidemia    - Lipid Panel; Future    3. Encounter for screening for diabetes mellitus    - Glucose, Fasting; Future    4. Health maintenance discussed with patient regarding getting the vaccines and testing for HIV, patient reported he will most likely get those next visit in the office. Requested Prescriptions     Signed Prescriptions Disp Refills    budesonide-formoterol (SYMBICORT) 160-4.5 MCG/ACT AERO 10.2 g 1     Sig: inhale 2 puffs by mouth twice a day    albuterol sulfate  (90 Base) MCG/ACT inhaler 18 g 5     Sig: Inhale 2 puffs into the lungs every 4 hours as needed for Wheezing    albuterol sulfate HFA (PROVENTIL HFA) 108 (90 Base) MCG/ACT inhaler 1 Inhaler 5     Sig: Inhale 1-2 puffs into the lungs every 4 hours as needed for Wheezing or Shortness of Breath (Space out to every 6 hours as symptoms improve) Space out to every 6 hours as symptoms improve. Medications Discontinued During This Encounter   Medication Reason    albuterol sulfate  (90 Base) MCG/ACT inhaler REORDER    budesonide-formoterol (SYMBICORT) 160-4.5 MCG/ACT AERO REORDER    albuterol sulfate HFA (PROVENTIL HFA) 108 (90 Base) MCG/ACT inhaler REORDER       No follow-ups on file. Austin received counseling on the following healthy behaviors: medication adherence  Reviewed prior labs and health maintenance  Continue current medications, diet and exercise. Discussed use, benefit, and side effects of prescribed medications. Barriers to medication compliance addressed.    Patient given educational materials - see patient instructions  Was a self-tracking

## 2021-05-04 NOTE — PROGRESS NOTES
Visit Information    Have you changed or started any medications since your last visit including any over-the-counter medicines, vitamins, or herbal medicines? no   Have you stopped taking any of your medications? Is so, why? -  no  Are you having any side effects from any of your medications? - no    Have you seen any other physician or provider since your last visit?  no   Have you had any other diagnostic tests since your last visit?  no   Have you been seen in the emergency room and/or had an admission in a hospital since we last saw you?  yes - William Villarreal 4/28/21    Have you had your routine dental cleaning in the past 6 months?  no     Do you have an active MyChart account? If no, what is the barrier?   No: inactive     Patient Care Team:  Laney Villavicencio MD as PCP - General (Emergency Medicine)    Medical History Review  Past Medical, Family, and Social History reviewed and does not contribute to the patient presenting condition    Health Maintenance   Topic Date Due    Varicella vaccine (1 of 2 - 2-dose childhood series) Never done    Pneumococcal 0-64 years Vaccine (1 of 1 - PPSV23) Never done    HIV screen  Never done    COVID-19 Vaccine (1) Never done    DTaP/Tdap/Td vaccine (1 - Tdap) Never done    Lipid screen  Never done    Diabetes screen  Never done    Flu vaccine (Season Ended) 09/01/2021    Hepatitis C screen  Completed    Hepatitis A vaccine  Aged Out    Hepatitis B vaccine  Aged Out    Hib vaccine  Aged Out    Meningococcal (ACWY) vaccine  Aged Out

## 2021-05-06 RX ORDER — BUDESONIDE AND FORMOTEROL FUMARATE DIHYDRATE 160; 4.5 UG/1; UG/1
AEROSOL RESPIRATORY (INHALATION)
Qty: 10.2 G | Refills: 1 | Status: SHIPPED | OUTPATIENT
Start: 2021-05-06 | End: 2021-09-21

## 2021-05-25 RX ORDER — MONTELUKAST SODIUM 10 MG/1
10 TABLET ORAL NIGHTLY
Qty: 90 TABLET | Refills: 0 | Status: SHIPPED | OUTPATIENT
Start: 2021-05-25 | End: 2021-09-21 | Stop reason: SDUPTHER

## 2021-05-25 NOTE — TELEPHONE ENCOUNTER
E-scribe request wil. Please review and e-scribe if applicable.      Last Visit Date: 5/4/2021  Next Visit Date:  6/15/2021    No results found for: LABA1C          ( goal A1C is < 7)   No results found for: LABMICR  No results found for: LDLCHOLESTEROL, LDLCALC    (goal LDL is <100)   AST (U/L)   Date Value   06/22/2020 126 (H)     ALT (U/L)   Date Value   06/22/2020 54 (H)     BUN (mg/dL)   Date Value   06/24/2020 12     BP Readings from Last 3 Encounters:   05/04/21 (!) 128/92   06/24/20 (!) 135/104   06/20/20 (!) 152/100          (goal 120/80)        Patient Active Problem List:     Severe persistent asthma in adult without complication     Altered mental status     Acute asthma exacerbation     DULCE MARIA (acute kidney injury) ([facility] [address]

## 2021-07-26 RX ORDER — MONTELUKAST SODIUM 10 MG/1
10 TABLET ORAL NIGHTLY
Qty: 90 TABLET | Refills: 0 | Status: CANCELLED | OUTPATIENT
Start: 2021-07-26 | End: 2021-08-25

## 2021-08-11 RX ORDER — MONTELUKAST SODIUM 10 MG/1
10 TABLET ORAL NIGHTLY
Qty: 90 TABLET | Refills: 0 | OUTPATIENT
Start: 2021-08-11 | End: 2021-09-10

## 2021-08-11 NOTE — TELEPHONE ENCOUNTER
E-scribe request for montelukast. Please review and e-scribe if applicable.      Last Visit Date:  05/04/2021  Next Visit Date:  Visit date not found    No results found for: LABA1C          ( goal A1C is < 7)   No results found for: LABMICR  No results found for: LDLCHOLESTEROL, LDLCALC    (goal LDL is <100)   AST (U/L)   Date Value   06/22/2020 126 (H)     ALT (U/L)   Date Value   06/22/2020 54 (H)     BUN (mg/dL)   Date Value   06/24/2020 12     BP Readings from Last 3 Encounters:   05/04/21 (!) 128/92   06/24/20 (!) 135/104   06/20/20 (!) 152/100          (goal 120/80)        Patient Active Problem List:     Severe persistent asthma in adult without complication     Altered mental status     Acute asthma exacerbation     DULCE MARIA (acute kidney injury) ([facility]      ----Jc Brumfield

## 2021-09-21 ENCOUNTER — OFFICE VISIT (OUTPATIENT)
Dept: FAMILY MEDICINE CLINIC | Age: 41
End: 2021-09-21
Payer: MEDICAID

## 2021-09-21 VITALS
BODY MASS INDEX: 26.28 KG/M2 | HEART RATE: 89 BPM | WEIGHT: 177.4 LBS | HEIGHT: 69 IN | DIASTOLIC BLOOD PRESSURE: 94 MMHG | SYSTOLIC BLOOD PRESSURE: 136 MMHG

## 2021-09-21 DIAGNOSIS — J45.30 MILD PERSISTENT ASTHMA WITHOUT COMPLICATION: Primary | ICD-10-CM

## 2021-09-21 PROBLEM — J45.901 ACUTE ASTHMA EXACERBATION: Status: RESOLVED | Noted: 2020-06-23 | Resolved: 2021-09-21

## 2021-09-21 PROBLEM — N17.9 AKI (ACUTE KIDNEY INJURY) (HCC): Status: RESOLVED | Noted: 2020-06-23 | Resolved: 2021-09-21

## 2021-09-21 PROBLEM — R41.82 ALTERED MENTAL STATUS: Status: RESOLVED | Noted: 2020-06-22 | Resolved: 2021-09-21

## 2021-09-21 PROCEDURE — 1036F TOBACCO NON-USER: CPT | Performed by: STUDENT IN AN ORGANIZED HEALTH CARE EDUCATION/TRAINING PROGRAM

## 2021-09-21 PROCEDURE — G8427 DOCREV CUR MEDS BY ELIG CLIN: HCPCS | Performed by: STUDENT IN AN ORGANIZED HEALTH CARE EDUCATION/TRAINING PROGRAM

## 2021-09-21 PROCEDURE — 99213 OFFICE O/P EST LOW 20 MIN: CPT | Performed by: STUDENT IN AN ORGANIZED HEALTH CARE EDUCATION/TRAINING PROGRAM

## 2021-09-21 PROCEDURE — G8419 CALC BMI OUT NRM PARAM NOF/U: HCPCS | Performed by: STUDENT IN AN ORGANIZED HEALTH CARE EDUCATION/TRAINING PROGRAM

## 2021-09-21 RX ORDER — BUDESONIDE AND FORMOTEROL FUMARATE DIHYDRATE 160; 4.5 UG/1; UG/1
AEROSOL RESPIRATORY (INHALATION)
Qty: 10.2 G | Refills: 1 | Status: CANCELLED | OUTPATIENT
Start: 2021-09-21

## 2021-09-21 RX ORDER — MONTELUKAST SODIUM 10 MG/1
10 TABLET ORAL NIGHTLY
Qty: 90 TABLET | Refills: 5 | Status: SHIPPED | OUTPATIENT
Start: 2021-09-21 | End: 2021-10-21

## 2021-09-21 RX ORDER — ALBUTEROL SULFATE 90 UG/1
1-2 AEROSOL, METERED RESPIRATORY (INHALATION) EVERY 4 HOURS PRN
Qty: 1 EACH | Refills: 5 | Status: SHIPPED | OUTPATIENT
Start: 2021-09-21 | End: 2021-10-21

## 2021-09-21 ASSESSMENT — ENCOUNTER SYMPTOMS
WHEEZING: 0
SINUS PAIN: 0
SHORTNESS OF BREATH: 0
COLOR CHANGE: 0
ABDOMINAL DISTENTION: 0
SINUS PRESSURE: 0
NAUSEA: 0
VOICE CHANGE: 0
VOMITING: 0
COUGH: 0
ABDOMINAL PAIN: 0

## 2021-09-21 NOTE — PROGRESS NOTES
Visit Information    Have you changed or started any medications since your last visit including any over-the-counter medicines, vitamins, or herbal medicines? no   Have you stopped taking any of your medications? Is so, why? -  no  Are you having any side effects from any of your medications? - no    Have you seen any other physician or provider since your last visit?  no   Have you had any other diagnostic tests since your last visit?  no   Have you been seen in the emergency room and/or had an admission in a hospital since we last saw you?  no   Have you had your routine dental cleaning in the past 6 months?  no     Do you have an active MyChart account? If no, what is the barrier? No: declined    Patient Care Team:  Flako Chin MD as PCP - General (Emergency Medicine)    Medical History Review  Past Medical, Family, and Social History reviewed and does not.  contribute to the patient presenting condition    Health Maintenance   Topic Date Due    Varicella vaccine (1 of 2 - 2-dose childhood series) Never done    Pneumococcal 0-64 years Vaccine (1 of 2 - PPSV23) Never done    COVID-19 Vaccine (1) Never done    HIV screen  Never done    DTaP/Tdap/Td vaccine (1 - Tdap) Never done    Lipid screen  Never done    Diabetes screen  Never done    Flu vaccine (1) Never done    Hepatitis C screen  Completed    Hepatitis A vaccine  Aged Out    Hepatitis B vaccine  Aged Out    Hib vaccine  Aged Out    Meningococcal (ACWY) vaccine  Aged Out

## 2021-09-21 NOTE — PROGRESS NOTES
Subjective:    Almita Bee is a 37 y.o. male with  has a past medical history of DULCE MARIA (acute kidney injury) ([address] and Asthma. Presented to the office today for:  Chief Complaint   Patient presents with    Medication Refill       HPI    Asthma  Patient is presenting today for follow-up of his asthma and medication refills. Patient is currently on Symbicort, albuterol, Singulair.   - Patient states that [address] works better for him than Symbicort and is requesting changing Symbicort to Advair  Denies any recent flareups. Denies any nighttime awakenings due to shortness of breath. Symptoms well controlled with current medications      Review of Systems   Constitutional: Negative for fatigue, fever and unexpected weight change. HENT: Negative for sinus pressure, sinus pain and voice change. Eyes: Negative for visual disturbance. Respiratory: Negative for cough, shortness of breath and wheezing. Cardiovascular: Negative for chest pain, palpitations and leg swelling. Gastrointestinal: Negative for abdominal distention, abdominal pain, nausea and vomiting. Endocrine: Negative for polydipsia, polyphagia and polyuria. Genitourinary: Negative for difficulty urinating, flank pain and frequency. Skin: Negative for color change, rash and wound. Neurological: Negative for dizziness, light-headedness, numbness and headaches. Psychiatric/Behavioral: Negative for confusion and decreased concentration. The patient is not nervous/anxious.                   The patient has a   Family History   Problem Relation Age of Onset    High Blood Pressure Mother     Stroke Father        Objective:    BP (!) 136/94 (Site: Right Upper Arm, Position: Sitting, Cuff Size: Medium Adult)   Pulse 89   Ht 5' 9\" (1.753 m)   Wt 177 lb 6.4 oz (80.5 kg)   BMI 26.20 kg/m²    BP Readings from Last 3 Encounters:   09/21/21 (!) 136/94   05/04/21 (!) 128/92   06/24/20 (!) 135/104       Physical Exam  Constitutional: Appearance: He is well-developed. HENT:      Head: Normocephalic and atraumatic. Eyes:      Pupils: Pupils are equal, round, and reactive to light. Cardiovascular:      Rate and Rhythm: Normal rate and regular rhythm. Heart sounds: Normal heart sounds. No murmur heard. No friction rub. No gallop. Pulmonary:      Effort: Pulmonary effort is normal. No respiratory distress. Breath sounds: Normal breath sounds. No wheezing or rales. Chest:      Chest wall: No tenderness. Abdominal:      General: Bowel sounds are normal. There is no distension. Palpations: Abdomen is soft. Tenderness: There is no abdominal tenderness. Skin:     General: Skin is warm. Findings: No erythema or rash. Neurological:      Mental Status: He is alert and oriented to person, place, and time. Lab Results   Component Value Date    WBC 9.6 06/24/2020    HGB 15.2 06/24/2020    HCT 44.4 06/24/2020     06/24/2020    ALT 54 (H) 06/22/2020     (H) 06/22/2020     06/24/2020    K 4.9 06/24/2020     06/24/2020    CREATININE 1.15 06/24/2020    BUN 12 06/24/2020    CO2 21 06/24/2020     Lab Results   Component Value Date    CALCIUM 8.9 06/24/2020     No results found for: LDLCALC, LDLCHOLESTEROL, LDLDIRECT    Assessment and Plan:    1. Mild persistent asthma without complication  -Well-controlled  -Symbicort changed to Advair  - montelukast (SINGULAIR) 10 MG tablet; Take 1 tablet by mouth nightly  Dispense: 90 tablet; Refill: 5  - albuterol sulfate HFA (PROVENTIL HFA) 108 (90 Base) MCG/ACT inhaler; Inhale 1-2 puffs into the lungs every 4 hours as needed for Wheezing or Shortness of Breath (Space out to every 6 hours as symptoms improve) Space out to every 6 hours as symptoms improve. Dispense: 1 each; Refill: 5  - fluticasone-salmeterol (ADVAIR DISKUS) 100-50 MCG/DOSE diskus inhaler; Inhale 1 puff into the lungs every 12 hours  Dispense: 60 each;  Refill: 3          Requested Prescriptions     Signed Prescriptions Disp Refills    montelukast (SINGULAIR) 10 MG tablet 90 tablet 5     Sig: Take 1 tablet by mouth nightly    albuterol sulfate HFA (PROVENTIL HFA) 108 (90 Base) MCG/ACT inhaler 1 each 5     Sig: Inhale 1-2 puffs into the lungs every 4 hours as needed for Wheezing or Shortness of Breath (Space out to every 6 hours as symptoms improve) Space out to every 6 hours as symptoms improve.  fluticasone-salmeterol (ADVAIR DISKUS) 100-50 MCG/DOSE diskus inhaler 60 each 3     Sig: Inhale 1 puff into the lungs every 12 hours       Medications Discontinued During This Encounter   Medication Reason    budesonide-formoterol (SYMBICORT) 160-4.5 MCG/ACT AERO LIST CLEANUP    budesonide-formoterol (SYMBICORT) 160-4.5 MCG/ACT AERO LIST CLEANUP    albuterol sulfate HFA (PROVENTIL HFA) 108 (90 Base) MCG/ACT inhaler REORDER    montelukast (SINGULAIR) 10 MG tablet REORDER       Austin received counseling on the following healthy behaviors: nutrition, exercise and medication adherence    Discussed use,benefit, and side effects of prescribed medications. Barriers to medication compliance addressed. All patient questions answered. Pt voiced understanding. Return in about 6 months (around 3/21/2022). Disclaimer: Some orall of this note was transcribed using voice-recognition software. This may cause typographical errors occasionally. Although all effort is made to fix these errors, please do not hesitate to contact our office if there [place] concern with the understanding of this note.

## 2021-09-21 NOTE — PROGRESS NOTES
Attending Physician Statement  I have discussed the care of Lyssa Lombardo 37 y.o. male, including pertinent history and exam findings, with the resident Dr. Jasper Lepe MD.    History and Exam:   Chief Complaint   Patient presents with    Medication Refill       Past Medical History:   Diagnosis Date    DULCE MARIA (acute kidney injury) ([address] 6/23/2020    Asthma      Allergies   Allergen Reactions    Seasonal       I have seen and examined the patient and the key elements of the encounter have been performed by me. BP Readings from Last 3 Encounters:   09/21/21 (!) 136/94   05/04/21 (!) 128/92   06/24/20 (!) 135/104     BP (!) 136/94 (Site: Right Upper Arm, Position: Sitting, Cuff Size: Medium Adult)   Pulse 89   Ht 5' 9\" (1.753 m)   Wt 177 lb 6.4 oz (80.5 kg)   BMI 26.20 kg/m²   Lab Results   Component Value Date    WBC 9.6 06/24/2020    HGB 15.2 06/24/2020    HCT 44.4 06/24/2020     06/24/2020    ALT 54 (H) 06/22/2020     (H) 06/22/2020     06/24/2020    K 4.9 06/24/2020     06/24/2020    CREATININE 1.15 06/24/2020    BUN 12 06/24/2020    CO2 21 06/24/2020     Lab Results   Component Value Date    LABALBU 4.2 06/22/2020     No results found for: IRON, TIBC, FERRITIN  No results found for: LDLCALC, LDLCHOLESTEROL, LDLDIRECT  I agree with the assessment, plan and the diagnosis of    Diagnosis Orders   1. Mild persistent asthma without complication  montelukast (SINGULAIR) 10 MG tablet    albuterol sulfate HFA (PROVENTIL HFA) 108 (90 Base) MCG/ACT inhaler    fluticasone-salmeterol (ADVAIR DISKUS) 100-50 MCG/DOSE diskus inhaler    . I agree with orders as documented by the resident. More than 25 minutes spent  in face to face encounter with the patient and more than half in counseling. Patient's questions were answered. Patient Voiced understanding to the counseling. Return in about 6 months (around 3/21/2022).    (GC Modifier)-Dr. Lynette Ramon MD

## 2022-01-10 DIAGNOSIS — J45.30 MILD PERSISTENT ASTHMA WITHOUT COMPLICATION: ICD-10-CM

## 2022-01-10 NOTE — TELEPHONE ENCOUNTER
Last visit: 09/21/21  Last Med refill: 09/21/21  Does patient have enough medication for 72 hours: No:     Next Visit Date:  No future appointments. Health Maintenance   Topic Date Due    Varicella vaccine (1 of 2 - 2-dose childhood series) Never done    Pneumococcal 0-64 years Vaccine (1 of 2 - PPSV23) Never done    HIV screen  Never done    DTaP/Tdap/Td vaccine (1 - Tdap) Never done    Diabetes screen  Never done    Lipid screen  Never done    COVID-19 Vaccine (1) 06/30/2022 (Originally 1/17/1985)    Flu vaccine (1) 09/21/2022 (Originally 9/1/2021)    Depression Screen  05/04/2022    Hepatitis C screen  Completed    Hepatitis A vaccine  Aged Out    Hepatitis B vaccine  Aged Out    Hib vaccine  Aged Out    Meningococcal (ACWY) vaccine  Aged Out       No results found for: LABA1C          ( goal A1C is < 7)   No results found for: LABMICR  No results found for: LDLCHOLESTEROL, LDLCALC    (goal LDL is <100)   AST (U/L)   Date Value   06/22/2020 126 (H)     ALT (U/L)   Date Value   06/22/2020 54 (H)     BUN (mg/dL)   Date Value   06/24/2020 12     BP Readings from Last 3 Encounters:   09/21/21 (!) 136/94   05/04/21 (!) 128/92   06/24/20 (!) 135/104          (goal 120/80)    All Future Testing planned in [facility]  Lab Frequency Next Occurrence   Glucose, Fasting Once 05/04/2022   Lipid Panel Once 05/04/2022               Patient Active Problem List:     Severe persistent asthma in adult without complication         Please address the medication refill and close the encounter. If I can be of assistance, please route to the applicable pool. Thank you.

## 2022-01-20 ENCOUNTER — HOSPITAL ENCOUNTER (EMERGENCY)
Age: 42
Discharge: HOME | End: 2022-01-20
Attending: EMERGENCY MEDICINE
Payer: MEDICAID

## 2022-01-20 VITALS
SYSTOLIC BLOOD PRESSURE: 162 MMHG | OXYGEN SATURATION: 96 % | WEIGHT: 175 LBS | DIASTOLIC BLOOD PRESSURE: 88 MMHG | BODY MASS INDEX: 25.84 KG/M2 | RESPIRATION RATE: 17 BRPM | HEART RATE: 87 BPM | TEMPERATURE: 98 F

## 2022-01-20 DIAGNOSIS — J06.9 VIRAL URI: Primary | ICD-10-CM

## 2022-01-20 PROCEDURE — 99282 EMERGENCY DEPT VISIT SF MDM: CPT

## 2022-01-20 PROCEDURE — U0005 INFEC AGEN DETEC AMPLI PROBE: HCPCS

## 2022-01-20 PROCEDURE — U0003 INFECTIOUS AGENT DETECTION BY NUCLEIC ACID (DNA OR RNA); SEVERE ACUTE RESPIRATORY SYNDROME CORONAVIRUS 2 (SARS-COV-2) (CORONAVIRUS DISEASE [COVID-19]), AMPLIFIED PROBE TECHNIQUE, MAKING USE OF HIGH THROUGHPUT TECHNOLOGIES AS DESCRIBED BY CMS-2020-01-R: HCPCS

## 2022-01-20 RX ORDER — IBUPROFEN 400 MG/1
400 TABLET ORAL EVERY 6 HOURS PRN
Qty: 40 TABLET | Refills: 0 | Status: SHIPPED | OUTPATIENT
Start: 2022-01-20 | End: 2022-01-30

## 2022-01-20 ASSESSMENT — ENCOUNTER SYMPTOMS
COUGH: 0
ABDOMINAL PAIN: 0
RHINORRHEA: 1
DIARRHEA: 0
WHEEZING: 0
VOMITING: 0
BACK PAIN: 0
PHOTOPHOBIA: 0
NAUSEA: 0

## 2022-01-20 NOTE — ED PROVIDER NOTES
[facility] [address] ED  Faculty Attestation    I performed a history and physical examination of the patient and discussed management with the resident. I reviewed the residents note and agree with the documented findings and plan of care. Any areas of disagreement are noted on the chart. I was personally present for the key portions of any procedures and the inclusive time noted in any critical care statement. I have documented in the chart those procedures where I was not present during the key portions. I have reviewed the emergency nurses triage note. I agree with the chief complaint, past medical history, past surgical history, allergies, medications, social, and family history as documented unless otherwise noted below.        This is a 45-year-old male who presents to the emergency department because he thinks he may have sleep apnea  Patient describes that he has had recent nasal congestion  Several days ago he had a mild sore throat and some possible chills  No measured fever   No other HEENT complaints  He states that for the last 3 days he has woken up in the middle of the night feeling as if he needed to catch his breath  He states that he did some Internet research and believes that he may have sleep apnea and so presented to the emergency department  Patient does have a history of asthma which he states is very well controlled on Advair  He denies any shortness of breath throughout the day   Patient is not short of breath at this time  No cough or wheezing  No chest pain or palpitations   No GI/ complaints  No lower extremity edema or calf pain  He is currently asymptomatic  Review of systems otherwise negative  He has no additional concerns at this time    On examination he appears in no acute distress  Nursing notes and vital signs reviewed  Heart is regular in rate and rhythm   Lungs are clear to auscultation  Normal work of breathing  Posterior oropharynx is clear  Oral mucosa is moist  Neck is supple without discomfort or lymphadenopathy  Normal peripheral perfusion and skin turgor  No peripheral edema    Blood work or imaging indicated  Outpatient COVID test ordered given his recent symptoms  We did discuss that we are not able to make the diagnosis of sleep apnea here in the emergency department and he will need to follow-up with his primary care provider for this issue  He does not need a breathing treatment or steroids at this time  Reassurance provided  Stable for discharge      Magali Aldridge DO  Attending Emergency Physician        Luly Perez DO  01/20/22 1986

## 2022-01-20 NOTE — ED NOTES
Pt to the ER with c/o sore throat,chills and nasal congestion. Pt reports sore throat is resolving. Symptoms worse at night. Afebrile upon arrival. Has h/o asthma. Denies chest pain, no SOB. , no cough or headache. No acute distress noted, rr even and NL.  Dr. Giana Meeks at the bedside to evaluate the pt          Priya Greene RN  01/20/22 [id number]

## 2022-01-20 NOTE — ED PROVIDER NOTES
[facility]  ED  Emergency Department Encounter  EmergencyMedicine Resident     Pt Name:Austin Floyd  MRN: [id number]  [place] 1980  Date of evaluation: 1/20/22  PCP:  Orlando Pruitt MD    This patient was evaluated in the Emergency Department for symptoms described in the history of present illness. The patient was evaluated in the context of the global COVID-19 pandemic, which necessitated consideration that the patient might be at risk for infection with the SARS-CoV-2 virus that causes COVID-19. Institutional protocols and algorithms that pertain to the evaluation of patients at risk for COVID-19 are in a state of rapid change based on information released by regulatory bodies including the CDC and federal and state organizations. These policies and algorithms were followed during the patient's care in the ED. CHIEF COMPLAINT       Chief Complaint   Patient presents with    URI     sore throat, nasal congestion. Symptoms worse at night. Has h/o asthma       HISTORY OF PRESENT ILLNESS  (Location/Symptom, Timing/Onset, Context/Setting, Quality, Duration, Modifying Factors, Severity.)      Renae Vasquez is a 40 y.o. male who presents with complaints of dyspnea at night and has woken patient up for the last 3 nights multiple times, rhinorrhea, nasal congestion. Patient notes sore throat 4 days ago that is since resolved. No drooling or neck pain. No change in voice. Patient has history of asthma for which he uses Advair and denies any shortness of breath during the day, wheezing. No chest pain. Notes he is not COVID vaccinated. Does note some subjective chills. PAST MEDICAL / SURGICAL / SOCIAL / FAMILY HISTORY      has a past medical history of DULCE MARIA (acute kidney injury) ([facility] [address] and Asthma. has no past surgical history on file.       Social History     Socioeconomic History    Marital status:      Spouse name: Not on file    Number of children: Not on file  Years of education: Not on file    Highest education level: Not on file   Occupational History    Not on file   Tobacco Use    Smoking status: Former Smoker     Types: Cigarettes     Quit date: 2000     Years since quittin.9    Smokeless tobacco: Never Used   Substance and Sexual Activity    Alcohol use: Yes     Comment: 1 24oz can of beer daily    Drug use: No    Sexual activity: Yes     Partners: Female   Other Topics Concern    Not on file   Social History Narrative    Not on file     Social Determinants of Health     Financial Resource Strain:     Difficulty of Paying Living Expenses: Not on file   Food Insecurity:     Worried About Running Out of Food in the Last Year: Not on file    Christian of Food in the Last Year: Not on file   Transportation Needs:     Lack of Transportation (Medical): Not on file    Lack of Transportation (Non-Medical):  Not on file   Physical Activity:     Days of Exercise per Week: Not on file    Minutes of Exercise per Session: Not on file   Stress:     Feeling of Stress : Not on file   Social Connections:     Frequency of Communication with Friends and Family: Not on file    Frequency of Social Gatherings with Friends and Family: Not on file    Attends Cheondo[employer] Services: Not on file    Active Member of [address] [place] [address] [employer] or Organizations: Not on file    Attends Club or Organization Meetings: Not on file    Marital Status: Not on file   Intimate Partner Violence:     Fear of Current or Ex-Partner: Not on file    Emotionally Abused: Not on file    Physically Abused: Not on file    Sexually Abused: Not on file   Housing Stability:     Unable to Pay for Housing in the Last Year: Not on file    Number of [place] in the Last Year: Not on file    Unstable Housing in the Last Year: Not on file       Family History   Problem Relation Age of Onset    High Blood Pressure Mother     Stroke Father        Allergies:  Seasonal    Home Medications:  Prior to Admission medications    Medication Sig Start Date End Date Taking? Authorizing Provider   ibuprofen (IBU) 400 MG tablet Take 1 tablet by mouth every 6 hours as needed for Pain 1/20/22 1/30/22 Yes Eblert Chavarria MD   fluticasone-salmeterol (ADVAIR) 100-50 MCG/DOSE diskus inhaler inhale 1 puff by mouth and INTO THE LUNGS every 12 hours 1/10/22   Bina Flood MD   montelukast (SINGULAIR) 10 MG tablet Take 1 tablet by mouth nightly 9/21/21 10/21/21  Monet Delarosa MD   albuterol sulfate HFA (PROVENTIL HFA) 108 (90 Base) MCG/ACT inhaler Inhale 1-2 puffs into the lungs every 4 hours as needed for Wheezing or Shortness of Breath (Space out to every 6 hours as symptoms improve) Space out to every 6 hours as symptoms improve. 9/21/21 10/21/21  Monet Delarosa MD   albuterol sulfate  (90 Base) MCG/ACT inhaler Inhale 2 puffs into the lungs every 4 hours as needed for Wheezing 5/4/21   Mo'Julia Land MD   budesonide-formoterol (SYMBICORT) 160-4.5 MCG/ACT AERO inhale 2 puffs by mouth twice a day 12/1/20   Alex Joseph MD   simethicone (MYLICON) 80 MG chewable tablet Take 1 tablet by mouth every 6 hours as needed for Flatulence 6/24/20 7/24/20  Alva Weir MD   Respiratory Therapy Supplies (NEBULIZER/TUBING/MOUTHPIECE) KIT 1 kit by Does not apply route daily as needed (wheezing) 11/20/17   Dhara Israel MD       REVIEW OF SYSTEMS    (2-9 systems for level 4, 10 or more for level 5)      Review of Systems   Constitutional: Positive for chills. Negative for fever. HENT: Positive for congestion and rhinorrhea. Eyes: Negative for photophobia and visual disturbance. Respiratory: Negative for cough and wheezing. Dyspnea at night   Cardiovascular: Negative for chest pain and palpitations. Gastrointestinal: Negative for abdominal pain, diarrhea, nausea and vomiting. Genitourinary: Negative for dysuria and frequency. Musculoskeletal: Negative for back pain and neck pain.    Skin: Negative for rash and wound. Neurological: Negative for dizziness and headaches. PHYSICAL EXAM   (up to 7 for level 4, 8 or more for level 5)      INITIAL VITALS:   BP (!) 162/88   Pulse 87   Temp 98 °F (36.7 °C) (Oral)   Resp 17   Wt 175 lb (79.4 kg)   SpO2 96%   BMI 25.84 kg/m²     Physical Exam  Vitals and nursing note reviewed. Constitutional:       General: He is not in acute distress. HENT:      Head: Normocephalic and atraumatic. Right Ear: External ear normal.      Left Ear: External ear normal.      Nose: Nose normal.      Mouth/Throat:      Mouth: Mucous membranes are moist.      Pharynx: Oropharynx is clear. No oropharyngeal exudate or uvula swelling. Tonsils: No tonsillar exudate. Comments: Uvula midline  Eyes:      Conjunctiva/sclera: Conjunctivae normal.   Cardiovascular:      Rate and Rhythm: Normal rate and regular rhythm. Pulmonary:      Effort: Pulmonary effort is normal. No respiratory distress. Breath sounds: Normal breath sounds. No stridor. No wheezing, rhonchi or rales. Abdominal:      Palpations: Abdomen is soft. Tenderness: There is no abdominal tenderness. Musculoskeletal:         General: Normal range of motion. Lymphadenopathy:      Cervical: No cervical adenopathy. Skin:     General: Skin is warm and dry. Neurological:      General: No focal deficit present. Mental Status: He is alert and oriented to person, place, and time. DIFFERENTIAL  DIAGNOSIS     PLAN (LABS / IMAGING / EKG):  Orders Placed This Encounter   Procedures    COVID-19       MEDICATIONS ORDERED:  Orders Placed This Encounter   Medications    ibuprofen (IBU) 400 MG tablet     Sig: Take 1 tablet by mouth every 6 hours as needed for Pain     Dispense:  40 tablet     Refill:  0       DDX: Viral URI, COVID, sleep apnea    DIAGNOSTIC RESULTS / EMERGENCY DEPARTMENT COURSE / MDM   LAB RESULTS:  No results found for this visit on 01/20/22.     IMPRESSION: Viral URI    RADIOLOGY:  No orders to display       EMERGENCY DEPARTMENT COURSE:  60-year-old male, history of asthma, presents emergency department with complaints of episodes of dyspnea that wake him up from sleep over the last 3 days associated with recent sore throat 4 days ago that is now resolved, subjective chills over the last few days, rhinorrhea and congestion. No shortness of breath during the day, chest pain, palpitations. Patient notes he feels like his asthma is well controlled at this point. Patient notes came to ED because he is concerned that he may have sleep apnea. Patient does have a PCP at [facility]. On exam, patient afebrile, 96% on room air, lungs clear to auscultation bilaterally, uvula midline, no exudates, no cervical lymphadenopathy. Non-rapid covid obtained. Helped pt sign up for mychart and instructed f/u with pcp within 1 week. Patient discharged home with final diagnosis viral URI and given prescription for ibuprofen at patient request.    PROCEDURES:  None    CONSULTS:  None    CRITICAL CARE:  None     FINAL IMPRESSION      1. Viral URI          DISPOSITION / PLAN     DISPOSITION Home      PATIENT REFERRED TO:  Job Wang MD  [id number]   [address]  [id number]  [phone]    In 1 week        DISCHARGE MEDICATIONS:  Discharge Medication List as of 1/20/2022  1:01 PM      START taking these medications    Details   ibuprofen (IBU) 400 MG tablet Take 1 tablet by mouth every 6 hours as needed for Pain, Disp-40 tablet, R-0Print             Fercho Castro MD  Emergency Medicine Resident    (Please note that portions of thisnote were completed with a voice recognition program.  Efforts were made to edit the dictations but occasionally words are mis-transcribed.)     Tonia Zarate MD  Resident  01/20/22 [id number]

## 2022-01-20 NOTE — Clinical Note
Juan Pablo Fleischer was seen and treated in our emergency department on 1/20/2022. He may return to work on 01/21/2022. If you have any questions or concerns, please don't hesitate to call.       Casey Brown, DO

## 2022-01-21 LAB
SARS-COV-2: ABNORMAL
SARS-COV-2: ABNORMAL
SOURCE: ABNORMAL

## 2022-04-22 DIAGNOSIS — J45.30 MILD PERSISTENT ASTHMA WITHOUT COMPLICATION: ICD-10-CM

## 2022-04-22 RX ORDER — FLUTICASONE PROPIONATE AND SALMETEROL 50; 100 UG/1; UG/1
POWDER RESPIRATORY (INHALATION)
Qty: 60 EACH | Refills: 0 | Status: SHIPPED | OUTPATIENT
Start: 2022-04-22 | End: 2022-05-24

## 2022-04-22 NOTE — TELEPHONE ENCOUNTER
Last visit: 09/21/2021  Last Med refill:   Does patient have enough medication for 72 hours: No:     Next Visit Date:  No future appointments.     Health Maintenance   Topic Date Due    Varicella vaccine (1 of 2 - 2-dose childhood series) Never done    Pneumococcal 0-64 years Vaccine (1 - PCV) Never done    HIV screen  Never done    DTaP/Tdap/Td vaccine (1 - Tdap) Never done    Diabetes screen  Never done    Lipids  Never done    Depression Screen  05/04/2022    COVID-19 Vaccine (1) 06/30/2022 (Originally 1/17/1986)    Flu vaccine (Season Ended) 09/21/2022 (Originally 9/1/2022)    Hepatitis C screen  Completed    Hepatitis A vaccine  Aged Out    Hepatitis B vaccine  Aged Out    Hib vaccine  Aged Out    Meningococcal (ACWY) vaccine  Aged Out       No results found for: LABA1C          ( goal A1C is < 7)   No results found for: LABMICR  No results found for: LDLCHOLESTEROL, LDLCALC    (goal LDL is <100)   AST (U/L)   Date Value   06/22/2020 126 (H)     ALT (U/L)   Date Value   06/22/2020 54 (H)     BUN (mg/dL)   Date Value   06/24/2020 12     BP Readings from Last 3 Encounters:   01/20/22 (!) 162/88   09/21/21 (!) 136/94   05/04/21 (!) 128/92          (goal 120/80)    All Future Testing planned in [facility]  Lab Frequency Next Occurrence   Glucose, Fasting Once 05/04/2022   Lipid Panel Once 05/04/2022               Patient Active Problem List:     Severe persistent asthma in adult without complication

## 2022-04-27 ENCOUNTER — APPOINTMENT (OUTPATIENT)
Dept: GENERAL RADIOLOGY | Age: 42
End: 2022-04-27
Payer: MEDICAID

## 2022-04-27 ENCOUNTER — HOSPITAL ENCOUNTER (EMERGENCY)
Age: 42
Discharge: HOME | End: 2022-04-27
Attending: EMERGENCY MEDICINE
Payer: MEDICAID

## 2022-04-27 VITALS
HEART RATE: 91 BPM | SYSTOLIC BLOOD PRESSURE: 134 MMHG | TEMPERATURE: 97.9 F | DIASTOLIC BLOOD PRESSURE: 94 MMHG | RESPIRATION RATE: 24 BRPM | OXYGEN SATURATION: 95 %

## 2022-04-27 DIAGNOSIS — J45.20 MILD INTERMITTENT ASTHMA WITHOUT COMPLICATION: Primary | ICD-10-CM

## 2022-04-27 PROCEDURE — 93005 ELECTROCARDIOGRAM TRACING: CPT | Performed by: STUDENT IN AN ORGANIZED HEALTH CARE EDUCATION/TRAINING PROGRAM

## 2022-04-27 PROCEDURE — 94640 AIRWAY INHALATION TREATMENT: CPT

## 2022-04-27 PROCEDURE — 99284 EMERGENCY DEPT VISIT MOD MDM: CPT

## 2022-04-27 PROCEDURE — 6370000000 HC RX 637 (ALT 250 FOR IP): Performed by: STUDENT IN AN ORGANIZED HEALTH CARE EDUCATION/TRAINING PROGRAM

## 2022-04-27 PROCEDURE — 71045 X-RAY EXAM CHEST 1 VIEW: CPT

## 2022-04-27 PROCEDURE — 6360000002 HC RX W HCPCS: Performed by: STUDENT IN AN ORGANIZED HEALTH CARE EDUCATION/TRAINING PROGRAM

## 2022-04-27 RX ORDER — IPRATROPIUM BROMIDE AND ALBUTEROL SULFATE 2.5; .5 MG/3ML; MG/3ML
1 SOLUTION RESPIRATORY (INHALATION) EVERY 4 HOURS PRN
Status: DISCONTINUED | OUTPATIENT
Start: 2022-04-27 | End: 2022-04-27 | Stop reason: HOSPADM

## 2022-04-27 RX ORDER — ALBUTEROL SULFATE 90 UG/1
AEROSOL, METERED RESPIRATORY (INHALATION)
Status: DISCONTINUED
Start: 2022-04-27 | End: 2022-04-27 | Stop reason: HOSPADM

## 2022-04-27 RX ORDER — ALBUTEROL SULFATE 90 UG/1
2 AEROSOL, METERED RESPIRATORY (INHALATION) 4 TIMES DAILY PRN
Qty: 54 G | Refills: 1 | Status: SHIPPED | OUTPATIENT
Start: 2022-04-27

## 2022-04-27 RX ORDER — ALBUTEROL SULFATE 2.5 MG/3ML
2.5 SOLUTION RESPIRATORY (INHALATION) EVERY 6 HOURS PRN
Status: DISCONTINUED | OUTPATIENT
Start: 2022-04-27 | End: 2022-04-27 | Stop reason: HOSPADM

## 2022-04-27 RX ORDER — PREDNISONE 20 MG/1
60 TABLET ORAL ONCE
Status: COMPLETED | OUTPATIENT
Start: 2022-04-27 | End: 2022-04-27

## 2022-04-27 RX ORDER — ALBUTEROL SULFATE 90 UG/1
2 AEROSOL, METERED RESPIRATORY (INHALATION) EVERY 6 HOURS PRN
Status: DISCONTINUED | OUTPATIENT
Start: 2022-04-27 | End: 2022-04-27 | Stop reason: HOSPADM

## 2022-04-27 RX ORDER — FLUTICASONE PROPIONATE AND SALMETEROL 100; 50 UG/1; UG/1
1 POWDER RESPIRATORY (INHALATION) EVERY 12 HOURS
Qty: 60 EACH | Refills: 1 | Status: SHIPPED | OUTPATIENT
Start: 2022-04-27

## 2022-04-27 RX ADMIN — ALBUTEROL SULFATE 2.5 MG: 2.5 SOLUTION RESPIRATORY (INHALATION) at 18:21

## 2022-04-27 RX ADMIN — PREDNISONE 60 MG: 20 TABLET ORAL at 18:41

## 2022-04-27 NOTE — ED PROVIDER NOTES
[facility]     Emergency Department     Faculty Attestation    I performed a history and physical examination of the patient and discussed management with the resident. I reviewed the residents note and agree with the documented findings and plan of care. Any areas of disagreement are noted on the chart. I was personally present for the key portions of any procedures. I have documented in the chart those procedures where I was not present during the key portions. I have reviewed the emergency nurses triage note. I agree with the chief complaint, past medical history, past surgical history, allergies, medications, social and family history as documented unless otherwise noted below. Documentation of the HPI, Physical Exam and Medical Decision Making performed by medical students or scribes is based on my personal performance of the HPI, PE and MDM. For Physician Assistant/ Nurse Practitioner cases/documentation I have personally evaluated this patient and have completed at least one if not all key elements of the E/M (history, physical exam, and MDM). Additional findings are as noted. Vital signs:   Vitals:    04/27/22 1821   BP:    Pulse:    Resp: 24   Temp:    SpO3       67-year-old male presents complaining of shortness of breath and wheezing. Patient has a history of asthma, and has been out of his inhaler. He denies fever or chills. No productive cough. On physical exam, he has a few scattered wheezes bilaterally. No rales or rhonchi. No accessory muscle use. He appears comfortable from a respiratory standpoint.     EKG Interpretation    Interpreted by me    Rhythm: normal sinus   Rate: normal  Axis: normal  Ectopy: none  Conduction: normal  ST Segments: no acute change  T Waves: no acute change  Q Waves: none    Clinical Impression: no acute changes and normal EKG    XR CHEST PORTABLE    Result Date: 4/27/2022  EXAMINATION: ONE XRAY VIEW OF THE CHEST 4/27/2022 6:17 pm COMPARISON: 04/28/2021 HISTORY: ORDERING SYSTEM PROVIDED HISTORY: shortness of breath TECHNOLOGIST PROVIDED HISTORY: shortness of breath Reason for Exam: upr,sob,hx asthma FINDINGS: The lungs are without acute focal process. There is no effusion or pneumothorax. The cardiomediastinal silhouette is stable. The osseous structures are stable. No acute process.                  Aurelia Edwards M.D,  Attending Emergency  Physician            Fabrizio Barksdale MD  04/27/22 1913

## 2022-04-27 NOTE — ED PROVIDER NOTES
[facility]  ED  Emergency Department Encounter  Emergency Medicine Resident     Pt Name: Rashid Burris  MRN: [id number]  [place] 1980  Date of evaluation: 22  PCP:  Tamar Lambert MD    [address] [place] [address]       Chief Complaint   Patient presents with    Shortness of Breath     Hx of asthma; Ran out of inhaler (last use \"couple days ago\")    Medication Refill       HISTORY OFPRESENT ILLNESS  (Location/Symptom, Timing/Onset, Context/Setting, Quality, Duration, Modifying Factors,Severity.)      Rashid Burris is a 40 y. o.yo male who presents with moderate persistent asthma on Advair and albuterol inhaler who reports that he has run out of medication and does feel as though his asthma has flared. Patient reports that he ran out of it 2 days ago and with the cold weather he has been having difficulty in breathing. He denies any chest pain, increasing cough, fever, chills. Patient reports that he would not be able to get his albuterol inhaler prescription filled to Friday and his Advair will be able to be filled till Monday. PAST MEDICAL / SURGICAL / SOCIAL / FAMILY HISTORY      has a past medical history of DULCE MARIA (acute kidney injury) ([facility] [address] and Asthma. has no past surgical history on file.      Social History     Socioeconomic History    Marital status:      Spouse name: Not on file    Number of children: Not on file    Years of education: Not on file    Highest education level: Not on file   Occupational History    Not on file   Tobacco Use    Smoking status: Former Smoker     Types: Cigarettes     Quit date: 2000     Years since quittin.2    Smokeless tobacco: Never Used   Substance and Sexual Activity    Alcohol use: Yes     Comment: 1 24oz can of beer daily    Drug use: No    Sexual activity: Yes     Partners: Female   Other Topics Concern    Not on file   Social History Narrative    Not on file     Social Determinants of Health     Financial Resource Strain:     Difficulty of Paying Living Expenses: Not on file   Food Insecurity:     Worried About Running Out of Food in the Last Year: Not on file    Christian of Food in the Last Year: Not on file   Transportation Needs:     Lack of Transportation (Medical): Not on file    Lack of Transportation (Non-Medical): Not on file   Physical Activity:     Days of Exercise per Week: Not on file    Minutes of Exercise per Session: Not on file   Stress:     Feeling of Stress : Not on file   Social Connections:     Frequency of Communication with Friends and Family: Not on file    Frequency of Social Gatherings with Friends and Family: Not on file    Attends Restoration Services: Not on file    Active Member of [address] [place] [address] or Organizations: Not on file    Attends Club or Organization Meetings: Not on file    Marital Status: Not on file   Intimate Partner Violence:     Fear of Current or Ex-Partner: Not on file    Emotionally Abused: Not on file    Physically Abused: Not on file    Sexually Abused: Not on file   Housing Stability:     Unable to Pay for Housing in the Last Year: Not on file    Number of [place] in the Last Year: Not on file    Unstable Housing in the Last Year: Not on file       Family History   Problem Relation Age of Onset    High Blood Pressure Mother     Stroke Father         Allergies:  Seasonal    Home Medications:  Prior to Admission medications    Medication Sig Start Date End Date Taking?  Authorizing Provider   fluticasone-salmeterol (ADVAIR DISKUS) 100-50 MCG/ACT AEPB diskus inhaler Inhale 1 puff into the lungs every 12 hours 4/27/22  Yes Aracelis Dickson MD   albuterol sulfate HFA (VENTOLIN HFA) 108 (90 Base) MCG/ACT inhaler Inhale 2 puffs into the lungs 4 times daily as needed for Wheezing 4/27/22  Yes Celso Cole MD   ADVAIR DISKUS 100-50 MCG/ACT AEPB diskus inhaler inhale 1 puff by mouth and INTO THE LUNGS every 12 hours 4/22/22   Willene Boas, MD ibuprofen (IBU) 400 MG tablet Take 1 tablet by mouth every 6 hours as needed for Pain 1/20/22 1/30/22  Bo Weeks MD   montelukast (SINGULAIR) 10 MG tablet Take 1 tablet by mouth nightly 9/21/21 10/21/21  Oralia Todd MD   albuterol sulfate HFA (PROVENTIL HFA) 108 (90 Base) MCG/ACT inhaler Inhale 1-2 puffs into the lungs every 4 hours as needed for Wheezing or Shortness of Breath (Space out to every 6 hours as symptoms improve) Space out to every 6 hours as symptoms improve. 9/21/21 10/21/21  Oralia Todd MD   albuterol sulfate  (90 Base) MCG/ACT inhaler Inhale 2 puffs into the lungs every 4 hours as needed for Wheezing 5/4/21   Mo'Julia Marie MD   budesonide-formoterol (SYMBICORT) 160-4.5 MCG/ACT AERO inhale 2 puffs by mouth twice a day 12/1/20   Gauri Cheema MD   simethicone (MYLICON) 80 MG chewable tablet Take 1 tablet by mouth every 6 hours as needed for Flatulence 6/24/20 7/24/20  Huel Severe, MD   Respiratory Therapy Supplies (NEBULIZER/TUBING/MOUTHPIECE) KIT 1 kit by Does not apply route daily as needed (wheezing) 11/20/17   Kilo Simeon MD       REVIEW OFSYSTEMS    (2-9 systems for level 4, 10 or more for level 5)      Review of Systems   Constitutional: Negative for fatigue and fever. HENT: Negative for dental problem and voice change. Respiratory: Positive for cough, shortness of breath and wheezing. Gastrointestinal: Negative for abdominal pain and vomiting. Skin: Negative for rash and wound. PHYSICAL EXAM   (up to 7 for level 4, 8 or more forlevel 5)      INITIAL VITALS:   ED Triage Vitals [04/27/22 1805]   BP Temp Temp Source Pulse Resp SpO2 Height Weight   (!) 134/94 97.9 °F (36.6 °C) Oral 91 19 95 % -- --       Physical Exam  Constitutional:       General: He is not in acute distress. Appearance: Normal appearance. He is well-developed. HENT:      Head: Normocephalic.       Nose: Nose normal.      Mouth/Throat:      Mouth: Mucous membranes are moist.   Eyes:      Pupils: Pupils are equal, round, and reactive to light. Cardiovascular:      Rate and Rhythm: Normal rate. Pulmonary:      Breath sounds: Wheezing present. Comments: Patient with bilateral expiratory wheezing, he is speaking in full sentence, not in any acute respiratory distress  Chest:      Chest wall: No mass or tenderness. Abdominal:      General: There is no distension. Tenderness: There is no abdominal tenderness. Musculoskeletal:         General: No swelling or tenderness. Cervical back: Normal range of motion and neck supple. Skin:     Capillary Refill: Capillary refill takes less than 2 seconds. Coloration: Skin is not jaundiced. Neurological:      General: No focal deficit present. Mental Status: He is alert. Psychiatric:         Mood and Affect: Mood normal.         DIFFERENTIAL  DIAGNOSIS     PLAN (LABS / IMAGING / EKG):  Orders Placed This Encounter   Procedures    XR CHEST PORTABLE    EKG 12 Lead       MEDICATIONS ORDERED:  Orders Placed This Encounter   Medications    predniSONE (DELTASONE) tablet 60 mg    DISCONTD: albuterol (PROVENTIL) nebulizer solution 2.5 mg     Order Specific Question:   Initiate RT Bronchodilator Protocol     Answer: Yes    DISCONTD: ipratropium-albuterol (DUONEB) nebulizer solution 1 ampule     Order Specific Question:   Initiate RT Bronchodilator Protocol     Answer:    Yes    DISCONTD: albuterol sulfate  (90 Base) MCG/ACT inhaler 2 puff     Order Specific Question:   Initiate RT Bronchodilator Protocol     Answer:   No    DISCONTD: albuterol sulfate  (90 Base) MCG/ACT inhaler     Roxy Eng: cabinet override    fluticasone-salmeterol (ADVAIR DISKUS) 100-50 MCG/ACT AEPB diskus inhaler     Sig: Inhale 1 puff into the lungs every 12 hours     Dispense:  60 each     Refill:  1    albuterol sulfate HFA (VENTOLIN HFA) 108 (90 Base) MCG/ACT inhaler     Sig: Inhale 2 puffs into the lungs 4 times daily as needed for Wheezing     Dispense:  54 g     Refill:  1         Initial MDM/Plan: 40 y.o. male who presents with shortness of breath   Patient appears well, not in any acute distress, speaking in full sentences. He does have wheezes expiratory bilaterally. We will give patient a breathing treatment, also give 60mg of prednisone. We will also get x-rays to ensure patient does not have any pneumonia. Obtain baseline EKG  Plan for discharge    DIAGNOSTIC RESULTS / EMERGENCYDEPARTMENT COURSE / MDM     LABS:  Labs Reviewed - No data to display      RADIOLOGY:  XR CHEST PORTABLE    Result Date: 4/27/2022  EXAMINATION: ONE XRAY VIEW OF THE CHEST 4/27/2022 6:17 pm COMPARISON: 04/28/2021 HISTORY: ORDERING SYSTEM PROVIDED HISTORY: shortness of breath TECHNOLOGIST PROVIDED HISTORY: shortness of breath Reason for Exam: upr,sob,hx asthma FINDINGS: The lungs are without acute focal process. There is no effusion or pneumothorax. The cardiomediastinal silhouette is stable. The osseous structures are stable. No acute process. EKG      All EKG's are interpreted by the Emergency Department Physicianwho either signs or Co-signs this chart in the absence of a cardiologist.    EMERGENCY DEPARTMENT COURSE:  ED Course as of 04/28/22 0159   Thu Apr 28, 2022   0158 X-ray negative for pneumonia, EKG unremarkable. Patient given an albuterol inhaler here in addition to prescription and for albuterol inhaler and Advair. Plan for discharge [AN]      ED Course User Index  [AN] Otoniel Macias MD          PROCEDURES:  None    CONSULTS:  None    CRITICAL CARE:      FINAL IMPRESSION      1. Mild intermittent asthma without complication          DISPOSITION / PLAN     DISPOSITION Decision To Discharge 04/27/2022 06:59:39 PM      PATIENT REFERRED TO:  [facility]  [address] [facility] [address]  [phone]    If symptoms worsen    José Miguel Sebastian MD  [address]   [address] [address]  [phone]      As needed      DISCHARGE MEDICATIONS:  Discharge Medication List as of 4/27/2022  7:01 PM      START taking these medications    Details   !! fluticasone-salmeterol (ADVAIR DISKUS) 100-50 MCG/ACT AEPB diskus inhaler Inhale 1 puff into the lungs every 12 hours, Disp-60 each, R-1Print       !! - Potential duplicate medications found. Please discuss with provider.           Malena Solis MD  Emergency Medicine Resident    (Please note that portions of this note were completed with a voice recognition program.Efforts were made to edit the dictations but occasionally words are mis-transcribed.)        Malena Solis MD  Resident  04/28/22 0955

## 2022-04-27 NOTE — ED NOTES
Patient states that he has been out of his inhaler a couple days and complains of asthma exacerbation      Marilyn Ahn RN  04/27/22 3077

## 2022-04-28 DIAGNOSIS — J45.30 MILD PERSISTENT ASTHMA WITHOUT COMPLICATION: ICD-10-CM

## 2022-04-28 RX ORDER — FLUTICASONE PROPIONATE AND SALMETEROL 100; 50 UG/1; UG/1
POWDER RESPIRATORY (INHALATION)
Qty: 60 EACH | Refills: 0 | OUTPATIENT
Start: 2022-04-28

## 2022-04-28 RX ORDER — FLUTICASONE PROPIONATE AND SALMETEROL 50; 100 UG/1; UG/1
POWDER RESPIRATORY (INHALATION)
OUTPATIENT
Start: 2022-04-28

## 2022-04-28 ASSESSMENT — ENCOUNTER SYMPTOMS
COUGH: 1
VOMITING: 0
SHORTNESS OF BREATH: 1
WHEEZING: 1
ABDOMINAL PAIN: 0
VOICE CHANGE: 0

## 2022-04-28 NOTE — TELEPHONE ENCOUNTER
Please address the medication refill and close the encounter. If I can be of assistance, please route to the applicable pool. Thank you. Last visit: 09/21/21  Last Med refill: 04/22/22  Does patient have enough medication for 72 hours: Yes    Next Visit Date:  No future appointments.     Health Maintenance   Topic Date Due    Varicella vaccine (1 of 2 - 2-dose childhood series) Never done    Pneumococcal 0-64 years Vaccine (1 - PCV) Never done    HIV screen  Never done    DTaP/Tdap/Td vaccine (1 - Tdap) Never done    Diabetes screen  Never done    Lipids  Never done    Depression Screen  05/04/2022    COVID-19 Vaccine (1) 06/30/2022 (Originally 1/17/1986)    Flu vaccine (Season Ended) 09/21/2022 (Originally 9/1/2022)    Hepatitis C screen  Completed    Hepatitis A vaccine  Aged Out    Hepatitis B vaccine  Aged Out    Hib vaccine  Aged Out    Meningococcal (ACWY) vaccine  Aged Out       No results found for: LABA1C          ( goal A1C is < 7)   No results found for: LABMICR  No results found for: LDLCHOLESTEROL, LDLCALC    (goal LDL is <100)   AST (U/L)   Date Value   06/22/2020 126 (H)     ALT (U/L)   Date Value   06/22/2020 54 (H)     BUN (mg/dL)   Date Value   06/24/2020 12     BP Readings from Last 3 Encounters:   04/27/22 (!) 134/94   01/20/22 (!) 162/88   09/21/21 (!) 136/94          (goal 120/80)    All Future Testing planned in [facility]  Lab Frequency Next Occurrence   Glucose, Fasting Once 05/04/2022   Lipid Panel Once 05/04/2022               Patient Active Problem List:     Severe persistent asthma in adult without complication

## 2022-04-29 LAB
EKG ATRIAL RATE: 80 BPM
EKG P AXIS: 49 DEGREES
EKG P-R INTERVAL: 128 MS
EKG Q-T INTERVAL: 362 MS
EKG QRS DURATION: 90 MS
EKG QTC CALCULATION (BAZETT): 417 MS
EKG R AXIS: 81 DEGREES
EKG T AXIS: 72 DEGREES
EKG VENTRICULAR RATE: 80 BPM

## 2022-04-29 PROCEDURE — 93010 ELECTROCARDIOGRAM REPORT: CPT | Performed by: INTERNAL MEDICINE

## 2022-05-19 ENCOUNTER — HOSPITAL ENCOUNTER (EMERGENCY)
Age: 42
Discharge: HOME | End: 2022-05-19
Attending: EMERGENCY MEDICINE
Payer: MEDICAID

## 2022-05-19 VITALS
DIASTOLIC BLOOD PRESSURE: 93 MMHG | TEMPERATURE: 97.2 F | RESPIRATION RATE: 18 BRPM | OXYGEN SATURATION: 99 % | HEART RATE: 96 BPM | SYSTOLIC BLOOD PRESSURE: 132 MMHG

## 2022-05-19 DIAGNOSIS — J02.9 ACUTE PHARYNGITIS, UNSPECIFIED ETIOLOGY: Primary | ICD-10-CM

## 2022-05-19 PROCEDURE — 99283 EMERGENCY DEPT VISIT LOW MDM: CPT

## 2022-05-19 PROCEDURE — 6370000000 HC RX 637 (ALT 250 FOR IP): Performed by: STUDENT IN AN ORGANIZED HEALTH CARE EDUCATION/TRAINING PROGRAM

## 2022-05-19 RX ORDER — PSEUDOEPHEDRINE HYDROCHLORIDE 30 MG/1
30 TABLET ORAL ONCE
Status: COMPLETED | OUTPATIENT
Start: 2022-05-19 | End: 2022-05-19

## 2022-05-19 RX ORDER — PSEUDOEPHEDRINE HYDROCHLORIDE 30 MG/1
30 TABLET ORAL EVERY 12 HOURS PRN
Qty: 10 TABLET | Refills: 0 | Status: SHIPPED | OUTPATIENT
Start: 2022-05-19 | End: 2022-05-24

## 2022-05-19 RX ADMIN — BENZOCAINE AND MENTHOL 1 LOZENGE: 15; 3.6 LOZENGE ORAL at 06:27

## 2022-05-19 RX ADMIN — PSEUDOEPHEDRINE HCL 30 MG: 30 TABLET, FILM COATED ORAL at 06:27

## 2022-05-19 ASSESSMENT — ENCOUNTER SYMPTOMS
EYE REDNESS: 0
RHINORRHEA: 0
COUGH: 0
ABDOMINAL PAIN: 0
COLOR CHANGE: 0
PHOTOPHOBIA: 0
NAUSEA: 0
CONSTIPATION: 0
CHEST TIGHTNESS: 0
SHORTNESS OF BREATH: 0
SINUS PRESSURE: 0
TROUBLE SWALLOWING: 0
EYE PAIN: 0
SORE THROAT: 1
DIARRHEA: 0
VOMITING: 0
VOICE CHANGE: 0

## 2022-05-19 ASSESSMENT — PAIN SCALES - GENERAL: PAINLEVEL_OUTOF10: 5

## 2022-05-19 ASSESSMENT — PAIN - FUNCTIONAL ASSESSMENT: PAIN_FUNCTIONAL_ASSESSMENT: 0-10

## 2022-05-19 NOTE — ED PROVIDER NOTES
[facility]  ED  Emergency Department Encounter  EmergencyMedicine Resident     Pt Name:Austin Ramírez  MRN: [id number]  [place] 1980  Date of evaluation: 5/19/22  PCP:  Lizzie Hackett MD    This patient was evaluated in the Emergency Department for symptoms described in the history of present illness. The patient was evaluated in the context of the global COVID-19 pandemic, which necessitated consideration that the patient might be at risk for infection with the SARS-CoV-2 virus that causes COVID-19. Institutional protocols and algorithms that pertain to the evaluation of patients at risk for COVID-19 are in a state of rapid change based on information released by regulatory bodies including the CDC and federal and state organizations. These policies and algorithms were followed during the patient's care in the ED. CHIEF COMPLAINT       Chief Complaint   Patient presents with    Pharyngitis     PT woke up with a sore throat this morning. HISTORY OF PRESENT ILLNESS  (Location/Symptom, Timing/Onset, Context/Setting, Quality, Duration, Modifying Factors, Severity.)      Agustin Olmos is a 40 y.o. male with a history of asthma who presents with sore throat, right ear pain that began when he woke up this morning. Denies cough, congestion, chest pain or shortness of breath. He is afebrile. On exam, patient is in no apparent distress, uvula is midline tongue not elevated, no exudates on tonsils. Erythema to posterior oropharynx. Effusion behind right tympanic membrane. PAST MEDICAL / SURGICAL / SOCIAL / FAMILY HISTORY      has a past medical history of DULCE MARIA (acute kidney injury) ([address] and Asthma. has no past surgical history on file.   None    Social History     Socioeconomic History    Marital status:      Spouse name: Not on file    Number of children: Not on file    Years of education: Not on file    Highest education level: Not on file   Occupational History    Not on file   Tobacco Use    Smoking status: Former Smoker     Types: Cigarettes     Quit date: 2000     Years since quittin.2    Smokeless tobacco: Never Used   Substance and Sexual Activity    Alcohol use: Yes     Comment: 1 24oz can of beer daily    Drug use: No    Sexual activity: Yes     Partners: Female   Other Topics Concern    Not on file   Social History Narrative    Not on file     Social Determinants of Health     Financial Resource Strain:     Difficulty of Paying Living Expenses: Not on file   Food Insecurity:     Worried About Running Out of Food in the Last Year: Not on file    Christian of Food in the Last Year: Not on file   Transportation Needs:     Lack of Transportation (Medical): Not on file    Lack of Transportation (Non-Medical): Not on file   Physical Activity:     Days of Exercise per Week: Not on file    Minutes of Exercise per Session: Not on file   Stress:     Feeling of Stress : Not on file   Social Connections:     Frequency of Communication with Friends and Family: Not on file    Frequency of Social Gatherings with Friends and Family: Not on file    Attends Hoahaoism Services: Not on file    Active Member of [address] [place] [address] or Organizations: Not on file    Attends Club or Organization Meetings: Not on file    Marital Status: Not on file   Intimate Partner Violence:     Fear of Current or Ex-Partner: Not on file    Emotionally Abused: Not on file    Physically Abused: Not on file    Sexually Abused: Not on file   Housing Stability:     Unable to Pay for Housing in the Last Year: Not on file    Number of [place] in the Last Year: Not on file    Unstable Housing in the Last Year: Not on file       Family History   Problem Relation Age of Onset    High Blood Pressure Mother     Stroke Father        Allergies:  Seasonal    Home Medications:  Prior to Admission medications    Medication Sig Start Date End Date Taking?  Authorizing Provider pseudoephedrine (DECONGESTANT) 30 MG tablet Take 1 tablet by mouth every 12 hours as needed for Congestion 5/19/22 5/24/22 Yes Jerica Keith MD   benzocaine-menthol (CEPACOL) 15-4 MG LOZG lozenge Take 1 lozenge by mouth every 2 hours as needed for Sore Throat 5/19/22  Yes Jerica Keith MD   fluticasone-salmeterol (ADVAIR DISKUS) 100-50 MCG/ACT AEPB diskus inhaler Inhale 1 puff into the lungs every 12 hours 4/27/22   Celso Ann MD   albuterol sulfate HFA (VENTOLIN HFA) 108 (90 Base) MCG/ACT inhaler Inhale 2 puffs into the lungs 4 times daily as needed for Wheezing 4/27/22   Celso Ann MD   ADVAIR DISKUS 100-50 MCG/ACT AEPB diskus inhaler inhale 1 puff by mouth and INTO THE LUNGS every 12 hours 4/22/22   Dawood Coleman MD   ibuprofen (IBU) 400 MG tablet Take 1 tablet by mouth every 6 hours as needed for Pain 1/20/22 1/30/22  Kieran Marks MD   montelukast (SINGULAIR) 10 MG tablet Take 1 tablet by mouth nightly 9/21/21 10/21/21  Hannah Torres MD   albuterol sulfate HFA (PROVENTIL HFA) 108 (90 Base) MCG/ACT inhaler Inhale 1-2 puffs into the lungs every 4 hours as needed for Wheezing or Shortness of Breath (Space out to every 6 hours as symptoms improve) Space out to every 6 hours as symptoms improve.  9/21/21 10/21/21  Hannah Torres MD   albuterol sulfate  (90 Base) MCG/ACT inhaler Inhale 2 puffs into the lungs every 4 hours as needed for Wheezing 5/4/21   Mo'Julia Meraz MD   budesonide-formoterol (SYMBICORT) 160-4.5 MCG/ACT AERO inhale 2 puffs by mouth twice a day 12/1/20   Lory Vanessa MD   simethicone (MYLICON) 80 MG chewable tablet Take 1 tablet by mouth every 6 hours as needed for Flatulence 6/24/20 7/24/20  Sharan Apple MD   Respiratory Therapy Supplies (NEBULIZER/TUBING/MOUTHPIECE) KIT 1 kit by Does not apply route daily as needed (wheezing) 11/20/17   Javon Roa MD       REVIEW OF SYSTEMS    (2-9 systems for level 4, 10 or more for level 5)      Review of Systems   Constitutional: Negative for activity change, chills, diaphoresis, fatigue and fever. HENT: Positive for ear pain and sore throat. Negative for congestion, dental problem, drooling, rhinorrhea, sinus pressure, trouble swallowing and voice change. Eyes: Negative for photophobia, pain and redness. Respiratory: Negative for cough, chest tightness and shortness of breath. Cardiovascular: Negative for chest pain and leg swelling. Gastrointestinal: Negative for abdominal pain, constipation, diarrhea, nausea and vomiting. Genitourinary: Negative for dysuria, flank pain, frequency and urgency. Musculoskeletal: Negative for arthralgias, myalgias and neck stiffness. Skin: Negative for color change, pallor and rash. Neurological: Negative for dizziness, syncope, weakness, light-headedness, numbness and headaches. PHYSICAL EXAM   (up to 7 for level 4, 8 or more for level 5)      INITIAL VITALS:   BP (!) 132/93   Pulse 96   Temp 97.2 °F (36.2 °C) (Oral)   Resp 18   SpO2 99%     Physical Exam  Constitutional:  Well developed, Well nourished. HENT:  Normocephalic, Atraumatic, Bilateral external ears normal,  Nose normal.  Uvula midline, tongue not elevated, posterior pharynx erythema  Neck: Normal range of motion, No stridor. Eyes:   No discharge. Respiratory:   No respiratory distress, Normal breath sounds without any wheezing, rales or rhonchi. Cardiovascular: Normal S1, S2. No rubs, gallops or murmurs. Gastrointestinal:  No organomegaly, no tenderness, no rebound or guarding. Musculoskeletal:  No extremity deformity. Lymphatic: No lymphadenopathy noted  Skin:  Warm, Dry,  No rash. Neurologic:  Alert & oriented x 3, Normal motor function,  No focal deficits noted. Psychiatric:  Affect normal, Judgment normal, Mood normal.              DIFFERENTIAL  DIAGNOSIS     PLAN (LABS / IMAGING / EKG):  No orders of the defined types were placed in this encounter.       MEDICATIONS ORDERED:  Orders Placed This Encounter   Medications    pseudoephedrine (SUDAFED) tablet 30 mg    benzocaine-menthol (CEPACOL SORE THROAT) lozenge 1 lozenge    pseudoephedrine (DECONGESTANT) 30 MG tablet     Sig: Take 1 tablet by mouth every 12 hours as needed for Congestion     Dispense:  10 tablet     Refill:  0    benzocaine-menthol (CEPACOL) 15-4 MG LOZG lozenge     Sig: Take 1 lozenge by mouth every 2 hours as needed for Sore Throat     Dispense:  20 lozenge     Refill:  0       DDX: pharyngitis, viral URI    DIAGNOSTIC RESULTS / EMERGENCY DEPARTMENT COURSE / MDM   LAB RESULTS:  No results found for this visit on 05/19/22. RADIOLOGY:  No results found. IMPRESSION: 43-year-old male with a history of asthma presenting with sore throat that began when he woke up this morning approximately 1 to 2 hours prior to arrival.  Still complaining of right ear pain. No concern for peritonsillar abscess or Dory's, no strep pharyngitis. Right ear shows effusion, no signs of infection. Will treat symptomatically with Cepacol lozenges. No history of hypertension so will provide pseudoephedrine for decongestion. And also work note. EMERGENCY DEPARTMENT COURSE:               PROCEDURES:      CONSULTS:  None        FINAL IMPRESSION      1. Acute pharyngitis, unspecified etiology          DISPOSITION / PLAN     DISPOSITION Decision To Discharge 05/19/2022 06:26:43 AM      PATIENT REFERRED TO:  Juan Carlos Richards MD  [id number]   [facility] [address] [phone]    Schedule an appointment as soon as possible for a visit       [facility]  [address] [facility] [address] [phone]    If symptoms worsen      DISCHARGE MEDICATIONS:  Discharge Medication List as of 5/19/2022  6:26 AM      START taking these medications    Details   pseudoephedrine (DECONGESTANT) 30 MG tablet Take 1 tablet by mouth every 12 hours as needed for Congestion, Disp-10 tablet, R-0Print benzocaine-menthol (CEPACOL) 15-4 MG LOZG lozenge Take 1 lozenge by mouth every 2 hours as needed for Sore Throat, Disp-20 lozenge, R-0Print             Korina Crane MD  Emergency Medicine Resident    (Please note that portions of thisnote were completed with a voice recognition program.  Efforts were made to edit the dictations but occasionally words are mis-transcribed.)        Korina Crane MD  Resident  05/19/22 [id number]

## 2022-05-19 NOTE — Clinical Note
Len Grajeda was seen and treated in our emergency department on 5/19/2022. He may return to work on 05/20/2022. If you have any questions or concerns, please don't hesitate to call.       Keli Wilson MD

## 2022-05-21 NOTE — ED PROVIDER NOTES
[facility]   Emergency Department  Faculty Attestation       I performed a history and physical examination of the patient and discussed management with the resident. I reviewed the residents note and agree with the documented findings including all diagnostic interpretations and plan of care. Any areas of disagreement are noted on the chart. I was personally present for the key portions of any procedures. I have documented in the chart those procedures where I was not present during the key portions. I have reviewed the emergency nurses triage note. I agree with the chief complaint, past medical history, past surgical history, allergies, medications, social and family history as documented unless otherwise noted below. Documentation of the HPI, Physical Exam and Medical Decision Making performed by scribpadmini is based on my personal performance of the HPI, PE and MDM. For Physician Assistant/ Nurse Practitioner cases/documentation I have personally evaluated this patient and have completed at least one if not all key elements of the E/M (history, physical exam, and MDM). Additional findings are as noted. Pertinent Comments     Primary Care Physician: Chelsi Betancourt MD      ED Triage Vitals [05/19/22 0611]   BP Temp Temp Source Pulse Resp SpO2 Height Weight   (!) 132/93 97.2 °F (36.2 °C) Oral 96 18 99 % -- --        History/Physical:   This is a 40 y.o. male who presents to the Emergency Department with complaint of sore throat and right ear pain that started this morning. Denies any nasal congestion or cough. On exam he is well-appearing in no acute distress. Normocephalic atraumatic. Does have some mild erythema to the posterior oropharynx, but no exudate. No hypertrophy. And uvula is midline. Does have some mild effusion behind the right TM but no signs of injection. Moving neck without difficulty. No cervical lymphadenopathy. Heart sounds regular lungs auscultation.   Alert and oriented x4. MDM/Plan:   Mild posterior pharyngeal erythema, but no signs of exudate or hypertrophy. No signs of retropharyngeal abscess or peritonsillar abscess. Also has some mild ear effusion on the right, this is consistent with likely early onset viral illness versus allergic in nature. Will symptomatically treat. And okay for discharge.       Critical Care: None     Agnieszka Rowe MD  Attending Emergency Physician         Agnieszka Rowe MD  05/21/22 3584

## 2022-05-23 DIAGNOSIS — J45.30 MILD PERSISTENT ASTHMA WITHOUT COMPLICATION: ICD-10-CM

## 2022-05-24 RX ORDER — FLUTICASONE PROPIONATE AND SALMETEROL 50; 100 UG/1; UG/1
POWDER RESPIRATORY (INHALATION)
Qty: 5 EACH | Refills: 3 | Status: SHIPPED | OUTPATIENT
Start: 2022-05-24 | End: 2022-10-27

## 2022-05-24 NOTE — TELEPHONE ENCOUNTER
Last visit: 9/21/2021  Last Med refill: 4/27/2022  Does patient have enough medication for 72 hours: Yes    Next Visit Date:  No future appointments.     Health Maintenance   Topic Date Due    Varicella vaccine (1 of 2 - 2-dose childhood series) Never done    Pneumococcal 0-64 years Vaccine (1 - PCV) Never done    HIV screen  Never done    DTaP/Tdap/Td vaccine (1 - Tdap) Never done    Diabetes screen  Never done    Lipids  Never done    Depression Screen  05/04/2022    COVID-19 Vaccine (1) 06/30/2022 (Originally 1/17/1986)    Flu vaccine (Season Ended) 09/21/2022 (Originally 9/1/2022)    Hepatitis C screen  Completed    Hepatitis A vaccine  Aged Out    Hepatitis B vaccine  Aged Out    Hib vaccine  Aged Out    Meningococcal (ACWY) vaccine  Aged Out       No results found for: LABA1C          ( goal A1C is < 7)   No results found for: LABMICR  No results found for: LDLCHOLESTEROL, LDLCALC    (goal LDL is <100)   AST (U/L)   Date Value   06/22/2020 126 (H)     ALT (U/L)   Date Value   06/22/2020 54 (H)     BUN (mg/dL)   Date Value   06/24/2020 12     BP Readings from Last 3 Encounters:   05/19/22 (!) 132/93   04/27/22 (!) 134/94   01/20/22 (!) 162/88          (goal 120/80)    All Future Testing planned in [facility]  Lab Frequency Next Occurrence               Patient Active Problem List:     Severe persistent asthma in adult without complication

## 2022-06-02 DIAGNOSIS — J45.30 MILD PERSISTENT ASTHMA WITHOUT COMPLICATION: ICD-10-CM

## 2022-06-03 NOTE — TELEPHONE ENCOUNTER
E-scribe request for med refills. Please review and e-scribe if applicable.      Last Visit Date:  9/21/21  Next Visit Date:  Visit date not found    No results found for: LABA1C          ( goal A1C is < 7)   No results found for: LABMICR  No results found for: LDLCHOLESTEROL, LDLCALC    (goal LDL is <100)   AST (U/L)   Date Value   06/22/2020 126 (H)     ALT (U/L)   Date Value   06/22/2020 54 (H)     BUN (mg/dL)   Date Value   06/24/2020 12     BP Readings from Last 3 Encounters:   05/19/22 (!) 132/93   04/27/22 (!) 134/94   01/20/22 (!) 162/88          (goal 120/80)        Patient Active Problem List:     Severe persistent asthma in adult without complication      ----OBDULIO

## 2022-08-18 ENCOUNTER — HOSPITAL ENCOUNTER (EMERGENCY)
Age: 42
Discharge: HOME | End: 2022-08-18
Attending: EMERGENCY MEDICINE
Payer: MEDICAID

## 2022-08-18 VITALS
HEART RATE: 78 BPM | RESPIRATION RATE: 18 BRPM | BODY MASS INDEX: 25.84 KG/M2 | OXYGEN SATURATION: 97 % | WEIGHT: 175 LBS | SYSTOLIC BLOOD PRESSURE: 135 MMHG | DIASTOLIC BLOOD PRESSURE: 93 MMHG | TEMPERATURE: 97.6 F

## 2022-08-18 DIAGNOSIS — J45.901 MODERATE ASTHMA WITH EXACERBATION, UNSPECIFIED WHETHER PERSISTENT: Primary | ICD-10-CM

## 2022-08-18 PROCEDURE — 99283 EMERGENCY DEPT VISIT LOW MDM: CPT

## 2022-08-18 PROCEDURE — 94760 N-INVAS EAR/PLS OXIMETRY 1: CPT

## 2022-08-18 PROCEDURE — 93005 ELECTROCARDIOGRAM TRACING: CPT | Performed by: EMERGENCY MEDICINE

## 2022-08-18 PROCEDURE — 94640 AIRWAY INHALATION TREATMENT: CPT

## 2022-08-18 PROCEDURE — 6360000002 HC RX W HCPCS: Performed by: EMERGENCY MEDICINE

## 2022-08-18 PROCEDURE — 6370000000 HC RX 637 (ALT 250 FOR IP)

## 2022-08-18 RX ORDER — PREDNISONE 20 MG/1
40 TABLET ORAL DAILY
Qty: 10 TABLET | Refills: 0 | Status: SHIPPED | OUTPATIENT
Start: 2022-08-18 | End: 2022-08-18

## 2022-08-18 RX ORDER — PREDNISONE 20 MG/1
40 TABLET ORAL ONCE
Status: COMPLETED | OUTPATIENT
Start: 2022-08-18 | End: 2022-08-18

## 2022-08-18 RX ORDER — PREDNISONE 50 MG/1
50 TABLET ORAL DAILY
Qty: 5 TABLET | Refills: 0 | Status: SHIPPED | OUTPATIENT
Start: 2022-08-18 | End: 2022-08-23

## 2022-08-18 RX ORDER — ALBUTEROL SULFATE 2.5 MG/3ML
2.5 SOLUTION RESPIRATORY (INHALATION) EVERY 6 HOURS PRN
Status: DISCONTINUED | OUTPATIENT
Start: 2022-08-18 | End: 2022-08-18

## 2022-08-18 RX ADMIN — ALBUTEROL SULFATE 2.5 MG: 5 SOLUTION RESPIRATORY (INHALATION) at 06:13

## 2022-08-18 RX ADMIN — PREDNISONE 40 MG: 20 TABLET ORAL at 06:41

## 2022-08-18 ASSESSMENT — ENCOUNTER SYMPTOMS
ABDOMINAL PAIN: 0
COUGH: 0
RHINORRHEA: 0
SORE THROAT: 0
BACK PAIN: 0
NAUSEA: 0
CHEST TIGHTNESS: 1
VOMITING: 0
SHORTNESS OF BREATH: 1

## 2022-08-18 ASSESSMENT — PAIN SCALES - GENERAL: PAINLEVEL_OUTOF10: 0

## 2022-08-18 ASSESSMENT — PAIN - FUNCTIONAL ASSESSMENT: PAIN_FUNCTIONAL_ASSESSMENT: 0-10

## 2022-08-18 NOTE — Clinical Note
Cee Ontiveros was seen and treated in our emergency department on 8/18/2022. He may return to work on 08/19/2022. If you have any questions or concerns, please don't hesitate to call.       Amilcar Bower MD

## 2022-08-18 NOTE — ED TRIAGE NOTES
Pt arrives ambulatory via triage with c/o SOB. Pt states he has hx of asthma, unable to get refill for rescue inhaler. Pt did not do any breathing tx PTA. Cough present. VS EKG obtained.

## 2022-08-18 NOTE — ED PROVIDER NOTES
[facility] ED  Emergency Department Encounter  Emergency Medicine Resident     Pt Name:Austin Kate  MRN: [id number]  [place] 1980  Date of evaluation: 22  PCP:  Amee Lewis MD      [address] [place] [address]       Chief Complaint   Patient presents with    Shortness of Breath       HISTORY OF PRESENT ILLNESS  (Location/Symptom, Timing/Onset, Context/Setting, Quality, Duration, Modifying Factors, Severity.)      Han Skelton is a 40 y.o. male who presents with shortness of breath that started this morning as patient was getting ready for work. Patient has a history of asthma and is currently out of his albuterol inhaler. He was going to fill his prescription this morning however the pharmacy is closed. Patient denies any chest pain, fever, chills, or cough. Patient states he will be able to fill his prescription after being seen in the emergency department today. PAST MEDICAL / SURGICAL / SOCIAL / FAMILY HISTORY      has a past medical history of DULCE MARIA (acute kidney injury) ([facility] [address] and Asthma. Reviewed with patient. has no past surgical history on file. Reviewed with patient.     Social History     Socioeconomic History    Marital status:      Spouse name: Not on file    Number of children: Not on file    Years of education: Not on file    Highest education level: Not on file   Occupational History    Not on file   Tobacco Use    Smoking status: Former     Types: Cigarettes     Quit date: 2000     Years since quittin.5    Smokeless tobacco: Never   Substance and Sexual Activity    Alcohol use: Yes     Comment: 1 24oz can of beer daily    Drug use: No    Sexual activity: Yes     Partners: Female   Other Topics Concern    Not on file   Social History Narrative    Not on file     Social Determinants of Health     Financial Resource Strain: Not on file   Food Insecurity: Not on file   Transportation Needs: Not on file   Physical Activity: Not on file   Stress: Not on file   Social Connections: Not on file   Intimate Partner Violence: Not on file   Housing Stability: Not on file       Family History   Problem Relation Age of Onset    High Blood Pressure Mother     Stroke Father        Allergies:  Seasonal    Home Medications:  Prior to Admission medications    Medication Sig Start Date End Date Taking? Authorizing Provider   predniSONE (DELTASONE) 20 MG tablet Take 2 tablets by mouth daily for 5 days 8/18/22 8/23/22 Yes Aravind Meraz MD   ADVAIR DISKUS 100-50 MCG/ACT AEPB diskus inhaler inhale 1 puff by mouth and INTO THE LUNGS every 12 hours 5/24/22   Shavonne Sorto MD   benzocaine-menthol (CEPACOL) 15-4 MG LOZG lozenge Take 1 lozenge by mouth every 2 hours as needed for Sore Throat 5/19/22   Calos Garibay MD   fluticasone-salmeterol (ADVAIR DISKUS) 100-50 MCG/ACT AEPB diskus inhaler Inhale 1 puff into the lungs every 12 hours 4/27/22   Celso Leonardo MD   albuterol sulfate HFA (VENTOLIN HFA) 108 (90 Base) MCG/ACT inhaler Inhale 2 puffs into the lungs 4 times daily as needed for Wheezing 4/27/22   Celso Leonardo MD   ibuprofen (IBU) 400 MG tablet Take 1 tablet by mouth every 6 hours as needed for Pain 1/20/22 1/30/22  Pina Martinez MD   montelukast (SINGULAIR) 10 MG tablet Take 1 tablet by mouth nightly 9/21/21 10/21/21  Shavonne Sorto MD   albuterol sulfate HFA (PROVENTIL HFA) 108 (90 Base) MCG/ACT inhaler Inhale 1-2 puffs into the lungs every 4 hours as needed for Wheezing or Shortness of Breath (Space out to every 6 hours as symptoms improve) Space out to every 6 hours as symptoms improve.  9/21/21 10/21/21  Shavonne Sorto MD   albuterol sulfate  (90 Base) MCG/ACT inhaler Inhale 2 puffs into the lungs every 4 hours as needed for Wheezing 5/4/21   Mo'Men Tawny Mccarthy MD   budesonide-formoterol [facility]) 160-4.5 MCG/ACT AERO inhale 2 puffs by mouth twice a day 12/1/20   Vicente Woods MD   simethicone (MYLICON) 80 MG chewable tablet Take 1 tablet by mouth every 6 hours as needed for Flatulence 6/24/20 7/24/20  Luisana Loving MD   Respiratory Therapy Supplies (NEBULIZER/TUBING/MOUTHPIECE) KIT 1 kit by Does not apply route daily as needed (wheezing) 11/20/17   Mony Forte MD       REVIEW OF SYSTEMS    (2-9 systems for level 4, 10 or more for level 5)      Review of Systems   Constitutional:  Negative for chills and fever. HENT:  Negative for rhinorrhea and sore throat. Respiratory:  Positive for chest tightness and shortness of breath. Negative for cough. Cardiovascular:  Negative for chest pain. Gastrointestinal:  Negative for abdominal pain, nausea and vomiting. Musculoskeletal:  Negative for back pain and neck pain. Neurological:  Negative for headaches. PHYSICAL EXAM   (up to 7 for level 4, 8 or more for level 5)      INITIAL VITALS:   BP (!) 135/93   Pulse 78   Temp 97.6 °F (36.4 °C) (Oral)   Resp 18   Wt 175 lb (79.4 kg)   SpO2 97%   BMI 25.84 kg/m²     Physical Exam  Constitutional:       Appearance: He is well-developed. He is not diaphoretic. Comments: Patient sitting on the edge of the bed. HENT:      Head: Normocephalic and atraumatic. Mouth/Throat:      Mouth: Mucous membranes are moist.   Eyes:      Extraocular Movements: Extraocular movements intact. Cardiovascular:      Rate and Rhythm: Normal rate. Heart sounds: Normal heart sounds. Pulmonary:      Effort: Tachypnea present. No respiratory distress. Breath sounds: No stridor. Comments: Diffuse wheezes heard throughout. Chest:      Chest wall: No tenderness. Abdominal:      Palpations: Abdomen is soft. Musculoskeletal:         General: Normal range of motion. Cervical back: Normal range of motion. Neurological:      General: No focal deficit present. Mental Status: He is alert and oriented to person, place, and time.        DIFFERENTIAL  DIAGNOSIS     PLAN (LABS / IMAGING / EKG):  Orders Placed This Encounter Procedures    Respiratory care evaluation only       MEDICATIONS ORDERED:  Orders Placed This Encounter   Medications    albuterol (PROVENTIL) nebulizer solution 2.5 mg     Order Specific Question:   Initiate RT Bronchodilator Protocol     Answer:   Yes - ED protocol    DISCONTD: albuterol (PROVENTIL) nebulizer solution 2.5 mg     Order Specific Question:   Initiate RT Bronchodilator Protocol     Answer:   Yes - ED protocol    predniSONE (DELTASONE) tablet 40 mg    predniSONE (DELTASONE) 20 MG tablet     Sig: Take 2 tablets by mouth daily for 5 days     Dispense:  10 tablet     Refill:  0       DDX: Asthma exacerbation    DIAGNOSTIC RESULTS / EMERGENCY DEPARTMENT COURSE / MDM     IMPRESSION: 60-year-old male with history of asthma presents with shortness of breath. Patient ran out of his albuterol inhaler. He was going to pick it up this morning but the pharmacy was closed. Patient not in respiratory distress. Diffuse wheezing heard throughout bilaterally. We will have RT give him a breathing treatment and reevaluate. Patient feeling much better after breathing treatment but still wheezing. Will give him some steroids. RT will reevaluate in an hour to see if he will need another treatment before discharge. EMERGENCY DEPARTMENT COURSE:  ED Course as of 08/18/22 0716   Thu Aug 18, 2022   0610 Called RT. They will give patient a breathing treatment and we will reevaluate. [KR]   1866 Signed out to Dr. Matthew George. [KR]      ED Course User Index  [KR] Humberto Jones MD       No notes of [employer] Admission Criteria type on file. CONSULTS:  None      FINAL IMPRESSION      1. Moderate asthma with exacerbation, unspecified whether persistent          DISPOSITION / PLAN     DISPOSITION        PATIENT REFERRED TO:  Randalyn Scheuermann, MD  [address]   [address]  [address]  [phone]    Schedule an appointment as soon as possible for a visit       DISCHARGE MEDICATIONS:  New Prescriptions    PREDNISONE (DELTASONE) 20 MG TABLET    Take 2 tablets by mouth daily for 5 days       Amilcar Bower MD  Emergency Medicine Resident    (Please note that portions of thisnote were completed with a voice recognition program.  Efforts were made to edit the dictations but occasionally words are mis-transcribed.)       Amilcar Bower MD  Resident  08/18/22 2567

## 2022-08-18 NOTE — ED NOTES
PT expresses that breathing treatment has been helpful at relieving SOB at this time.       Valerie Guido RN  08/18/22 0902

## 2022-08-18 NOTE — ED PROVIDER NOTES
[facility]  ED  Emergency Department  Emergency Medicine Resident Sign-out     Care of Ellie Medina was assumed from Dr. Kamari Snow and is being seen for Shortness of Breath  . The patient's initial evaluation and plan have been discussed with the prior provider who initially evaluated the patient. EMERGENCY DEPARTMENT COURSE / MEDICAL DECISION MAKING:       MEDICATIONS GIVEN:  Orders Placed This Encounter   Medications    albuterol (PROVENTIL) nebulizer solution 2.5 mg     Order Specific Question:   Initiate RT Bronchodilator Protocol     Answer:   Yes - ED protocol    DISCONTD: albuterol (PROVENTIL) nebulizer solution 2.5 mg     Order Specific Question:   Initiate RT Bronchodilator Protocol     Answer:   Yes - ED protocol    predniSONE (DELTASONE) tablet 40 mg    DISCONTD: predniSONE (DELTASONE) 20 MG tablet     Sig: Take 2 tablets by mouth daily for 5 days     Dispense:  10 tablet     Refill:  0    predniSONE (DELTASONE) 50 MG tablet     Sig: Take 1 tablet by mouth daily for 5 days     Dispense:  5 tablet     Refill:  0       LABS / RADIOLOGY:     Labs Reviewed - No data to display    No results found. RECENT VITALS:     Temp: 97.6 °F (36.4 °C),  Heart Rate: 78, Resp: 18, BP: (!) 135/93, SpO2: 97 %      This patient is a 40 y.o. Male with with past medical history of asthma who presents with shortness of breath that started this morning. Patient takes albuterol, Advair and nebulizers for his symptoms. He reports he is currently out of his albuterol. Patient received a breathing treatment here and breath sounds are improved but still diminished with some wheezing. Plan for reevaluation and potential repeat nebulizing treatment. On reevaluation, patient's wheezing has resolved. He feels back to his baseline. He is still somewhat diminished. We will discharge him home at this time with a prescription for prednisone.   Patient received the first dose of prednisone in the emergency department. He was given a dose of nebulizing medication which he can take home just in case his albuterol does not come from the pharmacy today. He is to return for any worsening symptoms. ED Course as of 08/18/22 0921   Thu Aug 18, 2022   0610 Called RT. They will give patient a breathing treatment and we will reevaluate. [KR]   5884 Signed out to Dr. Suraj Valle. [KR]      ED Course User Index  [KR] Jovanna Sanches MD       OUTSTANDING TASKS / RECOMMENDATIONS:    Re-evaluations  Disposition     FINAL IMPRESSION:     1. Moderate asthma with exacerbation, unspecified whether persistent        DISPOSITION:         DISPOSITION:  [x]  Discharge   []  Transfer -    []  Admission -     []  Against Medical Advice   []  Eloped   FOLLOW-UP: Zohra Grande MD  [address]   [address]  [address]  [phone]    Schedule an appointment as soon as possible for a visit       [facility] ED  [address] [address]  [phone]    If symptoms worsen     DISCHARGE MEDICATIONS: Discharge Medication List as of 8/18/2022  8:09 AM        START taking these medications    Details   predniSONE (DELTASONE) 20 MG tablet Take 2 tablets by mouth daily for 5 days, Disp-10 tablet, R-0Lucio Lepe DO  Emergency Medicine Resident  [address], [address] [place] [address]  Resident  08/18/22 0958

## 2022-08-18 NOTE — ED PROVIDER NOTES
[facility]  [facility]     Emergency Department     Faculty Attestation    I performed a history and physical examination of the patient and discussed management with the resident. I reviewed the residents note and agree with the documented findings and plan of care. Any areas of disagreement are noted on the chart. I was personally present for the key portions of any procedures. I have documented in the chart those procedures where I was not present during the key portions. I have reviewed the emergency nurses triage note. I agree with the chief complaint, past medical history, past surgical history, allergies, medications, social and family history as documented unless otherwise noted below. For Physician Assistant/ Nurse Practitioner cases/documentation I have personally evaluated this patient and have completed at least one if not all key elements of the E/M (history, physical exam, and MDM). Additional findings are as noted. With history of asthma presents with increased shortness of breath. He says he is currently out of his albuterol and Advair inhalers. He says he is able to get the albuterol inhaler today when the pharmacy opens. He denies any recent fever, cough, congestion, chest pain. On exam, patient is sitting on the side of the bed and appears well. He is not in respiratory distress. There is diffuse wheeze heard on auscultation of lungs bilaterally. Will have respiratory therapy administer a nebulizer treatment here. Will reassess.       Bibiana Avendaño MD  Attending Emergency  Physician           Marcus Hummel MD  08/18/22 0589

## 2022-08-19 LAB
EKG ATRIAL RATE: 83 BPM
EKG P AXIS: 82 DEGREES
EKG P-R INTERVAL: 140 MS
EKG Q-T INTERVAL: 386 MS
EKG QRS DURATION: 96 MS
EKG QTC CALCULATION (BAZETT): 453 MS
EKG R AXIS: 86 DEGREES
EKG T AXIS: 72 DEGREES
EKG VENTRICULAR RATE: 83 BPM

## 2022-09-15 ENCOUNTER — HOSPITAL ENCOUNTER (EMERGENCY)
Age: 42
Discharge: HOME | End: 2022-09-15
Attending: EMERGENCY MEDICINE
Payer: MEDICAID

## 2022-09-15 VITALS
WEIGHT: 170 LBS | TEMPERATURE: 97.2 F | DIASTOLIC BLOOD PRESSURE: 100 MMHG | RESPIRATION RATE: 14 BRPM | HEIGHT: 69 IN | BODY MASS INDEX: 25.18 KG/M2 | HEART RATE: 72 BPM | OXYGEN SATURATION: 94 % | SYSTOLIC BLOOD PRESSURE: 121 MMHG

## 2022-09-15 DIAGNOSIS — J45.901 ASTHMA EXACERBATION, MILD: Primary | ICD-10-CM

## 2022-09-15 PROCEDURE — 93005 ELECTROCARDIOGRAM TRACING: CPT | Performed by: EMERGENCY MEDICINE

## 2022-09-15 PROCEDURE — 94761 N-INVAS EAR/PLS OXIMETRY MLT: CPT

## 2022-09-15 PROCEDURE — 6370000000 HC RX 637 (ALT 250 FOR IP): Performed by: EMERGENCY MEDICINE

## 2022-09-15 PROCEDURE — 6360000002 HC RX W HCPCS: Performed by: EMERGENCY MEDICINE

## 2022-09-15 PROCEDURE — 99284 EMERGENCY DEPT VISIT MOD MDM: CPT

## 2022-09-15 PROCEDURE — 94640 AIRWAY INHALATION TREATMENT: CPT

## 2022-09-15 RX ORDER — ALBUTEROL SULFATE 90 UG/1
2 AEROSOL, METERED RESPIRATORY (INHALATION) 4 TIMES DAILY PRN
Qty: 18 G | Refills: 0 | Status: SHIPPED | OUTPATIENT
Start: 2022-09-15

## 2022-09-15 RX ORDER — PREDNISONE 20 MG/1
20 TABLET ORAL DAILY
Qty: 4 TABLET | Refills: 0 | Status: SHIPPED | OUTPATIENT
Start: 2022-09-16 | End: 2022-09-20

## 2022-09-15 RX ORDER — PREDNISONE 20 MG/1
40 TABLET ORAL ONCE
Status: COMPLETED | OUTPATIENT
Start: 2022-09-15 | End: 2022-09-15

## 2022-09-15 RX ADMIN — ALBUTEROL SULFATE 2.5 MG: 5 SOLUTION RESPIRATORY (INHALATION) at 06:50

## 2022-09-15 RX ADMIN — PREDNISONE 40 MG: 20 TABLET ORAL at 06:48

## 2022-09-15 ASSESSMENT — PAIN - FUNCTIONAL ASSESSMENT: PAIN_FUNCTIONAL_ASSESSMENT: NONE - DENIES PAIN

## 2022-09-15 NOTE — Clinical Note
Kade Fitzgerald was seen and treated in our emergency department on 9/15/2022. He may return to work on 09/16/2022. If you have any questions or concerns, please don't hesitate to call.       Jenny Guerra, DO

## 2022-09-15 NOTE — DISCHARGE INSTRUCTIONS
Take your prednisone as prescribed. Take Pepcid (famotidine - over the counter) every day while you are taking the steroids. If you are a diabetic, you should check your blood sugar more frequently while taking prednisone. Use your inhaler or nebulizer as prescribed, or at minimum every 4 hours while you are having an asthma attack. Being around people that smoke, ankit houses, weather change can cause an asthma exacerbation. PLEASE RETURN TO THE EMERGENCY DEPARTMENT IMMEDIATELY for worsening symptoms of shortness of breath, wheezing, change in the amount of sputum that you cough up or a change in the color of your sputum, using your inhaler more frequently or if your inhaler only lasts up to 2 hours, or if you develop any concerning symptoms such as: high fever not relieved by acetaminophen (Tylenol) and/or ibuprofen (Motrin / Advil), chills, shortness of breath, chest pain, feeling of your heart fluttering or racing, persistent nausea and/or vomiting, numbness, weakness or tingling in the arms or legs or change in color of the extremities, changes in mental status, persistent headache, blurry vision, unable to follow up with your physician, or other any other care or concern.

## 2022-09-15 NOTE — ED PROVIDER NOTES
[facility]  ED  Emergency Department Encounter  EmergencyMedicine Resident     Pt Name:Austin George  MRN: [id number]  [place] 1980  Date of evaluation: 9/15/22  PCP:  No primary care provider on file. CHIEF COMPLAINT       Chief Complaint   Patient presents with    Shortness of Breath     Pt reports being out of inhaler medication       HISTORY OF PRESENT ILLNESS  (Location/Symptom, Timing/Onset, Context/Setting, Quality, Duration, Modifying Factors, Severity.)      Jase Dougherty is a 40 y.o. male who presents with cute onset of shortness of breath start about an hour ago. Patient is currently out of his inhaler and has a history of asthma. Patient states this feels like his  historic asthma. Patient denies any fevers or chills. Patient denies any chest pain, cough, abdominal pain or any other symptoms. Patient not lightheaded or dizzy at this time. Patient states that he can fill his inhaler tomorrow. PAST MEDICAL / SURGICAL / SOCIAL / FAMILY HISTORY      has a past medical history of DULCE MARIA (acute kidney injury) ([employer] [facility] [address] and Asthma. No additional pertinent     has no past surgical history on file.   No additional pertinent    Social History     Socioeconomic History    Marital status:      Spouse name: Not on file    Number of children: Not on file    Years of education: Not on file    Highest education level: Not on file   Occupational History    Not on file   Tobacco Use    Smoking status: Former     Types: Cigarettes     Quit date: 2000     Years since quittin.6    Smokeless tobacco: Never   Substance and Sexual Activity    Alcohol use: Yes     Comment: 1 24oz can of beer daily    Drug use: No    Sexual activity: Yes     Partners: Female   Other Topics Concern    Not on file   Social History Narrative    Not on file     Social Determinants of Health     Financial Resource Strain: Not on file   Food Insecurity: Not on file   Transportation Needs: Not on file Physical Activity: Not on file   Stress: Not on file   Social Connections: Not on file   Intimate Partner Violence: Not on file   Housing Stability: Not on file       Family History   Problem Relation Age of Onset    High Blood Pressure Mother     Stroke Father        Allergies:  Seasonal    Home Medications:  Prior to Admission medications    Medication Sig Start Date End Date Taking? Authorizing Provider   albuterol sulfate HFA (VENTOLIN HFA) 108 (90 Base) MCG/ACT inhaler Inhale 2 puffs into the lungs 4 times daily as needed for Wheezing 9/15/22  Yes Mac Aldana DO   predniSONE (DELTASONE) 20 MG tablet Take 1 tablet by mouth daily for 4 days 9/16/22 9/20/22 Yes Mac Aldana DO   ADVAIR DISKUS 100-50 MCG/ACT AEPB diskus inhaler inhale 1 puff by mouth and INTO THE LUNGS every 12 hours 5/24/22   Dominga Porter MD   benzocaine-menthol (CEPACOL) 15-4 MG LOZG lozenge Take 1 lozenge by mouth every 2 hours as needed for Sore Throat 5/19/22   Edith Khan MD   fluticasone-salmeterol (ADVAIR DISKUS) 100-50 MCG/ACT AEPB diskus inhaler Inhale 1 puff into the lungs every 12 hours 4/27/22   Celso Cole MD   albuterol sulfate HFA (VENTOLIN HFA) 108 (90 Base) MCG/ACT inhaler Inhale 2 puffs into the lungs 4 times daily as needed for Wheezing 4/27/22   Celso Cole MD   ibuprofen (IBU) 400 MG tablet Take 1 tablet by mouth every 6 hours as needed for Pain 1/20/22 1/30/22  Adam Fernando MD   montelukast (SINGULAIR) 10 MG tablet Take 1 tablet by mouth nightly 9/21/21 10/21/21  Dominga Porter MD   albuterol sulfate HFA (PROVENTIL HFA) 108 (90 Base) MCG/ACT inhaler Inhale 1-2 puffs into the lungs every 4 hours as needed for Wheezing or Shortness of Breath (Space out to every 6 hours as symptoms improve) Space out to every 6 hours as symptoms improve.  9/21/21 10/21/21  Dominga Porter MD   albuterol sulfate  (90 Base) MCG/ACT inhaler Inhale 2 puffs into the lungs every 4 hours as needed for Wheezing 5/4/21   Mo'Men Seun Christianson MD   budesonide-formoterol [facility]) 160-4.5 MCG/ACT AERO inhale 2 puffs by mouth twice a day 12/1/20   Joss Alves MD   simethicone (MYLICON) 80 MG chewable tablet Take 1 tablet by mouth every 6 hours as needed for Flatulence 6/24/20 7/24/20  Patricia Pak MD   Respiratory Therapy Supplies (NEBULIZER/TUBING/MOUTHPIECE) KIT 1 kit by Does not apply route daily as needed (wheezing) 11/20/17   Samy Talavera MD       REVIEW OF SYSTEMS    (2-9 systems for level 4, 10 or more for level 5)      Review of Systems   Constitutional:  Negative for chills and fever. HENT:  Negative for congestion. Respiratory:  Positive for shortness of breath and wheezing. Negative for chest tightness. Cardiovascular:  Negative for chest pain and leg swelling. Gastrointestinal:  Negative for abdominal pain, constipation, diarrhea, nausea and vomiting. Endocrine: Negative for polyuria. Genitourinary:  Negative for difficulty urinating. Skin:  Negative for color change. Neurological:  Negative for dizziness, weakness, light-headedness and headaches. Psychiatric/Behavioral:  Negative for confusion. PHYSICAL EXAM   (up to 7 for level 4, 8 or more for level 5)      INITIAL VITALS:   BP (!) 121/100   Pulse 72   Temp 97.2 °F (36.2 °C) (Oral)   Resp 14   Ht 5' 9\" (1.753 m)   Wt 170 lb (77.1 kg)   SpO2 94%   BMI 25.10 kg/m²     Physical Exam  Constitutional:       Appearance: Normal appearance. HENT:      Head: Normocephalic and atraumatic. Mouth/Throat:      Mouth: Mucous membranes are moist.      Pharynx: Oropharynx is clear. Eyes:      Extraocular Movements: Extraocular movements intact. Conjunctiva/sclera: Conjunctivae normal.   Cardiovascular:      Rate and Rhythm: Normal rate and regular rhythm. Pulses: Normal pulses. Heart sounds: Normal heart sounds. No murmur heard.   Pulmonary:      Effort: Pulmonary effort is normal. Breath sounds: Examination of the right-upper field reveals wheezing. Examination of the left-upper field reveals wheezing. Examination of the right-middle field reveals wheezing. Examination of the left-middle field reveals wheezing. Wheezing present. Abdominal:      General: Bowel sounds are normal. There is no distension. Tenderness: There is no abdominal tenderness. There is no guarding. Musculoskeletal:         General: Normal range of motion. Comments: Range of motion noted to be normal with patient's natural movements   Skin:     General: Skin is warm and dry. Findings: No rash (On exposed skin). Neurological:      General: No focal deficit present. Mental Status: He is alert and oriented to person, place, and time.    Psychiatric:         Mood and Affect: Mood normal.         Behavior: Behavior normal.       DIFFERENTIAL  DIAGNOSIS     PLAN (LABS / IMAGING / EKG):  Orders Placed This Encounter   Procedures    EKG 12 Lead       MEDICATIONS ORDERED:  Orders Placed This Encounter   Medications    predniSONE (DELTASONE) tablet 40 mg    albuterol (PROVENTIL) nebulizer solution 2.5 mg     Order Specific Question:   Initiate RT Bronchodilator Protocol     Answer:   Yes - ED protocol    albuterol sulfate HFA (VENTOLIN HFA) 108 (90 Base) MCG/ACT inhaler     Sig: Inhale 2 puffs into the lungs 4 times daily as needed for Wheezing     Dispense:  18 g     Refill:  0    predniSONE (DELTASONE) 20 MG tablet     Sig: Take 1 tablet by mouth daily for 4 days     Dispense:  4 tablet     Refill:  0         DIAGNOSTIC RESULTS / EMERGENCY DEPARTMENT COURSE / MDM   LAB RESULTS:  Results for orders placed or performed during the hospital encounter of 09/15/22   EKG 12 Lead   Result Value Ref Range    Ventricular Rate 74 BPM    Atrial Rate 74 BPM    P-R Interval 144 ms    QRS Duration 92 ms    Q-T Interval 376 ms    QTc Calculation (Bazett) 417 ms    P Axis 64 degrees    R Axis 82 degrees    T Axis 76 degrees RADIOLOGY:  No orders to display            PROCEDURES:        CONSULTS:  None    MEDICAL DECISION MAKING:  Patient presenting with acute onset shortness of breath with history of asthma. Patient have bilateral wheezing, more pronounced on the left. Patient has stable vitals. Patient nontachycardic, PERC negative. Patient appears generally clinically well in the room with mild respiratory effort. Patient provided nebulizer treatment at this time. No indication for imaging as patient is afebrile, denies any chest pain, no cough, no concerns for pneumonia at this time. We will treat patient's acute asthma exacerbation, provide prescription of albuterol, and plan for discharge. Patient was also given prednisone. Patient was discharged home in stable condition. Patient struck to return for any worsening shortness of breath or any concerns the patient may have. CRITICAL CARE:  Please see attending note    FINAL IMPRESSION      1.  Asthma exacerbation, mild        DISPOSITION / PLAN     DISPOSITION Decision To Discharge 09/15/2022 07:27:22 AM      PATIENT REFERRED TO:  [address] [place] [id number] [phone]  Schedule an appointment as soon as possible for a visit today      DISCHARGE MEDICATIONS:  Discharge Medication List as of 9/15/2022  7:34 AM        START taking these medications    Details   predniSONE (DELTASONE) 20 MG tablet Take 1 tablet by mouth daily for 4 days, Disp-4 tablet, R-0Print             Hortensia Jovan Sanchez DO  Emergency Medicine Resident    (Please note that portions of thisnote were completed with a voice recognition program.  Efforts were made to edit the dictations but occasionally words are mis-transcribed.)        Luis Bird DO  Resident  09/17/22 0810

## 2022-09-15 NOTE — ED TRIAGE NOTES
Pt presents to ED with SOB. Pt is alert, oriented, and ambulatory. Pt states he ran out of his inhaler yesterday and works around dust. Pt denies any injury, pain, or Hx of clots. Pt has Hx of asthma.

## 2022-09-15 NOTE — FLOWSHEET NOTE
Pt respirations are even and unlabored, pt is oriented X 4, speaking in complete sentences, bed is in the lowest position, call light is within reach. Will continue to monitor.

## 2022-09-15 NOTE — ED PROVIDER NOTES
[address] [facility]     Emergency Department     Faculty Attestation    I performed a history and physical examination of the patient and discussed management with the resident. I have reviewed and agree with the residents findings including all diagnostic interpretations, and treatment plans as written. Any areas of disagreement are noted on the chart. I was personally present for the key portions of any procedures. I have documented in the chart those procedures where I was not present during the key portions. I have reviewed the emergency nurses triage note. I agree with the chief complaint, past medical history, past surgical history, allergies, medications, social and family history as documented unless otherwise noted below. Documentation of the HPI, Physical Exam and Medical Decision Making performed by scribpadmini is based on my personal performance of the HPI, PE and MDM. For Physician Assistant/ Nurse Practitioner cases/documentation I have personally evaluated this patient and have completed at least one if not all key elements of the E/M (history, physical exam, and MDM). Additional findings are as noted. 41 yo M c/o sob wheeze, pt asthmatic & is out of his inhaler, no fever, no vomit no cp,   Pe vss gcs 15 sat 95% on ra, neck supple, bilateral expiratory wheeze, minimal distress, abdomen non tender, no distension, no rigidity, no calf tenderness, no calf swelling,     S/s improved, discharged,     EKG Interpretation    Interpreted by me  Sinus rhythm, sinus arrhythmia, heart rate 74, no ischemia, normal axis, QT corrected 417    CRITICAL CARE: There was a high probability of clinically significant/life threatening deterioration in this patient's condition which required my urgent intervention. Total critical care time was 5 minutes. This excludes any time for separately reportable procedures.        Rangel Aj DO  09/15/22 Teresa 5077, DO  09/15/22 2033

## 2022-09-17 LAB
EKG ATRIAL RATE: 74 BPM
EKG P AXIS: 64 DEGREES
EKG P-R INTERVAL: 144 MS
EKG Q-T INTERVAL: 376 MS
EKG QRS DURATION: 92 MS
EKG QTC CALCULATION (BAZETT): 417 MS
EKG R AXIS: 82 DEGREES
EKG T AXIS: 76 DEGREES
EKG VENTRICULAR RATE: 74 BPM

## 2022-09-17 PROCEDURE — 93010 ELECTROCARDIOGRAM REPORT: CPT | Performed by: INTERNAL MEDICINE

## 2022-09-17 ASSESSMENT — ENCOUNTER SYMPTOMS
DIARRHEA: 0
CONSTIPATION: 0
SHORTNESS OF BREATH: 1
WHEEZING: 1
ABDOMINAL PAIN: 0
NAUSEA: 0
VOMITING: 0
COLOR CHANGE: 0
CHEST TIGHTNESS: 0

## 2022-10-27 DIAGNOSIS — J45.30 MILD PERSISTENT ASTHMA WITHOUT COMPLICATION: ICD-10-CM

## 2022-10-27 NOTE — TELEPHONE ENCOUNTER
Called and left message for patient to schedule an appointment          Last visit: 9/21/21  Last Med refill: 9/29/22  Does patient have enough medication for 72 hours: Yes    Next Visit Date:  No future appointments.     Health Maintenance   Topic Date Due    COVID-19 Vaccine (1) Never done    Varicella vaccine (1 of 2 - 2-dose childhood series) Never done    Pneumococcal 0-64 years Vaccine (1 - PCV) Never done    HIV screen  Never done    DTaP/Tdap/Td vaccine (1 - Tdap) Never done    Diabetes screen  Never done    Lipids  Never done    Depression Screen  05/04/2022    Flu vaccine (1) Never done    Hepatitis C screen  Completed    Hepatitis A vaccine  Aged Out    Hib vaccine  Aged Out    Meningococcal (ACWY) vaccine  Aged Out       No results found for: LABA1C          ( goal A1C is < 7)   No results found for: LABMICR  No results found for: LDLCHOLESTEROL, LDLCALC    (goal LDL is <100)   AST (U/L)   Date Value   06/22/2020 126 (H)     ALT (U/L)   Date Value   06/22/2020 54 (H)     BUN (mg/dL)   Date Value   06/24/2020 12     BP Readings from Last 3 Encounters:   09/15/22 (!) 121/100   08/18/22 (!) 135/93   05/19/22 (!) 132/93          (goal 120/80)    All Future Testing planned in [facility]  Lab Frequency Next Occurrence               Patient Active Problem List:     Severe persistent asthma in adult without complication

## 2022-10-28 RX ORDER — FLUTICASONE PROPIONATE AND SALMETEROL 50; 100 UG/1; UG/1
POWDER RESPIRATORY (INHALATION)
Qty: 1 EACH | Refills: 0 | Status: SHIPPED | OUTPATIENT
Start: 2022-10-28

## 2022-11-22 RX ORDER — ALBUTEROL SULFATE 90 UG/1
AEROSOL, METERED RESPIRATORY (INHALATION)
Qty: 8.5 G | OUTPATIENT
Start: 2022-11-22

## 2022-12-05 DIAGNOSIS — J45.30 MILD PERSISTENT ASTHMA WITHOUT COMPLICATION: ICD-10-CM

## 2022-12-05 RX ORDER — FLUTICASONE PROPIONATE AND SALMETEROL 100; 50 UG/1; UG/1
POWDER RESPIRATORY (INHALATION)
Qty: 60 EACH | Refills: 2 | Status: SHIPPED | OUTPATIENT
Start: 2022-12-05

## 2022-12-05 NOTE — TELEPHONE ENCOUNTER
Last visit: 09/21/21  Last Med refill: 10/28/22  Does patient have enough medication for 72 hours: No: LEFT MESSAGE FOR PATIENT TO CALL OFFICE TO SCHEDULE APPOINTMENT. NO PCP LISTED FOR PATIENT. Next Visit Date:  No future appointments.     Health Maintenance   Topic Date Due    COVID-19 Vaccine (1) Never done    Varicella vaccine (1 of 2 - 2-dose childhood series) Never done    Pneumococcal 0-64 years Vaccine (1 - PCV) Never done    HIV screen  Never done    DTaP/Tdap/Td vaccine (1 - Tdap) Never done    Diabetes screen  Never done    Lipids  Never done    Depression Screen  05/04/2022    Flu vaccine (1) Never done    Hepatitis C screen  Completed    Hepatitis A vaccine  Aged Out    Hib vaccine  Aged Out    Meningococcal (ACWY) vaccine  Aged Out       No results found for: LABA1C          ( goal A1C is < 7)   No results found for: LABMICR  No results found for: LDLCHOLESTEROL, LDLCALC    (goal LDL is <100)   AST (U/L)   Date Value   06/22/2020 126 (H)     ALT (U/L)   Date Value   06/22/2020 54 (H)     BUN (mg/dL)   Date Value   06/24/2020 12     BP Readings from Last 3 Encounters:   09/15/22 (!) 121/100   08/18/22 (!) 135/93   05/19/22 (!) 132/93          (goal 120/80)    All Future Testing planned in [facility]  Lab Frequency Next Occurrence               Patient Active Problem List:     Severe persistent asthma in adult without complication

## 2022-12-26 DIAGNOSIS — J45.30 MILD PERSISTENT ASTHMA WITHOUT COMPLICATION: ICD-10-CM

## 2022-12-27 RX ORDER — MONTELUKAST SODIUM 10 MG/1
10 TABLET ORAL NIGHTLY
Qty: 30 TABLET | Refills: 0 | Status: SHIPPED | OUTPATIENT
Start: 2022-12-27 | End: 2023-01-26

## 2022-12-27 NOTE — TELEPHONE ENCOUNTER
E-scribe request for med refill. Please review and e-scribe if applicable.      Last Visit Date:  09/21/2021  Next Visit Date:  Visit date not found    No results found for: LABA1C          ( goal A1C is < 7)   No results found for: LABMICR  No results found for: LDLCHOLESTEROL, LDLCALC    (goal LDL is <100)   AST (U/L)   Date Value   06/22/2020 126 (H)     ALT (U/L)   Date Value   06/22/2020 54 (H)     BUN (mg/dL)   Date Value   06/24/2020 12     BP Readings from Last 3 Encounters:   09/15/22 (!) 121/100   08/18/22 (!) 135/93   05/19/22 (!) 132/93          (goal 120/80)        Patient Active Problem List:     Severe persistent asthma in adult without complication      ----OBDULIO

## 2023-01-16 RX ORDER — ALBUTEROL SULFATE 90 UG/1
AEROSOL, METERED RESPIRATORY (INHALATION)
Qty: 8.5 G | OUTPATIENT
Start: 2023-01-16

## 2023-03-04 DIAGNOSIS — J45.30 MILD PERSISTENT ASTHMA WITHOUT COMPLICATION: ICD-10-CM

## 2023-03-06 RX ORDER — FLUTICASONE PROPIONATE AND SALMETEROL 100; 50 UG/1; UG/1
POWDER RESPIRATORY (INHALATION)
Qty: 60 EACH | Refills: 0 | Status: SHIPPED | OUTPATIENT
Start: 2023-03-06

## 2023-03-06 NOTE — TELEPHONE ENCOUNTER
Last visit: 09/21/21  Last Med refill: 04/27/22  Does patient have enough medication for 72 hours: No: PATIENT NEEDS APPOINTMENT - LEFT MESSAGE FOR PATIENT TO CALL OFFICE TO SCHEDULE APPOINTMENT. Next Visit Date:  No future appointments.     Health Maintenance   Topic Date Due    COVID-19 Vaccine (1) Never done    Varicella vaccine (1 of 2 - 2-dose childhood series) Never done    Pneumococcal 0-64 years Vaccine (1 - PCV) Never done    HIV screen  Never done    DTaP/Tdap/Td vaccine (1 - Tdap) Never done    Diabetes screen  Never done    Lipids  Never done    Depression Screen  05/04/2022    Flu vaccine (1) Never done    Hepatitis C screen  Completed    Hepatitis A vaccine  Aged Out    Hib vaccine  Aged Out    Meningococcal (ACWY) vaccine  Aged Out       No results found for: LABA1C          ( goal A1C is < 7)   No results found for: LABMICR  No results found for: LDLCHOLESTEROL, LDLCALC    (goal LDL is <100)   AST (U/L)   Date Value   06/22/2020 126 (H)     ALT (U/L)   Date Value   06/22/2020 54 (H)     BUN (mg/dL)   Date Value   06/24/2020 12     BP Readings from Last 3 Encounters:   09/15/22 (!) 121/100   08/18/22 (!) 135/93   05/19/22 (!) 132/93          (goal 120/80)    All Future Testing planned in [facility]  Lab Frequency Next Occurrence               Patient Active Problem List:     Severe persistent asthma in adult without complication

## 2023-04-01 DIAGNOSIS — J45.30 MILD PERSISTENT ASTHMA WITHOUT COMPLICATION: ICD-10-CM

## 2023-04-03 RX ORDER — FLUTICASONE PROPIONATE AND SALMETEROL 100; 50 UG/1; UG/1
POWDER RESPIRATORY (INHALATION)
Qty: 60 EACH | Refills: 0 | Status: SHIPPED | OUTPATIENT
Start: 2023-04-03

## 2023-04-03 NOTE — TELEPHONE ENCOUNTER
Last visit: 09/21/21  Last Med refill: 03/06/23  Does patient have enough medication for 72 hours: No: Patient scheduled for 04/12/23 - Patient was told he must keep this appointment for medication refills. Next Visit Date:  No future appointments.     Health Maintenance   Topic Date Due    COVID-19 Vaccine (1) Never done    Varicella vaccine (1 of 2 - 2-dose childhood series) Never done    Pneumococcal 0-64 years Vaccine (1 - PCV) Never done    HIV screen  Never done    DTaP/Tdap/Td vaccine (1 - Tdap) Never done    Diabetes screen  Never done    Lipids  Never done    Depression Screen  05/04/2022    Flu vaccine (Season Ended) 08/01/2023    Hepatitis C screen  Completed    Hepatitis A vaccine  Aged Out    Hib vaccine  Aged Out    Meningococcal (ACWY) vaccine  Aged Out       No results found for: LABA1C          ( goal A1C is < 7)   No results found for: LABMICR  No results found for: LDLCHOLESTEROL, LDLCALC    (goal LDL is <100)   AST (U/L)   Date Value   06/22/2020 126 (H)     ALT (U/L)   Date Value   06/22/2020 54 (H)     BUN (mg/dL)   Date Value   06/24/2020 12     BP Readings from Last 3 Encounters:   09/15/22 (!) 121/100   08/18/22 (!) 135/93   05/19/22 (!) 132/93          (goal 120/80)    All Future Testing planned in [facility]  Lab Frequency Next Occurrence               Patient Active Problem List:     Severe persistent asthma in adult without complication

## 2023-04-08 DIAGNOSIS — J45.30 MILD PERSISTENT ASTHMA WITHOUT COMPLICATION: ICD-10-CM

## 2023-04-10 RX ORDER — FLUTICASONE PROPIONATE AND SALMETEROL 100; 50 UG/1; UG/1
POWDER RESPIRATORY (INHALATION)
OUTPATIENT
Start: 2023-04-10

## 2023-04-19 ENCOUNTER — APPOINTMENT (OUTPATIENT)
Dept: CT IMAGING | Age: 43
End: 2023-04-19
Payer: MEDICAID

## 2023-04-19 ENCOUNTER — HOSPITAL ENCOUNTER (EMERGENCY)
Age: 43
Discharge: HOME | End: 2023-04-19
Attending: EMERGENCY MEDICINE
Payer: MEDICAID

## 2023-04-19 VITALS
HEART RATE: 70 BPM | OXYGEN SATURATION: 98 % | RESPIRATION RATE: 17 BRPM | DIASTOLIC BLOOD PRESSURE: 99 MMHG | SYSTOLIC BLOOD PRESSURE: 142 MMHG | TEMPERATURE: 97.8 F

## 2023-04-19 DIAGNOSIS — R10.9 RIGHT FLANK PAIN: Primary | ICD-10-CM

## 2023-04-19 LAB
ALBUMIN SERPL-MCNC: 4.5 G/DL (ref 3.5–5.2)
ALBUMIN/GLOBULIN RATIO: 1.7 (ref 1–2.5)
ALP SERPL-CCNC: 49 U/L (ref 40–129)
ALT SERPL-CCNC: 21 U/L (ref 5–41)
ANION GAP SERPL CALCULATED.3IONS-SCNC: 9 MMOL/L (ref 9–17)
AST SERPL-CCNC: 22 U/L
BILIRUB DIRECT SERPL-MCNC: 0.1 MG/DL
BILIRUB INDIRECT SERPL-MCNC: 0.2 MG/DL (ref 0–1)
BILIRUB SERPL-MCNC: 0.3 MG/DL (ref 0.3–1.2)
BILIRUBIN URINE: NEGATIVE
BUN SERPL-MCNC: 14 MG/DL (ref 6–20)
CALCIUM SERPL-MCNC: 9.5 MG/DL (ref 8.6–10.4)
CHLORIDE SERPL-SCNC: 105 MMOL/L (ref 98–107)
CO2 SERPL-SCNC: 24 MMOL/L (ref 20–31)
COLOR: YELLOW
CREAT SERPL-MCNC: 1.18 MG/DL (ref 0.7–1.2)
EPITHELIAL CELLS UA: NORMAL /HPF (ref 0–5)
GFR SERPL CREATININE-BSD FRML MDRD: >60 ML/MIN/1.73M2
GLUCOSE SERPL-MCNC: 104 MG/DL (ref 70–99)
GLUCOSE UR STRIP.AUTO-MCNC: NEGATIVE MG/DL
HCT VFR BLD AUTO: 46.5 % (ref 40.7–50.3)
HGB BLD-MCNC: 16.1 G/DL (ref 13–17)
KETONES UR STRIP.AUTO-MCNC: NEGATIVE MG/DL
LEUKOCYTE ESTERASE UR QL STRIP.AUTO: NEGATIVE
LIPASE SERPL-CCNC: 37 U/L (ref 13–60)
MCH RBC QN AUTO: 27.5 PG (ref 25.2–33.5)
MCHC RBC AUTO-ENTMCNC: 34.6 G/DL (ref 28.4–34.8)
MCV RBC AUTO: 79.5 FL (ref 82.6–102.9)
NITRITE UR QL STRIP.AUTO: NEGATIVE
NRBC AUTOMATED: 0 PER 100 WBC
PDW BLD-RTO: 13 % (ref 11.8–14.4)
PLATELET # BLD AUTO: 220 K/UL (ref 138–453)
PMV BLD AUTO: 12.4 FL (ref 8.1–13.5)
POTASSIUM SERPL-SCNC: 4.3 MMOL/L (ref 3.7–5.3)
PROT SERPL-MCNC: 7.1 G/DL (ref 6.4–8.3)
PROT UR STRIP.AUTO-MCNC: 7 MG/DL (ref 5–8)
PROT UR STRIP.AUTO-MCNC: NEGATIVE MG/DL
RBC # BLD: 5.85 M/UL (ref 4.21–5.77)
RBC CLUMPS #/AREA URNS AUTO: NORMAL /HPF (ref 0–4)
SODIUM SERPL-SCNC: 138 MMOL/L (ref 135–144)
SPECIFIC GRAVITY UA: 1.02 (ref 1–1.03)
TURBIDITY: CLEAR
URINE HGB: NEGATIVE
UROBILINOGEN, URINE: NORMAL
WBC # BLD AUTO: 4.6 K/UL (ref 3.5–11.3)
WBC UA: NORMAL /HPF (ref 0–5)

## 2023-04-19 PROCEDURE — 74177 CT ABD & PELVIS W/CONTRAST: CPT

## 2023-04-19 PROCEDURE — 83690 ASSAY OF LIPASE: CPT

## 2023-04-19 PROCEDURE — 6360000004 HC RX CONTRAST MEDICATION: Performed by: EMERGENCY MEDICINE

## 2023-04-19 PROCEDURE — 6360000002 HC RX W HCPCS: Performed by: EMERGENCY MEDICINE

## 2023-04-19 PROCEDURE — 81001 URINALYSIS AUTO W/SCOPE: CPT

## 2023-04-19 PROCEDURE — 96374 THER/PROPH/DIAG INJ IV PUSH: CPT

## 2023-04-19 PROCEDURE — 99285 EMERGENCY DEPT VISIT HI MDM: CPT

## 2023-04-19 PROCEDURE — 80076 HEPATIC FUNCTION PANEL: CPT

## 2023-04-19 PROCEDURE — 85027 COMPLETE CBC AUTOMATED: CPT

## 2023-04-19 PROCEDURE — 80048 BASIC METABOLIC PNL TOTAL CA: CPT

## 2023-04-19 RX ORDER — ACETAMINOPHEN 325 MG/1
325 TABLET ORAL EVERY 6 HOURS PRN
Qty: 40 TABLET | Refills: 0 | Status: SHIPPED | OUTPATIENT
Start: 2023-04-19

## 2023-04-19 RX ORDER — IBUPROFEN 800 MG/1
800 TABLET ORAL EVERY 8 HOURS PRN
Qty: 21 TABLET | Refills: 0 | Status: SHIPPED | OUTPATIENT
Start: 2023-04-19

## 2023-04-19 RX ORDER — KETOROLAC TROMETHAMINE 30 MG/ML
30 INJECTION, SOLUTION INTRAMUSCULAR; INTRAVENOUS ONCE
Status: COMPLETED | OUTPATIENT
Start: 2023-04-19 | End: 2023-04-19

## 2023-04-19 RX ADMIN — KETOROLAC TROMETHAMINE 30 MG: 30 INJECTION, SOLUTION INTRAMUSCULAR at 09:37

## 2023-04-19 RX ADMIN — IOPAMIDOL 75 ML: 755 INJECTION, SOLUTION INTRAVENOUS at 11:59

## 2023-04-19 ASSESSMENT — PAIN SCALES - GENERAL
PAINLEVEL_OUTOF10: 6
PAINLEVEL_OUTOF10: 6

## 2023-04-19 ASSESSMENT — PAIN DESCRIPTION - ORIENTATION: ORIENTATION: RIGHT

## 2023-04-19 ASSESSMENT — PAIN - FUNCTIONAL ASSESSMENT: PAIN_FUNCTIONAL_ASSESSMENT: 0-10

## 2023-04-19 ASSESSMENT — PAIN DESCRIPTION - LOCATION: LOCATION: FLANK

## 2023-04-19 NOTE — ED TRIAGE NOTES
Patient presents to ED with complaint of right sided flank pain. States onset was this am when he woke up. Denies nausea, vomiting, or diarrhea. Has not had any injury to the area. Has not eaten anything today. States pain is noticed when raising up right arm and also occasional around belly button. Call light in reach, no distress noticed. Will continue to monitor and follow plan of care.

## 2023-04-19 NOTE — ED PROVIDER NOTES
[facility]  ED  Emergency Department Encounter  Emergency Medicine Resident     Pt Name:Austin Adler April  MRN: [id number]  [place] 1980  Date of evaluation: 23  PCP:  Victor M Garcia MD      [address] [place] [address]       Chief Complaint   Patient presents with    Flank Pain     Right flank pain       HISTORY OF PRESENT ILLNESS  (Location/Symptom, Timing/Onset, Context/Setting, Quality, Duration, Modifying Factors, Severity.)      Trey Maurer is a 44 y.o. male who presents with states he was stretching with his arms overhead this morning and felt pain in his right flank. Pain is worse with movement with his right arm overhead and stretching. And he was post to going to work but due to pain came here he has not had anything for pain control denies any nausea or vomiting no dysuria or hematuria no testicle pain. No prior abdominal surgeries. No pain to his lower abdomen. No fevers or chills. No diarrhea or constipation. PAST MEDICAL / SURGICAL / SOCIAL / FAMILY HISTORY      has a past medical history of DULCE MARIA (acute kidney injury) ([facility] [address] and Asthma. has no past surgical history on file.       Social History     Socioeconomic History    Marital status:      Spouse name: Not on file    Number of children: Not on file    Years of education: Not on file    Highest education level: Not on file   Occupational History    Not on file   Tobacco Use    Smoking status: Former     Types: Cigarettes     Quit date: 2000     Years since quittin.2    Smokeless tobacco: Never   Substance and Sexual Activity    Alcohol use: Yes     Comment: 1 24oz can of beer daily    Drug use: No    Sexual activity: Yes     Partners: Female   Other Topics Concern    Not on file   Social History Narrative    Not on file     Social Determinants of Health     Financial Resource Strain: Not on file   Food Insecurity: Not on file   Transportation Needs: Not on file   Physical Activity: Not on file

## 2023-04-19 NOTE — ED NOTES
Pt taken to CT via [facility], [address] [facility]  04/19/23 3737

## 2023-05-01 DIAGNOSIS — J45.30 MILD PERSISTENT ASTHMA WITHOUT COMPLICATION: ICD-10-CM

## 2023-05-01 RX ORDER — FLUTICASONE PROPIONATE AND SALMETEROL 100; 50 UG/1; UG/1
POWDER RESPIRATORY (INHALATION)
OUTPATIENT
Start: 2023-05-01

## 2023-05-07 DIAGNOSIS — J45.30 MILD PERSISTENT ASTHMA WITHOUT COMPLICATION: ICD-10-CM

## 2023-05-08 NOTE — TELEPHONE ENCOUNTER
E-scribe request for med refills. Please review and e-scribe if applicable.      Last Visit Date:  9/21/21  Next Visit Date:  Visit date not found    No results found for: LABA1C          ( goal A1C is < 7)   No results found for: LABMICR  No results found for: LDLCHOLESTEROL, LDLCALC    (goal LDL is <100)   AST (U/L)   Date Value   04/19/2023 22     ALT (U/L)   Date Value   04/19/2023 21     BUN (mg/dL)   Date Value   04/19/2023 14     BP Readings from Last 3 Encounters:   04/19/23 (!) 142/99   09/15/22 (!) 121/100   08/18/22 (!) 135/93          (goal 120/80)        Patient Active Problem List:     Severe persistent asthma in adult without complication      ----OBDULIO

## 2023-05-09 RX ORDER — FLUTICASONE PROPIONATE AND SALMETEROL 50; 100 UG/1; UG/1
POWDER RESPIRATORY (INHALATION)
Qty: 14 EACH | Refills: 3 | Status: SHIPPED | OUTPATIENT
Start: 2023-05-09

## 2023-09-12 ENCOUNTER — OFFICE VISIT (OUTPATIENT)
Dept: FAMILY MEDICINE CLINIC | Age: 43
End: 2023-09-12
Payer: MEDICAID

## 2023-09-12 VITALS
WEIGHT: 165.4 LBS | BODY MASS INDEX: 24.5 KG/M2 | SYSTOLIC BLOOD PRESSURE: 138 MMHG | HEIGHT: 69 IN | DIASTOLIC BLOOD PRESSURE: 99 MMHG | HEART RATE: 69 BPM

## 2023-09-12 DIAGNOSIS — J45.30 MILD PERSISTENT ASTHMA WITHOUT COMPLICATION: Primary | ICD-10-CM

## 2023-09-12 PROCEDURE — G8420 CALC BMI NORM PARAMETERS: HCPCS

## 2023-09-12 PROCEDURE — 99213 OFFICE O/P EST LOW 20 MIN: CPT

## 2023-09-12 PROCEDURE — G8427 DOCREV CUR MEDS BY ELIG CLIN: HCPCS

## 2023-09-12 PROCEDURE — 1036F TOBACCO NON-USER: CPT

## 2023-09-12 RX ORDER — MONTELUKAST SODIUM 10 MG/1
10 TABLET ORAL NIGHTLY
Qty: 30 TABLET | Refills: 5 | Status: SHIPPED | OUTPATIENT
Start: 2023-09-12 | End: 2024-03-10

## 2023-09-12 RX ORDER — FLUTICASONE PROPIONATE AND SALMETEROL 100; 50 UG/1; UG/1
POWDER RESPIRATORY (INHALATION)
Qty: 14 EACH | Refills: 5 | Status: SHIPPED | OUTPATIENT
Start: 2023-09-12

## 2023-09-12 RX ORDER — PREDNISONE 10 MG/1
TABLET ORAL
COMMUNITY
Start: 2023-06-09

## 2023-09-12 RX ORDER — ALBUTEROL SULFATE 90 UG/1
2 AEROSOL, METERED RESPIRATORY (INHALATION) 4 TIMES DAILY PRN
Qty: 18 G | Refills: 5 | Status: SHIPPED | OUTPATIENT
Start: 2023-09-12

## 2023-09-12 SDOH — ECONOMIC STABILITY: FOOD INSECURITY: WITHIN THE PAST 12 MONTHS, YOU WORRIED THAT YOUR FOOD WOULD RUN OUT BEFORE YOU GOT MONEY TO BUY MORE.: NEVER TRUE

## 2023-09-12 SDOH — ECONOMIC STABILITY: INCOME INSECURITY: HOW HARD IS IT FOR YOU TO PAY FOR THE VERY BASICS LIKE FOOD, HOUSING, MEDICAL CARE, AND HEATING?: NOT HARD AT ALL

## 2023-09-12 SDOH — ECONOMIC STABILITY: HOUSING INSECURITY
IN THE LAST 12 MONTHS, WAS THERE A TIME WHEN YOU DID NOT HAVE A STEADY PLACE TO SLEEP OR SLEPT IN A SHELTER (INCLUDING NOW)?: NO

## 2023-09-12 SDOH — ECONOMIC STABILITY: FOOD INSECURITY: WITHIN THE PAST 12 MONTHS, THE FOOD YOU BOUGHT JUST DIDN'T LAST AND YOU DIDN'T HAVE MONEY TO GET MORE.: NEVER TRUE

## 2023-09-12 ASSESSMENT — ENCOUNTER SYMPTOMS
COUGH: 0
SHORTNESS OF BREATH: 0
SORE THROAT: 0
ABDOMINAL PAIN: 0
BACK PAIN: 0
RHINORRHEA: 0
DIARRHEA: 0
NAUSEA: 0

## 2023-09-12 NOTE — PROGRESS NOTES
Attending Physician Statement  I have discussed the care of Mendez Saha, including pertinent history and exam findings,  with the resident. I have reviewed the key elements of all parts of the encounter with the resident. I agree with the assessment, plan and orders as documented by the resident.   (Radha Oropeza)    Ld Salgado MD

## 2023-09-12 NOTE — PROGRESS NOTES
Visit Information    Have you changed or started any medications since your last visit including any over-the-counter medicines, vitamins, or herbal medicines? no   Have you stopped taking any of your medications? Is so, why? -  no  Are you having any side effects from any of your medications? - no    Have you seen any other physician or provider since your last visit?  no   Have you had any other diagnostic tests since your last visit? yes - E4KG - CT- XRAY- LABS   Have you been seen in the emergency room and/or had an admission in a hospital since we last saw you?  yes - [facility]   Have you had your routine dental cleaning in the past 6 months?  no     Do you have an active MyChart account? If no, what is the barrier?   Yes    Patient Care Team:  Leroy Perez DO as PCP - General (Family Medicine)    Medical History Review  Past Medical, Family, and Social History reviewed and does not contribute to the patient presenting condition    Health Maintenance   Topic Date Due    Hepatitis B vaccine (1 of 3 - 3-dose series) Never done    COVID-19 Vaccine (1) Never done    Varicella vaccine (1 of 2 - 2-dose childhood series) Never done    Pneumococcal 0-64 years Vaccine (1 - PCV) Never done    Depression Screen  Never done    HIV screen  Never done    DTaP/Tdap/Td vaccine (1 - Tdap) Never done    Lipids  Never done    Flu vaccine (1) Never done    Hepatitis C screen  Completed    Hepatitis A vaccine  Aged Out    Hib vaccine  Aged Out    HPV vaccine  Aged Out    Meningococcal (ACWY) vaccine  Aged Out

## 2024-02-10 DIAGNOSIS — J45.30 MILD PERSISTENT ASTHMA WITHOUT COMPLICATION: ICD-10-CM

## 2024-02-12 NOTE — TELEPHONE ENCOUNTER
E-scribe request for ALBUTEROL HFA. Please review and e-scribe if applicable.     Last Visit Date:  9/12/2023  Next Visit Date:  Visit date not found    No results found for: \"LABA1C\"          ( goal A1C is < 7)   No components found for: \"LABMICR\"  No results found for: \"LDLCHOLESTEROL\", \"LDLCALC\"    (goal LDL is <100)   AST (U/L)   Date Value   04/19/2023 22     ALT (U/L)   Date Value   04/19/2023 21     BUN (mg/dL)   Date Value   04/19/2023 14     BP Readings from Last 3 Encounters:   09/12/23 (!) 138/99   04/19/23 (!) 142/99   09/15/22 (!) 121/100          (goal 120/80)        Patient Active Problem List:     Severe persistent asthma in adult without complication     Mild persistent asthma without complication      ----JF

## 2024-02-13 RX ORDER — ALBUTEROL SULFATE 90 UG/1
AEROSOL, METERED RESPIRATORY (INHALATION)
Qty: 8.5 G | Refills: 5 | Status: SHIPPED | OUTPATIENT
Start: 2024-02-13

## 2024-03-17 DIAGNOSIS — J45.30 MILD PERSISTENT ASTHMA WITHOUT COMPLICATION: ICD-10-CM

## 2024-03-18 RX ORDER — FLUTICASONE PROPIONATE AND SALMETEROL 100; 50 UG/1; UG/1
POWDER RESPIRATORY (INHALATION)
OUTPATIENT
Start: 2024-03-18

## 2024-04-19 DIAGNOSIS — J45.30 MILD PERSISTENT ASTHMA WITHOUT COMPLICATION: ICD-10-CM

## 2024-04-19 NOTE — TELEPHONE ENCOUNTER
E-scribe request for advair. Please review and e-scribe if applicable.     Last Visit Date:  9/12/23  Next Visit Date:  Visit date not found    No results found for: \"LABA1C\"          ( goal A1C is < 7)   No components found for: \"LABMICR\"  No results found for: \"LDLCHOLESTEROL\", \"LDLCALC\"    (goal LDL is <100)   AST (U/L)   Date Value   04/19/2023 22     ALT (U/L)   Date Value   04/19/2023 21     BUN (mg/dL)   Date Value   04/19/2023 14     BP Readings from Last 3 Encounters:   09/12/23 (!) 138/99   04/19/23 (!) 142/99   09/15/22 (!) 121/100          (goal 120/80)        Patient Active Problem List:     Severe persistent asthma in adult without complication     Mild persistent asthma without complication

## 2024-05-06 RX ORDER — FLUTICASONE PROPIONATE AND SALMETEROL 100; 50 UG/1; UG/1
POWDER RESPIRATORY (INHALATION)
Qty: 10 EACH | Refills: 5 | Status: SHIPPED | OUTPATIENT
Start: 2024-05-06

## 2024-07-19 DIAGNOSIS — J45.30 MILD PERSISTENT ASTHMA WITHOUT COMPLICATION: ICD-10-CM

## 2024-07-20 RX ORDER — ALBUTEROL SULFATE 90 UG/1
AEROSOL, METERED RESPIRATORY (INHALATION)
Qty: 8.5 G | Refills: 5 | Status: SHIPPED | OUTPATIENT
Start: 2024-07-20

## 2024-10-09 ENCOUNTER — HOSPITAL ENCOUNTER (INPATIENT)
Age: 44
LOS: 20 days | Discharge: HOME HEALTH | DRG: 009 | End: 2024-10-29
Attending: EMERGENCY MEDICINE | Admitting: INTERNAL MEDICINE
Payer: MEDICAID

## 2024-10-09 ENCOUNTER — APPOINTMENT (OUTPATIENT)
Dept: GENERAL RADIOLOGY | Age: 44
DRG: 009 | End: 2024-10-09
Payer: MEDICAID

## 2024-10-09 ENCOUNTER — APPOINTMENT (OUTPATIENT)
Dept: ULTRASOUND IMAGING | Age: 44
DRG: 009 | End: 2024-10-09
Payer: MEDICAID

## 2024-10-09 DIAGNOSIS — J45.51 SEVERE PERSISTENT ASTHMA WITH EXACERBATION: Primary | ICD-10-CM

## 2024-10-09 DIAGNOSIS — J96.02 ACUTE RESPIRATORY FAILURE WITH HYPERCAPNIA: ICD-10-CM

## 2024-10-09 DIAGNOSIS — E87.4: ICD-10-CM

## 2024-10-09 DIAGNOSIS — J45.901: ICD-10-CM

## 2024-10-09 PROBLEM — J96.01 ACUTE RESPIRATORY FAILURE WITH HYPOXIA: Status: ACTIVE | Noted: 2024-10-09

## 2024-10-09 PROBLEM — E87.20 METABOLIC ACIDEMIA: Status: ACTIVE | Noted: 2024-10-09

## 2024-10-09 PROBLEM — J96.12 ACUTE HYPOXIC ON CHRONIC HYPERCAPNIC RESPIRATORY FAILURE: Status: ACTIVE | Noted: 2024-10-09

## 2024-10-09 PROBLEM — N17.9 AKI (ACUTE KIDNEY INJURY) (HCC): Status: ACTIVE | Noted: 2024-10-09

## 2024-10-09 PROBLEM — E87.20 LACTIC ACIDEMIA: Status: ACTIVE | Noted: 2024-10-09

## 2024-10-09 LAB
ALBUMIN SERPL-MCNC: 4.5 G/DL (ref 3.5–5.2)
ALBUMIN/GLOB SERPL: 2 {RATIO} (ref 1–2.5)
ALLEN TEST: ABNORMAL
ALP SERPL-CCNC: 56 U/L (ref 40–129)
ALT SERPL-CCNC: 24 U/L (ref 10–50)
AMPHET UR QL SCN: NEGATIVE
ANION GAP SERPL CALCULATED.3IONS-SCNC: 12 MMOL/L (ref 9–16)
ANION GAP SERPL CALCULATED.3IONS-SCNC: 12 MMOL/L (ref 9–16)
ANION GAP SERPL CALCULATED.3IONS-SCNC: 18 MMOL/L (ref 9–16)
AST SERPL-CCNC: 22 U/L (ref 10–50)
B PARAP IS1001 DNA NPH QL NAA+NON-PROBE: NOT DETECTED
B PERT DNA SPEC QL NAA+PROBE: NOT DETECTED
BACTERIA URNS QL MICRO: ABNORMAL
BARBITURATES UR QL SCN: NEGATIVE
BASOPHILS # BLD: 0 K/UL (ref 0–0.2)
BASOPHILS # BLD: 0.08 K/UL (ref 0–0.2)
BASOPHILS NFR BLD: 0 % (ref 0–2)
BASOPHILS NFR BLD: 1 % (ref 0–2)
BENZODIAZ UR QL: POSITIVE
BILIRUB SERPL-MCNC: 0.3 MG/DL (ref 0–1.2)
BILIRUB UR QL STRIP: NEGATIVE
BODY TEMPERATURE: 37
BUN BLD-MCNC: 9 MG/DL (ref 8–26)
BUN SERPL-MCNC: 13 MG/DL (ref 6–20)
BUN SERPL-MCNC: 18 MG/DL (ref 6–20)
BUN SERPL-MCNC: 9 MG/DL (ref 6–20)
C PNEUM DNA NPH QL NAA+NON-PROBE: NOT DETECTED
CA-I BLD-SCNC: 1.09 MMOL/L (ref 1.13–1.33)
CA-I BLD-SCNC: 1.37 MMOL/L (ref 1.15–1.33)
CALCIUM SERPL-MCNC: 6.8 MG/DL (ref 8.6–10.4)
CALCIUM SERPL-MCNC: 9 MG/DL (ref 8.6–10.4)
CALCIUM SERPL-MCNC: 9.2 MG/DL (ref 8.6–10.4)
CANNABINOIDS UR QL SCN: POSITIVE
CASTS #/AREA URNS LPF: ABNORMAL /LPF (ref 0–8)
CHLORIDE BLD-SCNC: 108 MMOL/L (ref 98–107)
CHLORIDE SERPL-SCNC: 105 MMOL/L (ref 98–107)
CHLORIDE SERPL-SCNC: 106 MMOL/L (ref 98–107)
CHLORIDE SERPL-SCNC: 108 MMOL/L (ref 98–107)
CK SERPL-CCNC: 1454 U/L (ref 39–308)
CK SERPL-CCNC: 812 U/L (ref 39–308)
CLARITY UR: CLEAR
CO2 BLD CALC-SCNC: 30 MMOL/L (ref 22–30)
CO2 SERPL-SCNC: 15 MMOL/L (ref 20–31)
CO2 SERPL-SCNC: 19 MMOL/L (ref 20–31)
CO2 SERPL-SCNC: 21 MMOL/L (ref 20–31)
COCAINE UR QL SCN: NEGATIVE
COHGB MFR BLD: 0.3 % (ref 0–5)
COHGB MFR BLD: 1.1 % (ref 0–5)
COHGB MFR BLD: 1.1 % (ref 0–5)
COLOR UR: YELLOW
CREAT SERPL-MCNC: 1.3 MG/DL (ref 0.7–1.2)
CREAT SERPL-MCNC: 2.4 MG/DL (ref 0.7–1.2)
CREAT SERPL-MCNC: 2.7 MG/DL (ref 0.7–1.2)
CREAT UR-MCNC: 212 MG/DL (ref 39–259)
CREAT UR-MCNC: 224 MG/DL (ref 39–259)
EGFR, POC: 70 ML/MIN/1.73M2
EKG ATRIAL RATE: 109 BPM
EKG P AXIS: 80 DEGREES
EKG P-R INTERVAL: 120 MS
EKG Q-T INTERVAL: 340 MS
EKG QRS DURATION: 94 MS
EKG QTC CALCULATION (BAZETT): 457 MS
EKG R AXIS: 99 DEGREES
EKG T AXIS: 80 DEGREES
EKG VENTRICULAR RATE: 109 BPM
EOSINOPHIL # BLD: 0 K/UL (ref 0–0.44)
EOSINOPHIL # BLD: 0.64 K/UL (ref 0–0.44)
EOSINOPHILS RELATIVE PERCENT: 0 % (ref 1–4)
EOSINOPHILS RELATIVE PERCENT: 5 % (ref 1–4)
EPI CELLS #/AREA URNS HPF: ABNORMAL /HPF (ref 0–5)
ERYTHROCYTE [DISTWIDTH] IN BLOOD BY AUTOMATED COUNT: 13.2 % (ref 11.8–14.4)
ERYTHROCYTE [DISTWIDTH] IN BLOOD BY AUTOMATED COUNT: 13.2 % (ref 11.8–14.4)
FENTANYL UR QL: NEGATIVE
FIO2 ON VENT: ABNORMAL %
FIO2: 100
FIO2: 40
FIO2: 40
FLUAV RNA NPH QL NAA+NON-PROBE: NOT DETECTED
FLUBV RNA NPH QL NAA+NON-PROBE: NOT DETECTED
GFR, ESTIMATED: 29 ML/MIN/1.73M2
GFR, ESTIMATED: 33 ML/MIN/1.73M2
GFR, ESTIMATED: 71 ML/MIN/1.73M2
GLUCOSE BLD-MCNC: 153 MG/DL (ref 74–100)
GLUCOSE BLD-MCNC: 155 MG/DL (ref 75–110)
GLUCOSE BLD-MCNC: 156 MG/DL (ref 74–100)
GLUCOSE BLD-MCNC: 160 MG/DL (ref 75–110)
GLUCOSE BLD-MCNC: 178 MG/DL (ref 75–110)
GLUCOSE BLD-MCNC: 202 MG/DL (ref 74–100)
GLUCOSE SERPL-MCNC: 154 MG/DL (ref 74–99)
GLUCOSE SERPL-MCNC: 172 MG/DL (ref 74–99)
GLUCOSE SERPL-MCNC: 200 MG/DL (ref 74–99)
GLUCOSE UR STRIP-MCNC: NEGATIVE MG/DL
HADV DNA NPH QL NAA+NON-PROBE: NOT DETECTED
HCO3 VENOUS: 16.5 MMOL/L (ref 24–30)
HCO3 VENOUS: 16.6 MMOL/L (ref 24–30)
HCO3 VENOUS: 24.5 MMOL/L (ref 24–30)
HCO3 VENOUS: 27.8 MMOL/L (ref 22–29)
HCOV 229E RNA NPH QL NAA+NON-PROBE: NOT DETECTED
HCOV HKU1 RNA NPH QL NAA+NON-PROBE: NOT DETECTED
HCOV NL63 RNA NPH QL NAA+NON-PROBE: NOT DETECTED
HCOV OC43 RNA NPH QL NAA+NON-PROBE: NOT DETECTED
HCT VFR BLD AUTO: 43.9 % (ref 40.7–50.3)
HCT VFR BLD AUTO: 47.3 % (ref 40.7–50.3)
HCT VFR BLD AUTO: 48 % (ref 41–53)
HGB BLD-MCNC: 15.1 G/DL (ref 13–17)
HGB BLD-MCNC: 15.8 G/DL (ref 13–17)
HGB UR QL STRIP.AUTO: NEGATIVE
HMPV RNA NPH QL NAA+NON-PROBE: NOT DETECTED
HPIV1 RNA NPH QL NAA+NON-PROBE: NOT DETECTED
HPIV2 RNA NPH QL NAA+NON-PROBE: NOT DETECTED
HPIV3 RNA NPH QL NAA+NON-PROBE: NOT DETECTED
HPIV4 RNA NPH QL NAA+NON-PROBE: NOT DETECTED
IMM GRANULOCYTES # BLD AUTO: 0.09 K/UL (ref 0–0.3)
IMM GRANULOCYTES # BLD AUTO: 0.15 K/UL (ref 0–0.3)
IMM GRANULOCYTES NFR BLD: 1 %
IMM GRANULOCYTES NFR BLD: 1 %
INR PPP: 1
KETONES UR STRIP-MCNC: NEGATIVE MG/DL
L PNEUMO1 AG UR QL IA.RAPID: NEGATIVE
LACTIC ACID, SEPSIS WHOLE BLOOD: 2.9 MMOL/L (ref 0.5–1.9)
LACTIC ACID, SEPSIS WHOLE BLOOD: 7.2 MMOL/L (ref 0.5–1.9)
LACTIC ACID, SEPSIS WHOLE BLOOD: 7.5 MMOL/L (ref 0.5–1.9)
LACTIC ACID, WHOLE BLOOD: 1.1 MMOL/L (ref 0.7–2.1)
LACTIC ACID, WHOLE BLOOD: 4.2 MMOL/L (ref 0.7–2.1)
LACTIC ACID, WHOLE BLOOD: 6.1 MMOL/L (ref 0.7–2.1)
LEUKOCYTE ESTERASE UR QL STRIP: NEGATIVE
LYMPHOCYTES NFR BLD: 0.61 K/UL (ref 1.1–3.7)
LYMPHOCYTES NFR BLD: 4.01 K/UL (ref 1.1–3.7)
LYMPHOCYTES RELATIVE PERCENT: 31 % (ref 24–43)
LYMPHOCYTES RELATIVE PERCENT: 4 % (ref 24–43)
M PNEUMO DNA NPH QL NAA+NON-PROBE: NOT DETECTED
M PNEUMO IGM SER QL IA: 0.87
MCH RBC QN AUTO: 26.7 PG (ref 25.2–33.5)
MCH RBC QN AUTO: 27.2 PG (ref 25.2–33.5)
MCHC RBC AUTO-ENTMCNC: 33.4 G/DL (ref 28.4–34.8)
MCHC RBC AUTO-ENTMCNC: 34.4 G/DL (ref 28.4–34.8)
MCV RBC AUTO: 79.1 FL (ref 82.6–102.9)
MCV RBC AUTO: 80 FL (ref 82.6–102.9)
METHADONE UR QL: NEGATIVE
MODE: ABNORMAL
MODE: ABNORMAL
MONOCYTES NFR BLD: 0.61 K/UL (ref 0.1–1.2)
MONOCYTES NFR BLD: 1.29 K/UL (ref 0.1–1.2)
MONOCYTES NFR BLD: 10 % (ref 3–12)
MONOCYTES NFR BLD: 4 % (ref 3–12)
MORPHOLOGY: ABNORMAL
MRSA, DNA, NASAL: NEGATIVE
MYOGLOBIN SERPL-MCNC: 1466 NG/ML (ref 28–72)
NEGATIVE BASE EXCESS, ART: 0.4 MMOL/L (ref 0–2)
NEGATIVE BASE EXCESS, ART: 5 MMOL/L (ref 0–2)
NEGATIVE BASE EXCESS, ART: 6 MMOL/L (ref 0–2)
NEGATIVE BASE EXCESS, VEN: 11 MMOL/L (ref 0–2)
NEGATIVE BASE EXCESS, VEN: 11.2 MMOL/L (ref 0–2)
NEGATIVE BASE EXCESS, VEN: 5.7 MMOL/L (ref 0–2)
NEGATIVE BASE EXCESS, VEN: 8.1 MMOL/L (ref 0–2)
NEUTROPHILS NFR BLD: 52 % (ref 36–65)
NEUTROPHILS NFR BLD: 91 % (ref 36–65)
NEUTS SEG NFR BLD: 13.93 K/UL (ref 1.5–8.1)
NEUTS SEG NFR BLD: 6.97 K/UL (ref 1.5–8.1)
NITRITE UR QL STRIP: NEGATIVE
NRBC BLD-RTO: 0 PER 100 WBC
NRBC BLD-RTO: 0.2 PER 100 WBC
O2 DELIVERY DEVICE: ABNORMAL
O2 DELIVERY DEVICE: ABNORMAL
O2 SAT, VEN: 92.8 % (ref 60–85)
O2 SAT, VEN: 95.9 % (ref 60–85)
O2 SAT, VEN: 96.8 % (ref 60–85)
O2 SAT, VEN: 97.2 % (ref 60–85)
OPIATES UR QL SCN: NEGATIVE
OXYCODONE UR QL SCN: NEGATIVE
PARTIAL THROMBOPLASTIN TIME: 25 SEC (ref 23–36.5)
PATIENT TEMP: 36.3
PCO2 VENOUS: 43.7 MM HG (ref 39–55)
PCO2 VENOUS: 44 MM HG (ref 39–55)
PCO2 VENOUS: 81.1 MM HG (ref 39–55)
PCO2 VENOUS: 97.8 MM HG (ref 41–51)
PCP UR QL SCN: NEGATIVE
PH UR STRIP: 5 [PH] (ref 5–8)
PH VENOUS: 7.06 (ref 7.32–7.43)
PH VENOUS: 7.11 (ref 7.32–7.42)
PH VENOUS: 7.2 (ref 7.32–7.42)
PH VENOUS: 7.21 (ref 7.32–7.42)
PHOSPHATE SERPL-MCNC: 2.4 MG/DL (ref 2.5–4.5)
PLATELET # BLD AUTO: 208 K/UL (ref 138–453)
PLATELET # BLD AUTO: 279 K/UL (ref 138–453)
PMV BLD AUTO: 12.6 FL (ref 8.1–13.5)
PMV BLD AUTO: 12.8 FL (ref 8.1–13.5)
PO2 VENOUS: 101 MM HG (ref 30–50)
PO2 VENOUS: 121 MM HG (ref 30–50)
PO2 VENOUS: 179 MM HG (ref 30–50)
PO2 VENOUS: 97.1 MM HG (ref 30–50)
POC ANION GAP: 5 MMOL/L (ref 7–16)
POC CREATININE: 1.3 MG/DL (ref 0.51–1.19)
POC HCO3: 20 MMOL/L (ref 21–28)
POC HCO3: 24.5 MMOL/L (ref 21–28)
POC HCO3: 28.5 MMOL/L (ref 21–28)
POC HEMOGLOBIN (CALC): 16.2 G/DL (ref 13.5–17.5)
POC LACTIC ACID: 0.8 MMOL/L (ref 0.56–1.39)
POC LACTIC ACID: 2.6 MMOL/L (ref 0.56–1.39)
POC O2 SATURATION: 100 % (ref 94–98)
POC O2 SATURATION: 92.1 % (ref 94–98)
POC O2 SATURATION: 98.2 % (ref 94–98)
POC PCO2: 40.4 MM HG (ref 35–48)
POC PCO2: 62.7 MM HG (ref 35–48)
POC PCO2: 66.6 MM HG (ref 35–48)
POC PH: 7.2 (ref 7.35–7.45)
POC PH: 7.24 (ref 7.35–7.45)
POC PH: 7.3 (ref 7.35–7.45)
POC PO2: 118.3 MM HG (ref 83–108)
POC PO2: 674.8 MM HG (ref 83–108)
POC PO2: 77.4 MM HG (ref 83–108)
POTASSIUM BLD-SCNC: 4.3 MMOL/L (ref 3.5–4.5)
POTASSIUM SERPL-SCNC: 4.1 MMOL/L (ref 3.7–5.3)
POTASSIUM SERPL-SCNC: 4.1 MMOL/L (ref 3.7–5.3)
POTASSIUM SERPL-SCNC: 4.7 MMOL/L (ref 3.7–5.3)
PROCALCITONIN SERPL-MCNC: 0.66 NG/ML (ref 0–0.09)
PROT SERPL-MCNC: 7.2 G/DL (ref 6.6–8.7)
PROT UR STRIP-MCNC: ABNORMAL MG/DL
PROTHROMBIN TIME: 13.3 SEC (ref 11.7–14.9)
RBC # BLD AUTO: 5.55 M/UL (ref 4.21–5.77)
RBC # BLD AUTO: 5.91 M/UL (ref 4.21–5.77)
RBC # BLD: ABNORMAL 10*6/UL
RBC #/AREA URNS HPF: ABNORMAL /HPF (ref 0–4)
RSV RNA NPH QL NAA+NON-PROBE: NOT DETECTED
RV+EV RNA NPH QL NAA+NON-PROBE: DETECTED
S PNEUM AG SPEC QL: NEGATIVE
SAMPLE SITE: ABNORMAL
SARS-COV-2 RNA NPH QL NAA+NON-PROBE: NOT DETECTED
SODIUM BLD-SCNC: 142 MMOL/L (ref 138–146)
SODIUM SERPL-SCNC: 138 MMOL/L (ref 136–145)
SODIUM SERPL-SCNC: 139 MMOL/L (ref 136–145)
SODIUM SERPL-SCNC: 139 MMOL/L (ref 136–145)
SODIUM UR-SCNC: 121 MMOL/L
SP GR UR STRIP: 1.02 (ref 1–1.03)
SPECIMEN DESCRIPTION: ABNORMAL
SPECIMEN DESCRIPTION: NORMAL
SPECIMEN SOURCE: NORMAL
TEST INFORMATION: ABNORMAL
TOTAL PROTEIN, URINE: 64 MG/DL
TROPONIN I SERPL HS-MCNC: 11 NG/L (ref 0–22)
TROPONIN I SERPL HS-MCNC: <6 NG/L (ref 0–22)
URINE TOTAL PROTEIN CREATININE RATIO: 0.28
UROBILINOGEN UR STRIP-ACNC: NORMAL EU/DL (ref 0–1)
WBC #/AREA URNS HPF: ABNORMAL /HPF (ref 0–5)
WBC OTHER # BLD: 13.1 K/UL (ref 3.5–11.3)
WBC OTHER # BLD: 15.3 K/UL (ref 3.5–11.3)

## 2024-10-09 PROCEDURE — 86738 MYCOPLASMA ANTIBODY: CPT

## 2024-10-09 PROCEDURE — 82550 ASSAY OF CK (CPK): CPT

## 2024-10-09 PROCEDURE — 99222 1ST HOSP IP/OBS MODERATE 55: CPT | Performed by: INTERNAL MEDICINE

## 2024-10-09 PROCEDURE — 2500000003 HC RX 250 WO HCPCS: Performed by: STUDENT IN AN ORGANIZED HEALTH CARE EDUCATION/TRAINING PROGRAM

## 2024-10-09 PROCEDURE — 84100 ASSAY OF PHOSPHORUS: CPT

## 2024-10-09 PROCEDURE — 96375 TX/PRO/DX INJ NEW DRUG ADDON: CPT

## 2024-10-09 PROCEDURE — 6370000000 HC RX 637 (ALT 250 FOR IP)

## 2024-10-09 PROCEDURE — 87070 CULTURE OTHR SPECIMN AEROBIC: CPT

## 2024-10-09 PROCEDURE — 0BH17EZ INSERTION OF ENDOTRACHEAL AIRWAY INTO TRACHEA, VIA NATURAL OR ARTIFICIAL OPENING: ICD-10-PCS | Performed by: EMERGENCY MEDICINE

## 2024-10-09 PROCEDURE — 82803 BLOOD GASES ANY COMBINATION: CPT

## 2024-10-09 PROCEDURE — 82330 ASSAY OF CALCIUM: CPT

## 2024-10-09 PROCEDURE — 87641 MR-STAPH DNA AMP PROBE: CPT

## 2024-10-09 PROCEDURE — 6360000002 HC RX W HCPCS: Performed by: STUDENT IN AN ORGANIZED HEALTH CARE EDUCATION/TRAINING PROGRAM

## 2024-10-09 PROCEDURE — 80051 ELECTROLYTE PANEL: CPT

## 2024-10-09 PROCEDURE — 80307 DRUG TEST PRSMV CHEM ANLYZR: CPT

## 2024-10-09 PROCEDURE — 87040 BLOOD CULTURE FOR BACTERIA: CPT

## 2024-10-09 PROCEDURE — 37799 UNLISTED PX VASCULAR SURGERY: CPT

## 2024-10-09 PROCEDURE — 6360000002 HC RX W HCPCS

## 2024-10-09 PROCEDURE — 76770 US EXAM ABDO BACK WALL COMP: CPT

## 2024-10-09 PROCEDURE — 84156 ASSAY OF PROTEIN URINE: CPT

## 2024-10-09 PROCEDURE — 85025 COMPLETE CBC W/AUTO DIFF WBC: CPT

## 2024-10-09 PROCEDURE — 87899 AGENT NOS ASSAY W/OPTIC: CPT

## 2024-10-09 PROCEDURE — 87449 NOS EACH ORGANISM AG IA: CPT

## 2024-10-09 PROCEDURE — 51798 US URINE CAPACITY MEASURE: CPT

## 2024-10-09 PROCEDURE — 84145 PROCALCITONIN (PCT): CPT

## 2024-10-09 PROCEDURE — 84300 ASSAY OF URINE SODIUM: CPT

## 2024-10-09 PROCEDURE — 94640 AIRWAY INHALATION TREATMENT: CPT

## 2024-10-09 PROCEDURE — 99285 EMERGENCY DEPT VISIT HI MDM: CPT

## 2024-10-09 PROCEDURE — 84520 ASSAY OF UREA NITROGEN: CPT

## 2024-10-09 PROCEDURE — 86403 PARTICLE AGGLUT ANTBDY SCRN: CPT

## 2024-10-09 PROCEDURE — 82947 ASSAY GLUCOSE BLOOD QUANT: CPT

## 2024-10-09 PROCEDURE — 80048 BASIC METABOLIC PNL TOTAL CA: CPT

## 2024-10-09 PROCEDURE — 81001 URINALYSIS AUTO W/SCOPE: CPT

## 2024-10-09 PROCEDURE — 2580000003 HC RX 258: Performed by: STUDENT IN AN ORGANIZED HEALTH CARE EDUCATION/TRAINING PROGRAM

## 2024-10-09 PROCEDURE — 99291 CRITICAL CARE FIRST HOUR: CPT | Performed by: STUDENT IN AN ORGANIZED HEALTH CARE EDUCATION/TRAINING PROGRAM

## 2024-10-09 PROCEDURE — 85610 PROTHROMBIN TIME: CPT

## 2024-10-09 PROCEDURE — 84484 ASSAY OF TROPONIN QUANT: CPT

## 2024-10-09 PROCEDURE — 86160 COMPLEMENT ANTIGEN: CPT

## 2024-10-09 PROCEDURE — 31500 INSERT EMERGENCY AIRWAY: CPT

## 2024-10-09 PROCEDURE — 93010 ELECTROCARDIOGRAM REPORT: CPT | Performed by: INTERNAL MEDICINE

## 2024-10-09 PROCEDURE — 36600 WITHDRAWAL OF ARTERIAL BLOOD: CPT

## 2024-10-09 PROCEDURE — 96372 THER/PROPH/DIAG INJ SC/IM: CPT

## 2024-10-09 PROCEDURE — 85730 THROMBOPLASTIN TIME PARTIAL: CPT

## 2024-10-09 PROCEDURE — 83874 ASSAY OF MYOGLOBIN: CPT

## 2024-10-09 PROCEDURE — 87186 SC STD MICRODIL/AGAR DIL: CPT

## 2024-10-09 PROCEDURE — 6370000000 HC RX 637 (ALT 250 FOR IP): Performed by: INTERNAL MEDICINE

## 2024-10-09 PROCEDURE — 82805 BLOOD GASES W/O2 SATURATION: CPT

## 2024-10-09 PROCEDURE — 2500000003 HC RX 250 WO HCPCS

## 2024-10-09 PROCEDURE — 83605 ASSAY OF LACTIC ACID: CPT

## 2024-10-09 PROCEDURE — 83735 ASSAY OF MAGNESIUM: CPT

## 2024-10-09 PROCEDURE — 93005 ELECTROCARDIOGRAM TRACING: CPT | Performed by: STUDENT IN AN ORGANIZED HEALTH CARE EDUCATION/TRAINING PROGRAM

## 2024-10-09 PROCEDURE — 74018 RADEX ABDOMEN 1 VIEW: CPT

## 2024-10-09 PROCEDURE — 80053 COMPREHEN METABOLIC PANEL: CPT

## 2024-10-09 PROCEDURE — 2000000000 HC ICU R&B

## 2024-10-09 PROCEDURE — 6360000002 HC RX W HCPCS: Performed by: INTERNAL MEDICINE

## 2024-10-09 PROCEDURE — 96374 THER/PROPH/DIAG INJ IV PUSH: CPT

## 2024-10-09 PROCEDURE — 87205 SMEAR GRAM STAIN: CPT

## 2024-10-09 PROCEDURE — 2580000003 HC RX 258

## 2024-10-09 PROCEDURE — 36620 INSERTION CATHETER ARTERY: CPT

## 2024-10-09 PROCEDURE — 85014 HEMATOCRIT: CPT

## 2024-10-09 PROCEDURE — 5A1955Z RESPIRATORY VENTILATION, GREATER THAN 96 CONSECUTIVE HOURS: ICD-10-PCS | Performed by: EMERGENCY MEDICINE

## 2024-10-09 PROCEDURE — 03HY32Z INSERTION OF MONITORING DEVICE INTO UPPER ARTERY, PERCUTANEOUS APPROACH: ICD-10-PCS | Performed by: INTERNAL MEDICINE

## 2024-10-09 PROCEDURE — 94644 CONT INHLJ TX 1ST HOUR: CPT

## 2024-10-09 PROCEDURE — 94645 CONT INHLJ TX EACH ADDL HOUR: CPT

## 2024-10-09 PROCEDURE — 36415 COLL VENOUS BLD VENIPUNCTURE: CPT

## 2024-10-09 PROCEDURE — 94761 N-INVAS EAR/PLS OXIMETRY MLT: CPT

## 2024-10-09 PROCEDURE — 87086 URINE CULTURE/COLONY COUNT: CPT

## 2024-10-09 PROCEDURE — 71045 X-RAY EXAM CHEST 1 VIEW: CPT

## 2024-10-09 PROCEDURE — 0202U NFCT DS 22 TRGT SARS-COV-2: CPT

## 2024-10-09 PROCEDURE — 2700000000 HC OXYGEN THERAPY PER DAY

## 2024-10-09 PROCEDURE — 82570 ASSAY OF URINE CREATININE: CPT

## 2024-10-09 PROCEDURE — 82565 ASSAY OF CREATININE: CPT

## 2024-10-09 PROCEDURE — 83516 IMMUNOASSAY NONANTIBODY: CPT

## 2024-10-09 PROCEDURE — 94002 VENT MGMT INPAT INIT DAY: CPT

## 2024-10-09 RX ORDER — FENTANYL CITRATE 50 UG/ML
100 INJECTION, SOLUTION INTRAMUSCULAR; INTRAVENOUS ONCE
Status: COMPLETED | OUTPATIENT
Start: 2024-10-09 | End: 2024-10-09

## 2024-10-09 RX ORDER — ACETAMINOPHEN 650 MG/1
650 SUPPOSITORY RECTAL EVERY 6 HOURS PRN
Status: DISCONTINUED | OUTPATIENT
Start: 2024-10-09 | End: 2024-10-29 | Stop reason: HOSPADM

## 2024-10-09 RX ORDER — ACETAMINOPHEN 325 MG/1
650 TABLET ORAL EVERY 6 HOURS PRN
Status: DISCONTINUED | OUTPATIENT
Start: 2024-10-09 | End: 2024-10-15

## 2024-10-09 RX ORDER — HEPARIN SODIUM 5000 [USP'U]/ML
5000 INJECTION, SOLUTION INTRAVENOUS; SUBCUTANEOUS EVERY 8 HOURS SCHEDULED
Status: DISCONTINUED | OUTPATIENT
Start: 2024-10-09 | End: 2024-10-14

## 2024-10-09 RX ORDER — SODIUM CHLORIDE 9 MG/ML
INJECTION, SOLUTION INTRAVENOUS CONTINUOUS
Status: DISCONTINUED | OUTPATIENT
Start: 2024-10-09 | End: 2024-10-09

## 2024-10-09 RX ORDER — ONDANSETRON 4 MG/1
4 TABLET, ORALLY DISINTEGRATING ORAL EVERY 8 HOURS PRN
Status: DISCONTINUED | OUTPATIENT
Start: 2024-10-09 | End: 2024-10-29 | Stop reason: HOSPADM

## 2024-10-09 RX ORDER — ONDANSETRON 2 MG/ML
4 INJECTION INTRAMUSCULAR; INTRAVENOUS EVERY 6 HOURS PRN
Status: DISCONTINUED | OUTPATIENT
Start: 2024-10-09 | End: 2024-10-29 | Stop reason: HOSPADM

## 2024-10-09 RX ORDER — SODIUM CHLORIDE 9 MG/ML
INJECTION, SOLUTION INTRAVENOUS PRN
Status: DISCONTINUED | OUTPATIENT
Start: 2024-10-09 | End: 2024-10-29 | Stop reason: HOSPADM

## 2024-10-09 RX ORDER — CALCIUM GLUCONATE 20 MG/ML
1000 INJECTION, SOLUTION INTRAVENOUS ONCE
Status: COMPLETED | OUTPATIENT
Start: 2024-10-09 | End: 2024-10-10

## 2024-10-09 RX ORDER — SODIUM CHLORIDE 0.9 % (FLUSH) 0.9 %
5-40 SYRINGE (ML) INJECTION PRN
Status: DISCONTINUED | OUTPATIENT
Start: 2024-10-09 | End: 2024-10-29 | Stop reason: HOSPADM

## 2024-10-09 RX ORDER — ACETAMINOPHEN 325 MG/1
650 TABLET ORAL ONCE
Status: COMPLETED | OUTPATIENT
Start: 2024-10-09 | End: 2024-10-09

## 2024-10-09 RX ORDER — GLUCAGON 1 MG/ML
1 KIT INJECTION PRN
Status: DISCONTINUED | OUTPATIENT
Start: 2024-10-09 | End: 2024-10-29 | Stop reason: HOSPADM

## 2024-10-09 RX ORDER — SODIUM CHLORIDE 0.9 % (FLUSH) 0.9 %
5-40 SYRINGE (ML) INJECTION EVERY 12 HOURS SCHEDULED
Status: DISCONTINUED | OUTPATIENT
Start: 2024-10-09 | End: 2024-10-29 | Stop reason: HOSPADM

## 2024-10-09 RX ORDER — IPRATROPIUM BROMIDE AND ALBUTEROL SULFATE 2.5; .5 MG/3ML; MG/3ML
1 SOLUTION RESPIRATORY (INHALATION) EVERY 4 HOURS PRN
Status: DISCONTINUED | OUTPATIENT
Start: 2024-10-09 | End: 2024-10-12

## 2024-10-09 RX ORDER — POTASSIUM CHLORIDE 29.8 MG/ML
20 INJECTION INTRAVENOUS PRN
Status: DISCONTINUED | OUTPATIENT
Start: 2024-10-09 | End: 2024-10-24

## 2024-10-09 RX ORDER — ACETAMINOPHEN 325 MG/1
650 TABLET ORAL EVERY 4 HOURS PRN
Status: DISCONTINUED | OUTPATIENT
Start: 2024-10-09 | End: 2024-10-09

## 2024-10-09 RX ORDER — DEXTROSE MONOHYDRATE 100 MG/ML
INJECTION, SOLUTION INTRAVENOUS CONTINUOUS PRN
Status: DISCONTINUED | OUTPATIENT
Start: 2024-10-09 | End: 2024-10-29 | Stop reason: HOSPADM

## 2024-10-09 RX ORDER — METOPROLOL TARTRATE 1 MG/ML
2.5 INJECTION, SOLUTION INTRAVENOUS ONCE
Status: COMPLETED | OUTPATIENT
Start: 2024-10-09 | End: 2024-10-09

## 2024-10-09 RX ORDER — POLYETHYLENE GLYCOL 3350 17 G/17G
17 POWDER, FOR SOLUTION ORAL DAILY PRN
Status: DISCONTINUED | OUTPATIENT
Start: 2024-10-09 | End: 2024-10-15

## 2024-10-09 RX ORDER — IPRATROPIUM BROMIDE AND ALBUTEROL SULFATE 2.5; .5 MG/3ML; MG/3ML
1 SOLUTION RESPIRATORY (INHALATION)
Status: DISCONTINUED | OUTPATIENT
Start: 2024-10-09 | End: 2024-10-09

## 2024-10-09 RX ORDER — SUCCINYLCHOLINE CHLORIDE 20 MG/ML
120 INJECTION INTRAMUSCULAR; INTRAVENOUS ONCE
Status: COMPLETED | OUTPATIENT
Start: 2024-10-09 | End: 2024-10-09

## 2024-10-09 RX ORDER — MAGNESIUM SULFATE IN WATER 40 MG/ML
2000 INJECTION, SOLUTION INTRAVENOUS PRN
Status: DISCONTINUED | OUTPATIENT
Start: 2024-10-09 | End: 2024-10-29 | Stop reason: HOSPADM

## 2024-10-09 RX ORDER — PROPOFOL 10 MG/ML
5-80 INJECTION, EMULSION INTRAVENOUS CONTINUOUS
Status: DISCONTINUED | OUTPATIENT
Start: 2024-10-09 | End: 2024-10-11

## 2024-10-09 RX ORDER — INSULIN LISPRO 100 [IU]/ML
0-4 INJECTION, SOLUTION INTRAVENOUS; SUBCUTANEOUS
Status: DISCONTINUED | OUTPATIENT
Start: 2024-10-09 | End: 2024-10-11

## 2024-10-09 RX ORDER — ALBUTEROL SULFATE 0.83 MG/ML
2.5 SOLUTION RESPIRATORY (INHALATION) EVERY 4 HOURS PRN
Status: DISCONTINUED | OUTPATIENT
Start: 2024-10-09 | End: 2024-10-12

## 2024-10-09 RX ORDER — EPINEPHRINE 1 MG/ML
0.3 INJECTION, SOLUTION INTRAMUSCULAR; SUBCUTANEOUS ONCE
Status: COMPLETED | OUTPATIENT
Start: 2024-10-09 | End: 2024-10-09

## 2024-10-09 RX ORDER — PROPOFOL 10 MG/ML
INJECTION, EMULSION INTRAVENOUS
Status: COMPLETED
Start: 2024-10-09 | End: 2024-10-09

## 2024-10-09 RX ORDER — MIDAZOLAM HYDROCHLORIDE 1 MG/ML
1-10 INJECTION, SOLUTION INTRAVENOUS CONTINUOUS
Status: DISCONTINUED | OUTPATIENT
Start: 2024-10-09 | End: 2024-10-11

## 2024-10-09 RX ORDER — IPRATROPIUM BROMIDE AND ALBUTEROL SULFATE 2.5; .5 MG/3ML; MG/3ML
1 SOLUTION RESPIRATORY (INHALATION)
Status: DISCONTINUED | OUTPATIENT
Start: 2024-10-09 | End: 2024-10-12

## 2024-10-09 RX ORDER — ALBUTEROL SULFATE 5 MG/ML
10 SOLUTION RESPIRATORY (INHALATION) EVERY 6 HOURS PRN
Status: DISCONTINUED | OUTPATIENT
Start: 2024-10-09 | End: 2024-10-09

## 2024-10-09 RX ORDER — ETOMIDATE 2 MG/ML
30 INJECTION INTRAVENOUS ONCE
Status: COMPLETED | OUTPATIENT
Start: 2024-10-09 | End: 2024-10-09

## 2024-10-09 RX ORDER — ENOXAPARIN SODIUM 100 MG/ML
40 INJECTION SUBCUTANEOUS DAILY
Status: DISCONTINUED | OUTPATIENT
Start: 2024-10-09 | End: 2024-10-09

## 2024-10-09 RX ORDER — EPINEPHRINE 1 MG/ML
INJECTION, SOLUTION INTRAMUSCULAR; SUBCUTANEOUS
Status: COMPLETED
Start: 2024-10-09 | End: 2024-10-09

## 2024-10-09 RX ORDER — ALBUTEROL SULFATE 0.83 MG/ML
2.5 SOLUTION RESPIRATORY (INHALATION)
Status: DISCONTINUED | OUTPATIENT
Start: 2024-10-09 | End: 2024-10-12

## 2024-10-09 RX ORDER — 0.9 % SODIUM CHLORIDE 0.9 %
2000 INTRAVENOUS SOLUTION INTRAVENOUS ONCE
Status: COMPLETED | OUTPATIENT
Start: 2024-10-09 | End: 2024-10-09

## 2024-10-09 RX ORDER — ALBUTEROL SULFATE 2.5 MG/.5ML
15 SOLUTION RESPIRATORY (INHALATION) CONTINUOUS
Status: DISPENSED | OUTPATIENT
Start: 2024-10-09 | End: 2024-10-09

## 2024-10-09 RX ORDER — ACETAMINOPHEN 325 MG/1
650 TABLET ORAL EVERY 6 HOURS PRN
Status: DISCONTINUED | OUTPATIENT
Start: 2024-10-09 | End: 2024-10-29 | Stop reason: HOSPADM

## 2024-10-09 RX ORDER — PHENYLEPHRINE HCL IN 0.9% NACL 50MG/250ML
10-300 PLASTIC BAG, INJECTION (ML) INTRAVENOUS CONTINUOUS
Status: DISCONTINUED | OUTPATIENT
Start: 2024-10-09 | End: 2024-10-14

## 2024-10-09 RX ORDER — POTASSIUM CHLORIDE 7.45 MG/ML
10 INJECTION INTRAVENOUS PRN
Status: DISCONTINUED | OUTPATIENT
Start: 2024-10-09 | End: 2024-10-24

## 2024-10-09 RX ORDER — ALBUTEROL SULFATE 5 MG/ML
15 SOLUTION RESPIRATORY (INHALATION) CONTINUOUS
Status: DISPENSED | OUTPATIENT
Start: 2024-10-09 | End: 2024-10-09

## 2024-10-09 RX ORDER — MIDAZOLAM HYDROCHLORIDE 1 MG/ML
1-10 INJECTION, SOLUTION INTRAVENOUS CONTINUOUS
Status: DISCONTINUED | OUTPATIENT
Start: 2024-10-09 | End: 2024-10-09

## 2024-10-09 RX ADMIN — ALBUTEROL SULFATE 15 MG: 2.5 SOLUTION RESPIRATORY (INHALATION) at 07:39

## 2024-10-09 RX ADMIN — SODIUM CHLORIDE 2000 ML: 9 INJECTION, SOLUTION INTRAVENOUS at 10:27

## 2024-10-09 RX ADMIN — ACETAMINOPHEN 650 MG: 325 TABLET ORAL at 16:28

## 2024-10-09 RX ADMIN — Medication 1000 MG: at 04:01

## 2024-10-09 RX ADMIN — FENTANYL CITRATE 100 MCG: 50 INJECTION, SOLUTION INTRAMUSCULAR; INTRAVENOUS at 04:00

## 2024-10-09 RX ADMIN — METOPROLOL TARTRATE 2.5 MG: 5 INJECTION INTRAVENOUS at 16:29

## 2024-10-09 RX ADMIN — PROPOFOL 20 MCG/KG/MIN: 10 INJECTION, EMULSION INTRAVENOUS at 21:24

## 2024-10-09 RX ADMIN — PROPOFOL 20 MCG/KG/MIN: 10 INJECTION, EMULSION INTRAVENOUS at 02:36

## 2024-10-09 RX ADMIN — PROPOFOL 50 MCG/KG/MIN: 10 INJECTION, EMULSION INTRAVENOUS at 05:44

## 2024-10-09 RX ADMIN — Medication 30 MCG/MIN: at 21:25

## 2024-10-09 RX ADMIN — ACETAMINOPHEN 650 MG: 325 TABLET ORAL at 15:14

## 2024-10-09 RX ADMIN — IPRATROPIUM BROMIDE AND ALBUTEROL SULFATE 1 DOSE: 2.5; .5 SOLUTION RESPIRATORY (INHALATION) at 20:20

## 2024-10-09 RX ADMIN — SODIUM CHLORIDE 10 ML/HR: 9 INJECTION, SOLUTION INTRAVENOUS at 21:38

## 2024-10-09 RX ADMIN — Medication 200 MCG/HR: at 07:45

## 2024-10-09 RX ADMIN — Medication 200 MCG/HR: at 20:11

## 2024-10-09 RX ADMIN — WATER 60 MG: 1 INJECTION INTRAMUSCULAR; INTRAVENOUS; SUBCUTANEOUS at 05:44

## 2024-10-09 RX ADMIN — FENTANYL CITRATE 100 MCG: 50 INJECTION, SOLUTION INTRAMUSCULAR; INTRAVENOUS at 02:50

## 2024-10-09 RX ADMIN — PANTOPRAZOLE SODIUM 40 MG: 40 INJECTION, POWDER, FOR SOLUTION INTRAVENOUS at 08:08

## 2024-10-09 RX ADMIN — ALBUTEROL SULFATE 2.5 MG: 2.5 SOLUTION RESPIRATORY (INHALATION) at 23:44

## 2024-10-09 RX ADMIN — IPRATROPIUM BROMIDE AND ALBUTEROL SULFATE 1 DOSE: 2.5; .5 SOLUTION RESPIRATORY (INHALATION) at 12:00

## 2024-10-09 RX ADMIN — HEPARIN SODIUM 5000 UNITS: 5000 INJECTION INTRAVENOUS; SUBCUTANEOUS at 22:18

## 2024-10-09 RX ADMIN — SUCCINYLCHOLINE CHLORIDE 120 MG: 20 INJECTION, SOLUTION INTRAMUSCULAR; INTRAVENOUS at 02:34

## 2024-10-09 RX ADMIN — SODIUM BICARBONATE: 84 INJECTION, SOLUTION INTRAVENOUS at 12:34

## 2024-10-09 RX ADMIN — SODIUM CHLORIDE, PRESERVATIVE FREE 10 ML: 5 INJECTION INTRAVENOUS at 21:30

## 2024-10-09 RX ADMIN — METOPROLOL TARTRATE 2.5 MG: 5 INJECTION INTRAVENOUS at 12:54

## 2024-10-09 RX ADMIN — IPRATROPIUM BROMIDE 0.5 MG: 0.5 SOLUTION RESPIRATORY (INHALATION) at 03:15

## 2024-10-09 RX ADMIN — Medication 100 MCG/HR: at 02:48

## 2024-10-09 RX ADMIN — Medication 10 MG/HR: at 17:38

## 2024-10-09 RX ADMIN — Medication 2 MG/HR: at 06:58

## 2024-10-09 RX ADMIN — METHYLPREDNISOLONE SODIUM SUCCINATE 80 MG: 125 INJECTION, POWDER, LYOPHILIZED, FOR SOLUTION INTRAMUSCULAR; INTRAVENOUS at 21:38

## 2024-10-09 RX ADMIN — EPINEPHRINE 0.3 MG: 1 INJECTION INTRAMUSCULAR; INTRAVENOUS; SUBCUTANEOUS at 02:30

## 2024-10-09 RX ADMIN — EPINEPHRINE 0.3 MG: 1 INJECTION, SOLUTION INTRAMUSCULAR; SUBCUTANEOUS at 02:30

## 2024-10-09 RX ADMIN — ALBUTEROL SULFATE 15 MG: 5 SOLUTION RESPIRATORY (INHALATION) at 03:21

## 2024-10-09 RX ADMIN — SODIUM CHLORIDE: 9 INJECTION, SOLUTION INTRAVENOUS at 05:47

## 2024-10-09 RX ADMIN — IPRATROPIUM BROMIDE AND ALBUTEROL SULFATE 1 DOSE: 2.5; .5 SOLUTION RESPIRATORY (INHALATION) at 16:07

## 2024-10-09 RX ADMIN — ALBUTEROL SULFATE 10 MG: 5 SOLUTION RESPIRATORY (INHALATION) at 03:15

## 2024-10-09 RX ADMIN — ETOMIDATE 30 MG: 2 INJECTION INTRAVENOUS at 02:33

## 2024-10-09 RX ADMIN — METHYLPREDNISOLONE SODIUM SUCCINATE 80 MG: 125 INJECTION, POWDER, LYOPHILIZED, FOR SOLUTION INTRAMUSCULAR; INTRAVENOUS at 12:31

## 2024-10-09 RX ADMIN — AZITHROMYCIN MONOHYDRATE 500 MG: 500 INJECTION, POWDER, LYOPHILIZED, FOR SOLUTION INTRAVENOUS at 04:08

## 2024-10-09 RX ADMIN — CISATRACURIUM BESYLATE 2 MCG/KG/MIN: 200 INJECTION, SOLUTION INTRAVENOUS at 22:13

## 2024-10-09 RX ADMIN — HEPARIN SODIUM 5000 UNITS: 5000 INJECTION INTRAVENOUS; SUBCUTANEOUS at 13:30

## 2024-10-09 ASSESSMENT — PULMONARY FUNCTION TESTS
PIF_VALUE: 23
PIF_VALUE: 11
PIF_VALUE: 10
PIF_VALUE: 22
PIF_VALUE: 8
PIF_VALUE: 26
PIF_VALUE: 17
PIF_VALUE: 26
PIF_VALUE: 14
PIF_VALUE: 26
PIF_VALUE: 16
PIF_VALUE: 23
PIF_VALUE: 23
PIF_VALUE: 25
PIF_VALUE: 24
PIF_VALUE: 25
PIF_VALUE: 23
PIF_VALUE: 26
PIF_VALUE: 18
PIF_VALUE: 41
PIF_VALUE: 25
PIF_VALUE: 27
PIF_VALUE: 25
PIF_VALUE: 18
PIF_VALUE: 26
PIF_VALUE: 24
PIF_VALUE: 28
PIF_VALUE: 18
PIF_VALUE: 27
PIF_VALUE: 23
PIF_VALUE: 11
PIF_VALUE: 23
PIF_VALUE: 25
PIF_VALUE: 27
PIF_VALUE: 21
PIF_VALUE: 27
PIF_VALUE: 25

## 2024-10-09 NOTE — ED PROVIDER NOTES
[facility] ED  Emergency Department Encounter  Emergency Medicine Resident     Pt Name:Austin Kwon  MRN: [id number]  Birthdate 1980  Date of evaluation: 10/9/24  PCP:  Javi Oden DO  Note Started: 3:13 AM EDT      CHIEF COMPLAINT       Chief Complaint   Patient presents with    Respiratory Distress       HISTORY OF PRESENT ILLNESS  (Location/Symptom, Timing/Onset, Context/Setting, Quality, Duration, Modifying Factors, Severity.)      Austin Kwon is a 44 y.o. male with PMH including asthma who presents emergency department via EMS with severe asthma exacerbation.  Per EMS, patient was extremely anxious at home when they arrived actively experiencing a severe asthma exacerbation.  He was given Versed by EMS with attempts to calm him down.  Additionally given Combivent, magnesium, and steroids by EMS.  Arrived to our facility with nonrebreather on with a GCS of 7 with severe wheezing and decreased breath sounds.  Patient is not protecting his airway.  Decision made to intubate the patient using etomidate and succinylcholine.  No outward signs of trauma.  Patient unable to provide any medical history.  He is moving all 4 extremities and appears confused.  Tachycardic but hemodynamically stable    He was given breathing treatment by ED respiratory therapy and IM epi 0.3 while arranging for intubation.    PAST MEDICAL / SURGICAL / SOCIAL / FAMILY HISTORY      has a past medical history of DULCE MARIA (acute kidney injury) (HCC) and Asthma.       has no past surgical history on file.      Social History     Socioeconomic History    Marital status:      Spouse name: Not on file    Number of children: Not on file    Years of education: Not on file    Highest education level: Not on file   Occupational History    Not on file   Tobacco Use    Smoking status: Former     Current packs/day: 0.00     Types: Cigarettes     Quit date: 2000     Years since quittin.6    Smokeless

## 2024-10-09 NOTE — ED NOTES
Pt presents to ED via [facility] for respiratory distress. Per EMS, pt called for difficulty breathing with Hx of asthma. Upon arrival, pt was erratic and breathing rapidly, tripoding. Pt given combivent, prednisone unknown dose, 2g magnesium, and 4mg versed. Upon arrival, pt is altered, GCS 7, breathing rapidly, on 15LNRB. EMS state pt was not tolerating CPAP. Pt continued on 15LNRB. Pt throwing his arms around. Unable to assess full triage questions.

## 2024-10-09 NOTE — H&P
Critical Care - History and Physical Examination    Patient's name:  Austin Kwon  Medical Record Number: [id number]  Patient's account/billing number: [id number]  Patient's YOB: 1980  Age: 44 y.o.  Date of Admission: 10/9/2024  2:19 AM  Date of History and Physical Examination: 10/9/2024    Primary Care Physician: Javi Oden DO  Attending Physician: Matt Walker MD     Code Status: Full Code    Chief complaint:   Chief Complaint   Patient presents with    Respiratory Distress       HISTORY OF PRESENT ILLNESS:   Austin Kwon is a 44 y.o. male with a past medical history significant for asthma on Advair, albuterol and singular at home.  Presented to ER via EMS for exacerbation.  Per report from EMS patient was agitated and was given 4 mg of Versed also given Combivent magnesium, and steroids.  In the ED GCS was 7, severe wheezing and decreased breath sounds and decision was to intubate the etomidate and succinylcholine.  Was given Rocephin 1 g and azithromycin 500 mg, 1 mg of epi placed on continuous albuterol nebulizer.  Started on propofol fentanyl.  Pertinent labs showed initial VBG 7.10, 96, 179, 24 prior to intubation.  Postintubation ABG 7.19, 62, 674, 24  Vent setting initially 100/16/570/8 and subsequently 40/16/570/8    Creatinine 1.3 baseline normal, BUN 9, did not receive fluid.  Unremarkable liver function  White count 13.1 lactic acid 2.9    Chest x-ray showed no acute changes.        Past Medical History:        Diagnosis Date    DULCE MARIA (acute kidney injury) (HCC) 6/23/2020    Asthma        Past Surgical History:  No past surgical history on file.    Allergies:    Allergies   Allergen Reactions    Seasonal          Home Meds:   Prior to Admission medications    Medication Sig Start Date End Date Taking? Authorizing Provider   albuterol sulfate HFA (PROVENTIL;VENTOLIN;PROAIR) 108 (90 Base) MCG/ACT inhaler inhale 2 puffs by mouth and INTO THE LUNGS four times a day if

## 2024-10-09 NOTE — PROCEDURES
PROCEDURE NOTE  PROCEDURE NOTE - ARTERIAL LINE PLACEMENT    PATIENT NAME: Austin Kwon  MEDICAL RECORD NO. [id number]  DATE: 10/9/2024  ATTENDING PHYSICIAN:  Dr. Alejandra      PREOPERATIVE DIAGNOSIS:  Need for blood pressure monitoring  POSTOPERATIVE DIAGNOSIS:  Same  PROCEDURE PERFORMED: Right Radial Arterial Line Insertion  PERFORMING PHYSICIAN:  Fredy Hines MD  ESTIMATED BLOOD LOSS:  Less than 25 ml  COMPLICATIONS:  None immediately appreciated.     DISCUSSION:  Austin Kwon is a 44 y.o. male who requires invasive pressure monitoring. The history and physical examination were reviewed and confirmed.      CONSENT: Unable to be obtained due to patient's condition.     PROCEDURE:  A timeout was initiated by the bedside nurse and was confirmed by those present.  The patient was placed in a supine position. The skin overlying the Right Radial was prepped with chlorhexadine. Through this region, the introducer needle through catheter was inserted into R radial until pulsatile bright blood was seen in collection tubing. Guidewire was advanced with no resistance. Catheter was advanced into the artery, wire was pulled with brisk bleeding noted. Pressure monitoring setup was connected to the catheter, it aspirated and flushed easily. The catheter was secured to the wrist with 3-0 silk.     No immediate complication was evident.  All sponge, instrument and needle counts were correct at the completion of the procedure.      Fredy Hines MD  2:06 PM, 10/9/24

## 2024-10-09 NOTE — ED PROVIDER NOTES
[facility]     Emergency Department     Faculty Attestation    I performed a history and physical examination of the patient and discussed management with the resident. I have reviewed and agree with the resident’s findings including all diagnostic interpretations, and treatment plans as written. Any areas of disagreement are noted on the chart. I was personally present for the key portions of any procedures. I have documented in the chart those procedures where I was not present during the key portions. I have reviewed the emergency nurses triage note. I agree with the chief complaint, past medical history, past surgical history, allergies, medications, social and family history as documented unless otherwise noted below. Documentation of the HPI, Physical Exam and Medical Decision Making performed by polly is based on my personal performance of the HPI, PE and MDM. For Physician Assistant/ Nurse Practitioner cases/documentation I have personally evaluated this patient and have completed at least one if not all key elements of the E/M (history, physical exam, and MDM). Additional findings are as noted.    Note Started: 3:06 AM EDT     45 yo M acute respiratory distress, ems report acute asthma exacerbation, giving steroid & versed,   PE tachy, gcs 7-8 marked distress, audible wheeze,   Respiratory fatigue    -emergently intubated / icu admit   Abx,     EKG Interpretation    Interpreted by me  Sinus tachycardia, hr 109, no ischemia, R axis, qtc 457    CRITICAL CARE: There was a high probability of clinically significant/life threatening deterioration in this patient's condition which required my urgent intervention.  Total critical care time was 15 minutes.  This excludes any time for separately reportable procedures.       Julio Noland DO  10/09/24 0310       Julio Hoyos DO  10/09/24 0420

## 2024-10-09 NOTE — ED NOTES
The following labs were labeled with appropriate pt sticker and tubed to lab:     [x] Blue     [x] Lavender   [] on ice  [x] Green/yellow  [x] Green/black [x] on ice  [] Grey  [] on ice  [x] Yellow  [x] Red  [] Pink  [] Type/ Screen  [] ABG  [] VBG    [] COVID-19 swab    [] Rapid  [] PCR  [] Flu swab  [] Peds Viral Panel     [] Urine Sample  [] Fecal Sample  [] Pelvic Cultures  [] Blood Cultures  [] X 2  [] STREP Cultures

## 2024-10-10 LAB
ALLEN TEST: ABNORMAL
ANION GAP SERPL CALCULATED.3IONS-SCNC: 11 MMOL/L (ref 9–16)
ANION GAP SERPL CALCULATED.3IONS-SCNC: 6 MMOL/L (ref 9–16)
ANION GAP SERPL CALCULATED.3IONS-SCNC: 7 MMOL/L (ref 9–16)
BASOPHILS # BLD: 0 K/UL (ref 0–0.2)
BASOPHILS NFR BLD: 0 % (ref 0–2)
BUN SERPL-MCNC: 15 MG/DL (ref 6–20)
BUN SERPL-MCNC: 16 MG/DL (ref 6–20)
BUN SERPL-MCNC: 20 MG/DL (ref 6–20)
C3 SERPL-MCNC: 134 MG/DL (ref 90–180)
C4 SERPL-MCNC: 30 MG/DL (ref 10–40)
CALCIUM SERPL-MCNC: 7.9 MG/DL (ref 8.6–10.4)
CALCIUM SERPL-MCNC: 8.2 MG/DL (ref 8.6–10.4)
CALCIUM SERPL-MCNC: 8.5 MG/DL (ref 8.6–10.4)
CHLORIDE SERPL-SCNC: 103 MMOL/L (ref 98–107)
CHLORIDE SERPL-SCNC: 104 MMOL/L (ref 98–107)
CHLORIDE SERPL-SCNC: 105 MMOL/L (ref 98–107)
CK SERPL-CCNC: 2394 U/L (ref 39–308)
CK SERPL-CCNC: 2564 U/L (ref 39–308)
CO2 SERPL-SCNC: 23 MMOL/L (ref 20–31)
CO2 SERPL-SCNC: 27 MMOL/L (ref 20–31)
CO2 SERPL-SCNC: 29 MMOL/L (ref 20–31)
CREAT SERPL-MCNC: 1.3 MG/DL (ref 0.7–1.2)
CREAT SERPL-MCNC: 1.5 MG/DL (ref 0.7–1.2)
CREAT SERPL-MCNC: 2.2 MG/DL (ref 0.7–1.2)
EOSINOPHIL # BLD: 0 K/UL (ref 0–0.4)
EOSINOPHILS RELATIVE PERCENT: 0 % (ref 1–4)
ERYTHROCYTE [DISTWIDTH] IN BLOOD BY AUTOMATED COUNT: 13.2 % (ref 11.8–14.4)
FIO2: 40
FIO2: 40
GFR, ESTIMATED: 38 ML/MIN/1.73M2
GFR, ESTIMATED: 58 ML/MIN/1.73M2
GFR, ESTIMATED: 71 ML/MIN/1.73M2
GLUCOSE BLD-MCNC: 127 MG/DL (ref 75–110)
GLUCOSE BLD-MCNC: 134 MG/DL (ref 75–110)
GLUCOSE BLD-MCNC: 143 MG/DL (ref 74–100)
GLUCOSE BLD-MCNC: 146 MG/DL (ref 75–110)
GLUCOSE BLD-MCNC: 151 MG/DL (ref 74–100)
GLUCOSE BLD-MCNC: 151 MG/DL (ref 75–110)
GLUCOSE SERPL-MCNC: 134 MG/DL (ref 74–99)
GLUCOSE SERPL-MCNC: 146 MG/DL (ref 74–99)
GLUCOSE SERPL-MCNC: 154 MG/DL (ref 74–99)
HCT VFR BLD AUTO: 35.5 % (ref 40.7–50.3)
HGB BLD-MCNC: 12.5 G/DL (ref 13–17)
IMM GRANULOCYTES # BLD AUTO: 0.14 K/UL (ref 0–0.3)
IMM GRANULOCYTES NFR BLD: 1 %
LACTIC ACID, WHOLE BLOOD: 0.7 MMOL/L (ref 0.7–2.1)
LYMPHOCYTES NFR BLD: 0.7 K/UL (ref 1–4.8)
LYMPHOCYTES RELATIVE PERCENT: 5 % (ref 24–44)
MAGNESIUM SERPL-MCNC: 2.2 MG/DL (ref 1.6–2.6)
MCH RBC QN AUTO: 27.5 PG (ref 25.2–33.5)
MCHC RBC AUTO-ENTMCNC: 35.2 G/DL (ref 28.4–34.8)
MCV RBC AUTO: 78.2 FL (ref 82.6–102.9)
MICROORGANISM SPEC CULT: NO GROWTH
MONOCYTES NFR BLD: 0.7 K/UL (ref 0.1–0.8)
MONOCYTES NFR BLD: 5 % (ref 1–7)
MORPHOLOGY: ABNORMAL
NEUTROPHILS NFR BLD: 89 % (ref 36–66)
NEUTS SEG NFR BLD: 12.46 K/UL (ref 1.8–7.7)
NRBC BLD-RTO: 0 PER 100 WBC
O2 DELIVERY DEVICE: ABNORMAL
O2 DELIVERY DEVICE: ABNORMAL
PLATELET # BLD AUTO: ABNORMAL K/UL (ref 138–453)
PLATELET, FLUORESCENCE: 195 K/UL (ref 138–453)
PLATELETS.RETICULATED NFR BLD AUTO: 9.7 % (ref 1.1–10.3)
POC HCO3: 31.1 MMOL/L (ref 21–28)
POC HCO3: 32.6 MMOL/L (ref 21–28)
POC O2 SATURATION: 97.5 % (ref 94–98)
POC O2 SATURATION: 97.9 % (ref 94–98)
POC PCO2: 61.7 MM HG (ref 35–48)
POC PCO2: 61.7 MM HG (ref 35–48)
POC PH: 7.31 (ref 7.35–7.45)
POC PH: 7.33 (ref 7.35–7.45)
POC PO2: 108.1 MM HG (ref 83–108)
POC PO2: 113.3 MM HG (ref 83–108)
POSITIVE BASE EXCESS, ART: 3.3 MMOL/L (ref 0–3)
POSITIVE BASE EXCESS, ART: 4.9 MMOL/L (ref 0–3)
POTASSIUM SERPL-SCNC: 4.5 MMOL/L (ref 3.7–5.3)
POTASSIUM SERPL-SCNC: 4.6 MMOL/L (ref 3.7–5.3)
POTASSIUM SERPL-SCNC: 5 MMOL/L (ref 3.7–5.3)
RBC # BLD AUTO: 4.54 M/UL (ref 4.21–5.77)
SAMPLE SITE: ABNORMAL
SAMPLE SITE: ABNORMAL
SERVICE CMNT-IMP: NORMAL
SODIUM SERPL-SCNC: 138 MMOL/L (ref 136–145)
SODIUM SERPL-SCNC: 138 MMOL/L (ref 136–145)
SODIUM SERPL-SCNC: 139 MMOL/L (ref 136–145)
SPECIMEN DESCRIPTION: NORMAL
WBC OTHER # BLD: 14 K/UL (ref 3.5–11.3)

## 2024-10-10 PROCEDURE — 6360000002 HC RX W HCPCS

## 2024-10-10 PROCEDURE — 94003 VENT MGMT INPAT SUBQ DAY: CPT

## 2024-10-10 PROCEDURE — 83605 ASSAY OF LACTIC ACID: CPT

## 2024-10-10 PROCEDURE — 37799 UNLISTED PX VASCULAR SURGERY: CPT

## 2024-10-10 PROCEDURE — 2000000000 HC ICU R&B

## 2024-10-10 PROCEDURE — 80048 BASIC METABOLIC PNL TOTAL CA: CPT

## 2024-10-10 PROCEDURE — 6370000000 HC RX 637 (ALT 250 FOR IP): Performed by: INTERNAL MEDICINE

## 2024-10-10 PROCEDURE — 2580000003 HC RX 258

## 2024-10-10 PROCEDURE — 99232 SBSQ HOSP IP/OBS MODERATE 35: CPT | Performed by: INTERNAL MEDICINE

## 2024-10-10 PROCEDURE — 2500000003 HC RX 250 WO HCPCS

## 2024-10-10 PROCEDURE — 94640 AIRWAY INHALATION TREATMENT: CPT

## 2024-10-10 PROCEDURE — 2700000000 HC OXYGEN THERAPY PER DAY

## 2024-10-10 PROCEDURE — 6360000002 HC RX W HCPCS: Performed by: STUDENT IN AN ORGANIZED HEALTH CARE EDUCATION/TRAINING PROGRAM

## 2024-10-10 PROCEDURE — 99291 CRITICAL CARE FIRST HOUR: CPT | Performed by: STUDENT IN AN ORGANIZED HEALTH CARE EDUCATION/TRAINING PROGRAM

## 2024-10-10 PROCEDURE — 94761 N-INVAS EAR/PLS OXIMETRY MLT: CPT

## 2024-10-10 PROCEDURE — 94645 CONT INHLJ TX EACH ADDL HOUR: CPT

## 2024-10-10 PROCEDURE — 85055 RETICULATED PLATELET ASSAY: CPT

## 2024-10-10 PROCEDURE — 6360000002 HC RX W HCPCS: Performed by: INTERNAL MEDICINE

## 2024-10-10 PROCEDURE — 82947 ASSAY GLUCOSE BLOOD QUANT: CPT

## 2024-10-10 PROCEDURE — 85025 COMPLETE CBC W/AUTO DIFF WBC: CPT

## 2024-10-10 PROCEDURE — 82803 BLOOD GASES ANY COMBINATION: CPT

## 2024-10-10 PROCEDURE — 82550 ASSAY OF CK (CPK): CPT

## 2024-10-10 RX ORDER — SODIUM CHLORIDE, SODIUM LACTATE, POTASSIUM CHLORIDE, AND CALCIUM CHLORIDE .6; .31; .03; .02 G/100ML; G/100ML; G/100ML; G/100ML
500 INJECTION, SOLUTION INTRAVENOUS ONCE
Status: COMPLETED | OUTPATIENT
Start: 2024-10-10 | End: 2024-10-10

## 2024-10-10 RX ORDER — SODIUM CHLORIDE, SODIUM LACTATE, POTASSIUM CHLORIDE, CALCIUM CHLORIDE 600; 310; 30; 20 MG/100ML; MG/100ML; MG/100ML; MG/100ML
INJECTION, SOLUTION INTRAVENOUS CONTINUOUS
Status: DISCONTINUED | OUTPATIENT
Start: 2024-10-10 | End: 2024-10-14

## 2024-10-10 RX ADMIN — PROPOFOL 35 MCG/KG/MIN: 10 INJECTION, EMULSION INTRAVENOUS at 12:46

## 2024-10-10 RX ADMIN — IPRATROPIUM BROMIDE AND ALBUTEROL SULFATE 1 DOSE: 2.5; .5 SOLUTION RESPIRATORY (INHALATION) at 08:15

## 2024-10-10 RX ADMIN — HEPARIN SODIUM 5000 UNITS: 5000 INJECTION INTRAVENOUS; SUBCUTANEOUS at 13:56

## 2024-10-10 RX ADMIN — Medication 200 MCG/HR: at 11:37

## 2024-10-10 RX ADMIN — METHYLPREDNISOLONE SODIUM SUCCINATE 80 MG: 125 INJECTION, POWDER, LYOPHILIZED, FOR SOLUTION INTRAMUSCULAR; INTRAVENOUS at 05:00

## 2024-10-10 RX ADMIN — Medication 10 MG/HR: at 03:40

## 2024-10-10 RX ADMIN — CISATRACURIUM BESYLATE 2 MCG/KG/MIN: 200 INJECTION, SOLUTION INTRAVENOUS at 18:03

## 2024-10-10 RX ADMIN — SODIUM BICARBONATE: 84 INJECTION, SOLUTION INTRAVENOUS at 00:00

## 2024-10-10 RX ADMIN — SODIUM CHLORIDE, POTASSIUM CHLORIDE, SODIUM LACTATE AND CALCIUM CHLORIDE 500 ML: 600; 310; 30; 20 INJECTION, SOLUTION INTRAVENOUS at 03:51

## 2024-10-10 RX ADMIN — IPRATROPIUM BROMIDE AND ALBUTEROL SULFATE 1 DOSE: 2.5; .5 SOLUTION RESPIRATORY (INHALATION) at 19:43

## 2024-10-10 RX ADMIN — PANTOPRAZOLE SODIUM 40 MG: 40 INJECTION, POWDER, FOR SOLUTION INTRAVENOUS at 08:08

## 2024-10-10 RX ADMIN — Medication 200 MCG/HR: at 18:10

## 2024-10-10 RX ADMIN — IPRATROPIUM BROMIDE AND ALBUTEROL SULFATE 1 DOSE: 2.5; .5 SOLUTION RESPIRATORY (INHALATION) at 11:34

## 2024-10-10 RX ADMIN — ALBUTEROL SULFATE 2.5 MG: 2.5 SOLUTION RESPIRATORY (INHALATION) at 23:31

## 2024-10-10 RX ADMIN — HEPARIN SODIUM 5000 UNITS: 5000 INJECTION INTRAVENOUS; SUBCUTANEOUS at 06:30

## 2024-10-10 RX ADMIN — IPRATROPIUM BROMIDE AND ALBUTEROL SULFATE 1 DOSE: 2.5; .5 SOLUTION RESPIRATORY (INHALATION) at 15:37

## 2024-10-10 RX ADMIN — Medication 10 MG/HR: at 13:46

## 2024-10-10 RX ADMIN — PROPOFOL 25 MCG/KG/MIN: 10 INJECTION, EMULSION INTRAVENOUS at 04:28

## 2024-10-10 RX ADMIN — PROPOFOL 35 MCG/KG/MIN: 10 INJECTION, EMULSION INTRAVENOUS at 17:59

## 2024-10-10 RX ADMIN — METHYLPREDNISOLONE SODIUM SUCCINATE 80 MG: 125 INJECTION, POWDER, LYOPHILIZED, FOR SOLUTION INTRAMUSCULAR; INTRAVENOUS at 12:48

## 2024-10-10 RX ADMIN — METHYLPREDNISOLONE SODIUM SUCCINATE 80 MG: 125 INJECTION, POWDER, LYOPHILIZED, FOR SOLUTION INTRAMUSCULAR; INTRAVENOUS at 21:08

## 2024-10-10 RX ADMIN — HEPARIN SODIUM 5000 UNITS: 5000 INJECTION INTRAVENOUS; SUBCUTANEOUS at 22:00

## 2024-10-10 RX ADMIN — CALCIUM GLUCONATE 1000 MG: 20 INJECTION, SOLUTION INTRAVENOUS at 00:12

## 2024-10-10 RX ADMIN — SODIUM CHLORIDE, PRESERVATIVE FREE 10 ML: 5 INJECTION INTRAVENOUS at 08:08

## 2024-10-10 RX ADMIN — SODIUM CHLORIDE, PRESERVATIVE FREE 10 ML: 5 INJECTION INTRAVENOUS at 20:07

## 2024-10-10 RX ADMIN — ALBUTEROL SULFATE 2.5 MG: 2.5 SOLUTION RESPIRATORY (INHALATION) at 03:29

## 2024-10-10 RX ADMIN — SODIUM CHLORIDE, POTASSIUM CHLORIDE, SODIUM LACTATE AND CALCIUM CHLORIDE: 600; 310; 30; 20 INJECTION, SOLUTION INTRAVENOUS at 11:06

## 2024-10-10 ASSESSMENT — PULMONARY FUNCTION TESTS
PIF_VALUE: 30
PIF_VALUE: 33
PIF_VALUE: 35
PIF_VALUE: 30
PIF_VALUE: 31
PIF_VALUE: 32
PIF_VALUE: 36
PIF_VALUE: 35
PIF_VALUE: 37
PIF_VALUE: 30
PIF_VALUE: 38
PIF_VALUE: 30
PIF_VALUE: 28
PIF_VALUE: 30
PIF_VALUE: 29
PIF_VALUE: 36
PIF_VALUE: 29
PIF_VALUE: 30
PIF_VALUE: 31
PIF_VALUE: 31
PIF_VALUE: 38
PIF_VALUE: 40
PIF_VALUE: 46
PIF_VALUE: 29
PIF_VALUE: 32
PIF_VALUE: 32
PIF_VALUE: 35
PIF_VALUE: 35
PIF_VALUE: 30
PIF_VALUE: 33
PIF_VALUE: 29
PIF_VALUE: 30
PIF_VALUE: 29
PIF_VALUE: 36
PIF_VALUE: 29
PIF_VALUE: 45

## 2024-10-10 NOTE — CARE COORDINATION
Case Management Assessment  Initial Evaluation    Date/Time of Evaluation: 10/10/2024 3:17 PM  Assessment Completed by: LENNY VENTURA RN    If patient is discharged prior to next notation, then this note serves as note for discharge by case management.    Patient Name: Austin Kwon                   YOB: 1980  Diagnosis: Severe persistent asthma with exacerbation [J45.51]  Acute respiratory failure with hypercapnia [J96.02]  Life threatening acute exacerbation of asthma [J45.901]                   Date / Time: 10/9/2024  2:19 AM    Patient Admission Status: Inpatient   Readmission Risk (Low < 19, Mod (19-27), High > 27): Readmission Risk Score: 12.3    Current PCP: Javi Oden,   PCP verified by CM? Yes (Javi Oden DO)    Chart Reviewed: Yes      History Provided by: Child/Family (pt's sister Trinidad)  Patient Orientation: Sedated    Patient Cognition: Other (see comment) (Intubated/ Sedated/ Paralyzed)    Hospitalization in the last 30 days (Readmission):  No    If yes, Readmission Assessment in  Navigator will be completed.    Advance Directives:      Code Status: Full Code   Patient's Primary Decision Maker is: Legal Next of Kin      Discharge Planning:    Patient lives with: Other (Comment) (malinda- pt intubated) Type of Home: Other (Comment) (malinda- pt intubated)  Primary Care Giver: Self  Patient Support Systems include: Parent, Family Members   Current Financial resources: Medicaid ([employer] [facility])  Current community resources: None  Current services prior to admission: (P) None            Current DME:              Type of Home Care services:  (P) PT, OT, Nursing Services, Skilled Therapy    ADLS  Prior functional level: Independent in ADLs/IADLs  Current functional level: Assistance with the following:, Bathing, Dressing, Toileting, Mobility, Other (see comment) (Intubated/ Sedated/ Paralyzed)    PT AM-PAC:   /24  OT AM-PAC:   /24    Family can provide

## 2024-10-11 LAB
ANION GAP SERPL CALCULATED.3IONS-SCNC: 7 MMOL/L (ref 9–16)
BASOPHILS # BLD: <0.03 K/UL (ref 0–0.2)
BASOPHILS NFR BLD: 0 % (ref 0–2)
BUN SERPL-MCNC: 17 MG/DL (ref 6–20)
CALCIUM SERPL-MCNC: 8.6 MG/DL (ref 8.6–10.4)
CHLORIDE SERPL-SCNC: 103 MMOL/L (ref 98–107)
CK SERPL-CCNC: 1821 U/L (ref 39–308)
CO2 SERPL-SCNC: 28 MMOL/L (ref 20–31)
CREAT SERPL-MCNC: 1.1 MG/DL (ref 0.7–1.2)
EOSINOPHIL # BLD: <0.03 K/UL (ref 0–0.44)
EOSINOPHILS RELATIVE PERCENT: 0 % (ref 1–4)
ERYTHROCYTE [DISTWIDTH] IN BLOOD BY AUTOMATED COUNT: 13.2 % (ref 11.8–14.4)
FIO2: 40
GFR, ESTIMATED: 87 ML/MIN/1.73M2
GLUCOSE BLD-MCNC: 138 MG/DL (ref 75–110)
GLUCOSE BLD-MCNC: 139 MG/DL (ref 75–110)
GLUCOSE BLD-MCNC: 151 MG/DL (ref 74–100)
GLUCOSE BLD-MCNC: 166 MG/DL (ref 75–110)
GLUCOSE SERPL-MCNC: 159 MG/DL (ref 74–99)
HCT VFR BLD AUTO: 34 % (ref 40.7–50.3)
HGB BLD-MCNC: 11.9 G/DL (ref 13–17)
IMM GRANULOCYTES # BLD AUTO: 0.07 K/UL (ref 0–0.3)
IMM GRANULOCYTES NFR BLD: 1 %
LYMPHOCYTES NFR BLD: 0.8 K/UL (ref 1.1–3.7)
LYMPHOCYTES RELATIVE PERCENT: 8 % (ref 24–43)
MCH RBC QN AUTO: 27.6 PG (ref 25.2–33.5)
MCHC RBC AUTO-ENTMCNC: 35 G/DL (ref 28.4–34.8)
MCV RBC AUTO: 78.9 FL (ref 82.6–102.9)
MONOCYTES NFR BLD: 0.69 K/UL (ref 0.1–1.2)
MONOCYTES NFR BLD: 7 % (ref 3–12)
NEUTROPHILS NFR BLD: 84 % (ref 36–65)
NEUTS SEG NFR BLD: 8.76 K/UL (ref 1.5–8.1)
NRBC BLD-RTO: 0 PER 100 WBC
PATIENT TEMP: 36.2
PLATELET # BLD AUTO: 153 K/UL (ref 138–453)
PMV BLD AUTO: 12.6 FL (ref 8.1–13.5)
POC HCO3: 32.7 MMOL/L (ref 21–28)
POC O2 SATURATION: 99.1 % (ref 94–98)
POC PCO2: 54.8 MM HG (ref 35–48)
POC PH: 7.38 (ref 7.35–7.45)
POC PO2: 141.6 MM HG (ref 83–108)
POSITIVE BASE EXCESS, ART: 6.2 MMOL/L (ref 0–3)
POTASSIUM SERPL-SCNC: 4.8 MMOL/L (ref 3.7–5.3)
RBC # BLD AUTO: 4.31 M/UL (ref 4.21–5.77)
RBC # BLD: ABNORMAL 10*6/UL
SODIUM SERPL-SCNC: 138 MMOL/L (ref 136–145)
WBC OTHER # BLD: 10.3 K/UL (ref 3.5–11.3)

## 2024-10-11 PROCEDURE — 6360000002 HC RX W HCPCS: Performed by: INTERNAL MEDICINE

## 2024-10-11 PROCEDURE — 2580000003 HC RX 258

## 2024-10-11 PROCEDURE — 82803 BLOOD GASES ANY COMBINATION: CPT

## 2024-10-11 PROCEDURE — 6360000002 HC RX W HCPCS

## 2024-10-11 PROCEDURE — 51798 US URINE CAPACITY MEASURE: CPT

## 2024-10-11 PROCEDURE — 6370000000 HC RX 637 (ALT 250 FOR IP): Performed by: INTERNAL MEDICINE

## 2024-10-11 PROCEDURE — 2580000003 HC RX 258: Performed by: INTERNAL MEDICINE

## 2024-10-11 PROCEDURE — 2000000000 HC ICU R&B

## 2024-10-11 PROCEDURE — 85025 COMPLETE CBC W/AUTO DIFF WBC: CPT

## 2024-10-11 PROCEDURE — 80048 BASIC METABOLIC PNL TOTAL CA: CPT

## 2024-10-11 PROCEDURE — 82947 ASSAY GLUCOSE BLOOD QUANT: CPT

## 2024-10-11 PROCEDURE — 6370000000 HC RX 637 (ALT 250 FOR IP)

## 2024-10-11 PROCEDURE — 2500000003 HC RX 250 WO HCPCS

## 2024-10-11 PROCEDURE — 94003 VENT MGMT INPAT SUBQ DAY: CPT

## 2024-10-11 PROCEDURE — 99291 CRITICAL CARE FIRST HOUR: CPT | Performed by: STUDENT IN AN ORGANIZED HEALTH CARE EDUCATION/TRAINING PROGRAM

## 2024-10-11 PROCEDURE — 99232 SBSQ HOSP IP/OBS MODERATE 35: CPT | Performed by: INTERNAL MEDICINE

## 2024-10-11 PROCEDURE — 94761 N-INVAS EAR/PLS OXIMETRY MLT: CPT

## 2024-10-11 PROCEDURE — 94640 AIRWAY INHALATION TREATMENT: CPT

## 2024-10-11 PROCEDURE — 82550 ASSAY OF CK (CPK): CPT

## 2024-10-11 PROCEDURE — 6360000002 HC RX W HCPCS: Performed by: STUDENT IN AN ORGANIZED HEALTH CARE EDUCATION/TRAINING PROGRAM

## 2024-10-11 PROCEDURE — 37799 UNLISTED PX VASCULAR SURGERY: CPT

## 2024-10-11 PROCEDURE — 2700000000 HC OXYGEN THERAPY PER DAY

## 2024-10-11 RX ORDER — MIDAZOLAM HYDROCHLORIDE 1 MG/ML
1-10 INJECTION, SOLUTION INTRAVENOUS CONTINUOUS
Status: DISCONTINUED | OUTPATIENT
Start: 2024-10-11 | End: 2024-10-18

## 2024-10-11 RX ORDER — PROPOFOL 10 MG/ML
5-80 INJECTION, EMULSION INTRAVENOUS CONTINUOUS
Status: DISCONTINUED | OUTPATIENT
Start: 2024-10-11 | End: 2024-10-15

## 2024-10-11 RX ORDER — LANSOPRAZOLE 30 MG/1
30 TABLET, ORALLY DISINTEGRATING, DELAYED RELEASE ORAL DAILY
Status: DISCONTINUED | OUTPATIENT
Start: 2024-10-12 | End: 2024-10-29 | Stop reason: HOSPADM

## 2024-10-11 RX ORDER — ALBUTEROL SULFATE 90 UG/1
10 INHALANT RESPIRATORY (INHALATION) EVERY 4 HOURS PRN
Status: DISCONTINUED | OUTPATIENT
Start: 2024-10-11 | End: 2024-10-29 | Stop reason: HOSPADM

## 2024-10-11 RX ORDER — ACETAZOLAMIDE 500 MG/5ML
500 INJECTION, POWDER, LYOPHILIZED, FOR SOLUTION INTRAVENOUS ONCE
Status: COMPLETED | OUTPATIENT
Start: 2024-10-11 | End: 2024-10-11

## 2024-10-11 RX ORDER — INSULIN LISPRO 100 [IU]/ML
0-4 INJECTION, SOLUTION INTRAVENOUS; SUBCUTANEOUS EVERY 6 HOURS
Status: DISCONTINUED | OUTPATIENT
Start: 2024-10-11 | End: 2024-10-29 | Stop reason: HOSPADM

## 2024-10-11 RX ADMIN — METHYLPREDNISOLONE SODIUM SUCCINATE 80 MG: 125 INJECTION, POWDER, LYOPHILIZED, FOR SOLUTION INTRAMUSCULAR; INTRAVENOUS at 20:00

## 2024-10-11 RX ADMIN — ALBUTEROL SULFATE 10 PUFF: 90 AEROSOL, METERED RESPIRATORY (INHALATION) at 03:24

## 2024-10-11 RX ADMIN — CISATRACURIUM BESYLATE 2.5 MCG/KG/MIN: 200 INJECTION, SOLUTION INTRAVENOUS at 11:05

## 2024-10-11 RX ADMIN — SODIUM CHLORIDE, POTASSIUM CHLORIDE, SODIUM LACTATE AND CALCIUM CHLORIDE: 600; 310; 30; 20 INJECTION, SOLUTION INTRAVENOUS at 18:06

## 2024-10-11 RX ADMIN — SODIUM CHLORIDE, POTASSIUM CHLORIDE, SODIUM LACTATE AND CALCIUM CHLORIDE: 600; 310; 30; 20 INJECTION, SOLUTION INTRAVENOUS at 03:43

## 2024-10-11 RX ADMIN — Medication 200 MCG/HR: at 06:25

## 2024-10-11 RX ADMIN — IPRATROPIUM BROMIDE AND ALBUTEROL SULFATE 1 DOSE: 2.5; .5 SOLUTION RESPIRATORY (INHALATION) at 10:00

## 2024-10-11 RX ADMIN — ALBUTEROL SULFATE 2.5 MG: 2.5 SOLUTION RESPIRATORY (INHALATION) at 23:33

## 2024-10-11 RX ADMIN — HEPARIN SODIUM 5000 UNITS: 5000 INJECTION INTRAVENOUS; SUBCUTANEOUS at 13:25

## 2024-10-11 RX ADMIN — HEPARIN SODIUM 5000 UNITS: 5000 INJECTION INTRAVENOUS; SUBCUTANEOUS at 06:27

## 2024-10-11 RX ADMIN — POLYETHYLENE GLYCOL 3350 17 G: 17 POWDER, FOR SOLUTION ORAL at 08:17

## 2024-10-11 RX ADMIN — Medication 200 MCG/HR: at 18:02

## 2024-10-11 RX ADMIN — ACETAZOLAMIDE SODIUM 500 MG: 500 INJECTION, POWDER, LYOPHILIZED, FOR SOLUTION INTRAVENOUS at 11:02

## 2024-10-11 RX ADMIN — Medication 10 MG/HR: at 11:13

## 2024-10-11 RX ADMIN — METHYLPREDNISOLONE SODIUM SUCCINATE 80 MG: 125 INJECTION, POWDER, LYOPHILIZED, FOR SOLUTION INTRAMUSCULAR; INTRAVENOUS at 04:26

## 2024-10-11 RX ADMIN — PANTOPRAZOLE SODIUM 40 MG: 40 INJECTION, POWDER, FOR SOLUTION INTRAVENOUS at 08:15

## 2024-10-11 RX ADMIN — AZITHROMYCIN MONOHYDRATE 500 MG: 500 INJECTION, POWDER, LYOPHILIZED, FOR SOLUTION INTRAVENOUS at 10:46

## 2024-10-11 RX ADMIN — PROPOFOL 35 MCG/KG/MIN: 10 INJECTION, EMULSION INTRAVENOUS at 00:57

## 2024-10-11 RX ADMIN — IPRATROPIUM BROMIDE AND ALBUTEROL SULFATE 1 DOSE: 2.5; .5 SOLUTION RESPIRATORY (INHALATION) at 11:38

## 2024-10-11 RX ADMIN — PROPOFOL 35 MCG/KG/MIN: 10 INJECTION, EMULSION INTRAVENOUS at 06:27

## 2024-10-11 RX ADMIN — Medication 10 MG/HR: at 00:53

## 2024-10-11 RX ADMIN — PROPOFOL 35 MCG/KG/MIN: 10 INJECTION, EMULSION INTRAVENOUS at 13:22

## 2024-10-11 RX ADMIN — HEPARIN SODIUM 5000 UNITS: 5000 INJECTION INTRAVENOUS; SUBCUTANEOUS at 21:23

## 2024-10-11 RX ADMIN — METHYLPREDNISOLONE SODIUM SUCCINATE 80 MG: 125 INJECTION, POWDER, LYOPHILIZED, FOR SOLUTION INTRAMUSCULAR; INTRAVENOUS at 13:24

## 2024-10-11 RX ADMIN — IPRATROPIUM BROMIDE AND ALBUTEROL SULFATE 1 DOSE: 2.5; .5 SOLUTION RESPIRATORY (INHALATION) at 15:34

## 2024-10-11 RX ADMIN — SODIUM CHLORIDE, PRESERVATIVE FREE 10 ML: 5 INJECTION INTRAVENOUS at 20:00

## 2024-10-11 RX ADMIN — PROPOFOL 35 MCG/KG/MIN: 10 INJECTION, EMULSION INTRAVENOUS at 18:03

## 2024-10-11 RX ADMIN — IPRATROPIUM BROMIDE AND ALBUTEROL SULFATE 1 DOSE: 2.5; .5 SOLUTION RESPIRATORY (INHALATION) at 20:04

## 2024-10-11 RX ADMIN — Medication 10 MG/HR: at 20:59

## 2024-10-11 RX ADMIN — SODIUM CHLORIDE, PRESERVATIVE FREE 10 ML: 5 INJECTION INTRAVENOUS at 08:16

## 2024-10-11 ASSESSMENT — PULMONARY FUNCTION TESTS
PIF_VALUE: 34
PIF_VALUE: 35
PIF_VALUE: 45
PIF_VALUE: 35
PIF_VALUE: 34
PIF_VALUE: 32
PIF_VALUE: 33
PIF_VALUE: 36
PIF_VALUE: 30
PIF_VALUE: 31
PIF_VALUE: 33
PIF_VALUE: 32

## 2024-10-12 ENCOUNTER — APPOINTMENT (OUTPATIENT)
Dept: GENERAL RADIOLOGY | Age: 44
DRG: 009 | End: 2024-10-12
Payer: MEDICAID

## 2024-10-12 LAB
ALLEN TEST: ABNORMAL
ANCA MYELOPEROXIDASE: <0.3 AU/ML (ref 0–3.5)
ANCA PROTEINASE 3: <0.7 AU/ML (ref 0–2)
ANION GAP SERPL CALCULATED.3IONS-SCNC: 5 MMOL/L (ref 9–16)
BASOPHILS # BLD: 0 K/UL (ref 0–0.2)
BASOPHILS NFR BLD: 0 % (ref 0–2)
BUN SERPL-MCNC: 20 MG/DL (ref 6–20)
CALCIUM SERPL-MCNC: 8.6 MG/DL (ref 8.6–10.4)
CHLORIDE SERPL-SCNC: 109 MMOL/L (ref 98–107)
CK SERPL-CCNC: 1072 U/L (ref 39–308)
CK SERPL-CCNC: 885 U/L (ref 39–308)
CO2 SERPL-SCNC: 25 MMOL/L (ref 20–31)
CREAT SERPL-MCNC: 1.2 MG/DL (ref 0.7–1.2)
EOSINOPHIL # BLD: 0 K/UL (ref 0–0.44)
EOSINOPHILS RELATIVE PERCENT: 0 % (ref 1–4)
ERYTHROCYTE [DISTWIDTH] IN BLOOD BY AUTOMATED COUNT: 13.4 % (ref 11.8–14.4)
FIO2: 40
GFR, ESTIMATED: 80 ML/MIN/1.73M2
GLUCOSE BLD-MCNC: 132 MG/DL (ref 75–110)
GLUCOSE BLD-MCNC: 133 MG/DL (ref 74–100)
GLUCOSE BLD-MCNC: 136 MG/DL (ref 75–110)
GLUCOSE BLD-MCNC: 139 MG/DL (ref 74–100)
GLUCOSE BLD-MCNC: 145 MG/DL (ref 74–100)
GLUCOSE BLD-MCNC: 165 MG/DL (ref 74–100)
GLUCOSE BLD-MCNC: 170 MG/DL (ref 74–100)
GLUCOSE SERPL-MCNC: 145 MG/DL (ref 74–99)
HCT VFR BLD AUTO: 36.9 % (ref 40.7–50.3)
HGB BLD-MCNC: 12.3 G/DL (ref 13–17)
IMM GRANULOCYTES # BLD AUTO: 0.18 K/UL (ref 0–0.3)
IMM GRANULOCYTES NFR BLD: 2 %
LYMPHOCYTES NFR BLD: 0.71 K/UL (ref 1.1–3.7)
LYMPHOCYTES RELATIVE PERCENT: 8 % (ref 24–43)
MAGNESIUM SERPL-MCNC: 2.8 MG/DL (ref 1.6–2.6)
MCH RBC QN AUTO: 26.7 PG (ref 25.2–33.5)
MCHC RBC AUTO-ENTMCNC: 33.3 G/DL (ref 28.4–34.8)
MCV RBC AUTO: 80 FL (ref 82.6–102.9)
MODE: ABNORMAL
MONOCYTES NFR BLD: 0.62 K/UL (ref 0.1–1.2)
MONOCYTES NFR BLD: 7 % (ref 3–12)
MORPHOLOGY: ABNORMAL
NEGATIVE BASE EXCESS, ART: 0.7 MMOL/L (ref 0–2)
NEUTROPHILS NFR BLD: 83 % (ref 36–65)
NEUTS SEG NFR BLD: 7.39 K/UL (ref 1.5–8.1)
NRBC BLD-RTO: 0.2 PER 100 WBC
O2 DELIVERY DEVICE: ABNORMAL
O2 DELIVERY DEVICE: ABNORMAL
PHOSPHATE SERPL-MCNC: 3.4 MG/DL (ref 2.5–4.5)
PLATELET # BLD AUTO: 167 K/UL (ref 138–453)
PMV BLD AUTO: 13 FL (ref 8.1–13.5)
POC HCO3: 26.2 MMOL/L (ref 21–28)
POC HCO3: 28.9 MMOL/L (ref 21–28)
POC HCO3: 29.1 MMOL/L (ref 21–28)
POC HCO3: 29.5 MMOL/L (ref 21–28)
POC HCO3: 30.5 MMOL/L (ref 21–28)
POC O2 SATURATION: 98.1 % (ref 94–98)
POC O2 SATURATION: 98.6 % (ref 94–98)
POC O2 SATURATION: 98.9 % (ref 94–98)
POC O2 SATURATION: 99 % (ref 94–98)
POC O2 SATURATION: 99.2 % (ref 94–98)
POC PCO2: 52.1 MM HG (ref 35–48)
POC PCO2: 60.8 MM HG (ref 35–48)
POC PCO2: 61.7 MM HG (ref 35–48)
POC PCO2: 62.4 MM HG (ref 35–48)
POC PCO2: 65.3 MM HG (ref 35–48)
POC PH: 7.26 (ref 7.35–7.45)
POC PH: 7.28 (ref 7.35–7.45)
POC PH: 7.28 (ref 7.35–7.45)
POC PH: 7.31 (ref 7.35–7.45)
POC PH: 7.31 (ref 7.35–7.45)
POC PO2: 122.7 MM HG (ref 83–108)
POC PO2: 138 MM HG (ref 83–108)
POC PO2: 148.4 MM HG (ref 83–108)
POC PO2: 148.8 MM HG (ref 83–108)
POC PO2: 156.3 MM HG (ref 83–108)
POSITIVE BASE EXCESS, ART: 0.8 MMOL/L (ref 0–3)
POSITIVE BASE EXCESS, ART: 0.9 MMOL/L (ref 0–3)
POSITIVE BASE EXCESS, ART: 1 MMOL/L (ref 0–3)
POSITIVE BASE EXCESS, ART: 2.7 MMOL/L (ref 0–3)
POTASSIUM SERPL-SCNC: 5.3 MMOL/L (ref 3.7–5.3)
RBC # BLD AUTO: 4.61 M/UL (ref 4.21–5.77)
SAMPLE SITE: ABNORMAL
SODIUM SERPL-SCNC: 139 MMOL/L (ref 136–145)
WBC OTHER # BLD: 8.9 K/UL (ref 3.5–11.3)

## 2024-10-12 PROCEDURE — 94640 AIRWAY INHALATION TREATMENT: CPT

## 2024-10-12 PROCEDURE — 2580000003 HC RX 258

## 2024-10-12 PROCEDURE — 6370000000 HC RX 637 (ALT 250 FOR IP)

## 2024-10-12 PROCEDURE — 2500000003 HC RX 250 WO HCPCS

## 2024-10-12 PROCEDURE — 51798 US URINE CAPACITY MEASURE: CPT

## 2024-10-12 PROCEDURE — 84100 ASSAY OF PHOSPHORUS: CPT

## 2024-10-12 PROCEDURE — 6360000002 HC RX W HCPCS

## 2024-10-12 PROCEDURE — 71045 X-RAY EXAM CHEST 1 VIEW: CPT

## 2024-10-12 PROCEDURE — 94003 VENT MGMT INPAT SUBQ DAY: CPT

## 2024-10-12 PROCEDURE — 85025 COMPLETE CBC W/AUTO DIFF WBC: CPT

## 2024-10-12 PROCEDURE — 82947 ASSAY GLUCOSE BLOOD QUANT: CPT

## 2024-10-12 PROCEDURE — 37799 UNLISTED PX VASCULAR SURGERY: CPT

## 2024-10-12 PROCEDURE — 82550 ASSAY OF CK (CPK): CPT

## 2024-10-12 PROCEDURE — 2700000000 HC OXYGEN THERAPY PER DAY

## 2024-10-12 PROCEDURE — 82803 BLOOD GASES ANY COMBINATION: CPT

## 2024-10-12 PROCEDURE — 2000000000 HC ICU R&B

## 2024-10-12 PROCEDURE — 51702 INSERT TEMP BLADDER CATH: CPT

## 2024-10-12 PROCEDURE — 89220 SPUTUM SPECIMEN COLLECTION: CPT

## 2024-10-12 PROCEDURE — 94761 N-INVAS EAR/PLS OXIMETRY MLT: CPT

## 2024-10-12 PROCEDURE — 83735 ASSAY OF MAGNESIUM: CPT

## 2024-10-12 PROCEDURE — 80048 BASIC METABOLIC PNL TOTAL CA: CPT

## 2024-10-12 PROCEDURE — 99291 CRITICAL CARE FIRST HOUR: CPT | Performed by: STUDENT IN AN ORGANIZED HEALTH CARE EDUCATION/TRAINING PROGRAM

## 2024-10-12 PROCEDURE — 2580000003 HC RX 258: Performed by: INTERNAL MEDICINE

## 2024-10-12 RX ORDER — ALBUTEROL SULFATE 90 UG/1
INHALANT RESPIRATORY (INHALATION)
Status: COMPLETED
Start: 2024-10-12 | End: 2024-10-12

## 2024-10-12 RX ORDER — ALBUTEROL SULFATE 90 UG/1
10 INHALANT RESPIRATORY (INHALATION)
Status: DISCONTINUED | OUTPATIENT
Start: 2024-10-12 | End: 2024-10-15

## 2024-10-12 RX ORDER — SENNA AND DOCUSATE SODIUM 50; 8.6 MG/1; MG/1
2 TABLET, FILM COATED ORAL DAILY
Status: DISCONTINUED | OUTPATIENT
Start: 2024-10-12 | End: 2024-10-13

## 2024-10-12 RX ADMIN — METHYLPREDNISOLONE SODIUM SUCCINATE 80 MG: 125 INJECTION, POWDER, LYOPHILIZED, FOR SOLUTION INTRAMUSCULAR; INTRAVENOUS at 05:15

## 2024-10-12 RX ADMIN — AZITHROMYCIN MONOHYDRATE 500 MG: 500 INJECTION, POWDER, LYOPHILIZED, FOR SOLUTION INTRAVENOUS at 10:19

## 2024-10-12 RX ADMIN — Medication 10 MG/HR: at 18:33

## 2024-10-12 RX ADMIN — SODIUM CHLORIDE, PRESERVATIVE FREE 10 ML: 5 INJECTION INTRAVENOUS at 21:15

## 2024-10-12 RX ADMIN — ALBUTEROL SULFATE 10 PUFF: 90 AEROSOL, METERED RESPIRATORY (INHALATION) at 15:15

## 2024-10-12 RX ADMIN — SODIUM CHLORIDE, POTASSIUM CHLORIDE, SODIUM LACTATE AND CALCIUM CHLORIDE: 600; 310; 30; 20 INJECTION, SOLUTION INTRAVENOUS at 16:04

## 2024-10-12 RX ADMIN — HEPARIN SODIUM 5000 UNITS: 5000 INJECTION INTRAVENOUS; SUBCUTANEOUS at 13:25

## 2024-10-12 RX ADMIN — Medication 84.1 MG: at 13:23

## 2024-10-12 RX ADMIN — HEPARIN SODIUM 5000 UNITS: 5000 INJECTION INTRAVENOUS; SUBCUTANEOUS at 05:18

## 2024-10-12 RX ADMIN — METHYLPREDNISOLONE SODIUM SUCCINATE 80 MG: 125 INJECTION, POWDER, LYOPHILIZED, FOR SOLUTION INTRAMUSCULAR; INTRAVENOUS at 21:16

## 2024-10-12 RX ADMIN — CISATRACURIUM BESYLATE 2.5 MCG/KG/MIN: 200 INJECTION, SOLUTION INTRAVENOUS at 06:46

## 2024-10-12 RX ADMIN — Medication 175 MCG/HR: at 18:33

## 2024-10-12 RX ADMIN — PROPOFOL 40 MCG/KG/MIN: 10 INJECTION, EMULSION INTRAVENOUS at 12:38

## 2024-10-12 RX ADMIN — Medication 200 MCG/HR: at 06:05

## 2024-10-12 RX ADMIN — CISATRACURIUM BESYLATE 3 MCG/KG/MIN: 200 INJECTION, SOLUTION INTRAVENOUS at 22:54

## 2024-10-12 RX ADMIN — PROPOFOL 45 MCG/KG/MIN: 10 INJECTION, EMULSION INTRAVENOUS at 23:28

## 2024-10-12 RX ADMIN — ALBUTEROL SULFATE: 90 AEROSOL, METERED RESPIRATORY (INHALATION) at 11:22

## 2024-10-12 RX ADMIN — ALBUTEROL SULFATE 10 PUFF: 90 AEROSOL, METERED RESPIRATORY (INHALATION) at 23:35

## 2024-10-12 RX ADMIN — ALBUTEROL SULFATE 10 PUFF: 90 AEROSOL, METERED RESPIRATORY (INHALATION) at 08:03

## 2024-10-12 RX ADMIN — ALBUTEROL SULFATE 10 PUFF: 90 AEROSOL, METERED RESPIRATORY (INHALATION) at 20:04

## 2024-10-12 RX ADMIN — HEPARIN SODIUM 5000 UNITS: 5000 INJECTION INTRAVENOUS; SUBCUTANEOUS at 21:15

## 2024-10-12 RX ADMIN — METHYLPREDNISOLONE SODIUM SUCCINATE 80 MG: 125 INJECTION, POWDER, LYOPHILIZED, FOR SOLUTION INTRAMUSCULAR; INTRAVENOUS at 13:25

## 2024-10-12 RX ADMIN — LANSOPRAZOLE 30 MG: 30 TABLET, ORALLY DISINTEGRATING, DELAYED RELEASE ORAL at 08:22

## 2024-10-12 RX ADMIN — SODIUM CHLORIDE, PRESERVATIVE FREE 10 ML: 5 INJECTION INTRAVENOUS at 08:21

## 2024-10-12 RX ADMIN — ALBUTEROL SULFATE 10 PUFF: 90 AEROSOL, METERED RESPIRATORY (INHALATION) at 03:31

## 2024-10-12 RX ADMIN — SENNOSIDES AND DOCUSATE SODIUM 2 TABLET: 50; 8.6 TABLET ORAL at 10:00

## 2024-10-12 RX ADMIN — ALBUTEROL SULFATE 10 PUFF: 90 AEROSOL, METERED RESPIRATORY (INHALATION) at 22:06

## 2024-10-12 RX ADMIN — Medication 10 MG/HR: at 07:57

## 2024-10-12 RX ADMIN — Medication 0.2 MG/KG/HR: at 13:41

## 2024-10-12 RX ADMIN — PROPOFOL 35 MCG/KG/MIN: 10 INJECTION, EMULSION INTRAVENOUS at 06:47

## 2024-10-12 RX ADMIN — SODIUM CHLORIDE: 9 INJECTION, SOLUTION INTRAVENOUS at 18:34

## 2024-10-12 ASSESSMENT — PULMONARY FUNCTION TESTS
PIF_VALUE: 44
PIF_VALUE: 42
PIF_VALUE: 38
PIF_VALUE: 38
PIF_VALUE: 47
PIF_VALUE: 36
PIF_VALUE: 37
PIF_VALUE: 46
PIF_VALUE: 34
PIF_VALUE: 40
PIF_VALUE: 42
PIF_VALUE: 43
PIF_VALUE: 41
PIF_VALUE: 38
PIF_VALUE: 42
PIF_VALUE: 39
PIF_VALUE: 44
PIF_VALUE: 43
PIF_VALUE: 39
PIF_VALUE: 33
PIF_VALUE: 49
PIF_VALUE: 41
PIF_VALUE: 43
PIF_VALUE: 36

## 2024-10-13 ENCOUNTER — APPOINTMENT (OUTPATIENT)
Age: 44
DRG: 009 | End: 2024-10-13
Payer: MEDICAID

## 2024-10-13 LAB
ANION GAP SERPL CALCULATED.3IONS-SCNC: 6 MMOL/L (ref 9–16)
BASOPHILS # BLD: <0.03 K/UL (ref 0–0.2)
BASOPHILS NFR BLD: 0 % (ref 0–2)
BUN SERPL-MCNC: 24 MG/DL (ref 6–20)
CALCIUM SERPL-MCNC: 8.8 MG/DL (ref 8.6–10.4)
CHLORIDE SERPL-SCNC: 107 MMOL/L (ref 98–107)
CO2 SERPL-SCNC: 26 MMOL/L (ref 20–31)
CREAT SERPL-MCNC: 1.1 MG/DL (ref 0.7–1.2)
EOSINOPHIL # BLD: <0.03 K/UL (ref 0–0.44)
EOSINOPHILS RELATIVE PERCENT: 0 % (ref 1–4)
ERYTHROCYTE [DISTWIDTH] IN BLOOD BY AUTOMATED COUNT: 13.4 % (ref 11.8–14.4)
FIO2: 40
GFR, ESTIMATED: 89 ML/MIN/1.73M2
GLUCOSE BLD-MCNC: 145 MG/DL (ref 75–110)
GLUCOSE BLD-MCNC: 145 MG/DL (ref 75–110)
GLUCOSE BLD-MCNC: 148 MG/DL (ref 75–110)
GLUCOSE BLD-MCNC: 161 MG/DL (ref 74–100)
GLUCOSE SERPL-MCNC: 156 MG/DL (ref 74–99)
HCT VFR BLD AUTO: 37.1 % (ref 40.7–50.3)
HGB BLD-MCNC: 12.8 G/DL (ref 13–17)
IMM GRANULOCYTES # BLD AUTO: 0.31 K/UL (ref 0–0.3)
IMM GRANULOCYTES NFR BLD: 4 %
LYMPHOCYTES NFR BLD: 0.87 K/UL (ref 1.1–3.7)
LYMPHOCYTES RELATIVE PERCENT: 12 % (ref 24–43)
MCH RBC QN AUTO: 27.4 PG (ref 25.2–33.5)
MCHC RBC AUTO-ENTMCNC: 34.5 G/DL (ref 28.4–34.8)
MCV RBC AUTO: 79.4 FL (ref 82.6–102.9)
MONOCYTES NFR BLD: 0.54 K/UL (ref 0.1–1.2)
MONOCYTES NFR BLD: 7 % (ref 3–12)
NEUTROPHILS NFR BLD: 77 % (ref 36–65)
NEUTS SEG NFR BLD: 5.55 K/UL (ref 1.5–8.1)
NRBC BLD-RTO: 0.3 PER 100 WBC
PLATELET # BLD AUTO: 174 K/UL (ref 138–453)
PMV BLD AUTO: 12.7 FL (ref 8.1–13.5)
POC HCO3: 31.7 MMOL/L (ref 21–28)
POC O2 SATURATION: 98.5 % (ref 94–98)
POC PCO2: 60.9 MM HG (ref 35–48)
POC PH: 7.33 (ref 7.35–7.45)
POC PO2: 127.6 MM HG (ref 83–108)
POSITIVE BASE EXCESS, ART: 4 MMOL/L (ref 0–3)
POTASSIUM SERPL-SCNC: 4.9 MMOL/L (ref 3.7–5.3)
RBC # BLD AUTO: 4.67 M/UL (ref 4.21–5.77)
RBC # BLD: ABNORMAL 10*6/UL
SAMPLE SITE: ABNORMAL
SODIUM SERPL-SCNC: 139 MMOL/L (ref 136–145)
WBC OTHER # BLD: 7.3 K/UL (ref 3.5–11.3)

## 2024-10-13 PROCEDURE — 6360000002 HC RX W HCPCS

## 2024-10-13 PROCEDURE — 2500000003 HC RX 250 WO HCPCS

## 2024-10-13 PROCEDURE — 94761 N-INVAS EAR/PLS OXIMETRY MLT: CPT

## 2024-10-13 PROCEDURE — 2580000003 HC RX 258

## 2024-10-13 PROCEDURE — 99291 CRITICAL CARE FIRST HOUR: CPT | Performed by: STUDENT IN AN ORGANIZED HEALTH CARE EDUCATION/TRAINING PROGRAM

## 2024-10-13 PROCEDURE — 37799 UNLISTED PX VASCULAR SURGERY: CPT

## 2024-10-13 PROCEDURE — 94003 VENT MGMT INPAT SUBQ DAY: CPT

## 2024-10-13 PROCEDURE — 71250 CT THORAX DX C-: CPT

## 2024-10-13 PROCEDURE — 82785 ASSAY OF IGE: CPT

## 2024-10-13 PROCEDURE — 86606 ASPERGILLUS ANTIBODY: CPT

## 2024-10-13 PROCEDURE — 82803 BLOOD GASES ANY COMBINATION: CPT

## 2024-10-13 PROCEDURE — 2000000000 HC ICU R&B

## 2024-10-13 PROCEDURE — 80048 BASIC METABOLIC PNL TOTAL CA: CPT

## 2024-10-13 PROCEDURE — 6370000000 HC RX 637 (ALT 250 FOR IP)

## 2024-10-13 PROCEDURE — 82947 ASSAY GLUCOSE BLOOD QUANT: CPT

## 2024-10-13 PROCEDURE — 86003 ALLG SPEC IGE CRUDE XTRC EA: CPT

## 2024-10-13 PROCEDURE — 85025 COMPLETE CBC W/AUTO DIFF WBC: CPT

## 2024-10-13 PROCEDURE — 2700000000 HC OXYGEN THERAPY PER DAY

## 2024-10-13 PROCEDURE — 94640 AIRWAY INHALATION TREATMENT: CPT

## 2024-10-13 RX ORDER — SENNOSIDES 8.8 MG/5ML
5 LIQUID ORAL NIGHTLY
Status: DISCONTINUED | OUTPATIENT
Start: 2024-10-13 | End: 2024-10-15

## 2024-10-13 RX ADMIN — METHYLPREDNISOLONE SODIUM SUCCINATE 80 MG: 125 INJECTION, POWDER, LYOPHILIZED, FOR SOLUTION INTRAMUSCULAR; INTRAVENOUS at 05:29

## 2024-10-13 RX ADMIN — ALBUTEROL SULFATE 10 PUFF: 90 AEROSOL, METERED RESPIRATORY (INHALATION) at 19:43

## 2024-10-13 RX ADMIN — Medication 0.75 MG/KG/HR: at 17:41

## 2024-10-13 RX ADMIN — Medication 10 MG/HR: at 08:58

## 2024-10-13 RX ADMIN — HEPARIN SODIUM 5000 UNITS: 5000 INJECTION INTRAVENOUS; SUBCUTANEOUS at 21:02

## 2024-10-13 RX ADMIN — AZITHROMYCIN MONOHYDRATE 500 MG: 500 INJECTION, POWDER, LYOPHILIZED, FOR SOLUTION INTRAVENOUS at 14:37

## 2024-10-13 RX ADMIN — ALBUTEROL SULFATE 10 PUFF: 90 AEROSOL, METERED RESPIRATORY (INHALATION) at 02:01

## 2024-10-13 RX ADMIN — LANSOPRAZOLE 30 MG: 30 TABLET, ORALLY DISINTEGRATING, DELAYED RELEASE ORAL at 08:49

## 2024-10-13 RX ADMIN — Medication 150 MCG/HR: at 08:59

## 2024-10-13 RX ADMIN — PROPOFOL 50 MCG/KG/MIN: 10 INJECTION, EMULSION INTRAVENOUS at 07:27

## 2024-10-13 RX ADMIN — SODIUM CHLORIDE: 9 INJECTION, SOLUTION INTRAVENOUS at 22:58

## 2024-10-13 RX ADMIN — PROPOFOL 50 MCG/KG/MIN: 10 INJECTION, EMULSION INTRAVENOUS at 22:32

## 2024-10-13 RX ADMIN — METHYLPREDNISOLONE SODIUM SUCCINATE 80 MG: 125 INJECTION, POWDER, LYOPHILIZED, FOR SOLUTION INTRAMUSCULAR; INTRAVENOUS at 20:23

## 2024-10-13 RX ADMIN — PROPOFOL 50 MCG/KG/MIN: 10 INJECTION, EMULSION INTRAVENOUS at 17:40

## 2024-10-13 RX ADMIN — SODIUM CHLORIDE, PRESERVATIVE FREE 10 ML: 5 INJECTION INTRAVENOUS at 08:50

## 2024-10-13 RX ADMIN — SENNOSIDES 8.8 MG: 8.8 LIQUID ORAL at 21:02

## 2024-10-13 RX ADMIN — HEPARIN SODIUM 5000 UNITS: 5000 INJECTION INTRAVENOUS; SUBCUTANEOUS at 05:29

## 2024-10-13 RX ADMIN — ALBUTEROL SULFATE 10 PUFF: 90 AEROSOL, METERED RESPIRATORY (INHALATION) at 07:38

## 2024-10-13 RX ADMIN — ALBUTEROL SULFATE 10 PUFF: 90 AEROSOL, METERED RESPIRATORY (INHALATION) at 15:02

## 2024-10-13 RX ADMIN — PROPOFOL 45 MCG/KG/MIN: 10 INJECTION, EMULSION INTRAVENOUS at 02:40

## 2024-10-13 RX ADMIN — ALBUTEROL SULFATE 10 PUFF: 90 AEROSOL, METERED RESPIRATORY (INHALATION) at 03:27

## 2024-10-13 RX ADMIN — METHYLPREDNISOLONE SODIUM SUCCINATE 80 MG: 125 INJECTION, POWDER, LYOPHILIZED, FOR SOLUTION INTRAMUSCULAR; INTRAVENOUS at 14:25

## 2024-10-13 RX ADMIN — ALBUTEROL SULFATE 10 PUFF: 90 AEROSOL, METERED RESPIRATORY (INHALATION) at 11:26

## 2024-10-13 RX ADMIN — HEPARIN SODIUM 5000 UNITS: 5000 INJECTION INTRAVENOUS; SUBCUTANEOUS at 14:26

## 2024-10-13 RX ADMIN — Medication 10 MG/HR: at 22:32

## 2024-10-13 RX ADMIN — SODIUM CHLORIDE, PRESERVATIVE FREE 10 ML: 5 INJECTION INTRAVENOUS at 20:23

## 2024-10-13 RX ADMIN — ALBUTEROL SULFATE 10 PUFF: 90 AEROSOL, METERED RESPIRATORY (INHALATION) at 23:16

## 2024-10-13 RX ADMIN — CISATRACURIUM BESYLATE 3 MCG/KG/MIN: 200 INJECTION, SOLUTION INTRAVENOUS at 14:20

## 2024-10-13 ASSESSMENT — PULMONARY FUNCTION TESTS
PIF_VALUE: 37
PIF_VALUE: 36
PIF_VALUE: 56
PIF_VALUE: 46
PIF_VALUE: 35
PIF_VALUE: 41
PIF_VALUE: 60
PIF_VALUE: 50
PIF_VALUE: 38
PIF_VALUE: 38
PIF_VALUE: 36
PIF_VALUE: 52
PIF_VALUE: 43
PIF_VALUE: 36
PIF_VALUE: 40
PIF_VALUE: 37
PIF_VALUE: 40
PIF_VALUE: 41
PIF_VALUE: 43
PIF_VALUE: 35

## 2024-10-14 ENCOUNTER — APPOINTMENT (OUTPATIENT)
Age: 44
DRG: 009 | End: 2024-10-14
Attending: THORACIC SURGERY (CARDIOTHORACIC VASCULAR SURGERY)
Payer: MEDICAID

## 2024-10-14 ENCOUNTER — ANESTHESIA EVENT (OUTPATIENT)
Dept: OPERATING ROOM | Age: 44
End: 2024-10-14
Payer: MEDICAID

## 2024-10-14 ENCOUNTER — APPOINTMENT (OUTPATIENT)
Dept: GENERAL RADIOLOGY | Age: 44
DRG: 009 | End: 2024-10-14
Payer: MEDICAID

## 2024-10-14 ENCOUNTER — ANESTHESIA (OUTPATIENT)
Dept: OPERATING ROOM | Age: 44
End: 2024-10-14
Payer: MEDICAID

## 2024-10-14 ENCOUNTER — OFFICE VISIT (OUTPATIENT)
Dept: OPERATING ROOM | Age: 44
End: 2024-10-14
Attending: THORACIC SURGERY (CARDIOTHORACIC VASCULAR SURGERY)

## 2024-10-14 LAB
A FUMIGATUS IGE QN: 3.18 KU/L (ref 0–0.34)
ACT BLD: 169 SEC (ref 79–149)
ALLEN TEST: ABNORMAL
ALLEN TEST: POSITIVE
ALLEN TEST: POSITIVE
ANION GAP SERPL CALCULATED.3IONS-SCNC: 7 MMOL/L (ref 9–16)
ANION GAP SERPL CALCULATED.3IONS-SCNC: 7 MMOL/L (ref 9–16)
BASOPHILS # BLD: 0 K/UL (ref 0–0.2)
BASOPHILS NFR BLD: 0 % (ref 0–2)
BUN SERPL-MCNC: 24 MG/DL (ref 6–20)
BUN SERPL-MCNC: 24 MG/DL (ref 6–20)
CA-I BLD-SCNC: 1.12 MMOL/L (ref 1.13–1.33)
CALCIUM SERPL-MCNC: 8 MG/DL (ref 8.6–10.4)
CALCIUM SERPL-MCNC: 8.5 MG/DL (ref 8.6–10.4)
CHLORIDE SERPL-SCNC: 106 MMOL/L (ref 98–107)
CHLORIDE SERPL-SCNC: 107 MMOL/L (ref 98–107)
CLOT ANGLE.KAOLIN INDUCED BLD RES TEG: 71.1 DEG (ref 63–78)
CO2 SERPL-SCNC: 27 MMOL/L (ref 20–31)
CO2 SERPL-SCNC: 28 MMOL/L (ref 20–31)
CREAT SERPL-MCNC: 0.9 MG/DL (ref 0.7–1.2)
CREAT SERPL-MCNC: 1 MG/DL (ref 0.7–1.2)
ECHO AO ROOT DIAM: 3.2 CM
ECHO AO ROOT INDEX: 1.6 CM/M2
ECHO AV AREA PEAK VELOCITY: 1.9 CM2
ECHO AV AREA VTI: 2.4 CM2
ECHO AV AREA/BSA PEAK VELOCITY: 1 CM2/M2
ECHO AV AREA/BSA VTI: 1.2 CM2/M2
ECHO AV MEAN GRADIENT: 4 MMHG
ECHO AV MEAN VELOCITY: 0.9 M/S
ECHO AV PEAK GRADIENT: 9 MMHG
ECHO AV PEAK VELOCITY: 1.5 M/S
ECHO AV VELOCITY RATIO: 0.6
ECHO AV VTI: 26.1 CM
ECHO BSA: 2.02 M2
ECHO BSA: 2.02 M2
ECHO EST RA PRESSURE: 0 MMHG
ECHO LA AREA 2C: 14.4 CM2
ECHO LA AREA 4C: 17.6 CM2
ECHO LA DIAMETER INDEX: 1.7 CM/M2
ECHO LA DIAMETER: 3.4 CM
ECHO LA MAJOR AXIS: 5.3 CM
ECHO LA MINOR AXIS: 4 CM
ECHO LA TO AORTIC ROOT RATIO: 1.06
ECHO LA VOL MOD A2C: 40 ML (ref 18–58)
ECHO LA VOL MOD A4C: 45 ML (ref 18–58)
ECHO LA VOLUME INDEX MOD A2C: 20 ML/M2 (ref 16–34)
ECHO LA VOLUME INDEX MOD A4C: 23 ML/M2 (ref 16–34)
ECHO LV E' LATERAL VELOCITY: 11.4 CM/S
ECHO LV E' SEPTAL VELOCITY: 11.3 CM/S
ECHO LV EDV A2C: 51 ML
ECHO LV EDV A4C: 64 ML
ECHO LV EDV INDEX A4C: 32 ML/M2
ECHO LV EDV NDEX A2C: 26 ML/M2
ECHO LV EF PHYSICIAN: 60 %
ECHO LV EJECTION FRACTION A2C: 69 %
ECHO LV EJECTION FRACTION A4C: 66 %
ECHO LV EJECTION FRACTION BIPLANE: 67 % (ref 55–100)
ECHO LV ESV A2C: 16 ML
ECHO LV ESV A4C: 22 ML
ECHO LV ESV INDEX A2C: 8 ML/M2
ECHO LV ESV INDEX A4C: 11 ML/M2
ECHO LV FRACTIONAL SHORTENING: 26 % (ref 28–44)
ECHO LV INTERNAL DIMENSION DIASTOLE INDEX: 2.5 CM/M2
ECHO LV INTERNAL DIMENSION DIASTOLIC: 5 CM (ref 4.2–5.9)
ECHO LV INTERNAL DIMENSION SYSTOLIC INDEX: 1.85 CM/M2
ECHO LV INTERNAL DIMENSION SYSTOLIC: 3.7 CM
ECHO LV IVSD: 0.9 CM (ref 0.6–1)
ECHO LV MASS 2D: 146.8 G (ref 88–224)
ECHO LV MASS INDEX 2D: 73.4 G/M2 (ref 49–115)
ECHO LV POSTERIOR WALL DIASTOLIC: 0.8 CM (ref 0.6–1)
ECHO LV RELATIVE WALL THICKNESS RATIO: 0.32
ECHO LVOT AREA: 3.1 CM2
ECHO LVOT AV VTI INDEX: 0.78
ECHO LVOT DIAM: 2 CM
ECHO LVOT MEAN GRADIENT: 2 MMHG
ECHO LVOT PEAK GRADIENT: 3 MMHG
ECHO LVOT PEAK VELOCITY: 0.9 M/S
ECHO LVOT STROKE VOLUME INDEX: 31.9 ML/M2
ECHO LVOT SV: 63.7 ML
ECHO LVOT VTI: 20.3 CM
ECHO MV A VELOCITY: 0.72 M/S
ECHO MV AREA VTI: 2.6 CM2
ECHO MV E DECELERATION TIME (DT): 140 MS
ECHO MV E VELOCITY: 1.18 M/S
ECHO MV E/A RATIO: 1.64
ECHO MV E/E' LATERAL: 10.35
ECHO MV E/E' RATIO (AVERAGED): 10.4
ECHO MV E/E' SEPTAL: 10.44
ECHO MV LVOT VTI INDEX: 1.21
ECHO MV MAX VELOCITY: 1.2 M/S
ECHO MV MEAN GRADIENT: 2 MMHG
ECHO MV MEAN VELOCITY: 0.7 M/S
ECHO MV PEAK GRADIENT: 6 MMHG
ECHO MV VTI: 24.6 CM
ECHO PV MAX VELOCITY: 1.1 M/S
ECHO PV PEAK GRADIENT: 5 MMHG
ECHO RIGHT VENTRICULAR SYSTOLIC PRESSURE (RVSP): 45 MMHG
ECHO RV BASAL DIMENSION: 3.3 CM
ECHO RV FREE WALL PEAK S': 11.9 CM/S
ECHO RV TAPSE: 2.4 CM (ref 1.7–?)
ECHO TV REGURGITANT MAX VELOCITY: 3.34 M/S
ECHO TV REGURGITANT PEAK GRADIENT: 45 MMHG
EOSINOPHIL # BLD: 0 K/UL (ref 0–0.4)
EOSINOPHILS RELATIVE PERCENT: 0 % (ref 1–4)
ERYTHROCYTE [DISTWIDTH] IN BLOOD BY AUTOMATED COUNT: 12.8 % (ref 11.8–14.4)
ERYTHROCYTE [DISTWIDTH] IN BLOOD BY AUTOMATED COUNT: 13.2 % (ref 11.8–14.4)
FIBRINOGEN PPP-MCNC: 214 MG/DL (ref 203–521)
FIBRINOGEN, FUNCTIONAL TEG: 18.1 MM (ref 15–32)
FIO2: 100
FIO2: 100
FIO2: 40
FIO2: 40
FIO2: 50
FIO2: 70
FIO2: 80
GFR, ESTIMATED: >90 ML/MIN/1.73M2
GFR, ESTIMATED: >90 ML/MIN/1.73M2
GLUCOSE BLD-MCNC: 117 MG/DL (ref 75–110)
GLUCOSE BLD-MCNC: 138 MG/DL (ref 75–110)
GLUCOSE BLD-MCNC: 146 MG/DL (ref 74–100)
GLUCOSE BLD-MCNC: 153 MG/DL (ref 74–100)
GLUCOSE BLD-MCNC: 154 MG/DL (ref 74–100)
GLUCOSE BLD-MCNC: 159 MG/DL (ref 74–100)
GLUCOSE BLD-MCNC: 160 MG/DL (ref 74–100)
GLUCOSE BLD-MCNC: 169 MG/DL (ref 74–100)
GLUCOSE SERPL-MCNC: 129 MG/DL (ref 74–99)
GLUCOSE SERPL-MCNC: 144 MG/DL (ref 74–99)
HCT VFR BLD AUTO: 33.3 % (ref 40.7–50.3)
HCT VFR BLD AUTO: 36 % (ref 40.7–50.3)
HGB BLD-MCNC: 11.8 G/DL (ref 13–17)
HGB BLD-MCNC: 12.5 G/DL (ref 13–17)
IGE SERPL-ACNC: 717 IU/ML (ref 0–100)
IMM GRANULOCYTES # BLD AUTO: 0.46 K/UL (ref 0–0.3)
IMM GRANULOCYTES NFR BLD: 6 %
INR PPP: 1.4
KINETICS TEG: 1.4 MIN (ref 0.8–2.1)
LACTIC ACID, WHOLE BLOOD: 1.5 MMOL/L (ref 0.7–2.1)
LYMPHOCYTES NFR BLD: 1.39 K/UL (ref 1–4.8)
LYMPHOCYTES RELATIVE PERCENT: 18 % (ref 24–44)
MA (MAX CLOT) TEG: 59.6 MM (ref 52–69)
MA(MAX CLOT) RAPID TEG: 59.9 MM (ref 52–70)
MAGNESIUM SERPL-MCNC: 2.5 MG/DL (ref 1.6–2.6)
MCH RBC QN AUTO: 27.3 PG (ref 25.2–33.5)
MCH RBC QN AUTO: 28.2 PG (ref 25.2–33.5)
MCHC RBC AUTO-ENTMCNC: 34.7 G/DL (ref 28.4–34.8)
MCHC RBC AUTO-ENTMCNC: 35.4 G/DL (ref 28.4–34.8)
MCV RBC AUTO: 78.6 FL (ref 82.6–102.9)
MCV RBC AUTO: 79.5 FL (ref 82.6–102.9)
MICROORGANISM SPEC CULT: NORMAL
MICROORGANISM SPEC CULT: NORMAL
MODE: ABNORMAL
MODE: ABNORMAL
MONOCYTES NFR BLD: 0.69 K/UL (ref 0.1–0.8)
MONOCYTES NFR BLD: 9 % (ref 1–7)
MORPHOLOGY: ABNORMAL
NEUTROPHILS NFR BLD: 67 % (ref 36–66)
NEUTS SEG NFR BLD: 5.16 K/UL (ref 1.8–7.7)
NRBC BLD-RTO: 0.1 PER 100 WBC
NRBC BLD-RTO: 0.4 PER 100 WBC
NUCLEATED RED BLOOD CELLS: 1 PER 100 WBC
PARTIAL THROMBOPLASTIN TIME: 108.9 SEC (ref 23–36.5)
PERFORMING LOCATION: NORMAL
PHOSPHATE SERPL-MCNC: 2.4 MG/DL (ref 2.5–4.5)
PLATELET # BLD AUTO: 151 K/UL (ref 138–453)
PLATELET # BLD AUTO: 168 K/UL (ref 138–453)
PMV BLD AUTO: 12.5 FL (ref 8.1–13.5)
PMV BLD AUTO: 12.8 FL (ref 8.1–13.5)
POC HCO3: 31.1 MMOL/L (ref 21–28)
POC HCO3: 32.2 MMOL/L (ref 21–28)
POC HCO3: 32.3 MMOL/L (ref 21–28)
POC HCO3: 32.9 MMOL/L (ref 21–28)
POC HCO3: 33.1 MMOL/L (ref 21–28)
POC HCO3: 34.3 MMOL/L (ref 21–28)
POC HCO3: 34.6 MMOL/L (ref 21–28)
POC LACTIC ACID: 1.3 MMOL/L (ref 0.56–1.39)
POC O2 SATURATION: 91.3 % (ref 94–98)
POC O2 SATURATION: 93 % (ref 94–98)
POC O2 SATURATION: 95 % (ref 94–98)
POC O2 SATURATION: 98.2 % (ref 94–98)
POC O2 SATURATION: 98.9 % (ref 94–98)
POC O2 SATURATION: 99.5 % (ref 94–98)
POC O2 SATURATION: 99.8 % (ref 94–98)
POC PCO2: 41.8 MM HG (ref 35–48)
POC PCO2: 44.4 MM HG (ref 35–48)
POC PCO2: 48.8 MM HG (ref 35–48)
POC PCO2: 51.1 MM HG (ref 35–48)
POC PCO2: 56.4 MM HG (ref 35–48)
POC PCO2: 63.6 MM HG (ref 35–48)
POC PCO2: 64.2 MM HG (ref 35–48)
POC PH: 7.31 (ref 7.35–7.45)
POC PH: 7.34 (ref 7.35–7.45)
POC PH: 7.39 (ref 7.35–7.45)
POC PH: 7.42 (ref 7.35–7.45)
POC PH: 7.44 (ref 7.35–7.45)
POC PH: 7.47 (ref 7.35–7.45)
POC PH: 7.48 (ref 7.35–7.45)
POC PO2: 111.7 MM HG (ref 83–108)
POC PO2: 124.5 MM HG (ref 83–108)
POC PO2: 153.1 MM HG (ref 83–108)
POC PO2: 232.1 MM HG (ref 83–108)
POC PO2: 67.4 MM HG (ref 83–108)
POC PO2: 68 MM HG (ref 83–108)
POC PO2: 84.9 MM HG (ref 83–108)
POSITIVE BASE EXCESS, ART: 4.2 MMOL/L (ref 0–3)
POSITIVE BASE EXCESS, ART: 6.8 MMOL/L (ref 0–3)
POSITIVE BASE EXCESS, ART: 6.9 MMOL/L (ref 0–3)
POSITIVE BASE EXCESS, ART: 7 MMOL/L (ref 0–3)
POSITIVE BASE EXCESS, ART: 7.7 MMOL/L (ref 0–3)
POSITIVE BASE EXCESS, ART: 7.7 MMOL/L (ref 0–3)
POSITIVE BASE EXCESS, ART: 7.8 MMOL/L (ref 0–3)
POTASSIUM SERPL-SCNC: 4.7 MMOL/L (ref 3.7–5.3)
POTASSIUM SERPL-SCNC: 4.7 MMOL/L (ref 3.7–5.3)
PROTHROMBIN TIME: 16.6 SEC (ref 11.7–14.9)
RBC # BLD AUTO: 4.19 M/UL (ref 4.21–5.77)
RBC # BLD AUTO: 4.58 M/UL (ref 4.21–5.77)
REACTION TIME TEG W HEPARIN: 4.3 MIN (ref 4.3–8.3)
REACTION TIME TEG: 5.2 MIN (ref 4.6–9.1)
SAMPLE SITE: ABNORMAL
SERVICE CMNT-IMP: NORMAL
SERVICE CMNT-IMP: NORMAL
SODIUM SERPL-SCNC: 141 MMOL/L (ref 136–145)
SODIUM SERPL-SCNC: 141 MMOL/L (ref 136–145)
SPECIMEN DESCRIPTION: NORMAL
SPECIMEN DESCRIPTION: NORMAL
TRIGL SERPL-MCNC: 351 MG/DL
WBC OTHER # BLD: 14.6 K/UL (ref 3.5–11.3)
WBC OTHER # BLD: 7.7 K/UL (ref 3.5–11.3)

## 2024-10-14 PROCEDURE — 74018 RADEX ABDOMEN 1 VIEW: CPT

## 2024-10-14 PROCEDURE — 2720000010 HC SURG SUPPLY STERILE: Performed by: SURGERY

## 2024-10-14 PROCEDURE — 6360000002 HC RX W HCPCS: Performed by: NURSE ANESTHETIST, CERTIFIED REGISTERED

## 2024-10-14 PROCEDURE — 85027 COMPLETE CBC AUTOMATED: CPT

## 2024-10-14 PROCEDURE — A4217 STERILE WATER/SALINE, 500 ML: HCPCS | Performed by: SURGERY

## 2024-10-14 PROCEDURE — 36556 INSERT NON-TUNNEL CV CATH: CPT

## 2024-10-14 PROCEDURE — 3700000001 HC ADD 15 MINUTES (ANESTHESIA): Performed by: SURGERY

## 2024-10-14 PROCEDURE — 3700000000 HC ANESTHESIA ATTENDED CARE: Performed by: SURGERY

## 2024-10-14 PROCEDURE — 85347 COAGULATION TIME ACTIVATED: CPT

## 2024-10-14 PROCEDURE — 6360000002 HC RX W HCPCS

## 2024-10-14 PROCEDURE — 33952 ECMO/ECLS INSJ PRPH CANNULA: CPT | Performed by: SURGERY

## 2024-10-14 PROCEDURE — 99222 1ST HOSP IP/OBS MODERATE 55: CPT | Performed by: SURGERY

## 2024-10-14 PROCEDURE — 84100 ASSAY OF PHOSPHORUS: CPT

## 2024-10-14 PROCEDURE — 80048 BASIC METABOLIC PNL TOTAL CA: CPT

## 2024-10-14 PROCEDURE — 33948 ECMO/ECLS DAILY MGMT-VENOUS: CPT

## 2024-10-14 PROCEDURE — 84478 ASSAY OF TRIGLYCERIDES: CPT

## 2024-10-14 PROCEDURE — 93306 TTE W/DOPPLER COMPLETE: CPT

## 2024-10-14 PROCEDURE — 2580000003 HC RX 258: Performed by: SURGERY

## 2024-10-14 PROCEDURE — 82803 BLOOD GASES ANY COMBINATION: CPT

## 2024-10-14 PROCEDURE — 6360000002 HC RX W HCPCS: Performed by: ANESTHESIOLOGY

## 2024-10-14 PROCEDURE — 99291 CRITICAL CARE FIRST HOUR: CPT | Performed by: INTERNAL MEDICINE

## 2024-10-14 PROCEDURE — 82947 ASSAY GLUCOSE BLOOD QUANT: CPT

## 2024-10-14 PROCEDURE — 85610 PROTHROMBIN TIME: CPT

## 2024-10-14 PROCEDURE — 2500000003 HC RX 250 WO HCPCS

## 2024-10-14 PROCEDURE — 36620 INSERTION CATHETER ARTERY: CPT | Performed by: INTERNAL MEDICINE

## 2024-10-14 PROCEDURE — 85730 THROMBOPLASTIN TIME PARTIAL: CPT

## 2024-10-14 PROCEDURE — 2580000003 HC RX 258

## 2024-10-14 PROCEDURE — 83735 ASSAY OF MAGNESIUM: CPT

## 2024-10-14 PROCEDURE — 3600000007 HC SURGERY HYBRID BASE: Performed by: SURGERY

## 2024-10-14 PROCEDURE — 83605 ASSAY OF LACTIC ACID: CPT

## 2024-10-14 PROCEDURE — 2580000003 HC RX 258: Performed by: ANESTHESIOLOGY

## 2024-10-14 PROCEDURE — 6370000000 HC RX 637 (ALT 250 FOR IP)

## 2024-10-14 PROCEDURE — 94640 AIRWAY INHALATION TREATMENT: CPT

## 2024-10-14 PROCEDURE — 94003 VENT MGMT INPAT SUBQ DAY: CPT

## 2024-10-14 PROCEDURE — 85025 COMPLETE CBC W/AUTO DIFF WBC: CPT

## 2024-10-14 PROCEDURE — C1769 GUIDE WIRE: HCPCS | Performed by: SURGERY

## 2024-10-14 PROCEDURE — 5A1522H EXTRACORPOREAL OXYGENATION, MEMBRANE, PERIPHERAL VENO-VENOUS: ICD-10-PCS | Performed by: SURGERY

## 2024-10-14 PROCEDURE — 04HY32Z INSERTION OF MONITORING DEVICE INTO LOWER ARTERY, PERCUTANEOUS APPROACH: ICD-10-PCS | Performed by: INTERNAL MEDICINE

## 2024-10-14 PROCEDURE — 93306 TTE W/DOPPLER COMPLETE: CPT | Performed by: INTERNAL MEDICINE

## 2024-10-14 PROCEDURE — 6360000002 HC RX W HCPCS: Performed by: SURGERY

## 2024-10-14 PROCEDURE — 82330 ASSAY OF CALCIUM: CPT

## 2024-10-14 PROCEDURE — 2700000000 HC OXYGEN THERAPY PER DAY

## 2024-10-14 PROCEDURE — 33946 ECMO/ECLS INITIATION VENOUS: CPT | Performed by: SURGERY

## 2024-10-14 PROCEDURE — 3600000017 HC SURGERY HYBRID ADDL 15MIN: Performed by: SURGERY

## 2024-10-14 PROCEDURE — 2709999900 HC NON-CHARGEABLE SUPPLY: Performed by: SURGERY

## 2024-10-14 PROCEDURE — 37799 UNLISTED PX VASCULAR SURGERY: CPT

## 2024-10-14 PROCEDURE — 85576 BLOOD PLATELET AGGREGATION: CPT

## 2024-10-14 PROCEDURE — 36620 INSERTION CATHETER ARTERY: CPT

## 2024-10-14 PROCEDURE — 2000000000 HC ICU R&B

## 2024-10-14 PROCEDURE — 94761 N-INVAS EAR/PLS OXIMETRY MLT: CPT

## 2024-10-14 PROCEDURE — 36556 INSERT NON-TUNNEL CV CATH: CPT | Performed by: INTERNAL MEDICINE

## 2024-10-14 PROCEDURE — 85384 FIBRINOGEN ACTIVITY: CPT

## 2024-10-14 PROCEDURE — 06HM33Z INSERTION OF INFUSION DEVICE INTO RIGHT FEMORAL VEIN, PERCUTANEOUS APPROACH: ICD-10-PCS | Performed by: INTERNAL MEDICINE

## 2024-10-14 RX ORDER — HEPARIN SODIUM,PORCINE/PF 10 UNIT/ML
3 SYRINGE (ML) INTRAVENOUS EVERY 12 HOURS
Status: DISCONTINUED | OUTPATIENT
Start: 2024-10-15 | End: 2024-10-23

## 2024-10-14 RX ORDER — SODIUM CHLORIDE 9 MG/ML
INJECTION, SOLUTION INTRAVENOUS
Status: DISCONTINUED | OUTPATIENT
Start: 2024-10-14 | End: 2024-10-14 | Stop reason: SDUPTHER

## 2024-10-14 RX ORDER — MIDAZOLAM HYDROCHLORIDE 2 MG/2ML
4 INJECTION, SOLUTION INTRAMUSCULAR; INTRAVENOUS ONCE
Status: DISCONTINUED | OUTPATIENT
Start: 2024-10-14 | End: 2024-10-15

## 2024-10-14 RX ORDER — HEPARIN SODIUM,PORCINE/PF 10 UNIT/ML
3 SYRINGE (ML) INTRAVENOUS PRN
Status: DISCONTINUED | OUTPATIENT
Start: 2024-10-14 | End: 2024-10-23

## 2024-10-14 RX ORDER — HEPARIN SODIUM,PORCINE/PF 10 UNIT/ML
3 SYRINGE (ML) INTRAVENOUS EVERY 12 HOURS
Status: DISCONTINUED | OUTPATIENT
Start: 2024-10-14 | End: 2024-10-14

## 2024-10-14 RX ORDER — HEPARIN SODIUM 1000 [USP'U]/ML
INJECTION, SOLUTION INTRAVENOUS; SUBCUTANEOUS
Status: DISCONTINUED | OUTPATIENT
Start: 2024-10-14 | End: 2024-10-14 | Stop reason: SDUPTHER

## 2024-10-14 RX ORDER — DIPHENHYDRAMINE HYDROCHLORIDE 50 MG/ML
25 INJECTION INTRAMUSCULAR; INTRAVENOUS ONCE
Status: DISCONTINUED | OUTPATIENT
Start: 2024-10-14 | End: 2024-10-14

## 2024-10-14 RX ORDER — PROPOFOL 10 MG/ML
INJECTION, EMULSION INTRAVENOUS
Status: DISCONTINUED | OUTPATIENT
Start: 2024-10-14 | End: 2024-10-14 | Stop reason: SDUPTHER

## 2024-10-14 RX ORDER — ARGATROBAN 1 MG/ML
.0625-1 INJECTION, SOLUTION INTRAVENOUS CONTINUOUS
Status: DISCONTINUED | OUTPATIENT
Start: 2024-10-14 | End: 2024-10-20

## 2024-10-14 RX ORDER — ENOXAPARIN SODIUM 100 MG/ML
40 INJECTION SUBCUTANEOUS DAILY
Status: DISCONTINUED | OUTPATIENT
Start: 2024-10-14 | End: 2024-10-14 | Stop reason: SDUPTHER

## 2024-10-14 RX ADMIN — PROPOFOL 60 MCG/KG/MIN: 10 INJECTION, EMULSION INTRAVENOUS at 18:02

## 2024-10-14 RX ADMIN — ALBUTEROL SULFATE 10 PUFF: 90 AEROSOL, METERED RESPIRATORY (INHALATION) at 23:35

## 2024-10-14 RX ADMIN — PROPOFOL 50 MCG/KG/MIN: 10 INJECTION, EMULSION INTRAVENOUS at 05:28

## 2024-10-14 RX ADMIN — Medication 0.7 MG/KG/HR: at 16:10

## 2024-10-14 RX ADMIN — HEPARIN SODIUM 10000 UNITS: 1000 INJECTION INTRAVENOUS; SUBCUTANEOUS at 19:01

## 2024-10-14 RX ADMIN — PROPOFOL 60 MCG/KG/MIN: 10 INJECTION, EMULSION INTRAVENOUS at 15:00

## 2024-10-14 RX ADMIN — SODIUM CHLORIDE, PRESERVATIVE FREE 10 ML: 5 INJECTION INTRAVENOUS at 09:00

## 2024-10-14 RX ADMIN — ALBUTEROL SULFATE 10 PUFF: 90 AEROSOL, METERED RESPIRATORY (INHALATION) at 03:26

## 2024-10-14 RX ADMIN — HEPARIN SODIUM 5000 UNITS: 5000 INJECTION INTRAVENOUS; SUBCUTANEOUS at 05:29

## 2024-10-14 RX ADMIN — PROPOFOL 40 MG: 10 INJECTION, EMULSION INTRAVENOUS at 18:45

## 2024-10-14 RX ADMIN — CISATRACURIUM BESYLATE 3 MCG/KG/MIN: 200 INJECTION, SOLUTION INTRAVENOUS at 05:27

## 2024-10-14 RX ADMIN — WATER 1000 MG: 1 INJECTION INTRAMUSCULAR; INTRAVENOUS; SUBCUTANEOUS at 11:01

## 2024-10-14 RX ADMIN — SODIUM CHLORIDE: 9 INJECTION, SOLUTION INTRAVENOUS at 18:31

## 2024-10-14 RX ADMIN — Medication 200 MCG/HR: at 23:34

## 2024-10-14 RX ADMIN — ALBUTEROL SULFATE 10 PUFF: 90 AEROSOL, METERED RESPIRATORY (INHALATION) at 19:57

## 2024-10-14 RX ADMIN — ALBUTEROL SULFATE 10 PUFF: 90 AEROSOL, METERED RESPIRATORY (INHALATION) at 15:48

## 2024-10-14 RX ADMIN — LANSOPRAZOLE 30 MG: 30 TABLET, ORALLY DISINTEGRATING, DELAYED RELEASE ORAL at 09:00

## 2024-10-14 RX ADMIN — POLYETHYLENE GLYCOL 3350 17 G: 17 POWDER, FOR SOLUTION ORAL at 11:01

## 2024-10-14 RX ADMIN — Medication 10 MG/HR: at 17:07

## 2024-10-14 RX ADMIN — ARGATROBAN 0.5 MCG/KG/MIN: 50 INJECTION INTRAVENOUS at 20:36

## 2024-10-14 RX ADMIN — CISATRACURIUM BESYLATE 3 MCG/KG/MIN: 200 INJECTION, SOLUTION INTRAVENOUS at 21:07

## 2024-10-14 RX ADMIN — ALBUTEROL SULFATE 10 PUFF: 90 AEROSOL, METERED RESPIRATORY (INHALATION) at 11:39

## 2024-10-14 RX ADMIN — MIDAZOLAM HYDROCHLORIDE 4 MG: 2 INJECTION, SOLUTION INTRAMUSCULAR; INTRAVENOUS at 11:30

## 2024-10-14 RX ADMIN — METHYLPREDNISOLONE SODIUM SUCCINATE 80 MG: 125 INJECTION, POWDER, LYOPHILIZED, FOR SOLUTION INTRAMUSCULAR; INTRAVENOUS at 05:28

## 2024-10-14 RX ADMIN — PROPOFOL 60 MCG/KG/MIN: 10 INJECTION, EMULSION INTRAVENOUS at 21:32

## 2024-10-14 RX ADMIN — PROPOFOL 60 MCG/KG/MIN: 10 INJECTION, EMULSION INTRAVENOUS at 10:38

## 2024-10-14 RX ADMIN — Medication 2 G: at 18:46

## 2024-10-14 RX ADMIN — Medication 0.75 MG/KG/HR: at 03:52

## 2024-10-14 RX ADMIN — Medication 10 MG/HR: at 06:29

## 2024-10-14 RX ADMIN — Medication 125 MCG/HR: at 06:26

## 2024-10-14 RX ADMIN — METHYLPREDNISOLONE SODIUM SUCCINATE 80 MG: 125 INJECTION, POWDER, LYOPHILIZED, FOR SOLUTION INTRAMUSCULAR; INTRAVENOUS at 20:36

## 2024-10-14 RX ADMIN — ALBUTEROL SULFATE 10 PUFF: 90 AEROSOL, METERED RESPIRATORY (INHALATION) at 08:32

## 2024-10-14 RX ADMIN — METHYLPREDNISOLONE SODIUM SUCCINATE 80 MG: 125 INJECTION, POWDER, LYOPHILIZED, FOR SOLUTION INTRAMUSCULAR; INTRAVENOUS at 13:44

## 2024-10-14 ASSESSMENT — PULMONARY FUNCTION TESTS
PIF_VALUE: 45
PIF_VALUE: 52
PIF_VALUE: 34
PIF_VALUE: 36
PIF_VALUE: 34
PIF_VALUE: 40
PIF_VALUE: 46
PIF_VALUE: 36
PIF_VALUE: 49
PIF_VALUE: 54
PIF_VALUE: 20
PIF_VALUE: 51
PIF_VALUE: 35
PIF_VALUE: 20
PIF_VALUE: 55

## 2024-10-14 NOTE — ANESTHESIA PRE PROCEDURE
Department of Anesthesiology  Preprocedure Note       Name:  Austin Kwon   Age:  44 y.o.  :  1980                                          MRN:  [id number]         Date:  10/14/2024      Surgeon: Surgeon(s):  Adi Alford MD    Procedure: Procedure(s):  E2 EXTRA CORPOREAL MEMBRANE OXYGENATION    Medications prior to admission:   Prior to Admission medications    Medication Sig Start Date End Date Taking? Authorizing Provider   albuterol sulfate HFA (PROVENTIL;VENTOLIN;PROAIR) 108 (90 Base) MCG/ACT inhaler inhale 2 puffs by mouth and INTO THE LUNGS four times a day if needed for wheezing 24   Javi Oden, DO   fluticasone-salmeterol (ADVAIR) 100-50 MCG/ACT AEPB diskus inhaler inhale 1 puff by mouth and INTO THE LUNGS every 12 hours 24   Javi Oden DO   predniSONE (DELTASONE) 10 MG tablet take 4 tablets by mouth every morning for 5 days 23   Provider, MD Pritesh   montelukast (SINGULAIR) 10 MG tablet Take 1 tablet by mouth nightly 9/12/23 3/10/24  Javi Oden DO   acetaminophen (TYLENOL) 325 MG tablet Take 1 tablet by mouth every 6 hours as needed for Pain  Patient not taking: Reported on 2023   Rica Benavidez DO   ibuprofen (IBU) 800 MG tablet Take 1 tablet by mouth every 8 hours as needed for Pain  Patient not taking: Reported on 2023   Rica Benavidez DO   benzocaine-menthol (CEPACOL) 15-4 MG LOZG lozenge Take 1 lozenge by mouth every 2 hours as needed for Sore Throat 22   Farzana Lay MD   albuterol sulfate HFA (VENTOLIN HFA) 108 (90 Base) MCG/ACT inhaler Inhale 2 puffs into the lungs 4 times daily as needed for Wheezing 22   Celso Cole MD   albuterol sulfate HFA (PROVENTIL HFA) 108 (90 Base) MCG/ACT inhaler Inhale 1-2 puffs into the lungs every 4 hours as needed for Wheezing or Shortness of Breath (Space out to every 6 hours as symptoms improve) Space out to every 6 hours as symptoms improve.

## 2024-10-14 NOTE — PROCEDURES
PATIENT NAME: Austin Kwon  MEDICAL RECORD NO. [id number]  DATE: 10/14/2024  ATTENDING PHYSICIAN:  Dr. Beau Mar  PRIMARY CARE PHYSICIAN: No primary provider on file.    PREOPERATIVE DIAGNOSIS:  Need for blood pressure monitoring  POSTOPERATIVE DIAGNOSIS:  Same  PROCEDURE PERFORMED:  right femoral Arterial Line Insertion  PERFORMING PHYSICIAN:  Darryn Robledo MD  ANESTHESIA:  Sedation for mechanical ventilation  ESTIMATED BLOOD LOSS:  Less than 25 ml  COMPLICATIONS:  None immediately appreciated.     DISCUSSION:  Austin Kwon is a 44 y.o. male who requires invasive pressure monitoring. The history and physical examination were reviewed and confirmed.  Given the patient's condition, emergent consent was implied.    PROCEDURE:  A timeout was initiated by the bedside nurse and was confirmed by those present.  The patient was placed in a supine position. The skin overlying the right groin was prepped with chlorhexadine. Through this region, the introducer needle through catheter was inserted into right femoral artery until pulsatile bright blood was seen in collection tubing. Guidewire was advanced with no resistance. Catheter was advanced into the artery, wire was pulled with brisk bleeding noted. Pressure monitoring setup was connected to the catheter, it aspirated and flushed easily. The catheter was secured to the wrist with 3-0 silk.     No immediate complication was evident.  All sponge, instrument and needle counts were correct at the completion of the procedure.      Darryn Robledo MD  Internal Medicine Resident, PGY-2  [place], [place]  10/14/2024,4:09 PM  Attending Physician Statement  I have discussed the care of Austin Kwon, including pertinent history and exam findings,  with the resident. I have seen and examined the patient and the key elements of all parts of the encounter have been performed by me.  I agree with the assessment, plan and orders as

## 2024-10-14 NOTE — CARE COORDINATION
Transitional planning. Intubated/ Sedated/ Paralyzed. FiO2 40%, PEEP of 10, OG for TF, POC dependent on pt progress.  Spoke to pt's sister about possible need for LTACH, she wants to waiting a few days to see how her brother progresses.

## 2024-10-14 NOTE — PROCEDURES
PROCEDURE NOTE - CENTRAL VENOUS LINE PLACEMENT    PATIENT NAME: Austin Kwon  MEDICAL RECORD NO. [id number]  DATE: 10/14/2024  ATTENDING PHYSICIAN: Dr. Beau Mar    PREOPERATIVE DIAGNOSIS:  Need for IV access  POSTOPERATIVE DIAGNOSIS:  Same  PROCEDURE PERFORMED:  Right Femoral Vein Central Line Insertion  PERFORMING PHYSICIAN: Darryn Robledo MD  ANESTHESIA:  Local utilizing 1% lidocaine  ESTIMATED BLOOD LOSS:  Less than 25 ml  COMPLICATIONS:  None immediately appreciated.     DISCUSSION:  Austin Kwon is a 44 y.o.-year-old male who requires central IV access. The history and physical examination were reviewed and confirmed. Patient was intubated and sedated. The patient was  prepared for the procedure.    PROCEDURE:  A timeout was initiated by the bedside nurse and was confirmed by those present.  The patient was placed in a supine position. The skin overlying the Right Femoral Vein was prepped with chlorhexadine and draped in sterile fashion. The skin was infiltrated with local anesthetic. The vessel and surrounding anatomy was visualized using ultrasound probe. Through the anesthetized region, the introducer needle was inserted into the femoral vein returning dark red non pulsatile blood. A guidewire was placed through the center of the needle with no resistance. Ultrasound confirmed presence of wire in the right femoral vein. A small incision made in the skin with a #11 scalpel blade. The dilator was inserted into the skin and vein over guidewire using Seldinger technique. The dilator was then removed and the catheter was placed in the vein over the guidewire using Seldinger technique. The guidewire was then removed and all ports aspirated and flushed appropriately. The catheter then secured using silk suture and a temporary sterile dressing was applied.  No immediate complication was evident.  All sponge, instrument and needle counts were correct at the completion of the procedure.    The patient

## 2024-10-15 ENCOUNTER — APPOINTMENT (OUTPATIENT)
Dept: GENERAL RADIOLOGY | Age: 44
DRG: 009 | End: 2024-10-15
Payer: MEDICAID

## 2024-10-15 PROBLEM — Z71.89 ACP (ADVANCE CARE PLANNING): Status: ACTIVE | Noted: 2024-10-15

## 2024-10-15 PROBLEM — Z51.5 ENCOUNTER FOR PALLIATIVE CARE: Status: ACTIVE | Noted: 2024-10-15

## 2024-10-15 PROBLEM — J80 ARDS (ADULT RESPIRATORY DISTRESS SYNDROME): Status: ACTIVE | Noted: 2024-10-15

## 2024-10-15 LAB
ABO + RH BLD: NORMAL
ACT BLD: 144 SEC (ref 79–149)
ALBUMIN SERPL-MCNC: 3.1 G/DL (ref 3.5–5.2)
ALBUMIN SERPL-MCNC: 3.2 G/DL (ref 3.5–5.2)
ALBUMIN/GLOB SERPL: 2 {RATIO} (ref 1–2.5)
ALBUMIN/GLOB SERPL: 2 {RATIO} (ref 1–2.5)
ALP SERPL-CCNC: 37 U/L (ref 40–129)
ALP SERPL-CCNC: 39 U/L (ref 40–129)
ALT SERPL-CCNC: 110 U/L (ref 10–50)
ALT SERPL-CCNC: 91 U/L (ref 10–50)
ANION GAP SERPL CALCULATED.3IONS-SCNC: 5 MMOL/L (ref 9–16)
ANION GAP SERPL CALCULATED.3IONS-SCNC: 6 MMOL/L (ref 9–16)
ANION GAP SERPL CALCULATED.3IONS-SCNC: 6 MMOL/L (ref 9–16)
ARM BAND NUMBER: NORMAL
AST SERPL-CCNC: 18 U/L (ref 10–50)
AST SERPL-CCNC: 32 U/L (ref 10–50)
BILIRUB DIRECT SERPL-MCNC: 0.1 MG/DL (ref 0–0.2)
BILIRUB DIRECT SERPL-MCNC: 0.1 MG/DL (ref 0–0.2)
BILIRUB INDIRECT SERPL-MCNC: 0.1 MG/DL (ref 0–1)
BILIRUB INDIRECT SERPL-MCNC: 0.1 MG/DL (ref 0–1)
BILIRUB SERPL-MCNC: 0.2 MG/DL (ref 0–1.2)
BILIRUB SERPL-MCNC: 0.2 MG/DL (ref 0–1.2)
BLOOD BANK SAMPLE EXPIRATION: NORMAL
BLOOD GROUP ANTIBODIES SERPL: NEGATIVE
BUN SERPL-MCNC: 24 MG/DL (ref 6–20)
BUN SERPL-MCNC: 25 MG/DL (ref 6–20)
BUN SERPL-MCNC: 26 MG/DL (ref 6–20)
BUN SERPL-MCNC: 28 MG/DL (ref 6–20)
BUN SERPL-MCNC: 28 MG/DL (ref 6–20)
CA-I BLD-SCNC: 1.12 MMOL/L (ref 1.13–1.33)
CA-I BLD-SCNC: 1.15 MMOL/L (ref 1.13–1.33)
CA-I BLD-SCNC: 1.16 MMOL/L (ref 1.13–1.33)
CA-I BLD-SCNC: 1.18 MMOL/L (ref 1.13–1.33)
CALCIUM SERPL-MCNC: 7.7 MG/DL (ref 8.6–10.4)
CALCIUM SERPL-MCNC: 8.1 MG/DL (ref 8.6–10.4)
CALCIUM SERPL-MCNC: 8.2 MG/DL (ref 8.6–10.4)
CHLORIDE SERPL-SCNC: 104 MMOL/L (ref 98–107)
CHLORIDE SERPL-SCNC: 105 MMOL/L (ref 98–107)
CHLORIDE SERPL-SCNC: 105 MMOL/L (ref 98–107)
CHLORIDE SERPL-SCNC: 106 MMOL/L (ref 98–107)
CHLORIDE SERPL-SCNC: 107 MMOL/L (ref 98–107)
CLOT ANGLE.KAOLIN INDUCED BLD RES TEG: 65.2 DEG (ref 63–78)
CLOT ANGLE.KAOLIN INDUCED BLD RES TEG: 71.2 DEG (ref 63–78)
CLOT ANGLE.KAOLIN INDUCED BLD RES TEG: <39 DEG (ref 63–78)
CO2 SERPL-SCNC: 28 MMOL/L (ref 20–31)
CO2 SERPL-SCNC: 28 MMOL/L (ref 20–31)
CO2 SERPL-SCNC: 29 MMOL/L (ref 20–31)
CO2 SERPL-SCNC: 31 MMOL/L (ref 20–31)
CO2 SERPL-SCNC: 32 MMOL/L (ref 20–31)
CREAT SERPL-MCNC: 0.9 MG/DL (ref 0.7–1.2)
CREAT SERPL-MCNC: 1 MG/DL (ref 0.7–1.2)
CREAT SERPL-MCNC: 1 MG/DL (ref 0.7–1.2)
ERYTHROCYTE [DISTWIDTH] IN BLOOD BY AUTOMATED COUNT: 12.9 % (ref 11.8–14.4)
ERYTHROCYTE [DISTWIDTH] IN BLOOD BY AUTOMATED COUNT: 12.9 % (ref 11.8–14.4)
ERYTHROCYTE [DISTWIDTH] IN BLOOD BY AUTOMATED COUNT: 13.2 % (ref 11.8–14.4)
ERYTHROCYTE [DISTWIDTH] IN BLOOD BY AUTOMATED COUNT: 13.2 % (ref 11.8–14.4)
FIBRINOGEN PPP-MCNC: 236 MG/DL (ref 203–521)
FIBRINOGEN PPP-MCNC: 263 MG/DL (ref 203–521)
FIBRINOGEN PPP-MCNC: 297 MG/DL (ref 203–521)
FIBRINOGEN PPP-MCNC: 323 MG/DL (ref 203–521)
FIBRINOGEN, FUNCTIONAL TEG: 14.2 MM (ref 15–32)
FIBRINOGEN, FUNCTIONAL TEG: 17.4 MM (ref 15–32)
FIBRINOGEN, FUNCTIONAL TEG: 17.9 MM (ref 15–32)
FIO2: 100
FIO2: 90
GFR, ESTIMATED: >90 ML/MIN/1.73M2
GLOBULIN SER CALC-MCNC: 1.8 G/DL
GLOBULIN SER CALC-MCNC: 1.9 G/DL
GLUCOSE BLD-MCNC: 115 MG/DL (ref 74–100)
GLUCOSE BLD-MCNC: 119 MG/DL (ref 74–100)
GLUCOSE BLD-MCNC: 121 MG/DL (ref 74–100)
GLUCOSE BLD-MCNC: 124 MG/DL (ref 74–100)
GLUCOSE BLD-MCNC: 133 MG/DL (ref 74–100)
GLUCOSE BLD-MCNC: 138 MG/DL (ref 74–100)
GLUCOSE BLD-MCNC: 138 MG/DL (ref 74–100)
GLUCOSE BLD-MCNC: 143 MG/DL (ref 74–100)
GLUCOSE SERPL-MCNC: 108 MG/DL (ref 74–99)
GLUCOSE SERPL-MCNC: 115 MG/DL (ref 74–99)
GLUCOSE SERPL-MCNC: 121 MG/DL (ref 74–99)
GLUCOSE SERPL-MCNC: 128 MG/DL (ref 74–99)
GLUCOSE SERPL-MCNC: 137 MG/DL (ref 74–99)
HCO3 VENOUS: 32.8 MMOL/L (ref 22–29)
HCO3 VENOUS: 37.3 MMOL/L (ref 22–29)
HCT VFR BLD AUTO: 33.2 % (ref 40.7–50.3)
HCT VFR BLD AUTO: 33.3 % (ref 40.7–50.3)
HCT VFR BLD AUTO: 33.6 % (ref 40.7–50.3)
HCT VFR BLD AUTO: 35.2 % (ref 40.7–50.3)
HGB BLD-MCNC: 11.4 G/DL (ref 13–17)
HGB BLD-MCNC: 11.6 G/DL (ref 13–17)
HGB BLD-MCNC: 11.7 G/DL (ref 13–17)
HGB BLD-MCNC: 12 G/DL (ref 13–17)
INR PPP: 1.1
INR PPP: 1.5
INR PPP: 1.8
INR PPP: 2
KINETICS TEG: 1.5 MIN (ref 0.8–2.1)
KINETICS TEG: 2.3 MIN (ref 0.8–2.1)
KINETICS TEG: >5 MIN (ref 0.8–2.1)
LACTIC ACID, WHOLE BLOOD: 0.8 MMOL/L (ref 0.7–2.1)
LACTIC ACID, WHOLE BLOOD: 1 MMOL/L (ref 0.7–2.1)
LACTIC ACID, WHOLE BLOOD: 1 MMOL/L (ref 0.7–2.1)
LACTIC ACID, WHOLE BLOOD: 1.2 MMOL/L (ref 0.7–2.1)
MA (MAX CLOT) TEG: 53.2 MM (ref 52–69)
MA (MAX CLOT) TEG: 59.8 MM (ref 52–69)
MA (MAX CLOT) TEG: ABNORMAL MM (ref 52–69)
MA(MAX CLOT) RAPID TEG: 50.9 MM (ref 52–70)
MA(MAX CLOT) RAPID TEG: 59.4 MM (ref 52–70)
MA(MAX CLOT) RAPID TEG: 61.4 MM (ref 52–70)
MAGNESIUM SERPL-MCNC: 2.3 MG/DL (ref 1.6–2.6)
MAGNESIUM SERPL-MCNC: 2.4 MG/DL (ref 1.6–2.6)
MAGNESIUM SERPL-MCNC: 2.5 MG/DL (ref 1.6–2.6)
MCH RBC QN AUTO: 27.5 PG (ref 25.2–33.5)
MCH RBC QN AUTO: 27.5 PG (ref 25.2–33.5)
MCH RBC QN AUTO: 27.6 PG (ref 25.2–33.5)
MCH RBC QN AUTO: 27.9 PG (ref 25.2–33.5)
MCHC RBC AUTO-ENTMCNC: 34.1 G/DL (ref 28.4–34.8)
MCHC RBC AUTO-ENTMCNC: 34.3 G/DL (ref 28.4–34.8)
MCHC RBC AUTO-ENTMCNC: 34.8 G/DL (ref 28.4–34.8)
MCHC RBC AUTO-ENTMCNC: 34.8 G/DL (ref 28.4–34.8)
MCV RBC AUTO: 79.3 FL (ref 82.6–102.9)
MCV RBC AUTO: 80.2 FL (ref 82.6–102.9)
MCV RBC AUTO: 80.2 FL (ref 82.6–102.9)
MCV RBC AUTO: 80.5 FL (ref 82.6–102.9)
MICROORGANISM SPEC CULT: ABNORMAL
MICROORGANISM SPEC CULT: ABNORMAL
MICROORGANISM/AGENT SPEC: ABNORMAL
NRBC BLD-RTO: 0 PER 100 WBC
NRBC BLD-RTO: 0 PER 100 WBC
NRBC BLD-RTO: 0.2 PER 100 WBC
NRBC BLD-RTO: 0.2 PER 100 WBC
O2 SAT, VEN: 76.1 % (ref 60–85)
O2 SAT, VEN: 80.4 % (ref 60–85)
PARTIAL THROMBOPLASTIN TIME: 24.9 SEC (ref 23–36.5)
PARTIAL THROMBOPLASTIN TIME: 28.2 SEC (ref 23–36.5)
PARTIAL THROMBOPLASTIN TIME: 33.5 SEC (ref 23–36.5)
PARTIAL THROMBOPLASTIN TIME: 37.3 SEC (ref 23–36.5)
PARTIAL THROMBOPLASTIN TIME: 38.2 SEC (ref 23–36.5)
PARTIAL THROMBOPLASTIN TIME: 38.2 SEC (ref 23–36.5)
PARTIAL THROMBOPLASTIN TIME: 39.7 SEC (ref 23–36.5)
PARTIAL THROMBOPLASTIN TIME: 45.7 SEC (ref 23–36.5)
PCO2 VENOUS: 60.8 MM HG (ref 41–51)
PCO2 VENOUS: 63.9 MM HG (ref 41–51)
PERFORMING LOCATION: ABNORMAL
PERFORMING LOCATION: ABNORMAL
PERFORMING LOCATION: NORMAL
PH VENOUS: 7.32 (ref 7.32–7.43)
PH VENOUS: 7.39 (ref 7.32–7.43)
PHOSPHATE SERPL-MCNC: 2.5 MG/DL (ref 2.5–4.5)
PHOSPHATE SERPL-MCNC: 2.5 MG/DL (ref 2.5–4.5)
PHOSPHATE SERPL-MCNC: 2.7 MG/DL (ref 2.5–4.5)
PHOSPHATE SERPL-MCNC: 2.9 MG/DL (ref 2.5–4.5)
PHOSPHATE SERPL-MCNC: 3.5 MG/DL (ref 2.5–4.5)
PLATELET # BLD AUTO: 137 K/UL (ref 138–453)
PLATELET # BLD AUTO: 143 K/UL (ref 138–453)
PLATELET # BLD AUTO: 150 K/UL (ref 138–453)
PLATELET # BLD AUTO: 163 K/UL (ref 138–453)
PMV BLD AUTO: 12.3 FL (ref 8.1–13.5)
PMV BLD AUTO: 12.5 FL (ref 8.1–13.5)
PMV BLD AUTO: 12.5 FL (ref 8.1–13.5)
PMV BLD AUTO: 12.6 FL (ref 8.1–13.5)
PO2 VENOUS: 42.7 MM HG (ref 30–50)
PO2 VENOUS: 50.1 MM HG (ref 30–50)
POC HCO3: 27.9 MMOL/L (ref 21–28)
POC HCO3: 30.8 MMOL/L (ref 21–28)
POC HCO3: 30.9 MMOL/L (ref 21–28)
POC HCO3: 32.8 MMOL/L (ref 21–28)
POC HCO3: 34.1 MMOL/L (ref 21–28)
POC HCO3: 34.2 MMOL/L (ref 21–28)
POC HCO3: 34.6 MMOL/L (ref 21–28)
POC HCO3: 37.3 MMOL/L (ref 21–28)
POC HCO3: 37.4 MMOL/L (ref 21–28)
POC HCO3: 37.5 MMOL/L (ref 21–28)
POC HCO3: 37.7 MMOL/L (ref 21–28)
POC O2 SATURATION: 100 % (ref 94–98)
POC O2 SATURATION: 100 % (ref 94–98)
POC O2 SATURATION: 92.2 % (ref 94–98)
POC O2 SATURATION: 95.4 % (ref 94–98)
POC O2 SATURATION: 97.6 % (ref 94–98)
POC O2 SATURATION: 98.4 % (ref 94–98)
POC O2 SATURATION: 98.6 % (ref 94–98)
POC O2 SATURATION: 99 % (ref 94–98)
POC O2 SATURATION: 99.1 % (ref 94–98)
POC O2 SATURATION: 99.6 % (ref 94–98)
POC O2 SATURATION: 99.7 % (ref 94–98)
POC PCO2: 45.3 MM HG (ref 35–48)
POC PCO2: 53.8 MM HG (ref 35–48)
POC PCO2: 55 MM HG (ref 35–48)
POC PCO2: 55.8 MM HG (ref 35–48)
POC PCO2: 57.1 MM HG (ref 35–48)
POC PCO2: 58.1 MM HG (ref 35–48)
POC PCO2: 61 MM HG (ref 35–48)
POC PCO2: 61 MM HG (ref 35–48)
POC PCO2: 63.8 MM HG (ref 35–48)
POC PCO2: 64.7 MM HG (ref 35–48)
POC PCO2: 81.8 MM HG (ref 35–48)
POC PH: 7.27 (ref 7.35–7.45)
POC PH: 7.29 (ref 7.35–7.45)
POC PH: 7.31 (ref 7.35–7.45)
POC PH: 7.36 (ref 7.35–7.45)
POC PH: 7.37 (ref 7.35–7.45)
POC PH: 7.39 (ref 7.35–7.45)
POC PH: 7.39 (ref 7.35–7.45)
POC PH: 7.4 (ref 7.35–7.45)
POC PH: 7.4 (ref 7.35–7.45)
POC PH: 7.42 (ref 7.35–7.45)
POC PH: 7.43 (ref 7.35–7.45)
POC PO2: 109.2 MM HG (ref 83–108)
POC PO2: 114.8 MM HG (ref 83–108)
POC PO2: 136 MM HG (ref 83–108)
POC PO2: 136.4 MM HG (ref 83–108)
POC PO2: 139.6 MM HG (ref 83–108)
POC PO2: 184.7 MM HG (ref 83–108)
POC PO2: 201.8 MM HG (ref 83–108)
POC PO2: 471.8 MM HG (ref 83–108)
POC PO2: 492.5 MM HG (ref 83–108)
POC PO2: 77.3 MM HG (ref 83–108)
POC PO2: 83.5 MM HG (ref 83–108)
POSITIVE BASE EXCESS, ART: 11.2 MMOL/L (ref 0–3)
POSITIVE BASE EXCESS, ART: 11.3 MMOL/L (ref 0–3)
POSITIVE BASE EXCESS, ART: 2.5 MMOL/L (ref 0–3)
POSITIVE BASE EXCESS, ART: 2.7 MMOL/L (ref 0–3)
POSITIVE BASE EXCESS, ART: 3.3 MMOL/L (ref 0–3)
POSITIVE BASE EXCESS, ART: 6.6 MMOL/L (ref 0–3)
POSITIVE BASE EXCESS, ART: 6.9 MMOL/L (ref 0–3)
POSITIVE BASE EXCESS, ART: 7.7 MMOL/L (ref 0–3)
POSITIVE BASE EXCESS, ART: 7.8 MMOL/L (ref 0–3)
POSITIVE BASE EXCESS, ART: 8.1 MMOL/L (ref 0–3)
POSITIVE BASE EXCESS, ART: 9.8 MMOL/L (ref 0–3)
POSITIVE BASE EXCESS, VEN: 10.4 MMOL/L (ref 0–3)
POSITIVE BASE EXCESS, VEN: 5.2 MMOL/L (ref 0–3)
POTASSIUM SERPL-SCNC: 4.3 MMOL/L (ref 3.7–5.3)
POTASSIUM SERPL-SCNC: 4.6 MMOL/L (ref 3.7–5.3)
POTASSIUM SERPL-SCNC: 4.7 MMOL/L (ref 3.7–5.3)
PROT SERPL-MCNC: 4.9 G/DL (ref 6.6–8.7)
PROT SERPL-MCNC: 5.1 G/DL (ref 6.6–8.7)
PROTHROMBIN TIME: 13.7 SEC (ref 11.7–14.9)
PROTHROMBIN TIME: 18.1 SEC (ref 11.7–14.9)
PROTHROMBIN TIME: 20.1 SEC (ref 11.7–14.9)
PROTHROMBIN TIME: 21.8 SEC (ref 11.7–14.9)
RBC # BLD AUTO: 4.14 M/UL (ref 4.21–5.77)
RBC # BLD AUTO: 4.19 M/UL (ref 4.21–5.77)
RBC # BLD AUTO: 4.2 M/UL (ref 4.21–5.77)
RBC # BLD AUTO: 4.37 M/UL (ref 4.21–5.77)
REACTION TIME TEG W HEPARIN: 10.7 MIN (ref 4.3–8.3)
REACTION TIME TEG W HEPARIN: 3.2 MIN (ref 4.3–8.3)
REACTION TIME TEG W HEPARIN: 6.9 MIN (ref 4.3–8.3)
REACTION TIME TEG: 3.3 MIN (ref 4.6–9.1)
REACTION TIME TEG: 7 MIN (ref 4.6–9.1)
REACTION TIME TEG: >17 MIN (ref 4.6–9.1)
SAMPLE SITE: ABNORMAL
SERVICE CMNT-IMP: ABNORMAL
SODIUM SERPL-SCNC: 138 MMOL/L (ref 136–145)
SODIUM SERPL-SCNC: 140 MMOL/L (ref 136–145)
SODIUM SERPL-SCNC: 140 MMOL/L (ref 136–145)
SODIUM SERPL-SCNC: 142 MMOL/L (ref 136–145)
SODIUM SERPL-SCNC: 142 MMOL/L (ref 136–145)
SPECIMEN DESCRIPTION: ABNORMAL
WBC OTHER # BLD: 15.2 K/UL (ref 3.5–11.3)
WBC OTHER # BLD: 16.4 K/UL (ref 3.5–11.3)
WBC OTHER # BLD: 17.4 K/UL (ref 3.5–11.3)
WBC OTHER # BLD: 17.6 K/UL (ref 3.5–11.3)

## 2024-10-15 PROCEDURE — 83605 ASSAY OF LACTIC ACID: CPT

## 2024-10-15 PROCEDURE — 33948 ECMO/ECLS DAILY MGMT-VENOUS: CPT | Performed by: INTERNAL MEDICINE

## 2024-10-15 PROCEDURE — 85576 BLOOD PLATELET AGGREGATION: CPT

## 2024-10-15 PROCEDURE — 86901 BLOOD TYPING SEROLOGIC RH(D): CPT

## 2024-10-15 PROCEDURE — 6370000000 HC RX 637 (ALT 250 FOR IP): Performed by: INTERNAL MEDICINE

## 2024-10-15 PROCEDURE — B24BZZ4 ULTRASONOGRAPHY OF HEART WITH AORTA, TRANSESOPHAGEAL: ICD-10-PCS | Performed by: INTERNAL MEDICINE

## 2024-10-15 PROCEDURE — 2500000003 HC RX 250 WO HCPCS: Performed by: INTERNAL MEDICINE

## 2024-10-15 PROCEDURE — 71045 X-RAY EXAM CHEST 1 VIEW: CPT

## 2024-10-15 PROCEDURE — 85384 FIBRINOGEN ACTIVITY: CPT

## 2024-10-15 PROCEDURE — 86850 RBC ANTIBODY SCREEN: CPT

## 2024-10-15 PROCEDURE — 85347 COAGULATION TIME ACTIVATED: CPT

## 2024-10-15 PROCEDURE — 6370000000 HC RX 637 (ALT 250 FOR IP)

## 2024-10-15 PROCEDURE — 74018 RADEX ABDOMEN 1 VIEW: CPT

## 2024-10-15 PROCEDURE — 85610 PROTHROMBIN TIME: CPT

## 2024-10-15 PROCEDURE — 82330 ASSAY OF CALCIUM: CPT

## 2024-10-15 PROCEDURE — 80048 BASIC METABOLIC PNL TOTAL CA: CPT

## 2024-10-15 PROCEDURE — 6360000002 HC RX W HCPCS

## 2024-10-15 PROCEDURE — 6360000002 HC RX W HCPCS: Performed by: INTERNAL MEDICINE

## 2024-10-15 PROCEDURE — 99291 CRITICAL CARE FIRST HOUR: CPT | Performed by: INTERNAL MEDICINE

## 2024-10-15 PROCEDURE — 94761 N-INVAS EAR/PLS OXIMETRY MLT: CPT

## 2024-10-15 PROCEDURE — 2500000003 HC RX 250 WO HCPCS

## 2024-10-15 PROCEDURE — 2580000003 HC RX 258

## 2024-10-15 PROCEDURE — 82803 BLOOD GASES ANY COMBINATION: CPT

## 2024-10-15 PROCEDURE — 85027 COMPLETE CBC AUTOMATED: CPT

## 2024-10-15 PROCEDURE — 83051 HEMOGLOBIN PLASMA: CPT

## 2024-10-15 PROCEDURE — 33948 ECMO/ECLS DAILY MGMT-VENOUS: CPT

## 2024-10-15 PROCEDURE — 85300 ANTITHROMBIN III ACTIVITY: CPT

## 2024-10-15 PROCEDURE — 86900 BLOOD TYPING SEROLOGIC ABO: CPT

## 2024-10-15 PROCEDURE — 2000000000 HC ICU R&B

## 2024-10-15 PROCEDURE — 83735 ASSAY OF MAGNESIUM: CPT

## 2024-10-15 PROCEDURE — 82947 ASSAY GLUCOSE BLOOD QUANT: CPT

## 2024-10-15 PROCEDURE — 85730 THROMBOPLASTIN TIME PARTIAL: CPT

## 2024-10-15 PROCEDURE — 94003 VENT MGMT INPAT SUBQ DAY: CPT

## 2024-10-15 PROCEDURE — 99222 1ST HOSP IP/OBS MODERATE 55: CPT | Performed by: INTERNAL MEDICINE

## 2024-10-15 PROCEDURE — 94640 AIRWAY INHALATION TREATMENT: CPT

## 2024-10-15 PROCEDURE — 36415 COLL VENOUS BLD VENIPUNCTURE: CPT

## 2024-10-15 PROCEDURE — 84100 ASSAY OF PHOSPHORUS: CPT

## 2024-10-15 PROCEDURE — 2700000000 HC OXYGEN THERAPY PER DAY

## 2024-10-15 PROCEDURE — 80076 HEPATIC FUNCTION PANEL: CPT

## 2024-10-15 RX ORDER — POLYETHYLENE GLYCOL 3350 17 G/17G
17 POWDER, FOR SOLUTION ORAL 2 TIMES DAILY
Status: DISCONTINUED | OUTPATIENT
Start: 2024-10-15 | End: 2024-10-29 | Stop reason: HOSPADM

## 2024-10-15 RX ORDER — DEXMEDETOMIDINE HYDROCHLORIDE 4 UG/ML
.1-1.5 INJECTION, SOLUTION INTRAVENOUS CONTINUOUS
Status: DISCONTINUED | OUTPATIENT
Start: 2024-10-15 | End: 2024-10-18

## 2024-10-15 RX ORDER — MINERAL OIL AND WHITE PETROLATUM 150; 830 MG/G; MG/G
OINTMENT OPHTHALMIC PRN
Status: DISCONTINUED | OUTPATIENT
Start: 2024-10-15 | End: 2024-10-15

## 2024-10-15 RX ORDER — SENNOSIDES 8.8 MG/5ML
5 LIQUID ORAL 2 TIMES DAILY
Status: DISCONTINUED | OUTPATIENT
Start: 2024-10-15 | End: 2024-10-29

## 2024-10-15 RX ORDER — ALBUTEROL SULFATE 90 UG/1
6 INHALANT RESPIRATORY (INHALATION)
Status: DISCONTINUED | OUTPATIENT
Start: 2024-10-15 | End: 2024-10-16

## 2024-10-15 RX ORDER — ACETAZOLAMIDE 500 MG/5ML
250 INJECTION, POWDER, LYOPHILIZED, FOR SOLUTION INTRAVENOUS EVERY 12 HOURS
Status: DISCONTINUED | OUTPATIENT
Start: 2024-10-15 | End: 2024-10-16

## 2024-10-15 RX ADMIN — HYPROMELLOSE: 0 GEL OPHTHALMIC at 11:06

## 2024-10-15 RX ADMIN — WATER 40 MG: 1 INJECTION INTRAMUSCULAR; INTRAVENOUS; SUBCUTANEOUS at 20:03

## 2024-10-15 RX ADMIN — ALBUTEROL SULFATE 10 PUFF: 90 AEROSOL, METERED RESPIRATORY (INHALATION) at 03:32

## 2024-10-15 RX ADMIN — PROPOFOL 60 MCG/KG/MIN: 10 INJECTION, EMULSION INTRAVENOUS at 00:25

## 2024-10-15 RX ADMIN — Medication 30 UNITS: at 04:25

## 2024-10-15 RX ADMIN — HYPROMELLOSE: 0 GEL OPHTHALMIC at 16:04

## 2024-10-15 RX ADMIN — Medication 200 MCG/HR: at 12:10

## 2024-10-15 RX ADMIN — SODIUM CHLORIDE, PRESERVATIVE FREE 10 ML: 5 INJECTION INTRAVENOUS at 08:40

## 2024-10-15 RX ADMIN — Medication 30 UNITS: at 04:26

## 2024-10-15 RX ADMIN — DOCUSATE SODIUM LIQUID 100 MG: 100 LIQUID ORAL at 20:03

## 2024-10-15 RX ADMIN — Medication 30 UNITS: at 12:27

## 2024-10-15 RX ADMIN — METHYLPREDNISOLONE SODIUM SUCCINATE 80 MG: 125 INJECTION, POWDER, LYOPHILIZED, FOR SOLUTION INTRAMUSCULAR; INTRAVENOUS at 04:33

## 2024-10-15 RX ADMIN — DOCUSATE SODIUM LIQUID 100 MG: 100 LIQUID ORAL at 13:57

## 2024-10-15 RX ADMIN — PROPOFOL 50 MCG/KG/MIN: 10 INJECTION, EMULSION INTRAVENOUS at 08:48

## 2024-10-15 RX ADMIN — CISATRACURIUM BESYLATE 3 MCG/KG/MIN: 200 INJECTION, SOLUTION INTRAVENOUS at 21:54

## 2024-10-15 RX ADMIN — Medication 10 MG/HR: at 21:48

## 2024-10-15 RX ADMIN — ACETAZOLAMIDE SODIUM 250 MG: 500 INJECTION, POWDER, LYOPHILIZED, FOR SOLUTION INTRAVENOUS at 23:38

## 2024-10-15 RX ADMIN — ALBUTEROL SULFATE 6 PUFF: 90 AEROSOL, METERED RESPIRATORY (INHALATION) at 12:10

## 2024-10-15 RX ADMIN — Medication 0.2 MG/KG/HR: at 10:37

## 2024-10-15 RX ADMIN — ALBUTEROL SULFATE 6 PUFF: 90 AEROSOL, METERED RESPIRATORY (INHALATION) at 23:47

## 2024-10-15 RX ADMIN — Medication 30 UNITS: at 12:26

## 2024-10-15 RX ADMIN — WATER 40 MG: 1 INJECTION INTRAMUSCULAR; INTRAVENOUS; SUBCUTANEOUS at 13:41

## 2024-10-15 RX ADMIN — LANSOPRAZOLE 30 MG: 30 TABLET, ORALLY DISINTEGRATING, DELAYED RELEASE ORAL at 13:57

## 2024-10-15 RX ADMIN — IPRATROPIUM BROMIDE 6 PUFF: 17 AEROSOL, METERED RESPIRATORY (INHALATION) at 23:47

## 2024-10-15 RX ADMIN — WATER 1000 MG: 1 INJECTION INTRAMUSCULAR; INTRAVENOUS; SUBCUTANEOUS at 09:43

## 2024-10-15 RX ADMIN — ACETAZOLAMIDE SODIUM 250 MG: 500 INJECTION, POWDER, LYOPHILIZED, FOR SOLUTION INTRAVENOUS at 11:45

## 2024-10-15 RX ADMIN — Medication 0.7 MG/KG/HR: at 00:24

## 2024-10-15 RX ADMIN — DEXMEDETOMIDINE HYDROCHLORIDE 0.2 MCG/KG/HR: 400 INJECTION, SOLUTION INTRAVENOUS at 20:37

## 2024-10-15 RX ADMIN — POLYETHYLENE GLYCOL 3350 17 G: 17 POWDER, FOR SOLUTION ORAL at 20:03

## 2024-10-15 RX ADMIN — ARGATROBAN 1.43 MCG/KG/MIN: 50 INJECTION INTRAVENOUS at 15:27

## 2024-10-15 RX ADMIN — ARGATROBAN 0.84 MCG/KG/MIN: 50 INJECTION INTRAVENOUS at 06:12

## 2024-10-15 RX ADMIN — ALBUTEROL SULFATE 6 PUFF: 90 AEROSOL, METERED RESPIRATORY (INHALATION) at 15:28

## 2024-10-15 RX ADMIN — ALBUTEROL SULFATE 6 PUFF: 90 AEROSOL, METERED RESPIRATORY (INHALATION) at 20:19

## 2024-10-15 RX ADMIN — POLYETHYLENE GLYCOL 3350 17 G: 17 POWDER, FOR SOLUTION ORAL at 13:57

## 2024-10-15 RX ADMIN — PROPOFOL 60 MCG/KG/MIN: 10 INJECTION, EMULSION INTRAVENOUS at 04:23

## 2024-10-15 RX ADMIN — IPRATROPIUM BROMIDE 6 PUFF: 17 AEROSOL, METERED RESPIRATORY (INHALATION) at 20:19

## 2024-10-15 RX ADMIN — SODIUM CHLORIDE, PRESERVATIVE FREE 10 ML: 5 INJECTION INTRAVENOUS at 20:04

## 2024-10-15 RX ADMIN — Medication 10 MG/HR: at 02:18

## 2024-10-15 RX ADMIN — Medication 10 MG/HR: at 12:33

## 2024-10-15 RX ADMIN — CISATRACURIUM BESYLATE 4 MCG/KG/MIN: 200 INJECTION, SOLUTION INTRAVENOUS at 10:22

## 2024-10-15 RX ADMIN — Medication 200 MCG/HR: at 23:37

## 2024-10-15 RX ADMIN — ALBUTEROL SULFATE 10 PUFF: 90 AEROSOL, METERED RESPIRATORY (INHALATION) at 08:48

## 2024-10-15 RX ADMIN — ARGATROBAN 2.42 MCG/KG/MIN: 50 INJECTION INTRAVENOUS at 21:47

## 2024-10-15 RX ADMIN — SENNOSIDES 8.8 MG: 8.8 LIQUID ORAL at 20:07

## 2024-10-15 ASSESSMENT — PULMONARY FUNCTION TESTS
PIF_VALUE: 19
PIF_VALUE: 20
PIF_VALUE: 18
PIF_VALUE: 18
PIF_VALUE: 19
PIF_VALUE: 20

## 2024-10-15 NOTE — ADDENDUM NOTE
Addendum  created 10/15/24 9372 by Tobin Wise MD    Child order released for a procedure order, Clinical Note Signed, Intraprocedure Blocks edited, SmartForm saved

## 2024-10-15 NOTE — ANESTHESIA PROCEDURE NOTES
Procedure Performed: RACHNA       Start Time:  10/14/2024 7:31 PM       End Time:          Preanesthesia Checklist:  Patient identified, IV assessed, risks and benefits discussed, monitors and equipment assessed, procedure being performed at surgeon's request and anesthesia consent obtained.    General Procedure Information  Diagnostic Indications for Echo:  assessment of surgical repair  Physician Requesting Echo: Adi Alford MD  Location performed:  OR  Intubated  Probe Type:  3D  Modalities:  2D and color flow mapping          RACHNA probe was inserted atraumatically at the start of ECMO placement.  Views were utitlized to guide placement of the VV catheter.  Flow jets were visualized confirming direction of flow towards right ventricle via tricuspid valve..  After satisfactory confirmation with both RACHNA and fluoroscopy the RACHNA probe was removed.  Patient tolerated procedure with no complications.

## 2024-10-15 NOTE — ANESTHESIA POSTPROCEDURE EVALUATION
Department of Anesthesiology  Postprocedure Note    Patient: Austin Kwon  MRN: [id number]  YOB: 1980  Date of evaluation: 10/15/2024    Procedure Summary       Date: 10/14/24 Room / Location: [place] [address] / [facility]    Anesthesia Start: 1829 Anesthesia Stop: 1943    Procedure: E2 EXTRA CORPOREAL MEMBRANE OXYGENATION (Right: Neck) Diagnosis:       Oxygen deficiency      (Oxygen deficiency [R09.02])    Surgeons: Adi Alford MD Responsible Provider: Tobin Wise MD    Anesthesia Type: general ASA Status: 4 - Emergent            Anesthesia Type: No value filed.    Victoria Phase I:      Victoria Phase II:    POST-OP ANESTHESIA NOTE       /83   Pulse 93   Temp 99.1 °F (37.3 °C)   Resp 10   Ht 1.753 m (5' 9\")   Wt 85 kg (187 lb 6.3 oz)   SpO2 93%   BMI 27.67 kg/m²    Pain Assessment: Critical Care Pain Observation Tool (CPOT)          Anesthesia Post Evaluation    Patient location during evaluation: ICU  Patient participation: complete - patient cannot participate  Level of consciousness: sedated and ventilated  Pain scale: CPOT.  Airway patency: patent  Nausea & Vomiting: no vomiting and no nausea  Cardiovascular status: hemodynamically stable  Respiratory status: ventilator and intubated  Hydration status: stable  Pain management: adequate        No notable events documented.

## 2024-10-15 NOTE — FLOWSHEET NOTE
Pt's sister, Trinidad, here at bedside to visit pt. Update provided, all questions were answered by RN and ECMO Specialist. Support provided. RN inquired re : pt's next of kin. Trinidad states pt is not  and has children, but only 2 are adults. States the one daughter's name is Elaina Luna and she lives in [place]. Trinidad does not have contact information, she has only communicated with Elaina via [employer].

## 2024-10-16 ENCOUNTER — APPOINTMENT (OUTPATIENT)
Dept: GENERAL RADIOLOGY | Age: 44
DRG: 009 | End: 2024-10-16
Payer: MEDICAID

## 2024-10-16 PROBLEM — J96.02 ACUTE RESPIRATORY FAILURE WITH HYPOXIA AND HYPERCAPNIA: Status: ACTIVE | Noted: 2024-10-09

## 2024-10-16 LAB
ACT BLD: 115 SEC (ref 79–149)
ACT BLD: 211 SEC (ref 79–149)
ALBUMIN SERPL-MCNC: 3.3 G/DL (ref 3.5–5.2)
ALBUMIN SERPL-MCNC: 3.5 G/DL (ref 3.5–5.2)
ALBUMIN/GLOB SERPL: 2 {RATIO} (ref 1–2.5)
ALBUMIN/GLOB SERPL: 2 {RATIO} (ref 1–2.5)
ALP SERPL-CCNC: 34 U/L (ref 40–129)
ALP SERPL-CCNC: 48 U/L (ref 40–129)
ALT SERPL-CCNC: 57 U/L (ref 10–50)
ALT SERPL-CCNC: 80 U/L (ref 10–50)
ANION GAP SERPL CALCULATED.3IONS-SCNC: 6 MMOL/L (ref 9–16)
ANION GAP SERPL CALCULATED.3IONS-SCNC: 6 MMOL/L (ref 9–16)
ANION GAP SERPL CALCULATED.3IONS-SCNC: 7 MMOL/L (ref 9–16)
ANION GAP SERPL CALCULATED.3IONS-SCNC: 7 MMOL/L (ref 9–16)
AST SERPL-CCNC: 12 U/L (ref 10–50)
AST SERPL-CCNC: 17 U/L (ref 10–50)
BILIRUB DIRECT SERPL-MCNC: 0.2 MG/DL (ref 0–0.2)
BILIRUB DIRECT SERPL-MCNC: 0.2 MG/DL (ref 0–0.2)
BILIRUB INDIRECT SERPL-MCNC: 0.1 MG/DL (ref 0–1)
BILIRUB INDIRECT SERPL-MCNC: 0.2 MG/DL (ref 0–1)
BILIRUB SERPL-MCNC: 0.3 MG/DL (ref 0–1.2)
BILIRUB SERPL-MCNC: 0.4 MG/DL (ref 0–1.2)
BUN SERPL-MCNC: 28 MG/DL (ref 6–20)
BUN SERPL-MCNC: 29 MG/DL (ref 6–20)
CA-I BLD-SCNC: 1.18 MMOL/L (ref 1.13–1.33)
CA-I BLD-SCNC: 1.21 MMOL/L (ref 1.13–1.33)
CA-I BLD-SCNC: 1.24 MMOL/L (ref 1.13–1.33)
CA-I BLD-SCNC: 1.25 MMOL/L (ref 1.13–1.33)
CALCIUM SERPL-MCNC: 8.2 MG/DL (ref 8.6–10.4)
CALCIUM SERPL-MCNC: 8.4 MG/DL (ref 8.6–10.4)
CALCIUM SERPL-MCNC: 8.4 MG/DL (ref 8.6–10.4)
CALCIUM SERPL-MCNC: 8.5 MG/DL (ref 8.6–10.4)
CHLORIDE SERPL-SCNC: 106 MMOL/L (ref 98–107)
CHLORIDE SERPL-SCNC: 107 MMOL/L (ref 98–107)
CHLORIDE SERPL-SCNC: 108 MMOL/L (ref 98–107)
CHLORIDE SERPL-SCNC: 109 MMOL/L (ref 98–107)
CLOT ANGLE.KAOLIN INDUCED BLD RES TEG: 64.3 DEG (ref 63–78)
CLOT ANGLE.KAOLIN INDUCED BLD RES TEG: 64.4 DEG (ref 63–78)
CO2 SERPL-SCNC: 22 MMOL/L (ref 20–31)
CO2 SERPL-SCNC: 23 MMOL/L (ref 20–31)
CO2 SERPL-SCNC: 23 MMOL/L (ref 20–31)
CO2 SERPL-SCNC: 24 MMOL/L (ref 20–31)
CREAT SERPL-MCNC: 0.8 MG/DL (ref 0.7–1.2)
CREAT SERPL-MCNC: 0.9 MG/DL (ref 0.7–1.2)
ERYTHROCYTE [DISTWIDTH] IN BLOOD BY AUTOMATED COUNT: 13.1 % (ref 11.8–14.4)
ERYTHROCYTE [DISTWIDTH] IN BLOOD BY AUTOMATED COUNT: 13.2 % (ref 11.8–14.4)
ERYTHROCYTE [DISTWIDTH] IN BLOOD BY AUTOMATED COUNT: 13.2 % (ref 11.8–14.4)
ERYTHROCYTE [DISTWIDTH] IN BLOOD BY AUTOMATED COUNT: 13.3 % (ref 11.8–14.4)
FIBRINOGEN PPP-MCNC: 362 MG/DL (ref 203–521)
FIBRINOGEN PPP-MCNC: 363 MG/DL (ref 203–521)
FIBRINOGEN PPP-MCNC: 373 MG/DL (ref 203–521)
FIBRINOGEN, FUNCTIONAL TEG: 19.8 MM (ref 15–32)
FIBRINOGEN, FUNCTIONAL TEG: 20 MM (ref 15–32)
FIO2: 100
GFR, ESTIMATED: >90 ML/MIN/1.73M2
GLOBULIN SER CALC-MCNC: 2 G/DL
GLOBULIN SER CALC-MCNC: 2.2 G/DL
GLUCOSE BLD-MCNC: 119 MG/DL (ref 74–100)
GLUCOSE BLD-MCNC: 122 MG/DL (ref 74–100)
GLUCOSE BLD-MCNC: 124 MG/DL (ref 74–100)
GLUCOSE BLD-MCNC: 133 MG/DL (ref 74–100)
GLUCOSE BLD-MCNC: 137 MG/DL (ref 74–100)
GLUCOSE BLD-MCNC: 140 MG/DL (ref 74–100)
GLUCOSE SERPL-MCNC: 113 MG/DL (ref 74–99)
GLUCOSE SERPL-MCNC: 119 MG/DL (ref 74–99)
GLUCOSE SERPL-MCNC: 122 MG/DL (ref 74–99)
GLUCOSE SERPL-MCNC: 131 MG/DL (ref 74–99)
HCO3 VENOUS: 24.4 MMOL/L (ref 22–29)
HCO3 VENOUS: 25.1 MMOL/L (ref 22–29)
HCT VFR BLD AUTO: 29.4 % (ref 40.7–50.3)
HCT VFR BLD AUTO: 29.7 % (ref 40.7–50.3)
HCT VFR BLD AUTO: 30 % (ref 41–53)
HCT VFR BLD AUTO: 31 % (ref 41–53)
HCT VFR BLD AUTO: 32.6 % (ref 40.7–50.3)
HCT VFR BLD AUTO: 33.2 % (ref 40.7–50.3)
HGB BLD-MCNC: 10 G/DL (ref 13–17)
HGB BLD-MCNC: 10.3 G/DL (ref 13–17)
HGB BLD-MCNC: 11 G/DL (ref 13–17)
HGB BLD-MCNC: 11.3 G/DL (ref 13–17)
INR PPP: 1.5
INR PPP: 2.3
INR PPP: 2.5
KINETICS TEG: 2 MIN (ref 0.8–2.1)
KINETICS TEG: 2.3 MIN (ref 0.8–2.1)
LACTIC ACID, WHOLE BLOOD: 0.7 MMOL/L (ref 0.7–2.1)
LACTIC ACID, WHOLE BLOOD: 0.8 MMOL/L (ref 0.7–2.1)
LACTIC ACID, WHOLE BLOOD: 1.3 MMOL/L (ref 0.7–2.1)
LACTIC ACID, WHOLE BLOOD: 1.3 MMOL/L (ref 0.7–2.1)
MA (MAX CLOT) TEG: 59.3 MM (ref 52–69)
MA (MAX CLOT) TEG: 61.9 MM (ref 52–69)
MA(MAX CLOT) RAPID TEG: 62.7 MM (ref 52–70)
MA(MAX CLOT) RAPID TEG: 63.6 MM (ref 52–70)
MAGNESIUM SERPL-MCNC: 2.1 MG/DL (ref 1.6–2.6)
MAGNESIUM SERPL-MCNC: 2.2 MG/DL (ref 1.6–2.6)
MAGNESIUM SERPL-MCNC: 2.3 MG/DL (ref 1.6–2.6)
MAGNESIUM SERPL-MCNC: 2.3 MG/DL (ref 1.6–2.6)
MCH RBC QN AUTO: 27 PG (ref 25.2–33.5)
MCH RBC QN AUTO: 27.5 PG (ref 25.2–33.5)
MCH RBC QN AUTO: 27.5 PG (ref 25.2–33.5)
MCH RBC QN AUTO: 27.8 PG (ref 25.2–33.5)
MCHC RBC AUTO-ENTMCNC: 33.7 G/DL (ref 28.4–34.8)
MCHC RBC AUTO-ENTMCNC: 34 G/DL (ref 28.4–34.8)
MCHC RBC AUTO-ENTMCNC: 34 G/DL (ref 28.4–34.8)
MCHC RBC AUTO-ENTMCNC: 34.7 G/DL (ref 28.4–34.8)
MCV RBC AUTO: 79.4 FL (ref 82.6–102.9)
MCV RBC AUTO: 79.9 FL (ref 82.6–102.9)
MCV RBC AUTO: 80.8 FL (ref 82.6–102.9)
MCV RBC AUTO: 81.7 FL (ref 82.6–102.9)
NEGATIVE BASE EXCESS, ART: 0.1 MMOL/L (ref 0–2)
NEGATIVE BASE EXCESS, ART: 0.4 MMOL/L (ref 0–2)
NEGATIVE BASE EXCESS, ART: 0.6 MMOL/L (ref 0–2)
NEGATIVE BASE EXCESS, ART: 0.7 MMOL/L (ref 0–2)
NEGATIVE BASE EXCESS, ART: 0.9 MMOL/L (ref 0–2)
NEGATIVE BASE EXCESS, ART: 1 MMOL/L (ref 0–2)
NEGATIVE BASE EXCESS, VEN: 0.3 MMOL/L (ref 0–2)
NEGATIVE BASE EXCESS, VEN: 1.1 MMOL/L (ref 0–2)
NRBC BLD-RTO: 0 PER 100 WBC
NRBC BLD-RTO: 0 PER 100 WBC
NRBC BLD-RTO: 0.1 PER 100 WBC
NRBC BLD-RTO: 0.1 PER 100 WBC
O2 DELIVERY DEVICE: ABNORMAL
O2 DELIVERY DEVICE: NORMAL
O2 SAT, VEN: 77.5 % (ref 60–85)
O2 SAT, VEN: 79.6 % (ref 60–85)
PARTIAL THROMBOPLASTIN TIME: 33.2 SEC (ref 23–36.5)
PARTIAL THROMBOPLASTIN TIME: 49.9 SEC (ref 23–36.5)
PARTIAL THROMBOPLASTIN TIME: 51.8 SEC (ref 23–36.5)
PARTIAL THROMBOPLASTIN TIME: 52.3 SEC (ref 23–36.5)
PARTIAL THROMBOPLASTIN TIME: 55.2 SEC (ref 23–36.5)
PARTIAL THROMBOPLASTIN TIME: 58.3 SEC (ref 23–36.5)
PCO2 VENOUS: 42.9 MM HG (ref 41–51)
PCO2 VENOUS: 43.6 MM HG (ref 41–51)
PERFORMING LOCATION: ABNORMAL
PERFORMING LOCATION: ABNORMAL
PH VENOUS: 7.36 (ref 7.32–7.43)
PH VENOUS: 7.37 (ref 7.32–7.43)
PHOSPHATE SERPL-MCNC: 2.3 MG/DL (ref 2.5–4.5)
PHOSPHATE SERPL-MCNC: 2.5 MG/DL (ref 2.5–4.5)
PHOSPHATE SERPL-MCNC: 2.9 MG/DL (ref 2.5–4.5)
PHOSPHATE SERPL-MCNC: 2.9 MG/DL (ref 2.5–4.5)
PLATELET # BLD AUTO: 110 K/UL (ref 138–453)
PLATELET # BLD AUTO: 125 K/UL (ref 138–453)
PLATELET # BLD AUTO: ABNORMAL K/UL (ref 138–453)
PLATELET # BLD AUTO: ABNORMAL K/UL (ref 138–453)
PLATELET, FLUORESCENCE: 117 K/UL (ref 138–453)
PLATELET, FLUORESCENCE: 135 K/UL (ref 138–453)
PLATELETS.RETICULATED NFR BLD AUTO: 5.7 % (ref 1.1–10.3)
PLATELETS.RETICULATED NFR BLD AUTO: 6.2 % (ref 1.1–10.3)
PMV BLD AUTO: 12 FL (ref 8.1–13.5)
PMV BLD AUTO: 12.3 FL (ref 8.1–13.5)
PO2 VENOUS: 43.5 MM HG (ref 30–50)
PO2 VENOUS: 45.7 MM HG (ref 30–50)
POC HCO3: 23.4 MMOL/L (ref 21–28)
POC HCO3: 23.9 MMOL/L (ref 21–28)
POC HCO3: 23.9 MMOL/L (ref 21–28)
POC HCO3: 24.2 MMOL/L (ref 21–28)
POC HCO3: 24.3 MMOL/L (ref 21–28)
POC HCO3: 25 MMOL/L (ref 21–28)
POC HCO3: 25.7 MMOL/L (ref 21–28)
POC HCO3: 26.1 MMOL/L (ref 21–28)
POC HEMOGLOBIN (CALC): 10.4 G/DL (ref 13.5–17.5)
POC HEMOGLOBIN (CALC): 10.6 G/DL (ref 13.5–17.5)
POC O2 SATURATION: 100 % (ref 94–98)
POC O2 SATURATION: 100 % (ref 94–98)
POC O2 SATURATION: 97.3 % (ref 94–98)
POC O2 SATURATION: 97.5 % (ref 94–98)
POC O2 SATURATION: 97.9 % (ref 94–98)
POC O2 SATURATION: 97.9 % (ref 94–98)
POC O2 SATURATION: 98.6 % (ref 94–98)
POC O2 SATURATION: 98.6 % (ref 94–98)
POC PCO2: 36.6 MM HG (ref 35–48)
POC PCO2: 36.7 MM HG (ref 35–48)
POC PCO2: 37.8 MM HG (ref 35–48)
POC PCO2: 37.9 MM HG (ref 35–48)
POC PCO2: 41.5 MM HG (ref 35–48)
POC PCO2: 42.9 MM HG (ref 35–48)
POC PCO2: 43 MM HG (ref 35–48)
POC PCO2: 47.1 MM HG (ref 35–48)
POC PH: 7.35 (ref 7.35–7.45)
POC PH: 7.37 (ref 7.35–7.45)
POC PH: 7.38 (ref 7.35–7.45)
POC PH: 7.38 (ref 7.35–7.45)
POC PH: 7.41 (ref 7.35–7.45)
POC PH: 7.43 (ref 7.35–7.45)
POC PO2: 101 MM HG (ref 83–108)
POC PO2: 105.8 MM HG (ref 83–108)
POC PO2: 117.1 MM HG (ref 83–108)
POC PO2: 121.9 MM HG (ref 83–108)
POC PO2: 577.8 MM HG (ref 83–108)
POC PO2: 584.2 MM HG (ref 83–108)
POC PO2: 98.3 MM HG (ref 83–108)
POC PO2: 99.7 MM HG (ref 83–108)
POSITIVE BASE EXCESS, ART: 0.1 MMOL/L (ref 0–3)
POSITIVE BASE EXCESS, ART: 0.5 MMOL/L (ref 0–3)
POTASSIUM SERPL-SCNC: 4.6 MMOL/L (ref 3.7–5.3)
POTASSIUM SERPL-SCNC: 4.7 MMOL/L (ref 3.7–5.3)
POTASSIUM SERPL-SCNC: 4.8 MMOL/L (ref 3.7–5.3)
POTASSIUM SERPL-SCNC: 4.9 MMOL/L (ref 3.7–5.3)
PROT SERPL-MCNC: 5.5 G/DL (ref 6.6–8.7)
PROT SERPL-MCNC: 5.5 G/DL (ref 6.6–8.7)
PROTHROMBIN TIME: 17.5 SEC (ref 11.7–14.9)
PROTHROMBIN TIME: 25 SEC (ref 11.7–14.9)
PROTHROMBIN TIME: 26.4 SEC (ref 11.7–14.9)
RBC # BLD AUTO: 3.6 M/UL (ref 4.21–5.77)
RBC # BLD AUTO: 3.74 M/UL (ref 4.21–5.77)
RBC # BLD AUTO: 4.08 M/UL (ref 4.21–5.77)
RBC # BLD AUTO: 4.11 M/UL (ref 4.21–5.77)
REACTION TIME TEG W HEPARIN: 11.2 MIN (ref 4.3–8.3)
REACTION TIME TEG W HEPARIN: 12.5 MIN (ref 4.3–8.3)
REACTION TIME TEG: 10.7 MIN (ref 4.6–9.1)
REACTION TIME TEG: 12.3 MIN (ref 4.6–9.1)
SAMPLE SITE: NORMAL
SODIUM SERPL-SCNC: 135 MMOL/L (ref 136–145)
SODIUM SERPL-SCNC: 136 MMOL/L (ref 136–145)
SODIUM SERPL-SCNC: 137 MMOL/L (ref 136–145)
SODIUM SERPL-SCNC: 140 MMOL/L (ref 136–145)
TRIGL SERPL-MCNC: 220 MG/DL
WBC OTHER # BLD: 12.8 K/UL (ref 3.5–11.3)
WBC OTHER # BLD: 14.3 K/UL (ref 3.5–11.3)
WBC OTHER # BLD: 15.4 K/UL (ref 3.5–11.3)
WBC OTHER # BLD: 16 K/UL (ref 3.5–11.3)

## 2024-10-16 PROCEDURE — 2580000003 HC RX 258

## 2024-10-16 PROCEDURE — 82947 ASSAY GLUCOSE BLOOD QUANT: CPT

## 2024-10-16 PROCEDURE — 82330 ASSAY OF CALCIUM: CPT

## 2024-10-16 PROCEDURE — 6360000002 HC RX W HCPCS

## 2024-10-16 PROCEDURE — 85347 COAGULATION TIME ACTIVATED: CPT

## 2024-10-16 PROCEDURE — P9041 ALBUMIN (HUMAN),5%, 50ML: HCPCS | Performed by: INTERNAL MEDICINE

## 2024-10-16 PROCEDURE — 6360000002 HC RX W HCPCS: Performed by: INTERNAL MEDICINE

## 2024-10-16 PROCEDURE — 71045 X-RAY EXAM CHEST 1 VIEW: CPT

## 2024-10-16 PROCEDURE — 85610 PROTHROMBIN TIME: CPT

## 2024-10-16 PROCEDURE — 0B9J8ZX DRAINAGE OF LEFT LOWER LUNG LOBE, VIA NATURAL OR ARTIFICIAL OPENING ENDOSCOPIC, DIAGNOSTIC: ICD-10-PCS | Performed by: INTERNAL MEDICINE

## 2024-10-16 PROCEDURE — 87186 SC STD MICRODIL/AGAR DIL: CPT

## 2024-10-16 PROCEDURE — 85055 RETICULATED PLATELET ASSAY: CPT

## 2024-10-16 PROCEDURE — 31624 DX BRONCHOSCOPE/LAVAGE: CPT | Performed by: INTERNAL MEDICINE

## 2024-10-16 PROCEDURE — 85027 COMPLETE CBC AUTOMATED: CPT

## 2024-10-16 PROCEDURE — 94640 AIRWAY INHALATION TREATMENT: CPT

## 2024-10-16 PROCEDURE — 99232 SBSQ HOSP IP/OBS MODERATE 35: CPT | Performed by: INTERNAL MEDICINE

## 2024-10-16 PROCEDURE — 83051 HEMOGLOBIN PLASMA: CPT

## 2024-10-16 PROCEDURE — 82803 BLOOD GASES ANY COMBINATION: CPT

## 2024-10-16 PROCEDURE — 6370000000 HC RX 637 (ALT 250 FOR IP)

## 2024-10-16 PROCEDURE — 2000000000 HC ICU R&B

## 2024-10-16 PROCEDURE — 84100 ASSAY OF PHOSPHORUS: CPT

## 2024-10-16 PROCEDURE — 80048 BASIC METABOLIC PNL TOTAL CA: CPT

## 2024-10-16 PROCEDURE — 2500000003 HC RX 250 WO HCPCS: Performed by: INTERNAL MEDICINE

## 2024-10-16 PROCEDURE — 33948 ECMO/ECLS DAILY MGMT-VENOUS: CPT

## 2024-10-16 PROCEDURE — 94761 N-INVAS EAR/PLS OXIMETRY MLT: CPT

## 2024-10-16 PROCEDURE — 83605 ASSAY OF LACTIC ACID: CPT

## 2024-10-16 PROCEDURE — 2700000000 HC OXYGEN THERAPY PER DAY

## 2024-10-16 PROCEDURE — 85576 BLOOD PLATELET AGGREGATION: CPT

## 2024-10-16 PROCEDURE — 85384 FIBRINOGEN ACTIVITY: CPT

## 2024-10-16 PROCEDURE — 85014 HEMATOCRIT: CPT

## 2024-10-16 PROCEDURE — 83735 ASSAY OF MAGNESIUM: CPT

## 2024-10-16 PROCEDURE — 86403 PARTICLE AGGLUT ANTBDY SCRN: CPT

## 2024-10-16 PROCEDURE — 85730 THROMBOPLASTIN TIME PARTIAL: CPT

## 2024-10-16 PROCEDURE — 33948 ECMO/ECLS DAILY MGMT-VENOUS: CPT | Performed by: INTERNAL MEDICINE

## 2024-10-16 PROCEDURE — 2500000003 HC RX 250 WO HCPCS

## 2024-10-16 PROCEDURE — 6370000000 HC RX 637 (ALT 250 FOR IP): Performed by: INTERNAL MEDICINE

## 2024-10-16 PROCEDURE — 85300 ANTITHROMBIN III ACTIVITY: CPT

## 2024-10-16 PROCEDURE — 84478 ASSAY OF TRIGLYCERIDES: CPT

## 2024-10-16 PROCEDURE — 99291 CRITICAL CARE FIRST HOUR: CPT | Performed by: INTERNAL MEDICINE

## 2024-10-16 PROCEDURE — 87070 CULTURE OTHR SPECIMN AEROBIC: CPT

## 2024-10-16 PROCEDURE — 94003 VENT MGMT INPAT SUBQ DAY: CPT

## 2024-10-16 PROCEDURE — 80076 HEPATIC FUNCTION PANEL: CPT

## 2024-10-16 PROCEDURE — 87205 SMEAR GRAM STAIN: CPT

## 2024-10-16 RX ORDER — ALBUTEROL SULFATE 0.83 MG/ML
2.5 SOLUTION RESPIRATORY (INHALATION)
Status: DISCONTINUED | OUTPATIENT
Start: 2024-10-16 | End: 2024-10-17

## 2024-10-16 RX ORDER — ALBUTEROL SULFATE 0.83 MG/ML
2.5 SOLUTION RESPIRATORY (INHALATION) EVERY 6 HOURS PRN
Status: DISCONTINUED | OUTPATIENT
Start: 2024-10-16 | End: 2024-10-16

## 2024-10-16 RX ORDER — ALBUMIN, HUMAN INJ 5% 5 %
25 SOLUTION INTRAVENOUS ONCE
Status: COMPLETED | OUTPATIENT
Start: 2024-10-16 | End: 2024-10-16

## 2024-10-16 RX ORDER — MIDAZOLAM HYDROCHLORIDE 2 MG/2ML
4 INJECTION, SOLUTION INTRAMUSCULAR; INTRAVENOUS ONCE
Status: DISCONTINUED | OUTPATIENT
Start: 2024-10-16 | End: 2024-10-19

## 2024-10-16 RX ORDER — LIDOCAINE HYDROCHLORIDE 10 MG/ML
20 INJECTION, SOLUTION EPIDURAL; INFILTRATION; INTRACAUDAL; PERINEURAL ONCE
Status: COMPLETED | OUTPATIENT
Start: 2024-10-16 | End: 2024-10-16

## 2024-10-16 RX ADMIN — ALBUTEROL SULFATE 2.5 MG: 2.5 SOLUTION RESPIRATORY (INHALATION) at 23:28

## 2024-10-16 RX ADMIN — ARGATROBAN 3.48 MCG/KG/MIN: 50 INJECTION INTRAVENOUS at 17:56

## 2024-10-16 RX ADMIN — ARGATROBAN 2.9 MCG/KG/MIN: 50 INJECTION INTRAVENOUS at 01:30

## 2024-10-16 RX ADMIN — Medication 10 MG/HR: at 08:12

## 2024-10-16 RX ADMIN — LANSOPRAZOLE 30 MG: 30 TABLET, ORALLY DISINTEGRATING, DELAYED RELEASE ORAL at 08:19

## 2024-10-16 RX ADMIN — WATER 1000 MG: 1 INJECTION INTRAMUSCULAR; INTRAVENOUS; SUBCUTANEOUS at 08:20

## 2024-10-16 RX ADMIN — IPRATROPIUM BROMIDE 6 PUFF: 17 AEROSOL, METERED RESPIRATORY (INHALATION) at 11:26

## 2024-10-16 RX ADMIN — ALBUTEROL SULFATE 6 PUFF: 90 AEROSOL, METERED RESPIRATORY (INHALATION) at 07:57

## 2024-10-16 RX ADMIN — POLYETHYLENE GLYCOL 3350 17 G: 17 POWDER, FOR SOLUTION ORAL at 08:19

## 2024-10-16 RX ADMIN — HYPROMELLOSE: 0 GEL OPHTHALMIC at 19:58

## 2024-10-16 RX ADMIN — SENNOSIDES 8.8 MG: 8.8 LIQUID ORAL at 08:19

## 2024-10-16 RX ADMIN — ALBUMIN (HUMAN) 25 G: 12.5 INJECTION, SOLUTION INTRAVENOUS at 09:50

## 2024-10-16 RX ADMIN — ARGATROBAN 3.48 MCG/KG/MIN: 50 INJECTION INTRAVENOUS at 04:30

## 2024-10-16 RX ADMIN — Medication 30 UNITS: at 16:43

## 2024-10-16 RX ADMIN — WATER 40 MG: 1 INJECTION INTRAMUSCULAR; INTRAVENOUS; SUBCUTANEOUS at 19:37

## 2024-10-16 RX ADMIN — DOCUSATE SODIUM LIQUID 100 MG: 100 LIQUID ORAL at 08:19

## 2024-10-16 RX ADMIN — ALBUTEROL SULFATE 6 PUFF: 90 AEROSOL, METERED RESPIRATORY (INHALATION) at 11:26

## 2024-10-16 RX ADMIN — ALBUTEROL SULFATE 6 PUFF: 90 AEROSOL, METERED RESPIRATORY (INHALATION) at 03:30

## 2024-10-16 RX ADMIN — DEXMEDETOMIDINE HYDROCHLORIDE 0.2 MCG/KG/HR: 400 INJECTION, SOLUTION INTRAVENOUS at 12:26

## 2024-10-16 RX ADMIN — ALBUTEROL SULFATE 2.5 MG: 2.5 SOLUTION RESPIRATORY (INHALATION) at 19:35

## 2024-10-16 RX ADMIN — Medication 200 MCG/HR: at 12:28

## 2024-10-16 RX ADMIN — SODIUM CHLORIDE, PRESERVATIVE FREE 10 ML: 5 INJECTION INTRAVENOUS at 19:37

## 2024-10-16 RX ADMIN — Medication 30 UNITS: at 04:56

## 2024-10-16 RX ADMIN — DOCUSATE SODIUM LIQUID 100 MG: 100 LIQUID ORAL at 19:37

## 2024-10-16 RX ADMIN — Medication 30 UNITS: at 16:42

## 2024-10-16 RX ADMIN — ARGATROBAN 3.48 MCG/KG/MIN: 50 INJECTION INTRAVENOUS at 08:11

## 2024-10-16 RX ADMIN — HYPROMELLOSE: 0 GEL OPHTHALMIC at 13:31

## 2024-10-16 RX ADMIN — ALBUTEROL SULFATE 2.5 MG: 2.5 SOLUTION RESPIRATORY (INHALATION) at 16:22

## 2024-10-16 RX ADMIN — WATER 40 MG: 1 INJECTION INTRAMUSCULAR; INTRAVENOUS; SUBCUTANEOUS at 04:30

## 2024-10-16 RX ADMIN — POLYETHYLENE GLYCOL 3350 17 G: 17 POWDER, FOR SOLUTION ORAL at 19:37

## 2024-10-16 RX ADMIN — MIDAZOLAM HYDROCHLORIDE 4 MG: 2 INJECTION, SOLUTION INTRAMUSCULAR; INTRAVENOUS at 16:14

## 2024-10-16 RX ADMIN — CISATRACURIUM BESYLATE 3 MCG/KG/MIN: 200 INJECTION, SOLUTION INTRAVENOUS at 13:58

## 2024-10-16 RX ADMIN — ARGATROBAN 3.48 MCG/KG/MIN: 50 INJECTION INTRAVENOUS at 10:28

## 2024-10-16 RX ADMIN — ARGATROBAN 3.48 MCG/KG/MIN: 50 INJECTION INTRAVENOUS at 22:13

## 2024-10-16 RX ADMIN — LIDOCAINE HYDROCHLORIDE 20 ML: 10 INJECTION, SOLUTION EPIDURAL; INFILTRATION; INTRACAUDAL; PERINEURAL at 16:14

## 2024-10-16 RX ADMIN — IPRATROPIUM BROMIDE 6 PUFF: 17 AEROSOL, METERED RESPIRATORY (INHALATION) at 07:57

## 2024-10-16 RX ADMIN — Medication 10 MG/HR: at 18:28

## 2024-10-16 RX ADMIN — SENNOSIDES 8.8 MG: 8.8 LIQUID ORAL at 19:37

## 2024-10-16 RX ADMIN — IPRATROPIUM BROMIDE 6 PUFF: 17 AEROSOL, METERED RESPIRATORY (INHALATION) at 03:30

## 2024-10-16 RX ADMIN — SODIUM CHLORIDE, PRESERVATIVE FREE 10 ML: 5 INJECTION INTRAVENOUS at 08:13

## 2024-10-16 RX ADMIN — WATER 40 MG: 1 INJECTION INTRAMUSCULAR; INTRAVENOUS; SUBCUTANEOUS at 13:30

## 2024-10-16 ASSESSMENT — PULMONARY FUNCTION TESTS
PIF_VALUE: 20
PIF_VALUE: 21
PIF_VALUE: 20
PIF_VALUE: 20
PIF_VALUE: 22
PIF_VALUE: 20
PIF_VALUE: 22

## 2024-10-16 NOTE — CARE COORDINATION
Transitional planning. Intubated, Sedated/ Paralyzed FiO2 30%, PEEP of 12, ECMO, OG for TF, POC dependent on pt progress. Daughter Elaina is NOK- number listed not working.     Attempted to call pt's daughterElaina. [phone], number listed rings busy.     Left HIPAA compliant message for pt's sisterTrinidad requesting CB. Will ask if she has another number for pt's daughter, Elaina    1232 pt's SisterTrinidad returned call, she confirmed number in chart for pt's daughterElaina is correct. She will text Elaina and ask her to call CM.     1250 Pt's daughterElaina called and confirmed number above is the correct number to reach her at however nursing had to go through PS to call her. Spoke to her about the difference in LOC between ICU and LTACH. She asked that CM email her LTACH/SNF lists to:  [email or web address]    4590 LTACH/SNF lists emailed to Chris as requested.

## 2024-10-16 NOTE — OP NOTE
Operative Note      Patient: Austin Kwon  YOB: 1980  MRN: [id number]    Date of Procedure: 10/14/2024    Pre op diagnosis - left lung atelectasis   Post-Op Diagnosis:  mucous plugging left lower lobe and right lower lobe        Procedure(s):  Flexible Fiber optic Bronchoscopy with BAL     Surgeon   ANUSHKA DAVILA MD     Assistant:   * No surgical staff found *    Anesthesia: General    Estimated Blood Loss (mL): 0 ml     Complications: None    Specimens:   * No specimens in log *    Implants:  * No implants in log *      Drains:   NG/OG/NJ/NE Tube Orogastric 16 fr (Active)   Surrounding Skin Clean, dry & intact 10/16/24 1630   Securement device Tape 10/16/24 1630   Status Continuous feeding 10/16/24 1630   Placement Verified External Catheter Length 10/16/24 1630   NG/OG/NJ/NE External Measurement (cm) 65 cm 10/16/24 1630   Drainage Appearance Bile 10/15/24 2000   Tube Feeding Peptide Based 10/16/24 1630   Tube feeding/verify rate (mL/hr) 20 mL/hr 10/16/24 1630   Tube Feeding Intake (mL) 40 ml 10/16/24 0800   Free Water/Flush (mL) 140 mL 10/16/24 0800       Urinary Catheter 10/12/24 Rodriguez-Temperature (Active)   $ Urethral catheter insertion $ Not inserted for procedure 10/12/24 1700   Catheter Indications Urinary retention (acute or chronic), continuous bladder irrigation or bladder outlet obstruction;Need for fluid volume management of the critically ill patient in a critical care setting 10/16/24 1600   Site Assessment No urethral drainage 10/16/24 1600   Urine Color Yellow 10/16/24 1600   Urine Appearance Clear 10/16/24 1600   Collection Container Standard 10/16/24 1600   Securement Method Leg strap 10/16/24 1600   Catheter Care  Soap and water 10/16/24 1600   Catheter Best Practices  Drainage tube clipped to bed;Catheter secured to thigh;Tamper seal intact;Bag below bladder;Lack of dependent loop in tubing;Bag not on floor;Drainage bag less than half full 10/16/24 1600   Status Draining;Patent 
sedation and paralytics for respiratory mechanics.  His neck chest and shoulder were prepped and draped in a sterile fashion on the right.  Timeout was held before an 18-gauge needle was inserted into the internal jugular vein under direct ultrasound guidance through which modified Seldinger technique was used to establish a 5 Danish sheath.  A Glidewire advantage was used with the assistance of a Kodak catheter to direct the wire into the right common iliac vein.  The catheter was placed as deep as possible and the wire was removed and replaced with an Amplatz wire.  The catheter was removed.  The sheath was removed and serial dilatation was performed starting at 10 Danish all the way up to 30 Danish to dilate the tract and the vein.  The catheter was then inserted into the appropriate position under fluoroscopic guidance and transesophageal echocardiogram was used to assist the placement of the catheter after it was placed on the ECMO circuit.  A waterfall technique was used to connect the ECMO tubing to the catheter after removing the inner cannula from the catheter and clamping both ports.  The venous port was hooked up first which was the blue port or return port.  This was done again using the waterfall technique to evacuate all bubbles.  This was inspected closely and there were none.  The same was done for the delivery port which was the port directed into the right ventricle.  No bubbles were at that location either.  The clamps were removed and no bubbles appeared at that time.  The ECMO circuit was begun and transesophageal echo was used to direct the jet directly into the right ventricle.  Once this was seen and optimized the catheter was sewn in place with multiple 0 silk sutures.  The patient was effectively removed from the mechanical ventilator and placed solely on the ECMO circuit in terms of oxygenation.  Vent settings were minimized.  The wound was dressed sterilely and the patient was then removed

## 2024-10-17 ENCOUNTER — APPOINTMENT (OUTPATIENT)
Dept: GENERAL RADIOLOGY | Age: 44
DRG: 009 | End: 2024-10-17
Payer: MEDICAID

## 2024-10-17 LAB
ACT BLD: 169 SEC (ref 79–149)
ACT BLD: 203 SEC (ref 79–149)
ACT BLD: 203 SEC (ref 79–149)
ALBUMIN SERPL-MCNC: 3.5 G/DL (ref 3.5–5.2)
ALBUMIN SERPL-MCNC: 3.6 G/DL (ref 3.5–5.2)
ALBUMIN/GLOB SERPL: 2 {RATIO} (ref 1–2.5)
ALBUMIN/GLOB SERPL: 2 {RATIO} (ref 1–2.5)
ALP SERPL-CCNC: 38 U/L (ref 40–129)
ALP SERPL-CCNC: 49 U/L (ref 40–129)
ALT SERPL-CCNC: 51 U/L (ref 10–50)
ALT SERPL-CCNC: 68 U/L (ref 10–50)
ANION GAP SERPL CALCULATED.3IONS-SCNC: 7 MMOL/L (ref 9–16)
ANION GAP SERPL CALCULATED.3IONS-SCNC: 8 MMOL/L (ref 9–16)
ANION GAP SERPL CALCULATED.3IONS-SCNC: 8 MMOL/L (ref 9–16)
ANION GAP SERPL CALCULATED.3IONS-SCNC: 9 MMOL/L (ref 9–16)
AST SERPL-CCNC: 11 U/L (ref 10–50)
AST SERPL-CCNC: 39 U/L (ref 10–50)
BILIRUB DIRECT SERPL-MCNC: 0.2 MG/DL (ref 0–0.2)
BILIRUB DIRECT SERPL-MCNC: 0.2 MG/DL (ref 0–0.2)
BILIRUB INDIRECT SERPL-MCNC: 0.2 MG/DL (ref 0–1)
BILIRUB INDIRECT SERPL-MCNC: 0.2 MG/DL (ref 0–1)
BILIRUB SERPL-MCNC: 0.3 MG/DL (ref 0–1.2)
BILIRUB SERPL-MCNC: 0.4 MG/DL (ref 0–1.2)
BUN SERPL-MCNC: 27 MG/DL (ref 6–20)
BUN SERPL-MCNC: 27 MG/DL (ref 6–20)
BUN SERPL-MCNC: 28 MG/DL (ref 6–20)
BUN SERPL-MCNC: 30 MG/DL (ref 6–20)
CA-I BLD-SCNC: 1.14 MMOL/L (ref 1.13–1.33)
CA-I BLD-SCNC: 1.17 MMOL/L (ref 1.13–1.33)
CA-I BLD-SCNC: 1.17 MMOL/L (ref 1.13–1.33)
CA-I BLD-SCNC: 1.18 MMOL/L (ref 1.13–1.33)
CALCIUM SERPL-MCNC: 8.1 MG/DL (ref 8.6–10.4)
CALCIUM SERPL-MCNC: 8.1 MG/DL (ref 8.6–10.4)
CALCIUM SERPL-MCNC: 8.2 MG/DL (ref 8.6–10.4)
CALCIUM SERPL-MCNC: 8.6 MG/DL (ref 8.6–10.4)
CHLORIDE SERPL-SCNC: 102 MMOL/L (ref 98–107)
CHLORIDE SERPL-SCNC: 103 MMOL/L (ref 98–107)
CHLORIDE SERPL-SCNC: 103 MMOL/L (ref 98–107)
CHLORIDE SERPL-SCNC: 105 MMOL/L (ref 98–107)
CLOT ANGLE.KAOLIN INDUCED BLD RES TEG: 60.8 DEG (ref 63–78)
CLOT ANGLE.KAOLIN INDUCED BLD RES TEG: 64.6 DEG (ref 63–78)
CO2 SERPL-SCNC: 22 MMOL/L (ref 20–31)
CO2 SERPL-SCNC: 22 MMOL/L (ref 20–31)
CO2 SERPL-SCNC: 23 MMOL/L (ref 20–31)
CO2 SERPL-SCNC: 24 MMOL/L (ref 20–31)
CREAT SERPL-MCNC: 0.8 MG/DL (ref 0.7–1.2)
CREAT SERPL-MCNC: 0.8 MG/DL (ref 0.7–1.2)
CREAT SERPL-MCNC: 0.9 MG/DL (ref 0.7–1.2)
CREAT SERPL-MCNC: 0.9 MG/DL (ref 0.7–1.2)
ERYTHROCYTE [DISTWIDTH] IN BLOOD BY AUTOMATED COUNT: 12.6 % (ref 11.8–14.4)
ERYTHROCYTE [DISTWIDTH] IN BLOOD BY AUTOMATED COUNT: 12.7 % (ref 11.8–14.4)
FIBRINOGEN PPP-MCNC: 360 MG/DL (ref 203–521)
FIBRINOGEN PPP-MCNC: 399 MG/DL (ref 203–521)
FIBRINOGEN PPP-MCNC: 406 MG/DL (ref 203–521)
FIBRINOGEN PPP-MCNC: 424 MG/DL (ref 203–521)
FIBRINOGEN PPP-MCNC: 433 MG/DL (ref 203–521)
FIBRINOGEN, FUNCTIONAL TEG: 20.9 MM (ref 15–32)
FIBRINOGEN, FUNCTIONAL TEG: 21.3 MM (ref 15–32)
FIO2: 50
FIO2: 60
FIO2: 80
FIO2: 80
GFR, ESTIMATED: >90 ML/MIN/1.73M2
GLOBULIN SER CALC-MCNC: 2.1 G/DL
GLOBULIN SER CALC-MCNC: 2.3 G/DL
GLUCOSE BLD-MCNC: 144 MG/DL (ref 74–100)
GLUCOSE BLD-MCNC: 144 MG/DL (ref 75–110)
GLUCOSE BLD-MCNC: 149 MG/DL (ref 74–100)
GLUCOSE BLD-MCNC: 156 MG/DL (ref 74–100)
GLUCOSE BLD-MCNC: 160 MG/DL (ref 74–100)
GLUCOSE BLD-MCNC: 165 MG/DL (ref 74–100)
GLUCOSE BLD-MCNC: 166 MG/DL (ref 74–100)
GLUCOSE BLD-MCNC: 168 MG/DL (ref 74–100)
GLUCOSE BLD-MCNC: 168 MG/DL (ref 74–100)
GLUCOSE SERPL-MCNC: 133 MG/DL (ref 74–99)
GLUCOSE SERPL-MCNC: 144 MG/DL (ref 74–99)
GLUCOSE SERPL-MCNC: 156 MG/DL (ref 74–99)
GLUCOSE SERPL-MCNC: 160 MG/DL (ref 74–99)
HCO3 VENOUS: 22.6 MMOL/L (ref 22–29)
HCO3 VENOUS: 27.1 MMOL/L (ref 22–29)
HCT VFR BLD AUTO: 28 % (ref 41–53)
HCT VFR BLD AUTO: 28 % (ref 41–53)
HCT VFR BLD AUTO: 29.1 % (ref 40.7–50.3)
HCT VFR BLD AUTO: 29.6 % (ref 40.7–50.3)
HCT VFR BLD AUTO: 30.2 % (ref 40.7–50.3)
HCT VFR BLD AUTO: 30.4 % (ref 40.7–50.3)
HCT VFR BLD AUTO: 31 % (ref 41–53)
HGB BLD-MCNC: 10.4 G/DL (ref 13–17)
HGB BLD-MCNC: 10.5 G/DL (ref 13–17)
HGB BLD-MCNC: 10.6 G/DL (ref 13–17)
HGB BLD-MCNC: 10.7 G/DL (ref 13–17)
INR PPP: 2.3
INR PPP: 2.4
INR PPP: 2.5
INR PPP: 2.6
INR PPP: 3.1
KINETICS TEG: 2 MIN (ref 0.8–2.1)
KINETICS TEG: 2.3 MIN (ref 0.8–2.1)
LACTIC ACID, WHOLE BLOOD: 1 MMOL/L (ref 0.7–2.1)
LACTIC ACID, WHOLE BLOOD: 1.4 MMOL/L (ref 0.7–2.1)
LACTIC ACID, WHOLE BLOOD: 1.5 MMOL/L (ref 0.7–2.1)
LACTIC ACID, WHOLE BLOOD: 1.5 MMOL/L (ref 0.7–2.1)
MA (MAX CLOT) TEG: 60.5 MM (ref 52–69)
MA (MAX CLOT) TEG: 62.4 MM (ref 52–69)
MA(MAX CLOT) RAPID TEG: 63.7 MM (ref 52–70)
MA(MAX CLOT) RAPID TEG: 63.8 MM (ref 52–70)
MAGNESIUM SERPL-MCNC: 2 MG/DL (ref 1.6–2.6)
MAGNESIUM SERPL-MCNC: 2.1 MG/DL (ref 1.6–2.6)
MAGNESIUM SERPL-MCNC: 2.1 MG/DL (ref 1.6–2.6)
MAGNESIUM SERPL-MCNC: 2.2 MG/DL (ref 1.6–2.6)
MCH RBC QN AUTO: 27.3 PG (ref 25.2–33.5)
MCH RBC QN AUTO: 27.4 PG (ref 25.2–33.5)
MCH RBC QN AUTO: 27.5 PG (ref 25.2–33.5)
MCH RBC QN AUTO: 27.7 PG (ref 25.2–33.5)
MCHC RBC AUTO-ENTMCNC: 35.1 G/DL (ref 28.4–34.8)
MCHC RBC AUTO-ENTMCNC: 35.1 G/DL (ref 28.4–34.8)
MCHC RBC AUTO-ENTMCNC: 35.2 G/DL (ref 28.4–34.8)
MCHC RBC AUTO-ENTMCNC: 36.1 G/DL (ref 28.4–34.8)
MCV RBC AUTO: 76.8 FL (ref 82.6–102.9)
MCV RBC AUTO: 77.6 FL (ref 82.6–102.9)
MCV RBC AUTO: 78.1 FL (ref 82.6–102.9)
MCV RBC AUTO: 78.2 FL (ref 82.6–102.9)
NEGATIVE BASE EXCESS, ART: 1.4 MMOL/L (ref 0–2)
NEGATIVE BASE EXCESS, VEN: 1 MMOL/L (ref 0–2)
NRBC BLD-RTO: 0 PER 100 WBC
NRBC BLD-RTO: 0.2 PER 100 WBC
O2 DELIVERY DEVICE: ABNORMAL
O2 DELIVERY DEVICE: ABNORMAL
O2 SAT, VEN: 78.1 % (ref 60–85)
O2 SAT, VEN: 80.5 % (ref 60–85)
PARTIAL THROMBOPLASTIN TIME: 52.3 SEC (ref 23–36.5)
PARTIAL THROMBOPLASTIN TIME: 52.4 SEC (ref 23–36.5)
PARTIAL THROMBOPLASTIN TIME: 53.9 SEC (ref 23–36.5)
PARTIAL THROMBOPLASTIN TIME: 56.9 SEC (ref 23–36.5)
PARTIAL THROMBOPLASTIN TIME: 62 SEC (ref 23–36.5)
PCO2 VENOUS: 32.6 MM HG (ref 41–51)
PCO2 VENOUS: 40.4 MM HG (ref 41–51)
PERFORMING LOCATION: ABNORMAL
PERFORMING LOCATION: ABNORMAL
PH VENOUS: 7.43 (ref 7.32–7.43)
PH VENOUS: 7.45 (ref 7.32–7.43)
PHOSPHATE SERPL-MCNC: 2.4 MG/DL (ref 2.5–4.5)
PHOSPHATE SERPL-MCNC: 2.5 MG/DL (ref 2.5–4.5)
PHOSPHATE SERPL-MCNC: 2.6 MG/DL (ref 2.5–4.5)
PHOSPHATE SERPL-MCNC: 2.7 MG/DL (ref 2.5–4.5)
PLATELET # BLD AUTO: 112 K/UL (ref 138–453)
PLATELET # BLD AUTO: 112 K/UL (ref 138–453)
PLATELET # BLD AUTO: 123 K/UL (ref 138–453)
PLATELET # BLD AUTO: ABNORMAL K/UL (ref 138–453)
PLATELET, FLUORESCENCE: 119 K/UL (ref 138–453)
PLATELETS.RETICULATED NFR BLD AUTO: 6.6 % (ref 1.1–10.3)
PMV BLD AUTO: 12.1 FL (ref 8.1–13.5)
PMV BLD AUTO: 12.2 FL (ref 8.1–13.5)
PMV BLD AUTO: 12.4 FL (ref 8.1–13.5)
PO2 VENOUS: 41.2 MM HG (ref 30–50)
PO2 VENOUS: 42.2 MM HG (ref 30–50)
POC HCO3: 21.4 MMOL/L (ref 21–28)
POC HCO3: 23.8 MMOL/L (ref 21–28)
POC HCO3: 24.6 MMOL/L (ref 21–28)
POC HCO3: 25 MMOL/L (ref 21–28)
POC HCO3: 25.1 MMOL/L (ref 21–28)
POC HCO3: 25.2 MMOL/L (ref 21–28)
POC HCO3: 25.3 MMOL/L (ref 21–28)
POC HCO3: 25.5 MMOL/L (ref 21–28)
POC HCO3: 25.5 MMOL/L (ref 21–28)
POC HCO3: 25.8 MMOL/L (ref 21–28)
POC HCO3: 26 MMOL/L (ref 21–28)
POC HCO3: 26.2 MMOL/L (ref 21–28)
POC HCO3: 26.4 MMOL/L (ref 21–28)
POC HCO3: 26.7 MMOL/L (ref 21–28)
POC HEMOGLOBIN (CALC): 10.4 G/DL (ref 13.5–17.5)
POC HEMOGLOBIN (CALC): 9.6 G/DL (ref 13.5–17.5)
POC HEMOGLOBIN (CALC): 9.7 G/DL (ref 13.5–17.5)
POC O2 SATURATION: 100 % (ref 94–98)
POC O2 SATURATION: 100 % (ref 94–98)
POC O2 SATURATION: 94.9 % (ref 94–98)
POC O2 SATURATION: 95.9 % (ref 94–98)
POC O2 SATURATION: 96 % (ref 94–98)
POC O2 SATURATION: 96.3 % (ref 94–98)
POC O2 SATURATION: 97 % (ref 94–98)
POC O2 SATURATION: 97 % (ref 94–98)
POC O2 SATURATION: 97.2 % (ref 94–98)
POC O2 SATURATION: 97.2 % (ref 94–98)
POC O2 SATURATION: 97.3 % (ref 94–98)
POC O2 SATURATION: 97.9 % (ref 94–98)
POC O2 SATURATION: 97.9 % (ref 94–98)
POC O2 SATURATION: 98.1 % (ref 94–98)
POC PCO2: 28.8 MM HG (ref 35–48)
POC PCO2: 33.2 MM HG (ref 35–48)
POC PCO2: 33.8 MM HG (ref 35–48)
POC PCO2: 35.7 MM HG (ref 35–48)
POC PCO2: 35.9 MM HG (ref 35–48)
POC PCO2: 36.1 MM HG (ref 35–48)
POC PCO2: 36.3 MM HG (ref 35–48)
POC PCO2: 36.8 MM HG (ref 35–48)
POC PCO2: 36.8 MM HG (ref 35–48)
POC PCO2: 37 MM HG (ref 35–48)
POC PCO2: 37.1 MM HG (ref 35–48)
POC PCO2: 37.5 MM HG (ref 35–48)
POC PCO2: 37.9 MM HG (ref 35–48)
POC PCO2: 39.4 MM HG (ref 35–48)
POC PH: 7.42 (ref 7.35–7.45)
POC PH: 7.43 (ref 7.35–7.45)
POC PH: 7.44 (ref 7.35–7.45)
POC PH: 7.44 (ref 7.35–7.45)
POC PH: 7.45 (ref 7.35–7.45)
POC PH: 7.46 (ref 7.35–7.45)
POC PH: 7.47 (ref 7.35–7.45)
POC PH: 7.48 (ref 7.35–7.45)
POC PH: 7.49 (ref 7.35–7.45)
POC PO2: 100.6 MM HG (ref 83–108)
POC PO2: 334.9 MM HG (ref 83–108)
POC PO2: 593.7 MM HG (ref 83–108)
POC PO2: 71.8 MM HG (ref 83–108)
POC PO2: 77 MM HG (ref 83–108)
POC PO2: 77 MM HG (ref 83–108)
POC PO2: 83 MM HG (ref 83–108)
POC PO2: 85.3 MM HG (ref 83–108)
POC PO2: 85.4 MM HG (ref 83–108)
POC PO2: 87.1 MM HG (ref 83–108)
POC PO2: 87.2 MM HG (ref 83–108)
POC PO2: 89.3 MM HG (ref 83–108)
POC PO2: 95.1 MM HG (ref 83–108)
POC PO2: 98.3 MM HG (ref 83–108)
POSITIVE BASE EXCESS, ART: 0.4 MMOL/L (ref 0–3)
POSITIVE BASE EXCESS, ART: 0.7 MMOL/L (ref 0–3)
POSITIVE BASE EXCESS, ART: 0.9 MMOL/L (ref 0–3)
POSITIVE BASE EXCESS, ART: 0.9 MMOL/L (ref 0–3)
POSITIVE BASE EXCESS, ART: 1.1 MMOL/L (ref 0–3)
POSITIVE BASE EXCESS, ART: 1.1 MMOL/L (ref 0–3)
POSITIVE BASE EXCESS, ART: 1.3 MMOL/L (ref 0–3)
POSITIVE BASE EXCESS, ART: 1.6 MMOL/L (ref 0–3)
POSITIVE BASE EXCESS, ART: 2.2 MMOL/L (ref 0–3)
POSITIVE BASE EXCESS, ART: 2.5 MMOL/L (ref 0–3)
POSITIVE BASE EXCESS, ART: 2.6 MMOL/L (ref 0–3)
POSITIVE BASE EXCESS, ART: 2.7 MMOL/L (ref 0–3)
POSITIVE BASE EXCESS, ART: 2.8 MMOL/L (ref 0–3)
POSITIVE BASE EXCESS, VEN: 2.6 MMOL/L (ref 0–3)
POTASSIUM SERPL-SCNC: 4.3 MMOL/L (ref 3.7–5.3)
POTASSIUM SERPL-SCNC: 4.4 MMOL/L (ref 3.7–5.3)
POTASSIUM SERPL-SCNC: 4.4 MMOL/L (ref 3.7–5.3)
POTASSIUM SERPL-SCNC: 4.5 MMOL/L (ref 3.7–5.3)
PROT SERPL-MCNC: 5.7 G/DL (ref 6.6–8.7)
PROT SERPL-MCNC: 5.8 G/DL (ref 6.6–8.7)
PROTHROMBIN TIME: 24.5 SEC (ref 11.7–14.9)
PROTHROMBIN TIME: 25.4 SEC (ref 11.7–14.9)
PROTHROMBIN TIME: 26.7 SEC (ref 11.7–14.9)
PROTHROMBIN TIME: 27.1 SEC (ref 11.7–14.9)
PROTHROMBIN TIME: 30.7 SEC (ref 11.7–14.9)
RBC # BLD AUTO: 3.79 M/UL (ref 4.21–5.77)
RBC # BLD AUTO: 3.79 M/UL (ref 4.21–5.77)
RBC # BLD AUTO: 3.86 M/UL (ref 4.21–5.77)
RBC # BLD AUTO: 3.92 M/UL (ref 4.21–5.77)
REACTION TIME TEG W HEPARIN: 10.5 MIN (ref 4.3–8.3)
REACTION TIME TEG W HEPARIN: 12.3 MIN (ref 4.3–8.3)
REACTION TIME TEG: 11.4 MIN (ref 4.6–9.1)
REACTION TIME TEG: 13.3 MIN (ref 4.6–9.1)
SAMPLE SITE: ABNORMAL
SODIUM SERPL-SCNC: 134 MMOL/L (ref 136–145)
WBC OTHER # BLD: 10.2 K/UL (ref 3.5–11.3)
WBC OTHER # BLD: 10.6 K/UL (ref 3.5–11.3)
WBC OTHER # BLD: 11.2 K/UL (ref 3.5–11.3)
WBC OTHER # BLD: 12.5 K/UL (ref 3.5–11.3)

## 2024-10-17 PROCEDURE — 6370000000 HC RX 637 (ALT 250 FOR IP)

## 2024-10-17 PROCEDURE — 83605 ASSAY OF LACTIC ACID: CPT

## 2024-10-17 PROCEDURE — 80048 BASIC METABOLIC PNL TOTAL CA: CPT

## 2024-10-17 PROCEDURE — 85055 RETICULATED PLATELET ASSAY: CPT

## 2024-10-17 PROCEDURE — 6360000002 HC RX W HCPCS: Performed by: INTERNAL MEDICINE

## 2024-10-17 PROCEDURE — 85300 ANTITHROMBIN III ACTIVITY: CPT

## 2024-10-17 PROCEDURE — 85027 COMPLETE CBC AUTOMATED: CPT

## 2024-10-17 PROCEDURE — 85576 BLOOD PLATELET AGGREGATION: CPT

## 2024-10-17 PROCEDURE — 6360000002 HC RX W HCPCS

## 2024-10-17 PROCEDURE — 82947 ASSAY GLUCOSE BLOOD QUANT: CPT

## 2024-10-17 PROCEDURE — 99291 CRITICAL CARE FIRST HOUR: CPT | Performed by: INTERNAL MEDICINE

## 2024-10-17 PROCEDURE — 85610 PROTHROMBIN TIME: CPT

## 2024-10-17 PROCEDURE — 83051 HEMOGLOBIN PLASMA: CPT

## 2024-10-17 PROCEDURE — 71045 X-RAY EXAM CHEST 1 VIEW: CPT

## 2024-10-17 PROCEDURE — 2000000000 HC ICU R&B

## 2024-10-17 PROCEDURE — 82330 ASSAY OF CALCIUM: CPT

## 2024-10-17 PROCEDURE — 85730 THROMBOPLASTIN TIME PARTIAL: CPT

## 2024-10-17 PROCEDURE — 85384 FIBRINOGEN ACTIVITY: CPT

## 2024-10-17 PROCEDURE — 83735 ASSAY OF MAGNESIUM: CPT

## 2024-10-17 PROCEDURE — 33948 ECMO/ECLS DAILY MGMT-VENOUS: CPT

## 2024-10-17 PROCEDURE — 94640 AIRWAY INHALATION TREATMENT: CPT

## 2024-10-17 PROCEDURE — 33948 ECMO/ECLS DAILY MGMT-VENOUS: CPT | Performed by: INTERNAL MEDICINE

## 2024-10-17 PROCEDURE — 2500000003 HC RX 250 WO HCPCS: Performed by: INTERNAL MEDICINE

## 2024-10-17 PROCEDURE — 85347 COAGULATION TIME ACTIVATED: CPT

## 2024-10-17 PROCEDURE — 85014 HEMATOCRIT: CPT

## 2024-10-17 PROCEDURE — 2580000003 HC RX 258

## 2024-10-17 PROCEDURE — 99232 SBSQ HOSP IP/OBS MODERATE 35: CPT

## 2024-10-17 PROCEDURE — 84100 ASSAY OF PHOSPHORUS: CPT

## 2024-10-17 PROCEDURE — 6370000000 HC RX 637 (ALT 250 FOR IP): Performed by: INTERNAL MEDICINE

## 2024-10-17 PROCEDURE — 2700000000 HC OXYGEN THERAPY PER DAY

## 2024-10-17 PROCEDURE — 94003 VENT MGMT INPAT SUBQ DAY: CPT

## 2024-10-17 PROCEDURE — 2500000003 HC RX 250 WO HCPCS

## 2024-10-17 PROCEDURE — 82803 BLOOD GASES ANY COMBINATION: CPT

## 2024-10-17 PROCEDURE — 94761 N-INVAS EAR/PLS OXIMETRY MLT: CPT

## 2024-10-17 PROCEDURE — 80076 HEPATIC FUNCTION PANEL: CPT

## 2024-10-17 RX ORDER — PROPOFOL 10 MG/ML
5-50 INJECTION, EMULSION INTRAVENOUS CONTINUOUS
Status: DISCONTINUED | OUTPATIENT
Start: 2024-10-17 | End: 2024-10-17

## 2024-10-17 RX ORDER — PROPOFOL 10 MG/ML
INJECTION, EMULSION INTRAVENOUS
Status: COMPLETED
Start: 2024-10-17 | End: 2024-10-17

## 2024-10-17 RX ORDER — AMLODIPINE BESYLATE 5 MG/1
5 TABLET ORAL DAILY
Status: DISCONTINUED | OUTPATIENT
Start: 2024-10-17 | End: 2024-10-17

## 2024-10-17 RX ORDER — AMLODIPINE BESYLATE 10 MG/1
10 TABLET ORAL DAILY
Status: DISCONTINUED | OUTPATIENT
Start: 2024-10-18 | End: 2024-10-29 | Stop reason: HOSPADM

## 2024-10-17 RX ORDER — FENTANYL CITRATE 50 UG/ML
100 INJECTION, SOLUTION INTRAMUSCULAR; INTRAVENOUS PRN
Status: COMPLETED | OUTPATIENT
Start: 2024-10-17 | End: 2024-10-17

## 2024-10-17 RX ORDER — IPRATROPIUM BROMIDE AND ALBUTEROL SULFATE 2.5; .5 MG/3ML; MG/3ML
1 SOLUTION RESPIRATORY (INHALATION)
Status: DISCONTINUED | OUTPATIENT
Start: 2024-10-17 | End: 2024-10-29 | Stop reason: HOSPADM

## 2024-10-17 RX ORDER — HYDRALAZINE HYDROCHLORIDE 20 MG/ML
10 INJECTION INTRAMUSCULAR; INTRAVENOUS EVERY 4 HOURS PRN
Status: DISCONTINUED | OUTPATIENT
Start: 2024-10-17 | End: 2024-10-29 | Stop reason: HOSPADM

## 2024-10-17 RX ORDER — HYDRALAZINE HYDROCHLORIDE 20 MG/ML
5 INJECTION INTRAMUSCULAR; INTRAVENOUS ONCE
Status: COMPLETED | OUTPATIENT
Start: 2024-10-17 | End: 2024-10-17

## 2024-10-17 RX ORDER — AMLODIPINE BESYLATE 5 MG/1
5 TABLET ORAL ONCE
Status: COMPLETED | OUTPATIENT
Start: 2024-10-17 | End: 2024-10-17

## 2024-10-17 RX ADMIN — LANSOPRAZOLE 30 MG: 30 TABLET, ORALLY DISINTEGRATING, DELAYED RELEASE ORAL at 08:27

## 2024-10-17 RX ADMIN — POLYETHYLENE GLYCOL 3350 17 G: 17 POWDER, FOR SOLUTION ORAL at 08:27

## 2024-10-17 RX ADMIN — SENNOSIDES 8.8 MG: 8.8 LIQUID ORAL at 19:56

## 2024-10-17 RX ADMIN — Medication 10 MG/HR: at 02:34

## 2024-10-17 RX ADMIN — Medication 30 UNITS: at 16:39

## 2024-10-17 RX ADMIN — Medication 30 UNITS: at 04:59

## 2024-10-17 RX ADMIN — ALBUTEROL SULFATE 2.5 MG: 2.5 SOLUTION RESPIRATORY (INHALATION) at 03:28

## 2024-10-17 RX ADMIN — DEXMEDETOMIDINE HYDROCHLORIDE 1.4 MCG/KG/HR: 400 INJECTION, SOLUTION INTRAVENOUS at 05:18

## 2024-10-17 RX ADMIN — CISATRACURIUM BESYLATE 5 MCG/KG/MIN: 200 INJECTION, SOLUTION INTRAVENOUS at 02:37

## 2024-10-17 RX ADMIN — WATER 40 MG: 1 INJECTION INTRAMUSCULAR; INTRAVENOUS; SUBCUTANEOUS at 06:17

## 2024-10-17 RX ADMIN — PROPOFOL 20 MCG/KG/MIN: 10 INJECTION, EMULSION INTRAVENOUS at 04:01

## 2024-10-17 RX ADMIN — DOCUSATE SODIUM LIQUID 100 MG: 100 LIQUID ORAL at 08:27

## 2024-10-17 RX ADMIN — WATER 40 MG: 1 INJECTION INTRAMUSCULAR; INTRAVENOUS; SUBCUTANEOUS at 12:23

## 2024-10-17 RX ADMIN — ARGATROBAN 3.48 MCG/KG/MIN: 50 INJECTION INTRAVENOUS at 01:00

## 2024-10-17 RX ADMIN — WATER 1000 MG: 1 INJECTION INTRAMUSCULAR; INTRAVENOUS; SUBCUTANEOUS at 09:24

## 2024-10-17 RX ADMIN — Medication 200 MCG/HR: at 09:38

## 2024-10-17 RX ADMIN — DOCUSATE SODIUM LIQUID 100 MG: 100 LIQUID ORAL at 19:56

## 2024-10-17 RX ADMIN — ARGATROBAN 3.48 MCG/KG/MIN: 50 INJECTION INTRAVENOUS at 15:23

## 2024-10-17 RX ADMIN — Medication 30 UNITS: at 17:00

## 2024-10-17 RX ADMIN — Medication 7 MG/HR: at 21:53

## 2024-10-17 RX ADMIN — DEXMEDETOMIDINE HYDROCHLORIDE 1.4 MCG/KG/HR: 400 INJECTION, SOLUTION INTRAVENOUS at 18:50

## 2024-10-17 RX ADMIN — Medication 0.2 MG/KG/HR: at 02:40

## 2024-10-17 RX ADMIN — Medication 200 MCG/HR: at 02:33

## 2024-10-17 RX ADMIN — DEXMEDETOMIDINE HYDROCHLORIDE 1.4 MCG/KG/HR: 400 INJECTION, SOLUTION INTRAVENOUS at 15:25

## 2024-10-17 RX ADMIN — ARGATROBAN 3.48 MCG/KG/MIN: 50 INJECTION INTRAVENOUS at 23:48

## 2024-10-17 RX ADMIN — ARGATROBAN 3.48 MCG/KG/MIN: 50 INJECTION INTRAVENOUS at 05:19

## 2024-10-17 RX ADMIN — Medication 30 UNITS: at 16:53

## 2024-10-17 RX ADMIN — Medication 200 MCG/HR: at 20:01

## 2024-10-17 RX ADMIN — SODIUM CHLORIDE, PRESERVATIVE FREE 10 ML: 5 INJECTION INTRAVENOUS at 08:20

## 2024-10-17 RX ADMIN — ARGATROBAN 3.48 MCG/KG/MIN: 50 INJECTION INTRAVENOUS at 20:36

## 2024-10-17 RX ADMIN — AMLODIPINE BESYLATE 5 MG: 5 TABLET ORAL at 08:27

## 2024-10-17 RX ADMIN — HYDRALAZINE HYDROCHLORIDE 5 MG: 20 INJECTION INTRAMUSCULAR; INTRAVENOUS at 08:24

## 2024-10-17 RX ADMIN — AMLODIPINE BESYLATE 5 MG: 5 TABLET ORAL at 12:11

## 2024-10-17 RX ADMIN — ARGATROBAN 3.48 MCG/KG/MIN: 50 INJECTION INTRAVENOUS at 17:49

## 2024-10-17 RX ADMIN — IPRATROPIUM BROMIDE AND ALBUTEROL SULFATE 1 DOSE: 2.5; .5 SOLUTION RESPIRATORY (INHALATION) at 15:26

## 2024-10-17 RX ADMIN — DEXMEDETOMIDINE HYDROCHLORIDE 1.4 MCG/KG/HR: 400 INJECTION, SOLUTION INTRAVENOUS at 11:45

## 2024-10-17 RX ADMIN — POLYETHYLENE GLYCOL 3350 17 G: 17 POWDER, FOR SOLUTION ORAL at 19:56

## 2024-10-17 RX ADMIN — SENNOSIDES 8.8 MG: 8.8 LIQUID ORAL at 08:27

## 2024-10-17 RX ADMIN — IPRATROPIUM BROMIDE AND ALBUTEROL SULFATE 1 DOSE: 2.5; .5 SOLUTION RESPIRATORY (INHALATION) at 23:39

## 2024-10-17 RX ADMIN — ALBUTEROL SULFATE 2.5 MG: 2.5 SOLUTION RESPIRATORY (INHALATION) at 08:12

## 2024-10-17 RX ADMIN — DEXMEDETOMIDINE HYDROCHLORIDE 1.4 MCG/KG/HR: 400 INJECTION, SOLUTION INTRAVENOUS at 09:20

## 2024-10-17 RX ADMIN — IPRATROPIUM BROMIDE AND ALBUTEROL SULFATE 1 DOSE: 2.5; .5 SOLUTION RESPIRATORY (INHALATION) at 11:10

## 2024-10-17 RX ADMIN — SODIUM CHLORIDE 10 ML/HR: 9 INJECTION, SOLUTION INTRAVENOUS at 02:34

## 2024-10-17 RX ADMIN — DEXMEDETOMIDINE HYDROCHLORIDE 1 MCG/KG/HR: 400 INJECTION, SOLUTION INTRAVENOUS at 02:32

## 2024-10-17 RX ADMIN — ARGATROBAN 3.48 MCG/KG/MIN: 50 INJECTION INTRAVENOUS at 08:51

## 2024-10-17 RX ADMIN — WATER 40 MG: 1 INJECTION INTRAMUSCULAR; INTRAVENOUS; SUBCUTANEOUS at 19:57

## 2024-10-17 RX ADMIN — FENTANYL CITRATE 100 MCG: 50 INJECTION, SOLUTION INTRAMUSCULAR; INTRAVENOUS at 03:30

## 2024-10-17 RX ADMIN — HYPROMELLOSE: 0 GEL OPHTHALMIC at 08:28

## 2024-10-17 RX ADMIN — Medication 30 UNITS: at 05:00

## 2024-10-17 RX ADMIN — ARGATROBAN 3.48 MCG/KG/MIN: 50 INJECTION INTRAVENOUS at 11:44

## 2024-10-17 RX ADMIN — Medication 10 MG/HR: at 11:42

## 2024-10-17 RX ADMIN — DEXMEDETOMIDINE HYDROCHLORIDE 1.4 MCG/KG/HR: 400 INJECTION, SOLUTION INTRAVENOUS at 22:22

## 2024-10-17 RX ADMIN — IPRATROPIUM BROMIDE AND ALBUTEROL SULFATE 1 DOSE: 2.5; .5 SOLUTION RESPIRATORY (INHALATION) at 19:48

## 2024-10-17 RX ADMIN — SODIUM CHLORIDE, PRESERVATIVE FREE 10 ML: 5 INJECTION INTRAVENOUS at 20:12

## 2024-10-17 RX ADMIN — ARGATROBAN 3.48 MCG/KG/MIN: 50 INJECTION INTRAVENOUS at 02:21

## 2024-10-17 ASSESSMENT — PULMONARY FUNCTION TESTS
PIF_VALUE: 23
PIF_VALUE: 28
PIF_VALUE: 22
PIF_VALUE: 25
PIF_VALUE: 23
PIF_VALUE: 22
PIF_VALUE: 22

## 2024-10-17 NOTE — FLOWSHEET NOTE
Dr. Mar updated on length of nimbex being off along with the patient not withdrawing from pain.  Per Dr. Mar do not wean sedation today.

## 2024-10-18 ENCOUNTER — APPOINTMENT (OUTPATIENT)
Dept: GENERAL RADIOLOGY | Age: 44
DRG: 009 | End: 2024-10-18
Payer: MEDICAID

## 2024-10-18 LAB
ACT BLD: 207 SEC (ref 79–149)
ALBUMIN SERPL-MCNC: 3.5 G/DL (ref 3.5–5.2)
ALBUMIN SERPL-MCNC: 3.6 G/DL (ref 3.5–5.2)
ALBUMIN/GLOB SERPL: 1 {RATIO} (ref 1–2.5)
ALBUMIN/GLOB SERPL: 2 {RATIO} (ref 1–2.5)
ALP SERPL-CCNC: 55 U/L (ref 40–129)
ALP SERPL-CCNC: 63 U/L (ref 40–129)
ALT SERPL-CCNC: 61 U/L (ref 10–50)
ALT SERPL-CCNC: 63 U/L (ref 10–50)
ANION GAP SERPL CALCULATED.3IONS-SCNC: 10 MMOL/L (ref 9–16)
ANION GAP SERPL CALCULATED.3IONS-SCNC: 10 MMOL/L (ref 9–16)
ANION GAP SERPL CALCULATED.3IONS-SCNC: 9 MMOL/L (ref 9–16)
ANION GAP SERPL CALCULATED.3IONS-SCNC: 9 MMOL/L (ref 9–16)
AST SERPL-CCNC: 25 U/L (ref 10–50)
AST SERPL-CCNC: 27 U/L (ref 10–50)
BILIRUB DIRECT SERPL-MCNC: 0.2 MG/DL (ref 0–0.2)
BILIRUB DIRECT SERPL-MCNC: 0.2 MG/DL (ref 0–0.2)
BILIRUB INDIRECT SERPL-MCNC: 0.2 MG/DL (ref 0–1)
BILIRUB INDIRECT SERPL-MCNC: 0.2 MG/DL (ref 0–1)
BILIRUB SERPL-MCNC: 0.3 MG/DL (ref 0–1.2)
BILIRUB SERPL-MCNC: 0.4 MG/DL (ref 0–1.2)
BUN SERPL-MCNC: 26 MG/DL (ref 6–20)
BUN SERPL-MCNC: 27 MG/DL (ref 6–20)
BUN SERPL-MCNC: 28 MG/DL (ref 6–20)
BUN SERPL-MCNC: 30 MG/DL (ref 6–20)
CA-I BLD-SCNC: 1.14 MMOL/L (ref 1.13–1.33)
CA-I BLD-SCNC: 1.15 MMOL/L (ref 1.13–1.33)
CA-I BLD-SCNC: 1.15 MMOL/L (ref 1.13–1.33)
CA-I BLD-SCNC: 1.18 MMOL/L (ref 1.13–1.33)
CALCIUM SERPL-MCNC: 8.5 MG/DL (ref 8.6–10.4)
CALCIUM SERPL-MCNC: 8.6 MG/DL (ref 8.6–10.4)
CALCIUM SERPL-MCNC: 8.7 MG/DL (ref 8.6–10.4)
CALCIUM SERPL-MCNC: 8.8 MG/DL (ref 8.6–10.4)
CHLORIDE SERPL-SCNC: 100 MMOL/L (ref 98–107)
CHLORIDE SERPL-SCNC: 100 MMOL/L (ref 98–107)
CHLORIDE SERPL-SCNC: 102 MMOL/L (ref 98–107)
CHLORIDE SERPL-SCNC: 103 MMOL/L (ref 98–107)
CLOT ANGLE.KAOLIN INDUCED BLD RES TEG: 55.2 DEG (ref 63–78)
CLOT ANGLE.KAOLIN INDUCED BLD RES TEG: 65.6 DEG (ref 63–78)
CO2 SERPL-SCNC: 23 MMOL/L (ref 20–31)
CO2 SERPL-SCNC: 23 MMOL/L (ref 20–31)
CO2 SERPL-SCNC: 24 MMOL/L (ref 20–31)
CO2 SERPL-SCNC: 24 MMOL/L (ref 20–31)
CREAT SERPL-MCNC: 0.7 MG/DL (ref 0.7–1.2)
CREAT SERPL-MCNC: 0.8 MG/DL (ref 0.7–1.2)
ERYTHROCYTE [DISTWIDTH] IN BLOOD BY AUTOMATED COUNT: 12.6 % (ref 11.8–14.4)
ERYTHROCYTE [DISTWIDTH] IN BLOOD BY AUTOMATED COUNT: 12.6 % (ref 11.8–14.4)
ERYTHROCYTE [DISTWIDTH] IN BLOOD BY AUTOMATED COUNT: 12.7 % (ref 11.8–14.4)
ERYTHROCYTE [DISTWIDTH] IN BLOOD BY AUTOMATED COUNT: 12.9 % (ref 11.8–14.4)
FIBRINOGEN PPP-MCNC: 389 MG/DL (ref 203–521)
FIBRINOGEN PPP-MCNC: 420 MG/DL (ref 203–521)
FIBRINOGEN PPP-MCNC: 433 MG/DL (ref 203–521)
FIBRINOGEN PPP-MCNC: 439 MG/DL (ref 203–521)
FIBRINOGEN, FUNCTIONAL TEG: 20.2 MM (ref 15–32)
FIBRINOGEN, FUNCTIONAL TEG: 20.6 MM (ref 15–32)
FIO2: 100
FIO2: 100
FIO2: 30
FIO2: 30
FIO2: 40
GFR, ESTIMATED: >90 ML/MIN/1.73M2
GLOBULIN SER CALC-MCNC: 2.3 G/DL
GLOBULIN SER CALC-MCNC: 2.5 G/DL
GLUCOSE BLD-MCNC: 150 MG/DL (ref 74–100)
GLUCOSE BLD-MCNC: 151 MG/DL (ref 74–100)
GLUCOSE BLD-MCNC: 151 MG/DL (ref 74–100)
GLUCOSE BLD-MCNC: 155 MG/DL (ref 74–100)
GLUCOSE BLD-MCNC: 160 MG/DL (ref 74–100)
GLUCOSE BLD-MCNC: 172 MG/DL (ref 74–100)
GLUCOSE SERPL-MCNC: 144 MG/DL (ref 74–99)
GLUCOSE SERPL-MCNC: 145 MG/DL (ref 74–99)
GLUCOSE SERPL-MCNC: 153 MG/DL (ref 74–99)
GLUCOSE SERPL-MCNC: 155 MG/DL (ref 74–99)
HCO3 VENOUS: 26.2 MMOL/L (ref 22–29)
HCO3 VENOUS: 28.2 MMOL/L (ref 22–29)
HCT VFR BLD AUTO: 29.9 % (ref 40.7–50.3)
HCT VFR BLD AUTO: 30.5 % (ref 40.7–50.3)
HCT VFR BLD AUTO: 31.2 % (ref 40.7–50.3)
HCT VFR BLD AUTO: 32.4 % (ref 40.7–50.3)
HGB BLD-MCNC: 10.7 G/DL (ref 13–17)
HGB BLD-MCNC: 10.8 G/DL (ref 13–17)
HGB BLD-MCNC: 11.2 G/DL (ref 13–17)
HGB BLD-MCNC: 11.5 G/DL (ref 13–17)
HGB FREE PLAS-MCNC: 1.8 MG/DL (ref 0–9.7)
HGB FREE PLAS-MCNC: 6.1 MG/DL (ref 0–9.7)
INR PPP: 2.1
INR PPP: 2.2
INR PPP: 2.3
INR PPP: 2.4
KINETICS TEG: 1.8 MIN (ref 0.8–2.1)
KINETICS TEG: 2.5 MIN (ref 0.8–2.1)
LACTIC ACID, WHOLE BLOOD: 1.3 MMOL/L (ref 0.7–2.1)
LACTIC ACID, WHOLE BLOOD: 1.4 MMOL/L (ref 0.7–2.1)
LACTIC ACID, WHOLE BLOOD: 1.9 MMOL/L (ref 0.7–2.1)
LACTIC ACID, WHOLE BLOOD: 2.3 MMOL/L (ref 0.7–2.1)
MA (MAX CLOT) TEG: 60.9 MM (ref 52–69)
MA (MAX CLOT) TEG: 62.6 MM (ref 52–69)
MA(MAX CLOT) RAPID TEG: 63.4 MM (ref 52–70)
MA(MAX CLOT) RAPID TEG: 63.8 MM (ref 52–70)
MAGNESIUM SERPL-MCNC: 2 MG/DL (ref 1.6–2.6)
MAGNESIUM SERPL-MCNC: 2 MG/DL (ref 1.6–2.6)
MAGNESIUM SERPL-MCNC: 2.1 MG/DL (ref 1.6–2.6)
MAGNESIUM SERPL-MCNC: 2.1 MG/DL (ref 1.6–2.6)
MCH RBC QN AUTO: 27.1 PG (ref 25.2–33.5)
MCH RBC QN AUTO: 27.2 PG (ref 25.2–33.5)
MCH RBC QN AUTO: 27.6 PG (ref 25.2–33.5)
MCH RBC QN AUTO: 27.7 PG (ref 25.2–33.5)
MCHC RBC AUTO-ENTMCNC: 35.4 G/DL (ref 28.4–34.8)
MCHC RBC AUTO-ENTMCNC: 35.5 G/DL (ref 28.4–34.8)
MCHC RBC AUTO-ENTMCNC: 35.8 G/DL (ref 28.4–34.8)
MCHC RBC AUTO-ENTMCNC: 35.9 G/DL (ref 28.4–34.8)
MCV RBC AUTO: 76.4 FL (ref 82.6–102.9)
MCV RBC AUTO: 76.8 FL (ref 82.6–102.9)
MCV RBC AUTO: 77 FL (ref 82.6–102.9)
MCV RBC AUTO: 77.1 FL (ref 82.6–102.9)
MICROORGANISM SPEC CULT: ABNORMAL
MICROORGANISM SPEC CULT: ABNORMAL
MICROORGANISM/AGENT SPEC: ABNORMAL
NRBC BLD-RTO: 0 PER 100 WBC
O2 SAT, VEN: 79.8 % (ref 60–85)
O2 SAT, VEN: 82.5 % (ref 60–85)
PARTIAL THROMBOPLASTIN TIME: 52.5 SEC (ref 23–36.5)
PARTIAL THROMBOPLASTIN TIME: 52.5 SEC (ref 23–36.5)
PARTIAL THROMBOPLASTIN TIME: 54.2 SEC (ref 23–36.5)
PARTIAL THROMBOPLASTIN TIME: 54.6 SEC (ref 23–36.5)
PCO2 VENOUS: 36.8 MM HG (ref 41–51)
PCO2 VENOUS: 41.1 MM HG (ref 41–51)
PERFORMING LOCATION: ABNORMAL
PERFORMING LOCATION: ABNORMAL
PH VENOUS: 7.45 (ref 7.32–7.43)
PH VENOUS: 7.46 (ref 7.32–7.43)
PHOSPHATE SERPL-MCNC: 2 MG/DL (ref 2.5–4.5)
PHOSPHATE SERPL-MCNC: 2.2 MG/DL (ref 2.5–4.5)
PHOSPHATE SERPL-MCNC: 2.7 MG/DL (ref 2.5–4.5)
PHOSPHATE SERPL-MCNC: 2.8 MG/DL (ref 2.5–4.5)
PLATELET # BLD AUTO: 113 K/UL (ref 138–453)
PLATELET # BLD AUTO: 115 K/UL (ref 138–453)
PLATELET # BLD AUTO: 118 K/UL (ref 138–453)
PLATELET # BLD AUTO: 119 K/UL (ref 138–453)
PMV BLD AUTO: 12.2 FL (ref 8.1–13.5)
PMV BLD AUTO: 12.2 FL (ref 8.1–13.5)
PMV BLD AUTO: 12.4 FL (ref 8.1–13.5)
PMV BLD AUTO: 12.4 FL (ref 8.1–13.5)
PO2 VENOUS: 41.4 MM HG (ref 30–50)
PO2 VENOUS: 45.1 MM HG (ref 30–50)
POC HCO3: 24.4 MMOL/L (ref 21–28)
POC HCO3: 25.1 MMOL/L (ref 21–28)
POC HCO3: 26.5 MMOL/L (ref 21–28)
POC HCO3: 26.7 MMOL/L (ref 21–28)
POC HCO3: 27 MMOL/L (ref 21–28)
POC HCO3: 27.2 MMOL/L (ref 21–28)
POC HCO3: 27.3 MMOL/L (ref 21–28)
POC HCO3: 29 MMOL/L (ref 21–28)
POC O2 SATURATION: 100 % (ref 94–98)
POC O2 SATURATION: 93.9 % (ref 94–98)
POC O2 SATURATION: 94.7 % (ref 94–98)
POC O2 SATURATION: 97.1 % (ref 94–98)
POC O2 SATURATION: 97.4 % (ref 94–98)
POC O2 SATURATION: 97.4 % (ref 94–98)
POC O2 SATURATION: 97.5 % (ref 94–98)
POC O2 SATURATION: 97.5 % (ref 94–98)
POC PCO2: 31.8 MM HG (ref 35–48)
POC PCO2: 32.3 MM HG (ref 35–48)
POC PCO2: 34.7 MM HG (ref 35–48)
POC PCO2: 35.4 MM HG (ref 35–48)
POC PCO2: 36.3 MM HG (ref 35–48)
POC PCO2: 36.7 MM HG (ref 35–48)
POC PCO2: 37.1 MM HG (ref 35–48)
POC PCO2: 44.6 MM HG (ref 35–48)
POC PH: 7.42 (ref 7.35–7.45)
POC PH: 7.47 (ref 7.35–7.45)
POC PH: 7.47 (ref 7.35–7.45)
POC PH: 7.48 (ref 7.35–7.45)
POC PH: 7.49 (ref 7.35–7.45)
POC PH: 7.49 (ref 7.35–7.45)
POC PH: 7.5 (ref 7.35–7.45)
POC PH: 7.5 (ref 7.35–7.45)
POC PO2: 559.3 MM HG (ref 83–108)
POC PO2: 63.5 MM HG (ref 83–108)
POC PO2: 67 MM HG (ref 83–108)
POC PO2: 84.7 MM HG (ref 83–108)
POC PO2: 85.9 MM HG (ref 83–108)
POC PO2: 86.9 MM HG (ref 83–108)
POC PO2: 88.9 MM HG (ref 83–108)
POC PO2: 89.6 MM HG (ref 83–108)
POSITIVE BASE EXCESS, ART: 1.6 MMOL/L (ref 0–3)
POSITIVE BASE EXCESS, ART: 2.2 MMOL/L (ref 0–3)
POSITIVE BASE EXCESS, ART: 2.7 MMOL/L (ref 0–3)
POSITIVE BASE EXCESS, ART: 3.4 MMOL/L (ref 0–3)
POSITIVE BASE EXCESS, ART: 3.4 MMOL/L (ref 0–3)
POSITIVE BASE EXCESS, ART: 3.5 MMOL/L (ref 0–3)
POSITIVE BASE EXCESS, ART: 4 MMOL/L (ref 0–3)
POSITIVE BASE EXCESS, ART: 4.1 MMOL/L (ref 0–3)
POSITIVE BASE EXCESS, VEN: 2.4 MMOL/L (ref 0–3)
POSITIVE BASE EXCESS, VEN: 3.8 MMOL/L (ref 0–3)
POTASSIUM SERPL-SCNC: 3.9 MMOL/L (ref 3.7–5.3)
POTASSIUM SERPL-SCNC: 4.4 MMOL/L (ref 3.7–5.3)
POTASSIUM SERPL-SCNC: 4.5 MMOL/L (ref 3.7–5.3)
POTASSIUM SERPL-SCNC: 4.6 MMOL/L (ref 3.7–5.3)
PROT SERPL-MCNC: 5.8 G/DL (ref 6.6–8.7)
PROT SERPL-MCNC: 6.1 G/DL (ref 6.6–8.7)
PROTHROMBIN TIME: 23.4 SEC (ref 11.7–14.9)
PROTHROMBIN TIME: 23.9 SEC (ref 11.7–14.9)
PROTHROMBIN TIME: 24.4 SEC (ref 11.7–14.9)
PROTHROMBIN TIME: 25.7 SEC (ref 11.7–14.9)
RBC # BLD AUTO: 3.88 M/UL (ref 4.21–5.77)
RBC # BLD AUTO: 3.97 M/UL (ref 4.21–5.77)
RBC # BLD AUTO: 4.05 M/UL (ref 4.21–5.77)
RBC # BLD AUTO: 4.24 M/UL (ref 4.21–5.77)
REACTION TIME TEG W HEPARIN: 12.5 MIN (ref 4.3–8.3)
REACTION TIME TEG W HEPARIN: 9.7 MIN (ref 4.3–8.3)
REACTION TIME TEG: 12.2 MIN (ref 4.6–9.1)
REACTION TIME TEG: 12.5 MIN (ref 4.6–9.1)
SAMPLE SITE: ABNORMAL
SODIUM SERPL-SCNC: 133 MMOL/L (ref 136–145)
SODIUM SERPL-SCNC: 133 MMOL/L (ref 136–145)
SODIUM SERPL-SCNC: 135 MMOL/L (ref 136–145)
SODIUM SERPL-SCNC: 136 MMOL/L (ref 136–145)
SPECIMEN DESCRIPTION: ABNORMAL
WBC OTHER # BLD: 10.5 K/UL (ref 3.5–11.3)
WBC OTHER # BLD: 10.5 K/UL (ref 3.5–11.3)
WBC OTHER # BLD: 12.1 K/UL (ref 3.5–11.3)
WBC OTHER # BLD: 13.6 K/UL (ref 3.5–11.3)

## 2024-10-18 PROCEDURE — 6360000002 HC RX W HCPCS

## 2024-10-18 PROCEDURE — 2580000003 HC RX 258

## 2024-10-18 PROCEDURE — 82330 ASSAY OF CALCIUM: CPT

## 2024-10-18 PROCEDURE — 2000000000 HC ICU R&B

## 2024-10-18 PROCEDURE — 80048 BASIC METABOLIC PNL TOTAL CA: CPT

## 2024-10-18 PROCEDURE — 85300 ANTITHROMBIN III ACTIVITY: CPT

## 2024-10-18 PROCEDURE — 85730 THROMBOPLASTIN TIME PARTIAL: CPT

## 2024-10-18 PROCEDURE — 85347 COAGULATION TIME ACTIVATED: CPT

## 2024-10-18 PROCEDURE — 99291 CRITICAL CARE FIRST HOUR: CPT | Performed by: INTERNAL MEDICINE

## 2024-10-18 PROCEDURE — 71045 X-RAY EXAM CHEST 1 VIEW: CPT

## 2024-10-18 PROCEDURE — 83605 ASSAY OF LACTIC ACID: CPT

## 2024-10-18 PROCEDURE — 94761 N-INVAS EAR/PLS OXIMETRY MLT: CPT

## 2024-10-18 PROCEDURE — 82947 ASSAY GLUCOSE BLOOD QUANT: CPT

## 2024-10-18 PROCEDURE — 6370000000 HC RX 637 (ALT 250 FOR IP)

## 2024-10-18 PROCEDURE — 84100 ASSAY OF PHOSPHORUS: CPT

## 2024-10-18 PROCEDURE — 94667 MNPJ CHEST WALL 1ST: CPT

## 2024-10-18 PROCEDURE — 85384 FIBRINOGEN ACTIVITY: CPT

## 2024-10-18 PROCEDURE — 6370000000 HC RX 637 (ALT 250 FOR IP): Performed by: INTERNAL MEDICINE

## 2024-10-18 PROCEDURE — 83735 ASSAY OF MAGNESIUM: CPT

## 2024-10-18 PROCEDURE — 85610 PROTHROMBIN TIME: CPT

## 2024-10-18 PROCEDURE — 6360000002 HC RX W HCPCS: Performed by: INTERNAL MEDICINE

## 2024-10-18 PROCEDURE — 80076 HEPATIC FUNCTION PANEL: CPT

## 2024-10-18 PROCEDURE — 2500000003 HC RX 250 WO HCPCS

## 2024-10-18 PROCEDURE — 33948 ECMO/ECLS DAILY MGMT-VENOUS: CPT | Performed by: INTERNAL MEDICINE

## 2024-10-18 PROCEDURE — 2500000003 HC RX 250 WO HCPCS: Performed by: INTERNAL MEDICINE

## 2024-10-18 PROCEDURE — 94640 AIRWAY INHALATION TREATMENT: CPT

## 2024-10-18 PROCEDURE — 33948 ECMO/ECLS DAILY MGMT-VENOUS: CPT

## 2024-10-18 PROCEDURE — 85576 BLOOD PLATELET AGGREGATION: CPT

## 2024-10-18 PROCEDURE — 82803 BLOOD GASES ANY COMBINATION: CPT

## 2024-10-18 PROCEDURE — 94003 VENT MGMT INPAT SUBQ DAY: CPT

## 2024-10-18 PROCEDURE — 2700000000 HC OXYGEN THERAPY PER DAY

## 2024-10-18 PROCEDURE — 85027 COMPLETE CBC AUTOMATED: CPT

## 2024-10-18 RX ORDER — LABETALOL HYDROCHLORIDE 5 MG/ML
5 INJECTION, SOLUTION INTRAVENOUS PRN
Status: DISCONTINUED | OUTPATIENT
Start: 2024-10-18 | End: 2024-10-18

## 2024-10-18 RX ORDER — METOPROLOL TARTRATE 25 MG/1
25 TABLET, FILM COATED ORAL 2 TIMES DAILY
Status: DISCONTINUED | OUTPATIENT
Start: 2024-10-18 | End: 2024-10-29 | Stop reason: HOSPADM

## 2024-10-18 RX ORDER — MIDAZOLAM HYDROCHLORIDE 2 MG/2ML
4 INJECTION, SOLUTION INTRAMUSCULAR; INTRAVENOUS
Status: COMPLETED | OUTPATIENT
Start: 2024-10-18 | End: 2024-10-18

## 2024-10-18 RX ORDER — LABETALOL HYDROCHLORIDE 5 MG/ML
5 INJECTION, SOLUTION INTRAVENOUS EVERY 4 HOURS PRN
Status: DISCONTINUED | OUTPATIENT
Start: 2024-10-18 | End: 2024-10-29 | Stop reason: HOSPADM

## 2024-10-18 RX ORDER — METOPROLOL TARTRATE 25 MG/1
25 TABLET, FILM COATED ORAL 2 TIMES DAILY
Status: DISCONTINUED | OUTPATIENT
Start: 2024-10-18 | End: 2024-10-18

## 2024-10-18 RX ORDER — MIDAZOLAM HYDROCHLORIDE 1 MG/ML
1-10 INJECTION, SOLUTION INTRAVENOUS CONTINUOUS
Status: DISCONTINUED | OUTPATIENT
Start: 2024-10-18 | End: 2024-10-21

## 2024-10-18 RX ORDER — DEXMEDETOMIDINE HYDROCHLORIDE 4 UG/ML
.1-1.5 INJECTION, SOLUTION INTRAVENOUS CONTINUOUS
Status: DISCONTINUED | OUTPATIENT
Start: 2024-10-18 | End: 2024-10-21

## 2024-10-18 RX ORDER — NICARDIPINE HYDROCHLORIDE 0.1 MG/ML
2.5-15 INJECTION INTRAVENOUS CONTINUOUS
Status: DISCONTINUED | OUTPATIENT
Start: 2024-10-19 | End: 2024-10-23

## 2024-10-18 RX ADMIN — ARGATROBAN 3.48 MCG/KG/MIN: 50 INJECTION INTRAVENOUS at 12:14

## 2024-10-18 RX ADMIN — WATER 40 MG: 1 INJECTION INTRAMUSCULAR; INTRAVENOUS; SUBCUTANEOUS at 04:53

## 2024-10-18 RX ADMIN — AMLODIPINE BESYLATE 10 MG: 10 TABLET ORAL at 08:33

## 2024-10-18 RX ADMIN — POLYETHYLENE GLYCOL 3350 17 G: 17 POWDER, FOR SOLUTION ORAL at 19:28

## 2024-10-18 RX ADMIN — Medication 30 UNITS: at 04:40

## 2024-10-18 RX ADMIN — DEXMEDETOMIDINE HYDROCHLORIDE 1.5 MCG/KG/HR: 400 INJECTION, SOLUTION INTRAVENOUS at 19:20

## 2024-10-18 RX ADMIN — ARGATROBAN 3.48 MCG/KG/MIN: 50 INJECTION INTRAVENOUS at 06:01

## 2024-10-18 RX ADMIN — DEXMEDETOMIDINE HYDROCHLORIDE 1.4 MCG/KG/HR: 400 INJECTION, SOLUTION INTRAVENOUS at 01:40

## 2024-10-18 RX ADMIN — ARGATROBAN 3.48 MCG/KG/MIN: 50 INJECTION INTRAVENOUS at 18:26

## 2024-10-18 RX ADMIN — IPRATROPIUM BROMIDE AND ALBUTEROL SULFATE 1 DOSE: 2.5; .5 SOLUTION RESPIRATORY (INHALATION) at 20:43

## 2024-10-18 RX ADMIN — DEXMEDETOMIDINE HYDROCHLORIDE 1.5 MCG/KG/HR: 400 INJECTION, SOLUTION INTRAVENOUS at 16:23

## 2024-10-18 RX ADMIN — Medication 200 MCG/HR: at 08:37

## 2024-10-18 RX ADMIN — Medication 10 MG/HR: at 09:08

## 2024-10-18 RX ADMIN — HYDRALAZINE HYDROCHLORIDE 10 MG: 20 INJECTION INTRAMUSCULAR; INTRAVENOUS at 19:26

## 2024-10-18 RX ADMIN — LABETALOL HYDROCHLORIDE 5 MG: 5 INJECTION, SOLUTION INTRAVENOUS at 15:36

## 2024-10-18 RX ADMIN — HYDRALAZINE HYDROCHLORIDE 10 MG: 20 INJECTION INTRAMUSCULAR; INTRAVENOUS at 03:17

## 2024-10-18 RX ADMIN — ARGATROBAN 3.48 MCG/KG/MIN: 50 INJECTION INTRAVENOUS at 02:48

## 2024-10-18 RX ADMIN — DOCUSATE SODIUM LIQUID 100 MG: 100 LIQUID ORAL at 08:33

## 2024-10-18 RX ADMIN — POLYETHYLENE GLYCOL 3350 17 G: 17 POWDER, FOR SOLUTION ORAL at 08:33

## 2024-10-18 RX ADMIN — HYPROMELLOSE: 0 GEL OPHTHALMIC at 20:54

## 2024-10-18 RX ADMIN — HYPROMELLOSE: 0 GEL OPHTHALMIC at 12:11

## 2024-10-18 RX ADMIN — Medication 30 UNITS: at 16:16

## 2024-10-18 RX ADMIN — IPRATROPIUM BROMIDE AND ALBUTEROL SULFATE 1 DOSE: 2.5; .5 SOLUTION RESPIRATORY (INHALATION) at 12:58

## 2024-10-18 RX ADMIN — SODIUM CHLORIDE, PRESERVATIVE FREE 10 ML: 5 INJECTION INTRAVENOUS at 08:34

## 2024-10-18 RX ADMIN — ARGATROBAN 3.48 MCG/KG/MIN: 50 INJECTION INTRAVENOUS at 15:35

## 2024-10-18 RX ADMIN — Medication 30 UNITS: at 16:15

## 2024-10-18 RX ADMIN — SENNOSIDES 8.8 MG: 8.8 LIQUID ORAL at 19:28

## 2024-10-18 RX ADMIN — ARGATROBAN 3.48 MCG/KG/MIN: 50 INJECTION INTRAVENOUS at 21:29

## 2024-10-18 RX ADMIN — DEXMEDETOMIDINE HYDROCHLORIDE 1.4 MCG/KG/HR: 400 INJECTION, SOLUTION INTRAVENOUS at 05:06

## 2024-10-18 RX ADMIN — METOPROLOL TARTRATE 25 MG: 25 TABLET, FILM COATED ORAL at 17:09

## 2024-10-18 RX ADMIN — WATER 1000 MG: 1 INJECTION INTRAMUSCULAR; INTRAVENOUS; SUBCUTANEOUS at 08:41

## 2024-10-18 RX ADMIN — MIDAZOLAM HYDROCHLORIDE 4 MG: 1 INJECTION, SOLUTION INTRAMUSCULAR; INTRAVENOUS at 22:01

## 2024-10-18 RX ADMIN — DEXMEDETOMIDINE HYDROCHLORIDE 1.5 MCG/KG/HR: 400 INJECTION, SOLUTION INTRAVENOUS at 22:11

## 2024-10-18 RX ADMIN — DOCUSATE SODIUM LIQUID 100 MG: 100 LIQUID ORAL at 19:28

## 2024-10-18 RX ADMIN — WATER 40 MG: 1 INJECTION INTRAMUSCULAR; INTRAVENOUS; SUBCUTANEOUS at 19:28

## 2024-10-18 RX ADMIN — IPRATROPIUM BROMIDE AND ALBUTEROL SULFATE 1 DOSE: 2.5; .5 SOLUTION RESPIRATORY (INHALATION) at 03:08

## 2024-10-18 RX ADMIN — ARGATROBAN 3.48 MCG/KG/MIN: 50 INJECTION INTRAVENOUS at 09:09

## 2024-10-18 RX ADMIN — LANSOPRAZOLE 30 MG: 30 TABLET, ORALLY DISINTEGRATING, DELAYED RELEASE ORAL at 08:33

## 2024-10-18 RX ADMIN — IPRATROPIUM BROMIDE AND ALBUTEROL SULFATE 1 DOSE: 2.5; .5 SOLUTION RESPIRATORY (INHALATION) at 23:53

## 2024-10-18 RX ADMIN — NICARDIPINE HYDROCHLORIDE 5 MG/HR: 0.1 INJECTION INTRAVENOUS at 23:44

## 2024-10-18 RX ADMIN — DEXMEDETOMIDINE HYDROCHLORIDE 1.2 MCG/KG/HR: 400 INJECTION, SOLUTION INTRAVENOUS at 12:12

## 2024-10-18 RX ADMIN — DEXMEDETOMIDINE HYDROCHLORIDE 1.4 MCG/KG/HR: 400 INJECTION, SOLUTION INTRAVENOUS at 08:35

## 2024-10-18 RX ADMIN — IPRATROPIUM BROMIDE AND ALBUTEROL SULFATE 1 DOSE: 2.5; .5 SOLUTION RESPIRATORY (INHALATION) at 16:35

## 2024-10-18 RX ADMIN — DIBASIC SODIUM PHOSPHATE, MONOBASIC POTASSIUM PHOSPHATE AND MONOBASIC SODIUM PHOSPHATE 1 TABLET: 852; 155; 130 TABLET ORAL at 23:13

## 2024-10-18 RX ADMIN — Medication 0.2 MG/KG/HR: at 04:59

## 2024-10-18 RX ADMIN — SENNOSIDES 8.8 MG: 8.8 LIQUID ORAL at 08:33

## 2024-10-18 RX ADMIN — WATER 40 MG: 1 INJECTION INTRAMUSCULAR; INTRAVENOUS; SUBCUTANEOUS at 12:11

## 2024-10-18 RX ADMIN — LABETALOL HYDROCHLORIDE 5 MG: 5 INJECTION, SOLUTION INTRAVENOUS at 20:53

## 2024-10-18 RX ADMIN — HYPROMELLOSE: 0 GEL OPHTHALMIC at 09:14

## 2024-10-18 RX ADMIN — IPRATROPIUM BROMIDE AND ALBUTEROL SULFATE 1 DOSE: 2.5; .5 SOLUTION RESPIRATORY (INHALATION) at 08:59

## 2024-10-18 RX ADMIN — HYDRALAZINE HYDROCHLORIDE 10 MG: 20 INJECTION INTRAMUSCULAR; INTRAVENOUS at 23:16

## 2024-10-18 RX ADMIN — Medication 200 MCG/HR: at 21:30

## 2024-10-18 ASSESSMENT — PULMONARY FUNCTION TESTS
PIF_VALUE: 16
PIF_VALUE: 23
PIF_VALUE: 24
PIF_VALUE: 23
PIF_VALUE: 23

## 2024-10-19 ENCOUNTER — APPOINTMENT (OUTPATIENT)
Dept: GENERAL RADIOLOGY | Age: 44
DRG: 009 | End: 2024-10-19
Payer: MEDICAID

## 2024-10-19 LAB
ABO + RH BLD: NORMAL
ACT BLD: 195 SEC (ref 79–149)
ACT BLD: 203 SEC (ref 79–149)
ACT BLD: 207 SEC (ref 79–149)
ALBUMIN SERPL-MCNC: 3.4 G/DL (ref 3.5–5.2)
ALBUMIN SERPL-MCNC: 3.7 G/DL (ref 3.5–5.2)
ALBUMIN/GLOB SERPL: 1 {RATIO} (ref 1–2.5)
ALBUMIN/GLOB SERPL: 2 {RATIO} (ref 1–2.5)
ALP SERPL-CCNC: 47 U/L (ref 40–129)
ALP SERPL-CCNC: 59 U/L (ref 40–129)
ALT SERPL-CCNC: 63 U/L (ref 10–50)
ALT SERPL-CCNC: 78 U/L (ref 10–50)
ANION GAP SERPL CALCULATED.3IONS-SCNC: 10 MMOL/L (ref 9–16)
ANION GAP SERPL CALCULATED.3IONS-SCNC: 12 MMOL/L (ref 9–16)
ANION GAP SERPL CALCULATED.3IONS-SCNC: 8 MMOL/L (ref 9–16)
ANION GAP SERPL CALCULATED.3IONS-SCNC: 9 MMOL/L (ref 9–16)
ARM BAND NUMBER: NORMAL
ASPERGILLUS AB SER QL ID: NOT DETECTED
AST SERPL-CCNC: 29 U/L (ref 10–50)
AST SERPL-CCNC: 45 U/L (ref 10–50)
BILIRUB DIRECT SERPL-MCNC: 0.2 MG/DL (ref 0–0.2)
BILIRUB DIRECT SERPL-MCNC: 0.2 MG/DL (ref 0–0.2)
BILIRUB INDIRECT SERPL-MCNC: 0.2 MG/DL (ref 0–1)
BILIRUB INDIRECT SERPL-MCNC: 0.2 MG/DL (ref 0–1)
BILIRUB SERPL-MCNC: 0.3 MG/DL (ref 0–1.2)
BILIRUB SERPL-MCNC: 0.4 MG/DL (ref 0–1.2)
BLOOD BANK SAMPLE EXPIRATION: NORMAL
BLOOD GROUP ANTIBODIES SERPL: NEGATIVE
BUN SERPL-MCNC: 25 MG/DL (ref 6–20)
BUN SERPL-MCNC: 27 MG/DL (ref 6–20)
BUN SERPL-MCNC: 28 MG/DL (ref 6–20)
BUN SERPL-MCNC: 29 MG/DL (ref 6–20)
CA-I BLD-SCNC: 1.12 MMOL/L (ref 1.13–1.33)
CA-I BLD-SCNC: 1.13 MMOL/L (ref 1.13–1.33)
CA-I BLD-SCNC: 1.15 MMOL/L (ref 1.13–1.33)
CA-I BLD-SCNC: 1.18 MMOL/L (ref 1.13–1.33)
CA-I BLD-SCNC: 1.19 MMOL/L (ref 1.13–1.33)
CALCIUM SERPL-MCNC: 8 MG/DL (ref 8.6–10.4)
CALCIUM SERPL-MCNC: 8.4 MG/DL (ref 8.6–10.4)
CALCIUM SERPL-MCNC: 8.5 MG/DL (ref 8.6–10.4)
CALCIUM SERPL-MCNC: 9 MG/DL (ref 8.6–10.4)
CHLORIDE SERPL-SCNC: 100 MMOL/L (ref 98–107)
CHLORIDE SERPL-SCNC: 102 MMOL/L (ref 98–107)
CHLORIDE SERPL-SCNC: 103 MMOL/L (ref 98–107)
CHLORIDE SERPL-SCNC: 98 MMOL/L (ref 98–107)
CLOT ANGLE.KAOLIN INDUCED BLD RES TEG: 66.5 DEG (ref 63–78)
CLOT ANGLE.KAOLIN INDUCED BLD RES TEG: 67.5 DEG (ref 63–78)
CO2 SERPL-SCNC: 21 MMOL/L (ref 20–31)
CO2 SERPL-SCNC: 22 MMOL/L (ref 20–31)
CO2 SERPL-SCNC: 23 MMOL/L (ref 20–31)
CO2 SERPL-SCNC: 23 MMOL/L (ref 20–31)
CREAT SERPL-MCNC: 0.7 MG/DL (ref 0.7–1.2)
CREAT SERPL-MCNC: 0.7 MG/DL (ref 0.7–1.2)
CREAT SERPL-MCNC: 0.8 MG/DL (ref 0.7–1.2)
CREAT SERPL-MCNC: 0.8 MG/DL (ref 0.7–1.2)
ERYTHROCYTE [DISTWIDTH] IN BLOOD BY AUTOMATED COUNT: 12.7 % (ref 11.8–14.4)
ERYTHROCYTE [DISTWIDTH] IN BLOOD BY AUTOMATED COUNT: 12.7 % (ref 11.8–14.4)
ERYTHROCYTE [DISTWIDTH] IN BLOOD BY AUTOMATED COUNT: 12.9 % (ref 11.8–14.4)
ERYTHROCYTE [DISTWIDTH] IN BLOOD BY AUTOMATED COUNT: 12.9 % (ref 11.8–14.4)
ERYTHROCYTE [DISTWIDTH] IN BLOOD BY AUTOMATED COUNT: 13 % (ref 11.8–14.4)
FIBRINOGEN PPP-MCNC: 324 MG/DL (ref 203–521)
FIBRINOGEN PPP-MCNC: 357 MG/DL (ref 203–521)
FIBRINOGEN PPP-MCNC: 419 MG/DL (ref 203–521)
FIBRINOGEN PPP-MCNC: 434 MG/DL (ref 203–521)
FIBRINOGEN, FUNCTIONAL TEG: 20 MM (ref 15–32)
FIBRINOGEN, FUNCTIONAL TEG: 29.9 MM (ref 15–32)
FIO2: 30
GFR, ESTIMATED: >90 ML/MIN/1.73M2
GLOBULIN SER CALC-MCNC: 1.9 G/DL
GLOBULIN SER CALC-MCNC: 2.6 G/DL
GLUCOSE BLD-MCNC: 125 MG/DL (ref 75–110)
GLUCOSE BLD-MCNC: 137 MG/DL (ref 74–100)
GLUCOSE BLD-MCNC: 149 MG/DL (ref 74–100)
GLUCOSE BLD-MCNC: 152 MG/DL (ref 75–110)
GLUCOSE BLD-MCNC: 154 MG/DL (ref 74–100)
GLUCOSE BLD-MCNC: 155 MG/DL (ref 74–100)
GLUCOSE BLD-MCNC: 155 MG/DL (ref 74–100)
GLUCOSE BLD-MCNC: 162 MG/DL (ref 75–110)
GLUCOSE BLD-MCNC: 175 MG/DL (ref 74–100)
GLUCOSE SERPL-MCNC: 127 MG/DL (ref 74–99)
GLUCOSE SERPL-MCNC: 135 MG/DL (ref 74–99)
GLUCOSE SERPL-MCNC: 140 MG/DL (ref 74–99)
GLUCOSE SERPL-MCNC: 150 MG/DL (ref 74–99)
HCO3 VENOUS: 26.7 MMOL/L (ref 22–29)
HCO3 VENOUS: 27.1 MMOL/L (ref 22–29)
HCT VFR BLD AUTO: 26.4 % (ref 40.7–50.3)
HCT VFR BLD AUTO: 26.5 % (ref 40.7–50.3)
HCT VFR BLD AUTO: 28.5 % (ref 40.7–50.3)
HCT VFR BLD AUTO: 33.2 % (ref 40.7–50.3)
HCT VFR BLD AUTO: 34.1 % (ref 40.7–50.3)
HGB BLD-MCNC: 10.2 G/DL (ref 13–17)
HGB BLD-MCNC: 11.6 G/DL (ref 13–17)
HGB BLD-MCNC: 12.2 G/DL (ref 13–17)
HGB BLD-MCNC: 9.2 G/DL (ref 13–17)
HGB BLD-MCNC: 9.4 G/DL (ref 13–17)
HGB FREE PLAS-MCNC: 2.4 MG/DL (ref 0–9.7)
INR PPP: 2.1
INR PPP: 2.5
INR PPP: 2.8
INR PPP: 3.3
KINETICS TEG: 1.8 MIN (ref 0.8–2.1)
KINETICS TEG: 2.3 MIN (ref 0.8–2.1)
LACTIC ACID, WHOLE BLOOD: 1.2 MMOL/L (ref 0.7–2.1)
LACTIC ACID, WHOLE BLOOD: 1.4 MMOL/L (ref 0.7–2.1)
LACTIC ACID, WHOLE BLOOD: 2.3 MMOL/L (ref 0.7–2.1)
LACTIC ACID, WHOLE BLOOD: 2.6 MMOL/L (ref 0.7–2.1)
MA (MAX CLOT) TEG: 60.4 MM (ref 52–69)
MA (MAX CLOT) TEG: 69.2 MM (ref 52–69)
MA(MAX CLOT) RAPID TEG: 63.8 MM (ref 52–70)
MA(MAX CLOT) RAPID TEG: 69.2 MM (ref 52–70)
MAGNESIUM SERPL-MCNC: 1.9 MG/DL (ref 1.6–2.6)
MAGNESIUM SERPL-MCNC: 1.9 MG/DL (ref 1.6–2.6)
MAGNESIUM SERPL-MCNC: 2 MG/DL (ref 1.6–2.6)
MAGNESIUM SERPL-MCNC: 2.1 MG/DL (ref 1.6–2.6)
MCH RBC QN AUTO: 26.9 PG (ref 25.2–33.5)
MCH RBC QN AUTO: 26.9 PG (ref 25.2–33.5)
MCH RBC QN AUTO: 27.4 PG (ref 25.2–33.5)
MCH RBC QN AUTO: 27.5 PG (ref 25.2–33.5)
MCH RBC QN AUTO: 27.6 PG (ref 25.2–33.5)
MCHC RBC AUTO-ENTMCNC: 34.8 G/DL (ref 28.4–34.8)
MCHC RBC AUTO-ENTMCNC: 34.9 G/DL (ref 28.4–34.8)
MCHC RBC AUTO-ENTMCNC: 35.5 G/DL (ref 28.4–34.8)
MCHC RBC AUTO-ENTMCNC: 35.8 G/DL (ref 28.4–34.8)
MCHC RBC AUTO-ENTMCNC: 35.8 G/DL (ref 28.4–34.8)
MCV RBC AUTO: 76.6 FL (ref 82.6–102.9)
MCV RBC AUTO: 76.8 FL (ref 82.6–102.9)
MCV RBC AUTO: 76.9 FL (ref 82.6–102.9)
MCV RBC AUTO: 77.2 FL (ref 82.6–102.9)
MCV RBC AUTO: 77.9 FL (ref 82.6–102.9)
NRBC BLD-RTO: 0 PER 100 WBC
NRBC BLD-RTO: 0.1 PER 100 WBC
O2 DELIVERY DEVICE: ABNORMAL
O2 DELIVERY DEVICE: NORMAL
O2 SAT, VEN: 81 % (ref 60–85)
O2 SAT, VEN: 83.7 % (ref 60–85)
PARTIAL THROMBOPLASTIN TIME: 51.9 SEC (ref 23–36.5)
PARTIAL THROMBOPLASTIN TIME: 57.6 SEC (ref 23–36.5)
PARTIAL THROMBOPLASTIN TIME: 58.4 SEC (ref 23–36.5)
PARTIAL THROMBOPLASTIN TIME: 66.5 SEC (ref 23–36.5)
PCO2 VENOUS: 35.7 MM HG (ref 41–51)
PCO2 VENOUS: 42 MM HG (ref 41–51)
PERFORMING LOCATION: ABNORMAL
PERFORMING LOCATION: ABNORMAL
PH VENOUS: 7.42 (ref 7.32–7.43)
PH VENOUS: 7.48 (ref 7.32–7.43)
PHOSPHATE SERPL-MCNC: 2 MG/DL (ref 2.5–4.5)
PHOSPHATE SERPL-MCNC: 2.6 MG/DL (ref 2.5–4.5)
PHOSPHATE SERPL-MCNC: 3 MG/DL (ref 2.5–4.5)
PHOSPHATE SERPL-MCNC: 3 MG/DL (ref 2.5–4.5)
PLATELET # BLD AUTO: 104 K/UL (ref 138–453)
PLATELET # BLD AUTO: 129 K/UL (ref 138–453)
PLATELET # BLD AUTO: 137 K/UL (ref 138–453)
PLATELET # BLD AUTO: 94 K/UL (ref 138–453)
PLATELET # BLD AUTO: 96 K/UL (ref 138–453)
PMV BLD AUTO: 12 FL (ref 8.1–13.5)
PMV BLD AUTO: 12.1 FL (ref 8.1–13.5)
PMV BLD AUTO: 12.3 FL (ref 8.1–13.5)
PMV BLD AUTO: 12.3 FL (ref 8.1–13.5)
PMV BLD AUTO: 12.4 FL (ref 8.1–13.5)
PO2 VENOUS: 41.6 MM HG (ref 30–50)
PO2 VENOUS: 47.7 MM HG (ref 30–50)
POC HCO3: 23.3 MMOL/L (ref 21–28)
POC HCO3: 24.5 MMOL/L (ref 21–28)
POC HCO3: 24.8 MMOL/L (ref 21–28)
POC HCO3: 25.2 MMOL/L (ref 21–28)
POC HCO3: 25.2 MMOL/L (ref 21–28)
POC HCO3: 25.4 MMOL/L (ref 21–28)
POC HCO3: 25.5 MMOL/L (ref 21–28)
POC HCO3: 25.8 MMOL/L (ref 21–28)
POC O2 SATURATION: 100 % (ref 94–98)
POC O2 SATURATION: 96.3 % (ref 94–98)
POC O2 SATURATION: 97.1 % (ref 94–98)
POC O2 SATURATION: 97.4 % (ref 94–98)
POC O2 SATURATION: 97.8 % (ref 94–98)
POC O2 SATURATION: 98.1 % (ref 94–98)
POC O2 SATURATION: 98.4 % (ref 94–98)
POC O2 SATURATION: 98.5 % (ref 94–98)
POC PCO2: 30.7 MM HG (ref 35–48)
POC PCO2: 31.2 MM HG (ref 35–48)
POC PCO2: 34.9 MM HG (ref 35–48)
POC PCO2: 34.9 MM HG (ref 35–48)
POC PCO2: 35.7 MM HG (ref 35–48)
POC PCO2: 37.3 MM HG (ref 35–48)
POC PCO2: 37.7 MM HG (ref 35–48)
POC PCO2: 41 MM HG (ref 35–48)
POC PH: 7.41 (ref 7.35–7.45)
POC PH: 7.44 (ref 7.35–7.45)
POC PH: 7.44 (ref 7.35–7.45)
POC PH: 7.46 (ref 7.35–7.45)
POC PH: 7.46 (ref 7.35–7.45)
POC PH: 7.47 (ref 7.35–7.45)
POC PH: 7.48 (ref 7.35–7.45)
POC PH: 7.51 (ref 7.35–7.45)
POC PO2: 102 MM HG (ref 83–108)
POC PO2: 104.2 MM HG (ref 83–108)
POC PO2: 108.5 MM HG (ref 83–108)
POC PO2: 556.3 MM HG (ref 83–108)
POC PO2: 74.1 MM HG (ref 83–108)
POC PO2: 85.5 MM HG (ref 83–108)
POC PO2: 86.2 MM HG (ref 83–108)
POC PO2: 96.6 MM HG (ref 83–108)
POSITIVE BASE EXCESS, ART: 0.4 MMOL/L (ref 0–3)
POSITIVE BASE EXCESS, ART: 1 MMOL/L (ref 0–3)
POSITIVE BASE EXCESS, ART: 1 MMOL/L (ref 0–3)
POSITIVE BASE EXCESS, ART: 1.1 MMOL/L (ref 0–3)
POSITIVE BASE EXCESS, ART: 1.3 MMOL/L (ref 0–3)
POSITIVE BASE EXCESS, ART: 1.4 MMOL/L (ref 0–3)
POSITIVE BASE EXCESS, ART: 1.8 MMOL/L (ref 0–3)
POSITIVE BASE EXCESS, ART: 2.1 MMOL/L (ref 0–3)
POSITIVE BASE EXCESS, VEN: 2.3 MMOL/L (ref 0–3)
POSITIVE BASE EXCESS, VEN: 3.2 MMOL/L (ref 0–3)
POTASSIUM SERPL-SCNC: 3.9 MMOL/L (ref 3.7–5.3)
POTASSIUM SERPL-SCNC: 3.9 MMOL/L (ref 3.7–5.3)
POTASSIUM SERPL-SCNC: 4.1 MMOL/L (ref 3.7–5.3)
POTASSIUM SERPL-SCNC: 4.2 MMOL/L (ref 3.7–5.3)
PROT SERPL-MCNC: 5.3 G/DL (ref 6.6–8.7)
PROT SERPL-MCNC: 6.3 G/DL (ref 6.6–8.7)
PROTHROMBIN TIME: 23 SEC (ref 11.7–14.9)
PROTHROMBIN TIME: 26.2 SEC (ref 11.7–14.9)
PROTHROMBIN TIME: 28.7 SEC (ref 11.7–14.9)
PROTHROMBIN TIME: 32.3 SEC (ref 11.7–14.9)
RBC # BLD AUTO: 3.4 M/UL (ref 4.21–5.77)
RBC # BLD AUTO: 3.42 M/UL (ref 4.21–5.77)
RBC # BLD AUTO: 3.71 M/UL (ref 4.21–5.77)
RBC # BLD AUTO: 4.32 M/UL (ref 4.21–5.77)
RBC # BLD AUTO: 4.45 M/UL (ref 4.21–5.77)
REACTION TIME TEG W HEPARIN: 12.1 MIN (ref 4.3–8.3)
REACTION TIME TEG W HEPARIN: 6.7 MIN (ref 4.3–8.3)
REACTION TIME TEG: 12.2 MIN (ref 4.6–9.1)
REACTION TIME TEG: 14.8 MIN (ref 4.6–9.1)
SAMPLE SITE: ABNORMAL
SAMPLE SITE: NORMAL
SODIUM SERPL-SCNC: 130 MMOL/L (ref 136–145)
SODIUM SERPL-SCNC: 133 MMOL/L (ref 136–145)
SODIUM SERPL-SCNC: 134 MMOL/L (ref 136–145)
SODIUM SERPL-SCNC: 134 MMOL/L (ref 136–145)
WBC OTHER # BLD: 11 K/UL (ref 3.5–11.3)
WBC OTHER # BLD: 11 K/UL (ref 3.5–11.3)
WBC OTHER # BLD: 12.8 K/UL (ref 3.5–11.3)
WBC OTHER # BLD: 17.2 K/UL (ref 3.5–11.3)
WBC OTHER # BLD: 17.6 K/UL (ref 3.5–11.3)

## 2024-10-19 PROCEDURE — 83051 HEMOGLOBIN PLASMA: CPT

## 2024-10-19 PROCEDURE — 6370000000 HC RX 637 (ALT 250 FOR IP): Performed by: INTERNAL MEDICINE

## 2024-10-19 PROCEDURE — 36415 COLL VENOUS BLD VENIPUNCTURE: CPT

## 2024-10-19 PROCEDURE — 94003 VENT MGMT INPAT SUBQ DAY: CPT

## 2024-10-19 PROCEDURE — 99291 CRITICAL CARE FIRST HOUR: CPT | Performed by: INTERNAL MEDICINE

## 2024-10-19 PROCEDURE — 82803 BLOOD GASES ANY COMBINATION: CPT

## 2024-10-19 PROCEDURE — 85384 FIBRINOGEN ACTIVITY: CPT

## 2024-10-19 PROCEDURE — 74018 RADEX ABDOMEN 1 VIEW: CPT

## 2024-10-19 PROCEDURE — 85730 THROMBOPLASTIN TIME PARTIAL: CPT

## 2024-10-19 PROCEDURE — 94668 MNPJ CHEST WALL SBSQ: CPT

## 2024-10-19 PROCEDURE — 82330 ASSAY OF CALCIUM: CPT

## 2024-10-19 PROCEDURE — 2500000003 HC RX 250 WO HCPCS: Performed by: INTERNAL MEDICINE

## 2024-10-19 PROCEDURE — 6360000002 HC RX W HCPCS: Performed by: INTERNAL MEDICINE

## 2024-10-19 PROCEDURE — 6360000002 HC RX W HCPCS

## 2024-10-19 PROCEDURE — 85576 BLOOD PLATELET AGGREGATION: CPT

## 2024-10-19 PROCEDURE — 94761 N-INVAS EAR/PLS OXIMETRY MLT: CPT

## 2024-10-19 PROCEDURE — 6370000000 HC RX 637 (ALT 250 FOR IP)

## 2024-10-19 PROCEDURE — 86901 BLOOD TYPING SEROLOGIC RH(D): CPT

## 2024-10-19 PROCEDURE — 71045 X-RAY EXAM CHEST 1 VIEW: CPT

## 2024-10-19 PROCEDURE — 33948 ECMO/ECLS DAILY MGMT-VENOUS: CPT

## 2024-10-19 PROCEDURE — 85300 ANTITHROMBIN III ACTIVITY: CPT

## 2024-10-19 PROCEDURE — 87040 BLOOD CULTURE FOR BACTERIA: CPT

## 2024-10-19 PROCEDURE — P9041 ALBUMIN (HUMAN),5%, 50ML: HCPCS | Performed by: INTERNAL MEDICINE

## 2024-10-19 PROCEDURE — 85610 PROTHROMBIN TIME: CPT

## 2024-10-19 PROCEDURE — 83605 ASSAY OF LACTIC ACID: CPT

## 2024-10-19 PROCEDURE — 82947 ASSAY GLUCOSE BLOOD QUANT: CPT

## 2024-10-19 PROCEDURE — 2700000000 HC OXYGEN THERAPY PER DAY

## 2024-10-19 PROCEDURE — 2580000003 HC RX 258

## 2024-10-19 PROCEDURE — 80076 HEPATIC FUNCTION PANEL: CPT

## 2024-10-19 PROCEDURE — 33948 ECMO/ECLS DAILY MGMT-VENOUS: CPT | Performed by: INTERNAL MEDICINE

## 2024-10-19 PROCEDURE — 86900 BLOOD TYPING SEROLOGIC ABO: CPT

## 2024-10-19 PROCEDURE — 2000000000 HC ICU R&B

## 2024-10-19 PROCEDURE — 80048 BASIC METABOLIC PNL TOTAL CA: CPT

## 2024-10-19 PROCEDURE — 86850 RBC ANTIBODY SCREEN: CPT

## 2024-10-19 PROCEDURE — 83735 ASSAY OF MAGNESIUM: CPT

## 2024-10-19 PROCEDURE — 84100 ASSAY OF PHOSPHORUS: CPT

## 2024-10-19 PROCEDURE — 85347 COAGULATION TIME ACTIVATED: CPT

## 2024-10-19 PROCEDURE — 94640 AIRWAY INHALATION TREATMENT: CPT

## 2024-10-19 PROCEDURE — 85027 COMPLETE CBC AUTOMATED: CPT

## 2024-10-19 RX ORDER — FENTANYL CITRATE 50 UG/ML
50 INJECTION, SOLUTION INTRAMUSCULAR; INTRAVENOUS ONCE
Status: DISCONTINUED | OUTPATIENT
Start: 2024-10-19 | End: 2024-10-20

## 2024-10-19 RX ORDER — BISACODYL 10 MG
10 SUPPOSITORY, RECTAL RECTAL DAILY PRN
Status: DISCONTINUED | OUTPATIENT
Start: 2024-10-19 | End: 2024-10-29

## 2024-10-19 RX ORDER — ALBUMIN, HUMAN INJ 5% 5 %
25 SOLUTION INTRAVENOUS ONCE
Status: COMPLETED | OUTPATIENT
Start: 2024-10-19 | End: 2024-10-19

## 2024-10-19 RX ORDER — MIDAZOLAM HYDROCHLORIDE 2 MG/2ML
2 INJECTION, SOLUTION INTRAMUSCULAR; INTRAVENOUS ONCE
Status: DISCONTINUED | OUTPATIENT
Start: 2024-10-19 | End: 2024-10-21

## 2024-10-19 RX ORDER — CHLORDIAZEPOXIDE HYDROCHLORIDE 25 MG/1
50 CAPSULE, GELATIN COATED ORAL 3 TIMES DAILY
Status: DISCONTINUED | OUTPATIENT
Start: 2024-10-19 | End: 2024-10-20

## 2024-10-19 RX ORDER — CHLORDIAZEPOXIDE HYDROCHLORIDE 25 MG/1
25 CAPSULE, GELATIN COATED ORAL 3 TIMES DAILY
Status: DISCONTINUED | OUTPATIENT
Start: 2024-10-19 | End: 2024-10-19

## 2024-10-19 RX ORDER — OXYCODONE HYDROCHLORIDE 5 MG/1
10 TABLET ORAL EVERY 6 HOURS
Status: DISCONTINUED | OUTPATIENT
Start: 2024-10-19 | End: 2024-10-20

## 2024-10-19 RX ADMIN — Medication 0.2 MG/KG/HR: at 14:50

## 2024-10-19 RX ADMIN — FENTANYL CITRATE 50 MCG: 50 INJECTION, SOLUTION INTRAMUSCULAR; INTRAVENOUS at 01:05

## 2024-10-19 RX ADMIN — WATER 1000 MG: 1 INJECTION INTRAMUSCULAR; INTRAVENOUS; SUBCUTANEOUS at 08:32

## 2024-10-19 RX ADMIN — DEXMEDETOMIDINE HYDROCHLORIDE 1.5 MCG/KG/HR: 400 INJECTION, SOLUTION INTRAVENOUS at 11:13

## 2024-10-19 RX ADMIN — ARGATROBAN 3.48 MCG/KG/MIN: 50 INJECTION INTRAVENOUS at 13:10

## 2024-10-19 RX ADMIN — POLYETHYLENE GLYCOL 3350 17 G: 17 POWDER, FOR SOLUTION ORAL at 08:21

## 2024-10-19 RX ADMIN — Medication 30 UNITS: at 04:07

## 2024-10-19 RX ADMIN — HYDROMORPHONE HYDROCHLORIDE 0.5 MG: 1 INJECTION, SOLUTION INTRAMUSCULAR; INTRAVENOUS; SUBCUTANEOUS at 12:09

## 2024-10-19 RX ADMIN — OXYCODONE 10 MG: 5 TABLET ORAL at 08:15

## 2024-10-19 RX ADMIN — OXYCODONE 10 MG: 5 TABLET ORAL at 13:34

## 2024-10-19 RX ADMIN — LANSOPRAZOLE 30 MG: 30 TABLET, ORALLY DISINTEGRATING, DELAYED RELEASE ORAL at 08:15

## 2024-10-19 RX ADMIN — DEXMEDETOMIDINE HYDROCHLORIDE 1.5 MCG/KG/HR: 400 INJECTION, SOLUTION INTRAVENOUS at 20:52

## 2024-10-19 RX ADMIN — DEXMEDETOMIDINE HYDROCHLORIDE 1.5 MCG/KG/HR: 400 INJECTION, SOLUTION INTRAVENOUS at 17:32

## 2024-10-19 RX ADMIN — DEXMEDETOMIDINE HYDROCHLORIDE 1.5 MCG/KG/HR: 400 INJECTION, SOLUTION INTRAVENOUS at 08:10

## 2024-10-19 RX ADMIN — Medication 30 UNITS: at 16:39

## 2024-10-19 RX ADMIN — HYDROMORPHONE HYDROCHLORIDE 0.5 MG: 1 INJECTION, SOLUTION INTRAMUSCULAR; INTRAVENOUS; SUBCUTANEOUS at 00:47

## 2024-10-19 RX ADMIN — DOCUSATE SODIUM LIQUID 100 MG: 100 LIQUID ORAL at 21:24

## 2024-10-19 RX ADMIN — IPRATROPIUM BROMIDE AND ALBUTEROL SULFATE 1 DOSE: 2.5; .5 SOLUTION RESPIRATORY (INHALATION) at 12:14

## 2024-10-19 RX ADMIN — ARGATROBAN 3.48 MCG/KG/MIN: 50 INJECTION INTRAVENOUS at 06:47

## 2024-10-19 RX ADMIN — ARGATROBAN 3.48 MCG/KG/MIN: 50 INJECTION INTRAVENOUS at 21:28

## 2024-10-19 RX ADMIN — IPRATROPIUM BROMIDE AND ALBUTEROL SULFATE 1 DOSE: 2.5; .5 SOLUTION RESPIRATORY (INHALATION) at 03:44

## 2024-10-19 RX ADMIN — DEXMEDETOMIDINE HYDROCHLORIDE 1.5 MCG/KG/HR: 400 INJECTION, SOLUTION INTRAVENOUS at 01:13

## 2024-10-19 RX ADMIN — CHLORDIAZEPOXIDE HYDROCHLORIDE 50 MG: 25 CAPSULE ORAL at 13:34

## 2024-10-19 RX ADMIN — DEXMEDETOMIDINE HYDROCHLORIDE 1.5 MCG/KG/HR: 400 INJECTION, SOLUTION INTRAVENOUS at 13:59

## 2024-10-19 RX ADMIN — OXYCODONE 10 MG: 5 TABLET ORAL at 01:42

## 2024-10-19 RX ADMIN — BISACODYL 10 MG: 10 SUPPOSITORY RECTAL at 10:18

## 2024-10-19 RX ADMIN — MIDAZOLAM HYDROCHLORIDE 2 MG: 2 INJECTION, SOLUTION INTRAMUSCULAR; INTRAVENOUS at 01:03

## 2024-10-19 RX ADMIN — NICARDIPINE HYDROCHLORIDE 2.5 MG/HR: 0.1 INJECTION INTRAVENOUS at 05:20

## 2024-10-19 RX ADMIN — IPRATROPIUM BROMIDE AND ALBUTEROL SULFATE 1 DOSE: 2.5; .5 SOLUTION RESPIRATORY (INHALATION) at 16:32

## 2024-10-19 RX ADMIN — METOPROLOL TARTRATE 25 MG: 25 TABLET, FILM COATED ORAL at 20:06

## 2024-10-19 RX ADMIN — Medication 200 MCG/HR: at 11:43

## 2024-10-19 RX ADMIN — POLYETHYLENE GLYCOL 3350 17 G: 17 POWDER, FOR SOLUTION ORAL at 20:05

## 2024-10-19 RX ADMIN — Medication 8 MG/HR: at 03:38

## 2024-10-19 RX ADMIN — CHLORDIAZEPOXIDE HYDROCHLORIDE 25 MG: 25 CAPSULE ORAL at 10:12

## 2024-10-19 RX ADMIN — ARGATROBAN 3.48 MCG/KG/MIN: 50 INJECTION INTRAVENOUS at 19:11

## 2024-10-19 RX ADMIN — ALBUMIN (HUMAN) 25 G: 12.5 INJECTION, SOLUTION INTRAVENOUS at 14:19

## 2024-10-19 RX ADMIN — ARGATROBAN 3.48 MCG/KG/MIN: 50 INJECTION INTRAVENOUS at 16:42

## 2024-10-19 RX ADMIN — ARGATROBAN 3.48 MCG/KG/MIN: 50 INJECTION INTRAVENOUS at 10:11

## 2024-10-19 RX ADMIN — SODIUM CHLORIDE, PRESERVATIVE FREE 10 ML: 5 INJECTION INTRAVENOUS at 19:32

## 2024-10-19 RX ADMIN — IPRATROPIUM BROMIDE AND ALBUTEROL SULFATE 1 DOSE: 2.5; .5 SOLUTION RESPIRATORY (INHALATION) at 09:05

## 2024-10-19 RX ADMIN — HYDROMORPHONE HYDROCHLORIDE 0.5 MG: 1 INJECTION, SOLUTION INTRAMUSCULAR; INTRAVENOUS; SUBCUTANEOUS at 23:51

## 2024-10-19 RX ADMIN — NICARDIPINE HYDROCHLORIDE 6.5 MG/HR: 0.1 INJECTION INTRAVENOUS at 13:34

## 2024-10-19 RX ADMIN — WATER 40 MG: 1 INJECTION INTRAMUSCULAR; INTRAVENOUS; SUBCUTANEOUS at 05:21

## 2024-10-19 RX ADMIN — CHLORDIAZEPOXIDE HYDROCHLORIDE 50 MG: 25 CAPSULE ORAL at 20:05

## 2024-10-19 RX ADMIN — Medication 5 MG/HR: at 22:53

## 2024-10-19 RX ADMIN — DEXMEDETOMIDINE HYDROCHLORIDE 1.5 MCG/KG/HR: 400 INJECTION, SOLUTION INTRAVENOUS at 04:34

## 2024-10-19 RX ADMIN — AMLODIPINE BESYLATE 10 MG: 10 TABLET ORAL at 08:14

## 2024-10-19 RX ADMIN — SODIUM CHLORIDE: 9 INJECTION, SOLUTION INTRAVENOUS at 03:39

## 2024-10-19 RX ADMIN — NICARDIPINE HYDROCHLORIDE 10 MG/HR: 0.1 INJECTION INTRAVENOUS at 02:18

## 2024-10-19 RX ADMIN — DIBASIC SODIUM PHOSPHATE, MONOBASIC POTASSIUM PHOSPHATE AND MONOBASIC SODIUM PHOSPHATE 1 TABLET: 852; 155; 130 TABLET ORAL at 08:15

## 2024-10-19 RX ADMIN — OXYCODONE 10 MG: 5 TABLET ORAL at 20:05

## 2024-10-19 RX ADMIN — HYPROMELLOSE: 0 GEL OPHTHALMIC at 19:33

## 2024-10-19 RX ADMIN — SENNOSIDES 8.8 MG: 8.8 LIQUID ORAL at 08:15

## 2024-10-19 RX ADMIN — MIDAZOLAM HYDROCHLORIDE 2 MG: 2 INJECTION, SOLUTION INTRAMUSCULAR; INTRAVENOUS at 13:16

## 2024-10-19 RX ADMIN — DEXMEDETOMIDINE HYDROCHLORIDE 1.5 MCG/KG/HR: 400 INJECTION, SOLUTION INTRAVENOUS at 23:44

## 2024-10-19 RX ADMIN — METOPROLOL TARTRATE 25 MG: 25 TABLET, FILM COATED ORAL at 08:14

## 2024-10-19 RX ADMIN — IPRATROPIUM BROMIDE AND ALBUTEROL SULFATE 1 DOSE: 2.5; .5 SOLUTION RESPIRATORY (INHALATION) at 23:37

## 2024-10-19 RX ADMIN — ARGATROBAN 3.48 MCG/KG/MIN: 50 INJECTION INTRAVENOUS at 03:45

## 2024-10-19 RX ADMIN — WATER 40 MG: 1 INJECTION INTRAMUSCULAR; INTRAVENOUS; SUBCUTANEOUS at 16:27

## 2024-10-19 RX ADMIN — SODIUM CHLORIDE, PRESERVATIVE FREE 10 ML: 5 INJECTION INTRAVENOUS at 08:21

## 2024-10-19 RX ADMIN — IPRATROPIUM BROMIDE AND ALBUTEROL SULFATE 1 DOSE: 2.5; .5 SOLUTION RESPIRATORY (INHALATION) at 19:43

## 2024-10-19 RX ADMIN — DOCUSATE SODIUM LIQUID 100 MG: 100 LIQUID ORAL at 08:15

## 2024-10-19 RX ADMIN — ARGATROBAN 3.48 MCG/KG/MIN: 50 INJECTION INTRAVENOUS at 00:39

## 2024-10-19 RX ADMIN — SENNOSIDES 8.8 MG: 8.8 LIQUID ORAL at 20:05

## 2024-10-19 ASSESSMENT — PULMONARY FUNCTION TESTS
PIF_VALUE: 17
PIF_VALUE: 19
PIF_VALUE: 22
PIF_VALUE: 17
PIF_VALUE: 18

## 2024-10-20 ENCOUNTER — APPOINTMENT (OUTPATIENT)
Dept: GENERAL RADIOLOGY | Age: 44
DRG: 009 | End: 2024-10-20
Payer: MEDICAID

## 2024-10-20 LAB
ACT BLD: 127 SEC (ref 79–149)
ACT BLD: 224 SEC (ref 79–149)
ALBUMIN SERPL-MCNC: 3.6 G/DL (ref 3.5–5.2)
ALBUMIN SERPL-MCNC: 3.9 G/DL (ref 3.5–5.2)
ALBUMIN/GLOB SERPL: 2 {RATIO} (ref 1–2.5)
ALBUMIN/GLOB SERPL: 2 {RATIO} (ref 1–2.5)
ALLEN TEST: ABNORMAL
ALP SERPL-CCNC: 51 U/L (ref 40–129)
ALP SERPL-CCNC: 52 U/L (ref 40–129)
ALT SERPL-CCNC: 59 U/L (ref 10–50)
ALT SERPL-CCNC: 69 U/L (ref 10–50)
ANION GAP SERPL CALCULATED.3IONS-SCNC: 10 MMOL/L (ref 9–16)
ANION GAP SERPL CALCULATED.3IONS-SCNC: 11 MMOL/L (ref 9–16)
ANION GAP SERPL CALCULATED.3IONS-SCNC: 8 MMOL/L (ref 9–16)
ANION GAP SERPL CALCULATED.3IONS-SCNC: 9 MMOL/L (ref 9–16)
AST SERPL-CCNC: 22 U/L (ref 10–50)
AST SERPL-CCNC: 29 U/L (ref 10–50)
BILIRUB DIRECT SERPL-MCNC: 0.1 MG/DL (ref 0–0.2)
BILIRUB DIRECT SERPL-MCNC: 0.2 MG/DL (ref 0–0.2)
BILIRUB INDIRECT SERPL-MCNC: 0.2 MG/DL (ref 0–1)
BILIRUB INDIRECT SERPL-MCNC: 0.2 MG/DL (ref 0–1)
BILIRUB SERPL-MCNC: 0.3 MG/DL (ref 0–1.2)
BILIRUB SERPL-MCNC: 0.4 MG/DL (ref 0–1.2)
BUN SERPL-MCNC: 16 MG/DL (ref 6–20)
BUN SERPL-MCNC: 19 MG/DL (ref 6–20)
BUN SERPL-MCNC: 20 MG/DL (ref 6–20)
BUN SERPL-MCNC: 23 MG/DL (ref 6–20)
CA-I BLD-SCNC: 1.15 MMOL/L (ref 1.13–1.33)
CA-I BLD-SCNC: 1.16 MMOL/L (ref 1.13–1.33)
CA-I BLD-SCNC: 1.18 MMOL/L (ref 1.13–1.33)
CA-I BLD-SCNC: 1.2 MMOL/L (ref 1.13–1.33)
CALCIUM SERPL-MCNC: 7.7 MG/DL (ref 8.6–10.4)
CALCIUM SERPL-MCNC: 8.5 MG/DL (ref 8.6–10.4)
CALCIUM SERPL-MCNC: 8.8 MG/DL (ref 8.6–10.4)
CALCIUM SERPL-MCNC: 8.8 MG/DL (ref 8.6–10.4)
CHLORIDE SERPL-SCNC: 100 MMOL/L (ref 98–107)
CHLORIDE SERPL-SCNC: 102 MMOL/L (ref 98–107)
CHLORIDE SERPL-SCNC: 103 MMOL/L (ref 98–107)
CHLORIDE SERPL-SCNC: 99 MMOL/L (ref 98–107)
CLOT ANGLE.KAOLIN INDUCED BLD RES TEG: 64.8 DEG (ref 63–78)
CLOT ANGLE.KAOLIN INDUCED BLD RES TEG: 66.4 DEG (ref 63–78)
CO2 SERPL-SCNC: 23 MMOL/L (ref 20–31)
CO2 SERPL-SCNC: 24 MMOL/L (ref 20–31)
CREAT SERPL-MCNC: 0.6 MG/DL (ref 0.7–1.2)
CREAT SERPL-MCNC: 0.6 MG/DL (ref 0.7–1.2)
CREAT SERPL-MCNC: 0.7 MG/DL (ref 0.7–1.2)
CREAT SERPL-MCNC: 0.7 MG/DL (ref 0.7–1.2)
ERYTHROCYTE [DISTWIDTH] IN BLOOD BY AUTOMATED COUNT: 12.7 % (ref 11.8–14.4)
ERYTHROCYTE [DISTWIDTH] IN BLOOD BY AUTOMATED COUNT: 12.9 % (ref 11.8–14.4)
ERYTHROCYTE [DISTWIDTH] IN BLOOD BY AUTOMATED COUNT: 13 % (ref 11.8–14.4)
ERYTHROCYTE [DISTWIDTH] IN BLOOD BY AUTOMATED COUNT: 13.1 % (ref 11.8–14.4)
FIBRINOGEN PPP-MCNC: 290 MG/DL (ref 203–521)
FIBRINOGEN PPP-MCNC: 335 MG/DL (ref 203–521)
FIBRINOGEN PPP-MCNC: 365 MG/DL (ref 203–521)
FIBRINOGEN PPP-MCNC: 376 MG/DL (ref 203–521)
FIBRINOGEN, FUNCTIONAL TEG: 16.2 MM (ref 15–32)
FIBRINOGEN, FUNCTIONAL TEG: 17.8 MM (ref 15–32)
FIO2: 30
GFR, ESTIMATED: >90 ML/MIN/1.73M2
GLOBULIN SER CALC-MCNC: 2.2 G/DL
GLOBULIN SER CALC-MCNC: 2.2 G/DL
GLUCOSE BLD-MCNC: 122 MG/DL (ref 74–100)
GLUCOSE BLD-MCNC: 130 MG/DL (ref 74–100)
GLUCOSE BLD-MCNC: 130 MG/DL (ref 75–110)
GLUCOSE BLD-MCNC: 138 MG/DL (ref 74–100)
GLUCOSE BLD-MCNC: 144 MG/DL (ref 75–110)
GLUCOSE BLD-MCNC: 156 MG/DL (ref 74–100)
GLUCOSE BLD-MCNC: 157 MG/DL (ref 74–100)
GLUCOSE BLD-MCNC: 159 MG/DL (ref 74–100)
GLUCOSE SERPL-MCNC: 123 MG/DL (ref 74–99)
GLUCOSE SERPL-MCNC: 138 MG/DL (ref 74–99)
GLUCOSE SERPL-MCNC: 139 MG/DL (ref 74–99)
GLUCOSE SERPL-MCNC: 156 MG/DL (ref 74–99)
HCO3 VENOUS: 27.4 MMOL/L (ref 22–29)
HCO3 VENOUS: 27.6 MMOL/L (ref 22–29)
HCT VFR BLD AUTO: 28.5 % (ref 40.7–50.3)
HCT VFR BLD AUTO: 30 % (ref 41–53)
HCT VFR BLD AUTO: 30.2 % (ref 40.7–50.3)
HCT VFR BLD AUTO: 30.8 % (ref 40.7–50.3)
HCT VFR BLD AUTO: 32.5 % (ref 40.7–50.3)
HGB BLD-MCNC: 10.1 G/DL (ref 13–17)
HGB BLD-MCNC: 10.8 G/DL (ref 13–17)
HGB BLD-MCNC: 11 G/DL (ref 13–17)
HGB BLD-MCNC: 11.4 G/DL (ref 13–17)
INR PPP: 2.1
INR PPP: 2.2
INR PPP: 2.4
INR PPP: 3.2
KINETICS TEG: 2.1 MIN (ref 0.8–2.1)
KINETICS TEG: 2.3 MIN (ref 0.8–2.1)
LACTIC ACID, WHOLE BLOOD: 0.7 MMOL/L (ref 0.7–2.1)
LACTIC ACID, WHOLE BLOOD: 0.9 MMOL/L (ref 0.7–2.1)
LACTIC ACID, WHOLE BLOOD: 1 MMOL/L (ref 0.7–2.1)
LACTIC ACID, WHOLE BLOOD: 1.4 MMOL/L (ref 0.7–2.1)
MA (MAX CLOT) TEG: 55.8 MM (ref 52–69)
MA (MAX CLOT) TEG: 57.7 MM (ref 52–69)
MA(MAX CLOT) RAPID TEG: 58 MM (ref 52–70)
MA(MAX CLOT) RAPID TEG: 60.4 MM (ref 52–70)
MAGNESIUM SERPL-MCNC: 1.8 MG/DL (ref 1.6–2.6)
MAGNESIUM SERPL-MCNC: 1.9 MG/DL (ref 1.6–2.6)
MAGNESIUM SERPL-MCNC: 2.1 MG/DL (ref 1.6–2.6)
MAGNESIUM SERPL-MCNC: 2.1 MG/DL (ref 1.6–2.6)
MCH RBC QN AUTO: 27 PG (ref 25.2–33.5)
MCH RBC QN AUTO: 27.4 PG (ref 25.2–33.5)
MCH RBC QN AUTO: 27.4 PG (ref 25.2–33.5)
MCH RBC QN AUTO: 27.6 PG (ref 25.2–33.5)
MCHC RBC AUTO-ENTMCNC: 35.1 G/DL (ref 28.4–34.8)
MCHC RBC AUTO-ENTMCNC: 35.4 G/DL (ref 28.4–34.8)
MCHC RBC AUTO-ENTMCNC: 35.7 G/DL (ref 28.4–34.8)
MCHC RBC AUTO-ENTMCNC: 35.8 G/DL (ref 28.4–34.8)
MCV RBC AUTO: 76.8 FL (ref 82.6–102.9)
MCV RBC AUTO: 76.8 FL (ref 82.6–102.9)
MCV RBC AUTO: 77 FL (ref 82.6–102.9)
MCV RBC AUTO: 77.2 FL (ref 82.6–102.9)
NRBC BLD-RTO: 0 PER 100 WBC
O2 DELIVERY DEVICE: ABNORMAL
O2 DELIVERY DEVICE: ABNORMAL
O2 SAT, VEN: 85.5 % (ref 60–85)
O2 SAT, VEN: 85.6 % (ref 60–85)
PARTIAL THROMBOPLASTIN TIME: 49.4 SEC (ref 23–36.5)
PARTIAL THROMBOPLASTIN TIME: 51.9 SEC (ref 23–36.5)
PARTIAL THROMBOPLASTIN TIME: 54.5 SEC (ref 23–36.5)
PARTIAL THROMBOPLASTIN TIME: 62.7 SEC (ref 23–36.5)
PCO2 VENOUS: 40.8 MM HG (ref 41–51)
PCO2 VENOUS: 41.2 MM HG (ref 41–51)
PERFORMING LOCATION: ABNORMAL
PERFORMING LOCATION: ABNORMAL
PH VENOUS: 7.43 (ref 7.32–7.43)
PH VENOUS: 7.44 (ref 7.32–7.43)
PHOSPHATE SERPL-MCNC: 1.7 MG/DL (ref 2.5–4.5)
PHOSPHATE SERPL-MCNC: 2.2 MG/DL (ref 2.5–4.5)
PHOSPHATE SERPL-MCNC: 2.6 MG/DL (ref 2.5–4.5)
PHOSPHATE SERPL-MCNC: 2.7 MG/DL (ref 2.5–4.5)
PLATELET # BLD AUTO: 118 K/UL (ref 138–453)
PLATELET # BLD AUTO: 122 K/UL (ref 138–453)
PLATELET # BLD AUTO: 123 K/UL (ref 138–453)
PLATELET # BLD AUTO: ABNORMAL K/UL (ref 138–453)
PLATELET, FLUORESCENCE: 104 K/UL (ref 138–453)
PLATELETS.RETICULATED NFR BLD AUTO: 7.1 % (ref 1.1–10.3)
PMV BLD AUTO: 12.2 FL (ref 8.1–13.5)
PMV BLD AUTO: 12.4 FL (ref 8.1–13.5)
PMV BLD AUTO: 12.5 FL (ref 8.1–13.5)
PO2 VENOUS: 49.2 MM HG (ref 30–50)
PO2 VENOUS: 49.3 MM HG (ref 30–50)
POC HCO3: 25 MMOL/L (ref 21–28)
POC HCO3: 25.1 MMOL/L (ref 21–28)
POC HCO3: 25.4 MMOL/L (ref 21–28)
POC HCO3: 26.4 MMOL/L (ref 21–28)
POC HCO3: 26.6 MMOL/L (ref 21–28)
POC HCO3: 26.7 MMOL/L (ref 21–28)
POC HCO3: 27.1 MMOL/L (ref 21–28)
POC HCO3: 27.6 MMOL/L (ref 21–28)
POC HEMOGLOBIN (CALC): 10.2 G/DL (ref 13.5–17.5)
POC O2 SATURATION: 100 % (ref 94–98)
POC O2 SATURATION: 92.7 % (ref 94–98)
POC O2 SATURATION: 94.9 % (ref 94–98)
POC O2 SATURATION: 95.2 % (ref 94–98)
POC O2 SATURATION: 96.8 % (ref 94–98)
POC O2 SATURATION: 96.8 % (ref 94–98)
POC O2 SATURATION: 97 % (ref 94–98)
POC O2 SATURATION: 98.1 % (ref 94–98)
POC PCO2: 33.6 MM HG (ref 35–48)
POC PCO2: 35.8 MM HG (ref 35–48)
POC PCO2: 35.9 MM HG (ref 35–48)
POC PCO2: 36.1 MM HG (ref 35–48)
POC PCO2: 36.3 MM HG (ref 35–48)
POC PCO2: 39.1 MM HG (ref 35–48)
POC PCO2: 39.9 MM HG (ref 35–48)
POC PCO2: 43.1 MM HG (ref 35–48)
POC PH: 7.42 (ref 7.35–7.45)
POC PH: 7.43 (ref 7.35–7.45)
POC PH: 7.44 (ref 7.35–7.45)
POC PH: 7.45 (ref 7.35–7.45)
POC PH: 7.45 (ref 7.35–7.45)
POC PH: 7.48 (ref 7.35–7.45)
POC PO2: 548.5 MM HG (ref 83–108)
POC PO2: 63.9 MM HG (ref 83–108)
POC PO2: 69 MM HG (ref 83–108)
POC PO2: 72.6 MM HG (ref 83–108)
POC PO2: 83.4 MM HG (ref 83–108)
POC PO2: 83.6 MM HG (ref 83–108)
POC PO2: 85.3 MM HG (ref 83–108)
POC PO2: 96.9 MM HG (ref 83–108)
POSITIVE BASE EXCESS, ART: 1.3 MMOL/L (ref 0–3)
POSITIVE BASE EXCESS, ART: 1.6 MMOL/L (ref 0–3)
POSITIVE BASE EXCESS, ART: 1.8 MMOL/L (ref 0–3)
POSITIVE BASE EXCESS, ART: 2.1 MMOL/L (ref 0–3)
POSITIVE BASE EXCESS, ART: 2.2 MMOL/L (ref 0–3)
POSITIVE BASE EXCESS, ART: 2.7 MMOL/L (ref 0–3)
POSITIVE BASE EXCESS, ART: 3.2 MMOL/L (ref 0–3)
POSITIVE BASE EXCESS, ART: 3.6 MMOL/L (ref 0–3)
POSITIVE BASE EXCESS, VEN: 2.9 MMOL/L (ref 0–3)
POSITIVE BASE EXCESS, VEN: 3 MMOL/L (ref 0–3)
POTASSIUM SERPL-SCNC: 3.3 MMOL/L (ref 3.7–5.3)
POTASSIUM SERPL-SCNC: 3.9 MMOL/L (ref 3.7–5.3)
POTASSIUM SERPL-SCNC: 3.9 MMOL/L (ref 3.7–5.3)
POTASSIUM SERPL-SCNC: 4.2 MMOL/L (ref 3.7–5.3)
PROT SERPL-MCNC: 5.8 G/DL (ref 6.6–8.7)
PROT SERPL-MCNC: 6.1 G/DL (ref 6.6–8.7)
PROTHROMBIN TIME: 22.7 SEC (ref 11.7–14.9)
PROTHROMBIN TIME: 23.7 SEC (ref 11.7–14.9)
PROTHROMBIN TIME: 25.9 SEC (ref 11.7–14.9)
PROTHROMBIN TIME: 31.5 SEC (ref 11.7–14.9)
RBC # BLD AUTO: 3.69 M/UL (ref 4.21–5.77)
RBC # BLD AUTO: 3.92 M/UL (ref 4.21–5.77)
RBC # BLD AUTO: 4.01 M/UL (ref 4.21–5.77)
RBC # BLD AUTO: 4.23 M/UL (ref 4.21–5.77)
REACTION TIME TEG W HEPARIN: 11.9 MIN (ref 4.3–8.3)
REACTION TIME TEG W HEPARIN: 6.7 MIN (ref 4.3–8.3)
REACTION TIME TEG: 11.1 MIN (ref 4.6–9.1)
REACTION TIME TEG: 9.8 MIN (ref 4.6–9.1)
SAMPLE SITE: ABNORMAL
SODIUM SERPL-SCNC: 133 MMOL/L (ref 136–145)
SODIUM SERPL-SCNC: 133 MMOL/L (ref 136–145)
SODIUM SERPL-SCNC: 134 MMOL/L (ref 136–145)
SODIUM SERPL-SCNC: 135 MMOL/L (ref 136–145)
WBC OTHER # BLD: 16.2 K/UL (ref 3.5–11.3)
WBC OTHER # BLD: 18 K/UL (ref 3.5–11.3)
WBC OTHER # BLD: 18.2 K/UL (ref 3.5–11.3)
WBC OTHER # BLD: 19.3 K/UL (ref 3.5–11.3)

## 2024-10-20 PROCEDURE — 85055 RETICULATED PLATELET ASSAY: CPT

## 2024-10-20 PROCEDURE — 6370000000 HC RX 637 (ALT 250 FOR IP): Performed by: INTERNAL MEDICINE

## 2024-10-20 PROCEDURE — 2580000003 HC RX 258

## 2024-10-20 PROCEDURE — 82330 ASSAY OF CALCIUM: CPT

## 2024-10-20 PROCEDURE — 85014 HEMATOCRIT: CPT

## 2024-10-20 PROCEDURE — 85027 COMPLETE CBC AUTOMATED: CPT

## 2024-10-20 PROCEDURE — 84100 ASSAY OF PHOSPHORUS: CPT

## 2024-10-20 PROCEDURE — 83605 ASSAY OF LACTIC ACID: CPT

## 2024-10-20 PROCEDURE — 6370000000 HC RX 637 (ALT 250 FOR IP)

## 2024-10-20 PROCEDURE — 2500000003 HC RX 250 WO HCPCS: Performed by: INTERNAL MEDICINE

## 2024-10-20 PROCEDURE — 6360000002 HC RX W HCPCS: Performed by: INTERNAL MEDICINE

## 2024-10-20 PROCEDURE — 82947 ASSAY GLUCOSE BLOOD QUANT: CPT

## 2024-10-20 PROCEDURE — 36415 COLL VENOUS BLD VENIPUNCTURE: CPT

## 2024-10-20 PROCEDURE — 85300 ANTITHROMBIN III ACTIVITY: CPT

## 2024-10-20 PROCEDURE — 6360000002 HC RX W HCPCS

## 2024-10-20 PROCEDURE — 2500000003 HC RX 250 WO HCPCS

## 2024-10-20 PROCEDURE — 2000000000 HC ICU R&B

## 2024-10-20 PROCEDURE — 94668 MNPJ CHEST WALL SBSQ: CPT

## 2024-10-20 PROCEDURE — 80076 HEPATIC FUNCTION PANEL: CPT

## 2024-10-20 PROCEDURE — 85610 PROTHROMBIN TIME: CPT

## 2024-10-20 PROCEDURE — 83735 ASSAY OF MAGNESIUM: CPT

## 2024-10-20 PROCEDURE — 33948 ECMO/ECLS DAILY MGMT-VENOUS: CPT

## 2024-10-20 PROCEDURE — 82803 BLOOD GASES ANY COMBINATION: CPT

## 2024-10-20 PROCEDURE — P9041 ALBUMIN (HUMAN),5%, 50ML: HCPCS | Performed by: INTERNAL MEDICINE

## 2024-10-20 PROCEDURE — 2700000000 HC OXYGEN THERAPY PER DAY

## 2024-10-20 PROCEDURE — 85384 FIBRINOGEN ACTIVITY: CPT

## 2024-10-20 PROCEDURE — 94640 AIRWAY INHALATION TREATMENT: CPT

## 2024-10-20 PROCEDURE — 85347 COAGULATION TIME ACTIVATED: CPT

## 2024-10-20 PROCEDURE — 83051 HEMOGLOBIN PLASMA: CPT

## 2024-10-20 PROCEDURE — 80048 BASIC METABOLIC PNL TOTAL CA: CPT

## 2024-10-20 PROCEDURE — 71045 X-RAY EXAM CHEST 1 VIEW: CPT

## 2024-10-20 PROCEDURE — 85730 THROMBOPLASTIN TIME PARTIAL: CPT

## 2024-10-20 PROCEDURE — 85576 BLOOD PLATELET AGGREGATION: CPT

## 2024-10-20 PROCEDURE — 94003 VENT MGMT INPAT SUBQ DAY: CPT

## 2024-10-20 PROCEDURE — 94761 N-INVAS EAR/PLS OXIMETRY MLT: CPT

## 2024-10-20 PROCEDURE — 33948 ECMO/ECLS DAILY MGMT-VENOUS: CPT | Performed by: INTERNAL MEDICINE

## 2024-10-20 PROCEDURE — 99291 CRITICAL CARE FIRST HOUR: CPT | Performed by: INTERNAL MEDICINE

## 2024-10-20 RX ORDER — MIDAZOLAM HYDROCHLORIDE 2 MG/2ML
2 INJECTION, SOLUTION INTRAMUSCULAR; INTRAVENOUS 3 TIMES DAILY PRN
Status: DISCONTINUED | OUTPATIENT
Start: 2024-10-20 | End: 2024-10-27

## 2024-10-20 RX ORDER — CHLORDIAZEPOXIDE HYDROCHLORIDE 25 MG/1
50 CAPSULE, GELATIN COATED ORAL 4 TIMES DAILY
Status: DISCONTINUED | OUTPATIENT
Start: 2024-10-20 | End: 2024-10-21

## 2024-10-20 RX ORDER — FENTANYL CITRATE 50 UG/ML
50 INJECTION, SOLUTION INTRAMUSCULAR; INTRAVENOUS ONCE
Status: COMPLETED | OUTPATIENT
Start: 2024-10-20 | End: 2024-10-20

## 2024-10-20 RX ORDER — ALBUMIN, HUMAN INJ 5% 5 %
25 SOLUTION INTRAVENOUS ONCE
Status: COMPLETED | OUTPATIENT
Start: 2024-10-20 | End: 2024-10-20

## 2024-10-20 RX ORDER — OXYCODONE HYDROCHLORIDE 5 MG/1
15 TABLET ORAL EVERY 6 HOURS
Status: DISCONTINUED | OUTPATIENT
Start: 2024-10-20 | End: 2024-10-22

## 2024-10-20 RX ORDER — OXYCODONE HYDROCHLORIDE 5 MG/1
12 TABLET ORAL EVERY 6 HOURS
Status: DISCONTINUED | OUTPATIENT
Start: 2024-10-20 | End: 2024-10-20

## 2024-10-20 RX ORDER — CHLORDIAZEPOXIDE HYDROCHLORIDE 25 MG/1
50 CAPSULE, GELATIN COATED ORAL 4 TIMES DAILY
Status: DISCONTINUED | OUTPATIENT
Start: 2024-10-20 | End: 2024-10-20

## 2024-10-20 RX ORDER — CARVEDILOL 6.25 MG/1
6.25 TABLET ORAL 2 TIMES DAILY WITH MEALS
Status: DISCONTINUED | OUTPATIENT
Start: 2024-10-20 | End: 2024-10-22

## 2024-10-20 RX ORDER — SIMETHICONE 40MG/0.6ML
40 SUSPENSION, DROPS(FINAL DOSAGE FORM)(ML) ORAL EVERY 6 HOURS PRN
Status: DISCONTINUED | OUTPATIENT
Start: 2024-10-20 | End: 2024-10-29 | Stop reason: HOSPADM

## 2024-10-20 RX ADMIN — LABETALOL HYDROCHLORIDE 5 MG: 5 INJECTION, SOLUTION INTRAVENOUS at 04:08

## 2024-10-20 RX ADMIN — CARVEDILOL 6.25 MG: 6.25 TABLET, FILM COATED ORAL at 15:07

## 2024-10-20 RX ADMIN — OXYCODONE 15 MG: 5 TABLET ORAL at 20:10

## 2024-10-20 RX ADMIN — HYDROMORPHONE HYDROCHLORIDE 0.5 MG: 1 INJECTION, SOLUTION INTRAMUSCULAR; INTRAVENOUS; SUBCUTANEOUS at 04:09

## 2024-10-20 RX ADMIN — CHLORDIAZEPOXIDE HYDROCHLORIDE 50 MG: 25 CAPSULE ORAL at 07:53

## 2024-10-20 RX ADMIN — SODIUM CHLORIDE, PRESERVATIVE FREE 10 ML: 5 INJECTION INTRAVENOUS at 07:59

## 2024-10-20 RX ADMIN — POLYETHYLENE GLYCOL 3350 17 G: 17 POWDER, FOR SOLUTION ORAL at 20:10

## 2024-10-20 RX ADMIN — CEFEPIME 2000 MG: 2 INJECTION, POWDER, FOR SOLUTION INTRAVENOUS at 13:48

## 2024-10-20 RX ADMIN — Medication 30 UNITS: at 04:25

## 2024-10-20 RX ADMIN — WATER 1000 MG: 1 INJECTION INTRAMUSCULAR; INTRAVENOUS; SUBCUTANEOUS at 07:40

## 2024-10-20 RX ADMIN — DEXMEDETOMIDINE HYDROCHLORIDE 1.5 MCG/KG/HR: 400 INJECTION, SOLUTION INTRAVENOUS at 22:05

## 2024-10-20 RX ADMIN — Medication 30 UNITS: at 04:35

## 2024-10-20 RX ADMIN — DEXMEDETOMIDINE HYDROCHLORIDE 1.5 MCG/KG/HR: 400 INJECTION, SOLUTION INTRAVENOUS at 15:39

## 2024-10-20 RX ADMIN — DOCUSATE SODIUM LIQUID 100 MG: 100 LIQUID ORAL at 20:10

## 2024-10-20 RX ADMIN — IPRATROPIUM BROMIDE AND ALBUTEROL SULFATE 1 DOSE: 2.5; .5 SOLUTION RESPIRATORY (INHALATION) at 08:25

## 2024-10-20 RX ADMIN — NICARDIPINE HYDROCHLORIDE 10 MG/HR: 0.1 INJECTION INTRAVENOUS at 21:30

## 2024-10-20 RX ADMIN — NICARDIPINE HYDROCHLORIDE 10 MG/HR: 0.1 INJECTION INTRAVENOUS at 14:58

## 2024-10-20 RX ADMIN — NICARDIPINE HYDROCHLORIDE 10 MG/HR: 0.1 INJECTION INTRAVENOUS at 17:11

## 2024-10-20 RX ADMIN — Medication 30 UNITS: at 18:12

## 2024-10-20 RX ADMIN — ARGATROBAN 3.48 MCG/KG/MIN: 250 INJECTION INTRAVENOUS at 06:29

## 2024-10-20 RX ADMIN — SODIUM CHLORIDE, PRESERVATIVE FREE 10 ML: 5 INJECTION INTRAVENOUS at 20:10

## 2024-10-20 RX ADMIN — FENTANYL CITRATE 50 MCG: 50 INJECTION, SOLUTION INTRAMUSCULAR; INTRAVENOUS at 01:03

## 2024-10-20 RX ADMIN — ARGATROBAN 3.48 MCG/KG/MIN: 250 INJECTION INTRAVENOUS at 20:33

## 2024-10-20 RX ADMIN — NICARDIPINE HYDROCHLORIDE 5 MG/HR: 0.1 INJECTION INTRAVENOUS at 08:20

## 2024-10-20 RX ADMIN — HYDRALAZINE HYDROCHLORIDE 10 MG: 20 INJECTION INTRAMUSCULAR; INTRAVENOUS at 00:48

## 2024-10-20 RX ADMIN — HYDROMORPHONE HYDROCHLORIDE 0.5 MG: 1 INJECTION, SOLUTION INTRAMUSCULAR; INTRAVENOUS; SUBCUTANEOUS at 18:53

## 2024-10-20 RX ADMIN — WATER 40 MG: 1 INJECTION INTRAMUSCULAR; INTRAVENOUS; SUBCUTANEOUS at 04:54

## 2024-10-20 RX ADMIN — DEXMEDETOMIDINE HYDROCHLORIDE 1.5 MCG/KG/HR: 400 INJECTION, SOLUTION INTRAVENOUS at 02:49

## 2024-10-20 RX ADMIN — ONDANSETRON 4 MG: 2 INJECTION INTRAMUSCULAR; INTRAVENOUS at 22:18

## 2024-10-20 RX ADMIN — OXYCODONE 10 MG: 5 TABLET ORAL at 07:17

## 2024-10-20 RX ADMIN — CEFEPIME 2000 MG: 2 INJECTION, POWDER, FOR SOLUTION INTRAVENOUS at 20:28

## 2024-10-20 RX ADMIN — NICARDIPINE HYDROCHLORIDE 10 MG/HR: 0.1 INJECTION INTRAVENOUS at 19:29

## 2024-10-20 RX ADMIN — DOCUSATE SODIUM LIQUID 100 MG: 100 LIQUID ORAL at 07:53

## 2024-10-20 RX ADMIN — DEXMEDETOMIDINE HYDROCHLORIDE 1.5 MCG/KG/HR: 400 INJECTION, SOLUTION INTRAVENOUS at 09:13

## 2024-10-20 RX ADMIN — ARGATROBAN 3.48 MCG/KG/MIN: 50 INJECTION INTRAVENOUS at 00:22

## 2024-10-20 RX ADMIN — POLYETHYLENE GLYCOL 3350 17 G: 17 POWDER, FOR SOLUTION ORAL at 07:53

## 2024-10-20 RX ADMIN — SENNOSIDES 8.8 MG: 8.8 LIQUID ORAL at 07:53

## 2024-10-20 RX ADMIN — HYDROMORPHONE HYDROCHLORIDE 0.5 MG: 1 INJECTION, SOLUTION INTRAMUSCULAR; INTRAVENOUS; SUBCUTANEOUS at 09:29

## 2024-10-20 RX ADMIN — LABETALOL HYDROCHLORIDE 5 MG: 5 INJECTION, SOLUTION INTRAVENOUS at 22:05

## 2024-10-20 RX ADMIN — ARGATROBAN 3.48 MCG/KG/MIN: 50 INJECTION INTRAVENOUS at 03:22

## 2024-10-20 RX ADMIN — SODIUM PHOSPHATE, MONOBASIC, MONOHYDRATE AND SODIUM PHOSPHATE, DIBASIC, ANHYDROUS 13.74 MMOL: 276; 142 INJECTION, SOLUTION INTRAVENOUS at 23:14

## 2024-10-20 RX ADMIN — NICARDIPINE HYDROCHLORIDE 10 MG/HR: 0.1 INJECTION INTRAVENOUS at 23:32

## 2024-10-20 RX ADMIN — Medication 30 UNITS: at 04:30

## 2024-10-20 RX ADMIN — IPRATROPIUM BROMIDE AND ALBUTEROL SULFATE 1 DOSE: 2.5; .5 SOLUTION RESPIRATORY (INHALATION) at 20:43

## 2024-10-20 RX ADMIN — LANSOPRAZOLE 30 MG: 30 TABLET, ORALLY DISINTEGRATING, DELAYED RELEASE ORAL at 07:53

## 2024-10-20 RX ADMIN — NICARDIPINE HYDROCHLORIDE 7.5 MG/HR: 0.1 INJECTION INTRAVENOUS at 12:27

## 2024-10-20 RX ADMIN — Medication 200 MCG/HR: at 00:05

## 2024-10-20 RX ADMIN — SENNOSIDES 8.8 MG: 8.8 LIQUID ORAL at 20:10

## 2024-10-20 RX ADMIN — DEXMEDETOMIDINE HYDROCHLORIDE 1.5 MCG/KG/HR: 400 INJECTION, SOLUTION INTRAVENOUS at 12:28

## 2024-10-20 RX ADMIN — DEXMEDETOMIDINE HYDROCHLORIDE 1.5 MCG/KG/HR: 400 INJECTION, SOLUTION INTRAVENOUS at 06:02

## 2024-10-20 RX ADMIN — HYPROMELLOSE: 0 GEL OPHTHALMIC at 09:39

## 2024-10-20 RX ADMIN — CHLORDIAZEPOXIDE HYDROCHLORIDE 50 MG: 25 CAPSULE ORAL at 15:07

## 2024-10-20 RX ADMIN — ALBUMIN (HUMAN) 25 G: 12.5 INJECTION, SOLUTION INTRAVENOUS at 18:10

## 2024-10-20 RX ADMIN — NICARDIPINE HYDROCHLORIDE 5 MG/HR: 0.1 INJECTION INTRAVENOUS at 04:49

## 2024-10-20 RX ADMIN — IPRATROPIUM BROMIDE AND ALBUTEROL SULFATE 1 DOSE: 2.5; .5 SOLUTION RESPIRATORY (INHALATION) at 23:36

## 2024-10-20 RX ADMIN — METOPROLOL TARTRATE 25 MG: 25 TABLET, FILM COATED ORAL at 08:00

## 2024-10-20 RX ADMIN — AMLODIPINE BESYLATE 10 MG: 10 TABLET ORAL at 07:52

## 2024-10-20 RX ADMIN — POTASSIUM CHLORIDE 20 MEQ: 29.8 INJECTION, SOLUTION INTRAVENOUS at 22:24

## 2024-10-20 RX ADMIN — IPRATROPIUM BROMIDE AND ALBUTEROL SULFATE 1 DOSE: 2.5; .5 SOLUTION RESPIRATORY (INHALATION) at 03:45

## 2024-10-20 RX ADMIN — Medication 30 UNITS: at 18:11

## 2024-10-20 RX ADMIN — SODIUM CHLORIDE: 9 INJECTION, SOLUTION INTRAVENOUS at 02:48

## 2024-10-20 RX ADMIN — DEXMEDETOMIDINE HYDROCHLORIDE 1.5 MCG/KG/HR: 400 INJECTION, SOLUTION INTRAVENOUS at 18:53

## 2024-10-20 RX ADMIN — Medication 200 MCG/HR: at 12:42

## 2024-10-20 RX ADMIN — CHLORDIAZEPOXIDE HYDROCHLORIDE 50 MG: 25 CAPSULE ORAL at 20:10

## 2024-10-20 RX ADMIN — OXYCODONE 10 MG: 5 TABLET ORAL at 01:06

## 2024-10-20 RX ADMIN — IPRATROPIUM BROMIDE AND ALBUTEROL SULFATE 1 DOSE: 2.5; .5 SOLUTION RESPIRATORY (INHALATION) at 12:12

## 2024-10-20 RX ADMIN — IPRATROPIUM BROMIDE AND ALBUTEROL SULFATE 1 DOSE: 2.5; .5 SOLUTION RESPIRATORY (INHALATION) at 15:54

## 2024-10-20 RX ADMIN — OXYCODONE 15 MG: 5 TABLET ORAL at 13:55

## 2024-10-20 ASSESSMENT — PULMONARY FUNCTION TESTS
PIF_VALUE: 17
PIF_VALUE: 20
PIF_VALUE: 17
PIF_VALUE: 11
PIF_VALUE: 10
PIF_VALUE: 17
PIF_VALUE: 18
PIF_VALUE: 10
PIF_VALUE: 16
PIF_VALUE: 19
PIF_VALUE: 19
PIF_VALUE: 10
PIF_VALUE: 16
PIF_VALUE: 17
PIF_VALUE: 17
PIF_VALUE: 10
PIF_VALUE: 20
PIF_VALUE: 16
PIF_VALUE: 10

## 2024-10-21 ENCOUNTER — APPOINTMENT (OUTPATIENT)
Dept: GENERAL RADIOLOGY | Age: 44
DRG: 009 | End: 2024-10-21
Payer: MEDICAID

## 2024-10-21 ENCOUNTER — APPOINTMENT (OUTPATIENT)
Age: 44
DRG: 009 | End: 2024-10-21
Attending: INTERNAL MEDICINE
Payer: MEDICAID

## 2024-10-21 LAB
ACT BLD: 203 SEC (ref 79–149)
ALBUMIN SERPL-MCNC: 4 G/DL (ref 3.5–5.2)
ALBUMIN SERPL-MCNC: 4.1 G/DL (ref 3.5–5.2)
ALBUMIN/GLOB SERPL: 2 {RATIO} (ref 1–2.5)
ALBUMIN/GLOB SERPL: 2 {RATIO} (ref 1–2.5)
ALLEN TEST: ABNORMAL
ALLEN TEST: ABNORMAL
ALLEN TEST: NORMAL
ALP SERPL-CCNC: 48 U/L (ref 40–129)
ALP SERPL-CCNC: 55 U/L (ref 40–129)
ALT SERPL-CCNC: 53 U/L (ref 10–50)
ALT SERPL-CCNC: 53 U/L (ref 10–50)
ANION GAP SERPL CALCULATED.3IONS-SCNC: 10 MMOL/L (ref 9–16)
ANION GAP SERPL CALCULATED.3IONS-SCNC: 12 MMOL/L (ref 9–16)
ANION GAP SERPL CALCULATED.3IONS-SCNC: 7 MMOL/L (ref 9–16)
ANION GAP SERPL CALCULATED.3IONS-SCNC: 9 MMOL/L (ref 9–16)
AST SERPL-CCNC: 22 U/L (ref 10–50)
AST SERPL-CCNC: 24 U/L (ref 10–50)
AT III ACT/NOR PPP CHRO: 126 % (ref 83–122)
AT III ACT/NOR PPP CHRO: 134 % (ref 83–122)
AT III ACT/NOR PPP CHRO: 136 % (ref 83–122)
AT III ACT/NOR PPP CHRO: 153 % (ref 83–122)
AT III ACT/NOR PPP CHRO: 226 % (ref 83–122)
AT III ACT/NOR PPP CHRO: 95 % (ref 83–122)
AT III ACT/NOR PPP CHRO: 99 % (ref 83–122)
BILIRUB DIRECT SERPL-MCNC: 0.3 MG/DL (ref 0–0.2)
BILIRUB DIRECT SERPL-MCNC: 0.3 MG/DL (ref 0–0.2)
BILIRUB INDIRECT SERPL-MCNC: 0.2 MG/DL (ref 0–1)
BILIRUB INDIRECT SERPL-MCNC: 0.4 MG/DL (ref 0–1)
BILIRUB SERPL-MCNC: 0.5 MG/DL (ref 0–1.2)
BILIRUB SERPL-MCNC: 0.7 MG/DL (ref 0–1.2)
BUN SERPL-MCNC: 16 MG/DL (ref 6–20)
BUN SERPL-MCNC: 17 MG/DL (ref 6–20)
BUN SERPL-MCNC: 17 MG/DL (ref 6–20)
BUN SERPL-MCNC: 20 MG/DL (ref 6–20)
CA-I BLD-SCNC: 1.18 MMOL/L (ref 1.13–1.33)
CA-I BLD-SCNC: 1.19 MMOL/L (ref 1.13–1.33)
CA-I BLD-SCNC: 1.22 MMOL/L (ref 1.13–1.33)
CA-I BLD-SCNC: 1.22 MMOL/L (ref 1.13–1.33)
CALCIUM SERPL-MCNC: 8.4 MG/DL (ref 8.6–10.4)
CALCIUM SERPL-MCNC: 8.6 MG/DL (ref 8.6–10.4)
CALCIUM SERPL-MCNC: 8.9 MG/DL (ref 8.6–10.4)
CALCIUM SERPL-MCNC: 9 MG/DL (ref 8.6–10.4)
CHLORIDE SERPL-SCNC: 101 MMOL/L (ref 98–107)
CHLORIDE SERPL-SCNC: 101 MMOL/L (ref 98–107)
CHLORIDE SERPL-SCNC: 104 MMOL/L (ref 98–107)
CHLORIDE SERPL-SCNC: 104 MMOL/L (ref 98–107)
CLOT ANGLE.KAOLIN INDUCED BLD RES TEG: 66.8 DEG (ref 63–78)
CLOT ANGLE.KAOLIN INDUCED BLD RES TEG: <39 DEG (ref 63–78)
CO2 SERPL-SCNC: 22 MMOL/L (ref 20–31)
CO2 SERPL-SCNC: 22 MMOL/L (ref 20–31)
CO2 SERPL-SCNC: 24 MMOL/L (ref 20–31)
CO2 SERPL-SCNC: 24 MMOL/L (ref 20–31)
CREAT SERPL-MCNC: 0.7 MG/DL (ref 0.7–1.2)
CREAT UR-MCNC: 23.4 MG/DL (ref 39–259)
ECHO BSA: 2.02 M2
ECHO EST RA PRESSURE: 3 MMHG
ECHO LV EDV A2C: 65 ML
ECHO LV EDV A4C: 66 ML
ECHO LV EDV INDEX A4C: 32 ML/M2
ECHO LV EDV NDEX A2C: 32 ML/M2
ECHO LV EJECTION FRACTION A2C: 55 %
ECHO LV EJECTION FRACTION A4C: 60 %
ECHO LV EJECTION FRACTION BIPLANE: 59 % (ref 55–100)
ECHO LV ESV A2C: 29 ML
ECHO LV ESV A4C: 27 ML
ECHO LV ESV INDEX A2C: 14 ML/M2
ECHO LV ESV INDEX A4C: 13 ML/M2
ECHO RIGHT VENTRICULAR SYSTOLIC PRESSURE (RVSP): 48 MMHG
ECHO RV FREE WALL PEAK S': 11.7 CM/S
ECHO RV INTERNAL DIMENSION: 3.9 CM
ECHO RV TAPSE: 2 CM (ref 1.7–?)
ECHO TV REGURGITANT MAX VELOCITY: 3.37 M/S
ECHO TV REGURGITANT PEAK GRADIENT: 45 MMHG
ERYTHROCYTE [DISTWIDTH] IN BLOOD BY AUTOMATED COUNT: 12.9 % (ref 11.8–14.4)
ERYTHROCYTE [DISTWIDTH] IN BLOOD BY AUTOMATED COUNT: 13 % (ref 11.8–14.4)
ERYTHROCYTE [DISTWIDTH] IN BLOOD BY AUTOMATED COUNT: 13 % (ref 11.8–14.4)
ERYTHROCYTE [DISTWIDTH] IN BLOOD BY AUTOMATED COUNT: 13.1 % (ref 11.8–14.4)
FIBRINOGEN PPP-MCNC: 326 MG/DL (ref 203–521)
FIBRINOGEN PPP-MCNC: 339 MG/DL (ref 203–521)
FIBRINOGEN PPP-MCNC: 352 MG/DL (ref 203–521)
FIBRINOGEN PPP-MCNC: 429 MG/DL (ref 203–521)
FIBRINOGEN, FUNCTIONAL TEG: 17.1 MM (ref 15–32)
FIBRINOGEN, FUNCTIONAL TEG: 20.2 MM (ref 15–32)
FIO2: 2
FIO2: 21
FIO2: 21
FIO2: 30
GFR, ESTIMATED: >90 ML/MIN/1.73M2
GLOBULIN SER CALC-MCNC: 2 G/DL
GLOBULIN SER CALC-MCNC: 2.4 G/DL
GLUCOSE BLD-MCNC: 114 MG/DL (ref 74–100)
GLUCOSE BLD-MCNC: 118 MG/DL (ref 74–100)
GLUCOSE BLD-MCNC: 123 MG/DL (ref 74–100)
GLUCOSE BLD-MCNC: 126 MG/DL (ref 74–100)
GLUCOSE BLD-MCNC: 140 MG/DL (ref 74–100)
GLUCOSE BLD-MCNC: 147 MG/DL (ref 74–100)
GLUCOSE SERPL-MCNC: 112 MG/DL (ref 74–99)
GLUCOSE SERPL-MCNC: 122 MG/DL (ref 74–99)
GLUCOSE SERPL-MCNC: 125 MG/DL (ref 74–99)
GLUCOSE SERPL-MCNC: 133 MG/DL (ref 74–99)
HCO3 VENOUS: 24.8 MMOL/L (ref 22–29)
HCO3 VENOUS: 25.7 MMOL/L (ref 22–29)
HCT VFR BLD AUTO: 29.1 % (ref 40.7–50.3)
HCT VFR BLD AUTO: 31.3 % (ref 40.7–50.3)
HCT VFR BLD AUTO: 32.1 % (ref 40.7–50.3)
HCT VFR BLD AUTO: 33.4 % (ref 40.7–50.3)
HGB BLD-MCNC: 10.3 G/DL (ref 13–17)
HGB BLD-MCNC: 11.3 G/DL (ref 13–17)
HGB BLD-MCNC: 11.6 G/DL (ref 13–17)
HGB BLD-MCNC: 12 G/DL (ref 13–17)
HGB FREE PLAS-MCNC: 6.8 MG/DL (ref 0–9.7)
INR PPP: 2.3
INR PPP: 2.4
INR PPP: 2.8
INR PPP: 2.8
KINETICS TEG: 1.6 MIN (ref 0.8–2.1)
KINETICS TEG: 2.7 MIN (ref 0.8–2.1)
LACTIC ACID, WHOLE BLOOD: 0.7 MMOL/L (ref 0.7–2.1)
LACTIC ACID, WHOLE BLOOD: 0.9 MMOL/L (ref 0.7–2.1)
LACTIC ACID, WHOLE BLOOD: 1.1 MMOL/L (ref 0.7–2.1)
LACTIC ACID, WHOLE BLOOD: 1.5 MMOL/L (ref 0.7–2.1)
MA (MAX CLOT) TEG: 58.3 MM (ref 52–69)
MA (MAX CLOT) TEG: 62.2 MM (ref 52–69)
MA(MAX CLOT) RAPID TEG: 59.7 MM (ref 52–70)
MA(MAX CLOT) RAPID TEG: 64 MM (ref 52–70)
MAGNESIUM SERPL-MCNC: 1.9 MG/DL (ref 1.6–2.6)
MAGNESIUM SERPL-MCNC: 2 MG/DL (ref 1.6–2.6)
MAGNESIUM SERPL-MCNC: 2 MG/DL (ref 1.6–2.6)
MAGNESIUM SERPL-MCNC: 2.1 MG/DL (ref 1.6–2.6)
MCH RBC QN AUTO: 27.4 PG (ref 25.2–33.5)
MCH RBC QN AUTO: 27.4 PG (ref 25.2–33.5)
MCH RBC QN AUTO: 27.7 PG (ref 25.2–33.5)
MCH RBC QN AUTO: 28 PG (ref 25.2–33.5)
MCHC RBC AUTO-ENTMCNC: 35.4 G/DL (ref 28.4–34.8)
MCHC RBC AUTO-ENTMCNC: 35.9 G/DL (ref 28.4–34.8)
MCHC RBC AUTO-ENTMCNC: 36.1 G/DL (ref 28.4–34.8)
MCHC RBC AUTO-ENTMCNC: 36.1 G/DL (ref 28.4–34.8)
MCV RBC AUTO: 76.3 FL (ref 82.6–102.9)
MCV RBC AUTO: 76.6 FL (ref 82.6–102.9)
MCV RBC AUTO: 77.4 FL (ref 82.6–102.9)
MCV RBC AUTO: 77.5 FL (ref 82.6–102.9)
NEGATIVE BASE EXCESS, ART: 1.3 MMOL/L (ref 0–2)
NRBC BLD-RTO: 0 PER 100 WBC
NRBC BLD-RTO: 0.1 PER 100 WBC
O2 DELIVERY DEVICE: ABNORMAL
O2 SAT, VEN: 86.3 % (ref 60–85)
O2 SAT, VEN: 88.1 % (ref 60–85)
PARTIAL THROMBOPLASTIN TIME: 58.4 SEC (ref 23–36.5)
PARTIAL THROMBOPLASTIN TIME: 59.1 SEC (ref 23–36.5)
PARTIAL THROMBOPLASTIN TIME: 59.1 SEC (ref 23–36.5)
PARTIAL THROMBOPLASTIN TIME: 61.4 SEC (ref 23–36.5)
PCO2 VENOUS: 37 MM HG (ref 41–51)
PCO2 VENOUS: 38 MM HG (ref 41–51)
PERFORMING LOCATION: ABNORMAL
PERFORMING LOCATION: ABNORMAL
PH VENOUS: 7.43 (ref 7.32–7.43)
PH VENOUS: 7.44 (ref 7.32–7.43)
PHOSPHATE SERPL-MCNC: 2.4 MG/DL (ref 2.5–4.5)
PHOSPHATE SERPL-MCNC: 2.4 MG/DL (ref 2.5–4.5)
PHOSPHATE SERPL-MCNC: 2.6 MG/DL (ref 2.5–4.5)
PHOSPHATE SERPL-MCNC: 2.9 MG/DL (ref 2.5–4.5)
PLATELET # BLD AUTO: 104 K/UL (ref 138–453)
PLATELET # BLD AUTO: 133 K/UL (ref 138–453)
PLATELET # BLD AUTO: 134 K/UL (ref 138–453)
PLATELET # BLD AUTO: 141 K/UL (ref 138–453)
PMV BLD AUTO: 12.1 FL (ref 8.1–13.5)
PMV BLD AUTO: 12.2 FL (ref 8.1–13.5)
PMV BLD AUTO: 12.2 FL (ref 8.1–13.5)
PMV BLD AUTO: 12.6 FL (ref 8.1–13.5)
PO2 VENOUS: 49.9 MM HG (ref 30–50)
PO2 VENOUS: 52.8 MM HG (ref 30–50)
POC HCO3: 22.4 MMOL/L (ref 21–28)
POC HCO3: 23.2 MMOL/L (ref 21–28)
POC HCO3: 24 MMOL/L (ref 21–28)
POC HCO3: 24.5 MMOL/L (ref 21–28)
POC HCO3: 25 MMOL/L (ref 21–28)
POC HCO3: 25.5 MMOL/L (ref 21–28)
POC HCO3: 26.2 MMOL/L (ref 21–28)
POC O2 SATURATION: 100 % (ref 94–98)
POC O2 SATURATION: 90.4 % (ref 94–98)
POC O2 SATURATION: 93.1 % (ref 94–98)
POC O2 SATURATION: 95.6 % (ref 94–98)
POC O2 SATURATION: 95.7 % (ref 94–98)
POC O2 SATURATION: 96.9 % (ref 94–98)
POC O2 SATURATION: 97.4 % (ref 94–98)
POC PCO2: 30.7 MM HG (ref 35–48)
POC PCO2: 30.9 MM HG (ref 35–48)
POC PCO2: 32.5 MM HG (ref 35–48)
POC PCO2: 32.7 MM HG (ref 35–48)
POC PCO2: 33.6 MM HG (ref 35–48)
POC PCO2: 36.6 MM HG (ref 35–48)
POC PCO2: 38.1 MM HG (ref 35–48)
POC PH: 7.43 (ref 7.35–7.45)
POC PH: 7.45 (ref 7.35–7.45)
POC PH: 7.45 (ref 7.35–7.45)
POC PH: 7.48 (ref 7.35–7.45)
POC PH: 7.49 (ref 7.35–7.45)
POC PH: 7.49 (ref 7.35–7.45)
POC PH: 7.5 (ref 7.35–7.45)
POC PO2: 53.9 MM HG (ref 83–108)
POC PO2: 541.7 MM HG (ref 83–108)
POC PO2: 61.3 MM HG (ref 83–108)
POC PO2: 70.9 MM HG (ref 83–108)
POC PO2: 75.6 MM HG (ref 83–108)
POC PO2: 85.8 MM HG (ref 83–108)
POC PO2: 85.9 MM HG (ref 83–108)
POSITIVE BASE EXCESS, ART: 0.2 MMOL/L (ref 0–3)
POSITIVE BASE EXCESS, ART: 1.3 MMOL/L (ref 0–3)
POSITIVE BASE EXCESS, ART: 1.5 MMOL/L (ref 0–3)
POSITIVE BASE EXCESS, ART: 1.6 MMOL/L (ref 0–3)
POSITIVE BASE EXCESS, ART: 2 MMOL/L (ref 0–3)
POSITIVE BASE EXCESS, ART: 2.1 MMOL/L (ref 0–3)
POSITIVE BASE EXCESS, VEN: 0.7 MMOL/L (ref 0–3)
POSITIVE BASE EXCESS, VEN: 1.6 MMOL/L (ref 0–3)
POTASSIUM SERPL-SCNC: 3.6 MMOL/L (ref 3.7–5.3)
POTASSIUM SERPL-SCNC: 3.9 MMOL/L (ref 3.7–5.3)
POTASSIUM SERPL-SCNC: 4.6 MMOL/L (ref 3.7–5.3)
POTASSIUM SERPL-SCNC: 4.7 MMOL/L (ref 3.7–5.3)
PROT SERPL-MCNC: 6 G/DL (ref 6.6–8.7)
PROT SERPL-MCNC: 6.5 G/DL (ref 6.6–8.7)
PROTHROMBIN TIME: 24.4 SEC (ref 11.7–14.9)
PROTHROMBIN TIME: 25.1 SEC (ref 11.7–14.9)
PROTHROMBIN TIME: 28.3 SEC (ref 11.7–14.9)
PROTHROMBIN TIME: 28.6 SEC (ref 11.7–14.9)
RBC # BLD AUTO: 3.76 M/UL (ref 4.21–5.77)
RBC # BLD AUTO: 4.04 M/UL (ref 4.21–5.77)
RBC # BLD AUTO: 4.19 M/UL (ref 4.21–5.77)
RBC # BLD AUTO: 4.38 M/UL (ref 4.21–5.77)
REACTION TIME TEG W HEPARIN: 12.8 MIN (ref 4.3–8.3)
REACTION TIME TEG W HEPARIN: 9.7 MIN (ref 4.3–8.3)
REACTION TIME TEG: 10.2 MIN (ref 4.6–9.1)
REACTION TIME TEG: 12.8 MIN (ref 4.6–9.1)
SAMPLE SITE: ABNORMAL
SAMPLE SITE: NORMAL
SODIUM SERPL-SCNC: 134 MMOL/L (ref 136–145)
SODIUM SERPL-SCNC: 135 MMOL/L (ref 136–145)
SODIUM SERPL-SCNC: 135 MMOL/L (ref 136–145)
SODIUM SERPL-SCNC: 136 MMOL/L (ref 136–145)
SODIUM UR-SCNC: 98 MMOL/L
WBC OTHER # BLD: 13.4 K/UL (ref 3.5–11.3)
WBC OTHER # BLD: 18.3 K/UL (ref 3.5–11.3)
WBC OTHER # BLD: 20.3 K/UL (ref 3.5–11.3)
WBC OTHER # BLD: 21.3 K/UL (ref 3.5–11.3)

## 2024-10-21 PROCEDURE — 2000000000 HC ICU R&B

## 2024-10-21 PROCEDURE — 2500000003 HC RX 250 WO HCPCS

## 2024-10-21 PROCEDURE — 82570 ASSAY OF URINE CREATININE: CPT

## 2024-10-21 PROCEDURE — 71045 X-RAY EXAM CHEST 1 VIEW: CPT

## 2024-10-21 PROCEDURE — 6360000002 HC RX W HCPCS

## 2024-10-21 PROCEDURE — 85300 ANTITHROMBIN III ACTIVITY: CPT

## 2024-10-21 PROCEDURE — 33948 ECMO/ECLS DAILY MGMT-VENOUS: CPT | Performed by: INTERNAL MEDICINE

## 2024-10-21 PROCEDURE — 2500000003 HC RX 250 WO HCPCS: Performed by: INTERNAL MEDICINE

## 2024-10-21 PROCEDURE — 6360000002 HC RX W HCPCS: Performed by: INTERNAL MEDICINE

## 2024-10-21 PROCEDURE — 85347 COAGULATION TIME ACTIVATED: CPT

## 2024-10-21 PROCEDURE — 85610 PROTHROMBIN TIME: CPT

## 2024-10-21 PROCEDURE — 82947 ASSAY GLUCOSE BLOOD QUANT: CPT

## 2024-10-21 PROCEDURE — 82330 ASSAY OF CALCIUM: CPT

## 2024-10-21 PROCEDURE — 6370000000 HC RX 637 (ALT 250 FOR IP)

## 2024-10-21 PROCEDURE — 85730 THROMBOPLASTIN TIME PARTIAL: CPT

## 2024-10-21 PROCEDURE — 93321 DOPPLER ECHO F-UP/LMTD STD: CPT | Performed by: INTERNAL MEDICINE

## 2024-10-21 PROCEDURE — 99291 CRITICAL CARE FIRST HOUR: CPT | Performed by: INTERNAL MEDICINE

## 2024-10-21 PROCEDURE — 6370000000 HC RX 637 (ALT 250 FOR IP): Performed by: INTERNAL MEDICINE

## 2024-10-21 PROCEDURE — 2700000000 HC OXYGEN THERAPY PER DAY

## 2024-10-21 PROCEDURE — 94668 MNPJ CHEST WALL SBSQ: CPT

## 2024-10-21 PROCEDURE — 94003 VENT MGMT INPAT SUBQ DAY: CPT

## 2024-10-21 PROCEDURE — 85027 COMPLETE CBC AUTOMATED: CPT

## 2024-10-21 PROCEDURE — 85576 BLOOD PLATELET AGGREGATION: CPT

## 2024-10-21 PROCEDURE — 2580000003 HC RX 258

## 2024-10-21 PROCEDURE — 80076 HEPATIC FUNCTION PANEL: CPT

## 2024-10-21 PROCEDURE — 84300 ASSAY OF URINE SODIUM: CPT

## 2024-10-21 PROCEDURE — 74018 RADEX ABDOMEN 1 VIEW: CPT

## 2024-10-21 PROCEDURE — 80048 BASIC METABOLIC PNL TOTAL CA: CPT

## 2024-10-21 PROCEDURE — 82803 BLOOD GASES ANY COMBINATION: CPT

## 2024-10-21 PROCEDURE — 93308 TTE F-UP OR LMTD: CPT | Performed by: INTERNAL MEDICINE

## 2024-10-21 PROCEDURE — 33948 ECMO/ECLS DAILY MGMT-VENOUS: CPT

## 2024-10-21 PROCEDURE — 93325 DOPPLER ECHO COLOR FLOW MAPG: CPT | Performed by: INTERNAL MEDICINE

## 2024-10-21 PROCEDURE — 83051 HEMOGLOBIN PLASMA: CPT

## 2024-10-21 PROCEDURE — 93308 TTE F-UP OR LMTD: CPT

## 2024-10-21 PROCEDURE — 94640 AIRWAY INHALATION TREATMENT: CPT

## 2024-10-21 PROCEDURE — 94761 N-INVAS EAR/PLS OXIMETRY MLT: CPT

## 2024-10-21 PROCEDURE — 84100 ASSAY OF PHOSPHORUS: CPT

## 2024-10-21 PROCEDURE — 83735 ASSAY OF MAGNESIUM: CPT

## 2024-10-21 PROCEDURE — 83605 ASSAY OF LACTIC ACID: CPT

## 2024-10-21 PROCEDURE — 85384 FIBRINOGEN ACTIVITY: CPT

## 2024-10-21 RX ORDER — OXYCODONE HYDROCHLORIDE 5 MG/1
15 TABLET ORAL ONCE
Status: COMPLETED | OUTPATIENT
Start: 2024-10-21 | End: 2024-10-21

## 2024-10-21 RX ORDER — CHLORDIAZEPOXIDE HYDROCHLORIDE 25 MG/1
25 CAPSULE, GELATIN COATED ORAL EVERY 8 HOURS
Status: DISCONTINUED | OUTPATIENT
Start: 2024-10-21 | End: 2024-10-22

## 2024-10-21 RX ORDER — DEXMEDETOMIDINE HYDROCHLORIDE 4 UG/ML
.1-1.5 INJECTION, SOLUTION INTRAVENOUS CONTINUOUS
Status: DISCONTINUED | OUTPATIENT
Start: 2024-10-21 | End: 2024-10-27

## 2024-10-21 RX ADMIN — ARGATROBAN 3.48 MCG/KG/MIN: 250 INJECTION INTRAVENOUS at 18:01

## 2024-10-21 RX ADMIN — OXYCODONE 15 MG: 5 TABLET ORAL at 01:04

## 2024-10-21 RX ADMIN — DEXMEDETOMIDINE HYDROCHLORIDE 1.5 MCG/KG/HR: 400 INJECTION, SOLUTION INTRAVENOUS at 00:17

## 2024-10-21 RX ADMIN — CARVEDILOL 6.25 MG: 6.25 TABLET, FILM COATED ORAL at 16:31

## 2024-10-21 RX ADMIN — NICARDIPINE HYDROCHLORIDE 7.5 MG/HR: 0.1 INJECTION INTRAVENOUS at 16:36

## 2024-10-21 RX ADMIN — SENNOSIDES 8.8 MG: 8.8 LIQUID ORAL at 19:33

## 2024-10-21 RX ADMIN — DEXMEDETOMIDINE HYDROCHLORIDE 1.5 MCG/KG/HR: 400 INJECTION, SOLUTION INTRAVENOUS at 02:34

## 2024-10-21 RX ADMIN — Medication 40 MG: at 22:23

## 2024-10-21 RX ADMIN — OXYCODONE 15 MG: 5 TABLET ORAL at 19:31

## 2024-10-21 RX ADMIN — HYDROMORPHONE HYDROCHLORIDE 0.5 MG: 1 INJECTION, SOLUTION INTRAMUSCULAR; INTRAVENOUS; SUBCUTANEOUS at 21:29

## 2024-10-21 RX ADMIN — Medication 30 UNITS: at 04:49

## 2024-10-21 RX ADMIN — SODIUM CHLORIDE, PRESERVATIVE FREE 10 ML: 5 INJECTION INTRAVENOUS at 20:33

## 2024-10-21 RX ADMIN — CEFEPIME 2000 MG: 2 INJECTION, POWDER, FOR SOLUTION INTRAVENOUS at 21:26

## 2024-10-21 RX ADMIN — DEXMEDETOMIDINE HYDROCHLORIDE 1.1 MCG/KG/HR: 4 INJECTION, SOLUTION INTRAVENOUS at 15:11

## 2024-10-21 RX ADMIN — HYDROMORPHONE HYDROCHLORIDE 0.5 MG: 1 INJECTION, SOLUTION INTRAMUSCULAR; INTRAVENOUS; SUBCUTANEOUS at 03:11

## 2024-10-21 RX ADMIN — IPRATROPIUM BROMIDE AND ALBUTEROL SULFATE 1 DOSE: 2.5; .5 SOLUTION RESPIRATORY (INHALATION) at 03:36

## 2024-10-21 RX ADMIN — NICARDIPINE HYDROCHLORIDE 12.5 MG/HR: 0.1 INJECTION INTRAVENOUS at 02:58

## 2024-10-21 RX ADMIN — DOCUSATE SODIUM LIQUID 100 MG: 100 LIQUID ORAL at 19:33

## 2024-10-21 RX ADMIN — NICARDIPINE HYDROCHLORIDE 5 MG/HR: 0.1 INJECTION INTRAVENOUS at 20:32

## 2024-10-21 RX ADMIN — Medication 30 UNITS: at 04:50

## 2024-10-21 RX ADMIN — Medication 30 UNITS: at 16:50

## 2024-10-21 RX ADMIN — NICARDIPINE HYDROCHLORIDE 12.5 MG/HR: 0.1 INJECTION INTRAVENOUS at 01:11

## 2024-10-21 RX ADMIN — HYDROMORPHONE HYDROCHLORIDE 0.5 MG: 1 INJECTION, SOLUTION INTRAMUSCULAR; INTRAVENOUS; SUBCUTANEOUS at 18:04

## 2024-10-21 RX ADMIN — OXYCODONE 15 MG: 5 TABLET ORAL at 15:29

## 2024-10-21 RX ADMIN — LABETALOL HYDROCHLORIDE 5 MG: 5 INJECTION, SOLUTION INTRAVENOUS at 21:59

## 2024-10-21 RX ADMIN — HYPROMELLOSE: 0 GEL OPHTHALMIC at 08:12

## 2024-10-21 RX ADMIN — MIDAZOLAM HYDROCHLORIDE 2 MG: 1 INJECTION, SOLUTION INTRAMUSCULAR; INTRAVENOUS at 00:32

## 2024-10-21 RX ADMIN — POLYETHYLENE GLYCOL 3350 17 G: 17 POWDER, FOR SOLUTION ORAL at 19:32

## 2024-10-21 RX ADMIN — WATER 40 MG: 1 INJECTION INTRAMUSCULAR; INTRAVENOUS; SUBCUTANEOUS at 08:14

## 2024-10-21 RX ADMIN — ARGATROBAN 3.48 MCG/KG/MIN: 250 INJECTION INTRAVENOUS at 04:14

## 2024-10-21 RX ADMIN — POLYETHYLENE GLYCOL 3350 17 G: 17 POWDER, FOR SOLUTION ORAL at 08:17

## 2024-10-21 RX ADMIN — NICARDIPINE HYDROCHLORIDE 15 MG/HR: 0.1 INJECTION INTRAVENOUS at 04:18

## 2024-10-21 RX ADMIN — DOCUSATE SODIUM LIQUID 100 MG: 100 LIQUID ORAL at 08:17

## 2024-10-21 RX ADMIN — IPRATROPIUM BROMIDE AND ALBUTEROL SULFATE 1 DOSE: 2.5; .5 SOLUTION RESPIRATORY (INHALATION) at 12:00

## 2024-10-21 RX ADMIN — SENNOSIDES 8.8 MG: 8.8 LIQUID ORAL at 08:17

## 2024-10-21 RX ADMIN — IPRATROPIUM BROMIDE AND ALBUTEROL SULFATE 1 DOSE: 2.5; .5 SOLUTION RESPIRATORY (INHALATION) at 20:22

## 2024-10-21 RX ADMIN — BISACODYL 10 MG: 10 SUPPOSITORY RECTAL at 08:30

## 2024-10-21 RX ADMIN — NICARDIPINE HYDROCHLORIDE 7.5 MG/HR: 0.1 INJECTION INTRAVENOUS at 23:21

## 2024-10-21 RX ADMIN — CARVEDILOL 6.25 MG: 6.25 TABLET, FILM COATED ORAL at 08:17

## 2024-10-21 RX ADMIN — CEFEPIME 2000 MG: 2 INJECTION, POWDER, FOR SOLUTION INTRAVENOUS at 05:05

## 2024-10-21 RX ADMIN — DEXMEDETOMIDINE HYDROCHLORIDE 1.1 MCG/KG/HR: 4 INJECTION, SOLUTION INTRAVENOUS at 20:52

## 2024-10-21 RX ADMIN — AMLODIPINE BESYLATE 10 MG: 10 TABLET ORAL at 08:17

## 2024-10-21 RX ADMIN — DEXMEDETOMIDINE HYDROCHLORIDE 0.9 MCG/KG/HR: 4 INJECTION, SOLUTION INTRAVENOUS at 16:38

## 2024-10-21 RX ADMIN — SODIUM CHLORIDE: 9 INJECTION, SOLUTION INTRAVENOUS at 00:35

## 2024-10-21 RX ADMIN — Medication 30 UNITS: at 16:39

## 2024-10-21 RX ADMIN — POTASSIUM CHLORIDE 20 MEQ: 29.8 INJECTION, SOLUTION INTRAVENOUS at 00:37

## 2024-10-21 RX ADMIN — Medication 30 UNITS: at 16:45

## 2024-10-21 RX ADMIN — Medication 200 MCG/HR: at 02:39

## 2024-10-21 RX ADMIN — Medication 0.2 MG/KG/HR: at 02:37

## 2024-10-21 RX ADMIN — NICARDIPINE HYDROCHLORIDE 10 MG/HR: 0.1 INJECTION INTRAVENOUS at 05:47

## 2024-10-21 RX ADMIN — DEXMEDETOMIDINE HYDROCHLORIDE 1.3 MCG/KG/HR: 400 INJECTION, SOLUTION INTRAVENOUS at 13:08

## 2024-10-21 RX ADMIN — LANSOPRAZOLE 30 MG: 30 TABLET, ORALLY DISINTEGRATING, DELAYED RELEASE ORAL at 08:17

## 2024-10-21 RX ADMIN — DEXMEDETOMIDINE HYDROCHLORIDE 1.3 MCG/KG/HR: 400 INJECTION, SOLUTION INTRAVENOUS at 09:01

## 2024-10-21 RX ADMIN — SODIUM CHLORIDE, PRESERVATIVE FREE 10 ML: 5 INJECTION INTRAVENOUS at 08:12

## 2024-10-21 RX ADMIN — OXYCODONE 15 MG: 5 TABLET ORAL at 08:16

## 2024-10-21 RX ADMIN — CHLORDIAZEPOXIDE HYDROCHLORIDE 50 MG: 25 CAPSULE ORAL at 08:16

## 2024-10-21 RX ADMIN — IPRATROPIUM BROMIDE AND ALBUTEROL SULFATE 1 DOSE: 2.5; .5 SOLUTION RESPIRATORY (INHALATION) at 08:23

## 2024-10-21 RX ADMIN — CEFEPIME 2000 MG: 2 INJECTION, POWDER, FOR SOLUTION INTRAVENOUS at 13:23

## 2024-10-21 RX ADMIN — IPRATROPIUM BROMIDE AND ALBUTEROL SULFATE 1 DOSE: 2.5; .5 SOLUTION RESPIRATORY (INHALATION) at 15:37

## 2024-10-21 RX ADMIN — DEXMEDETOMIDINE HYDROCHLORIDE 1.5 MCG/KG/HR: 400 INJECTION, SOLUTION INTRAVENOUS at 05:48

## 2024-10-21 ASSESSMENT — PAIN SCALES - GENERAL
PAINLEVEL_OUTOF10: 6
PAINLEVEL_OUTOF10: 8
PAINLEVEL_OUTOF10: 0
PAINLEVEL_OUTOF10: 0
PAINLEVEL_OUTOF10: 10

## 2024-10-21 ASSESSMENT — PULMONARY FUNCTION TESTS
PIF_VALUE: 11
PIF_VALUE: 18
PIF_VALUE: 13

## 2024-10-21 ASSESSMENT — PAIN DESCRIPTION - DESCRIPTORS
DESCRIPTORS: ACHING
DESCRIPTORS: CRAMPING
DESCRIPTORS: ACHING

## 2024-10-21 ASSESSMENT — PAIN DESCRIPTION - LOCATION
LOCATION: CHEST
LOCATION: ABDOMEN
LOCATION: LEG

## 2024-10-21 ASSESSMENT — PAIN DESCRIPTION - ORIENTATION
ORIENTATION: RIGHT
ORIENTATION: RIGHT;LEFT
ORIENTATION: ANTERIOR

## 2024-10-21 ASSESSMENT — PAIN SCALES - WONG BAKER
WONGBAKER_NUMERICALRESPONSE: NO HURT
WONGBAKER_NUMERICALRESPONSE: NO HURT

## 2024-10-21 ASSESSMENT — PAIN DESCRIPTION - FREQUENCY: FREQUENCY: INTERMITTENT

## 2024-10-21 ASSESSMENT — PAIN DESCRIPTION - ONSET: ONSET: ON-GOING

## 2024-10-22 ENCOUNTER — APPOINTMENT (OUTPATIENT)
Dept: GENERAL RADIOLOGY | Age: 44
DRG: 009 | End: 2024-10-22
Payer: MEDICAID

## 2024-10-22 PROBLEM — J96.02 ACUTE RESPIRATORY FAILURE WITH HYPERCAPNIA: Status: ACTIVE | Noted: 2024-10-22

## 2024-10-22 LAB
ACT BLD: 115 SEC (ref 79–149)
ALBUMIN SERPL-MCNC: 3.9 G/DL (ref 3.5–5.2)
ALBUMIN SERPL-MCNC: 4.1 G/DL (ref 3.5–5.2)
ALBUMIN/GLOB SERPL: 2 {RATIO} (ref 1–2.5)
ALBUMIN/GLOB SERPL: 2 {RATIO} (ref 1–2.5)
ALP SERPL-CCNC: 50 U/L (ref 40–129)
ALP SERPL-CCNC: 57 U/L (ref 40–129)
ALT SERPL-CCNC: 50 U/L (ref 10–50)
ALT SERPL-CCNC: 58 U/L (ref 10–50)
ANION GAP SERPL CALCULATED.3IONS-SCNC: 10 MMOL/L (ref 9–16)
ANION GAP SERPL CALCULATED.3IONS-SCNC: 11 MMOL/L (ref 9–16)
AST SERPL-CCNC: 28 U/L (ref 10–50)
AST SERPL-CCNC: 33 U/L (ref 10–50)
BILIRUB DIRECT SERPL-MCNC: 0.2 MG/DL (ref 0–0.2)
BILIRUB DIRECT SERPL-MCNC: 0.2 MG/DL (ref 0–0.2)
BILIRUB INDIRECT SERPL-MCNC: 0.2 MG/DL (ref 0–1)
BILIRUB INDIRECT SERPL-MCNC: 0.3 MG/DL (ref 0–1)
BILIRUB SERPL-MCNC: 0.4 MG/DL (ref 0–1.2)
BILIRUB SERPL-MCNC: 0.5 MG/DL (ref 0–1.2)
BUN SERPL-MCNC: 17 MG/DL (ref 6–20)
BUN SERPL-MCNC: 20 MG/DL (ref 6–20)
BUN SERPL-MCNC: 23 MG/DL (ref 6–20)
BUN SERPL-MCNC: 25 MG/DL (ref 6–20)
CA-I BLD-SCNC: 1.14 MMOL/L (ref 1.13–1.33)
CA-I BLD-SCNC: 1.15 MMOL/L (ref 1.13–1.33)
CA-I BLD-SCNC: 1.16 MMOL/L (ref 1.13–1.33)
CA-I BLD-SCNC: 1.19 MMOL/L (ref 1.13–1.33)
CALCIUM SERPL-MCNC: 8.7 MG/DL (ref 8.6–10.4)
CALCIUM SERPL-MCNC: 8.8 MG/DL (ref 8.6–10.4)
CALCIUM SERPL-MCNC: 8.9 MG/DL (ref 8.6–10.4)
CALCIUM SERPL-MCNC: 9 MG/DL (ref 8.6–10.4)
CHLORIDE SERPL-SCNC: 100 MMOL/L (ref 98–107)
CHLORIDE SERPL-SCNC: 102 MMOL/L (ref 98–107)
CHLORIDE SERPL-SCNC: 102 MMOL/L (ref 98–107)
CHLORIDE SERPL-SCNC: 104 MMOL/L (ref 98–107)
CLOT ANGLE.KAOLIN INDUCED BLD RES TEG: 42 DEG (ref 63–78)
CLOT ANGLE.KAOLIN INDUCED BLD RES TEG: 55.1 DEG (ref 63–78)
CO2 SERPL-SCNC: 22 MMOL/L (ref 20–31)
CO2 SERPL-SCNC: 23 MMOL/L (ref 20–31)
CREAT SERPL-MCNC: 0.6 MG/DL (ref 0.7–1.2)
CREAT SERPL-MCNC: 0.7 MG/DL (ref 0.7–1.2)
ERYTHROCYTE [DISTWIDTH] IN BLOOD BY AUTOMATED COUNT: 13 % (ref 11.8–14.4)
ERYTHROCYTE [DISTWIDTH] IN BLOOD BY AUTOMATED COUNT: 13.1 % (ref 11.8–14.4)
ERYTHROCYTE [DISTWIDTH] IN BLOOD BY AUTOMATED COUNT: 13.1 % (ref 11.8–14.4)
ERYTHROCYTE [DISTWIDTH] IN BLOOD BY AUTOMATED COUNT: 13.2 % (ref 11.8–14.4)
FIBRINOGEN PPP-MCNC: 304 MG/DL (ref 203–521)
FIBRINOGEN PPP-MCNC: 328 MG/DL (ref 203–521)
FIBRINOGEN PPP-MCNC: 343 MG/DL (ref 203–521)
FIBRINOGEN PPP-MCNC: 349 MG/DL (ref 203–521)
FIBRINOGEN, FUNCTIONAL TEG: 17.3 MM (ref 15–32)
FIBRINOGEN, FUNCTIONAL TEG: 17.7 MM (ref 15–32)
FIO2: 100
FIO2: 2
FIO2: 2
FIO2: 21
FIO2: 28
FIO2: 28
GFR, ESTIMATED: >90 ML/MIN/1.73M2
GLOBULIN SER CALC-MCNC: 2 G/DL
GLOBULIN SER CALC-MCNC: 2.1 G/DL
GLUCOSE BLD-MCNC: 117 MG/DL (ref 74–100)
GLUCOSE BLD-MCNC: 123 MG/DL (ref 74–100)
GLUCOSE BLD-MCNC: 126 MG/DL (ref 74–100)
GLUCOSE BLD-MCNC: 142 MG/DL (ref 74–100)
GLUCOSE BLD-MCNC: 146 MG/DL (ref 74–100)
GLUCOSE BLD-MCNC: 159 MG/DL (ref 74–100)
GLUCOSE SERPL-MCNC: 111 MG/DL (ref 74–99)
GLUCOSE SERPL-MCNC: 119 MG/DL (ref 74–99)
GLUCOSE SERPL-MCNC: 130 MG/DL (ref 74–99)
GLUCOSE SERPL-MCNC: 156 MG/DL (ref 74–99)
HCO3 VENOUS: 25.2 MMOL/L (ref 22–29)
HCO3 VENOUS: 27 MMOL/L (ref 22–29)
HCT VFR BLD AUTO: 30 % (ref 41–53)
HCT VFR BLD AUTO: 30.4 % (ref 40.7–50.3)
HCT VFR BLD AUTO: 30.5 % (ref 40.7–50.3)
HCT VFR BLD AUTO: 30.9 % (ref 40.7–50.3)
HCT VFR BLD AUTO: 31.2 % (ref 40.7–50.3)
HGB BLD-MCNC: 10.7 G/DL (ref 13–17)
HGB BLD-MCNC: 11 G/DL (ref 13–17)
HGB BLD-MCNC: 11.1 G/DL (ref 13–17)
HGB BLD-MCNC: 11.2 G/DL (ref 13–17)
INR PPP: 2.5
INR PPP: 2.5
INR PPP: 2.6
INR PPP: 2.7
KINETICS TEG: 2.5 MIN (ref 0.8–2.1)
KINETICS TEG: 2.8 MIN (ref 0.8–2.1)
LACTIC ACID, WHOLE BLOOD: 0.6 MMOL/L (ref 0.7–2.1)
LACTIC ACID, WHOLE BLOOD: 0.8 MMOL/L (ref 0.7–2.1)
LACTIC ACID, WHOLE BLOOD: 0.9 MMOL/L (ref 0.7–2.1)
LACTIC ACID, WHOLE BLOOD: 1 MMOL/L (ref 0.7–2.1)
MA (MAX CLOT) TEG: 56.3 MM (ref 52–69)
MA (MAX CLOT) TEG: 57.1 MM (ref 52–69)
MA(MAX CLOT) RAPID TEG: 60 MM (ref 52–70)
MA(MAX CLOT) RAPID TEG: 60.1 MM (ref 52–70)
MAGNESIUM SERPL-MCNC: 1.9 MG/DL (ref 1.6–2.6)
MAGNESIUM SERPL-MCNC: 2 MG/DL (ref 1.6–2.6)
MAGNESIUM SERPL-MCNC: 2.1 MG/DL (ref 1.6–2.6)
MAGNESIUM SERPL-MCNC: 2.5 MG/DL (ref 1.6–2.6)
MCH RBC QN AUTO: 27.1 PG (ref 25.2–33.5)
MCH RBC QN AUTO: 27.3 PG (ref 25.2–33.5)
MCH RBC QN AUTO: 27.4 PG (ref 25.2–33.5)
MCH RBC QN AUTO: 27.6 PG (ref 25.2–33.5)
MCHC RBC AUTO-ENTMCNC: 35.1 G/DL (ref 28.4–34.8)
MCHC RBC AUTO-ENTMCNC: 35.6 G/DL (ref 28.4–34.8)
MCHC RBC AUTO-ENTMCNC: 36.2 G/DL (ref 28.4–34.8)
MCHC RBC AUTO-ENTMCNC: 36.2 G/DL (ref 28.4–34.8)
MCV RBC AUTO: 75.6 FL (ref 82.6–102.9)
MCV RBC AUTO: 76.1 FL (ref 82.6–102.9)
MCV RBC AUTO: 76.8 FL (ref 82.6–102.9)
MCV RBC AUTO: 77.2 FL (ref 82.6–102.9)
NRBC BLD-RTO: 0 PER 100 WBC
O2 DELIVERY DEVICE: ABNORMAL
O2 SAT, VEN: 77.2 % (ref 60–85)
O2 SAT, VEN: 79.6 % (ref 60–85)
PARTIAL THROMBOPLASTIN TIME: 59.7 SEC (ref 23–36.5)
PARTIAL THROMBOPLASTIN TIME: 63.4 SEC (ref 23–36.5)
PARTIAL THROMBOPLASTIN TIME: 66 SEC (ref 23–36.5)
PARTIAL THROMBOPLASTIN TIME: 67.4 SEC (ref 23–36.5)
PCO2 VENOUS: 35.4 MM HG (ref 41–51)
PCO2 VENOUS: 40.3 MM HG (ref 41–51)
PERFORMING LOCATION: ABNORMAL
PERFORMING LOCATION: ABNORMAL
PH VENOUS: 7.43 (ref 7.32–7.43)
PH VENOUS: 7.46 (ref 7.32–7.43)
PHOSPHATE SERPL-MCNC: 2.2 MG/DL (ref 2.5–4.5)
PHOSPHATE SERPL-MCNC: 2.9 MG/DL (ref 2.5–4.5)
PHOSPHATE SERPL-MCNC: 3 MG/DL (ref 2.5–4.5)
PHOSPHATE SERPL-MCNC: 3 MG/DL (ref 2.5–4.5)
PLATELET # BLD AUTO: 113 K/UL (ref 138–453)
PLATELET # BLD AUTO: 129 K/UL (ref 138–453)
PLATELET # BLD AUTO: ABNORMAL K/UL (ref 138–453)
PLATELET # BLD AUTO: ABNORMAL K/UL (ref 138–453)
PLATELET, FLUORESCENCE: 118 K/UL (ref 138–453)
PLATELET, FLUORESCENCE: 124 K/UL (ref 138–453)
PLATELETS.RETICULATED NFR BLD AUTO: 6.9 % (ref 1.1–10.3)
PLATELETS.RETICULATED NFR BLD AUTO: 7.1 % (ref 1.1–10.3)
PMV BLD AUTO: 12.3 FL (ref 8.1–13.5)
PMV BLD AUTO: 12.4 FL (ref 8.1–13.5)
PO2 VENOUS: 40.5 MM HG (ref 30–50)
PO2 VENOUS: 41.1 MM HG (ref 30–50)
POC HCO3: 24.3 MMOL/L (ref 21–28)
POC HCO3: 24.5 MMOL/L (ref 21–28)
POC HCO3: 24.8 MMOL/L (ref 21–28)
POC HCO3: 25 MMOL/L (ref 21–28)
POC HCO3: 25.3 MMOL/L (ref 21–28)
POC HCO3: 26.3 MMOL/L (ref 21–28)
POC HEMOGLOBIN (CALC): 10.2 G/DL (ref 13.5–17.5)
POC O2 SATURATION: 92 % (ref 94–98)
POC O2 SATURATION: 96.5 % (ref 94–98)
POC O2 SATURATION: 97.2 % (ref 94–98)
POC O2 SATURATION: 97.8 % (ref 94–98)
POC O2 SATURATION: 98 % (ref 94–98)
POC O2 SATURATION: 98.2 % (ref 94–98)
POC PCO2: 31.6 MM HG (ref 35–48)
POC PCO2: 33 MM HG (ref 35–48)
POC PCO2: 33.5 MM HG (ref 35–48)
POC PCO2: 33.8 MM HG (ref 35–48)
POC PCO2: 34.1 MM HG (ref 35–48)
POC PCO2: 35.5 MM HG (ref 35–48)
POC PH: 7.46 (ref 7.35–7.45)
POC PH: 7.47 (ref 7.35–7.45)
POC PH: 7.47 (ref 7.35–7.45)
POC PH: 7.49 (ref 7.35–7.45)
POC PO2: 57.8 MM HG (ref 83–108)
POC PO2: 77.4 MM HG (ref 83–108)
POC PO2: 86 MM HG (ref 83–108)
POC PO2: 94.4 MM HG (ref 83–108)
POC PO2: 94.8 MM HG (ref 83–108)
POC PO2: 99.1 MM HG (ref 83–108)
POSITIVE BASE EXCESS, ART: 1.1 MMOL/L (ref 0–3)
POSITIVE BASE EXCESS, ART: 1.4 MMOL/L (ref 0–3)
POSITIVE BASE EXCESS, ART: 1.4 MMOL/L (ref 0–3)
POSITIVE BASE EXCESS, ART: 1.6 MMOL/L (ref 0–3)
POSITIVE BASE EXCESS, ART: 1.9 MMOL/L (ref 0–3)
POSITIVE BASE EXCESS, ART: 3.1 MMOL/L (ref 0–3)
POSITIVE BASE EXCESS, VEN: 1.5 MMOL/L (ref 0–3)
POSITIVE BASE EXCESS, VEN: 2.5 MMOL/L (ref 0–3)
POTASSIUM SERPL-SCNC: 3.7 MMOL/L (ref 3.7–5.3)
POTASSIUM SERPL-SCNC: 3.9 MMOL/L (ref 3.7–5.3)
POTASSIUM SERPL-SCNC: 4 MMOL/L (ref 3.7–5.3)
POTASSIUM SERPL-SCNC: 4.3 MMOL/L (ref 3.7–5.3)
PROT SERPL-MCNC: 5.9 G/DL (ref 6.6–8.7)
PROT SERPL-MCNC: 6.2 G/DL (ref 6.6–8.7)
PROTHROMBIN TIME: 26.3 SEC (ref 11.7–14.9)
PROTHROMBIN TIME: 26.4 SEC (ref 11.7–14.9)
PROTHROMBIN TIME: 27.1 SEC (ref 11.7–14.9)
PROTHROMBIN TIME: 28.1 SEC (ref 11.7–14.9)
RBC # BLD AUTO: 3.95 M/UL (ref 4.21–5.77)
RBC # BLD AUTO: 4.02 M/UL (ref 4.21–5.77)
RBC # BLD AUTO: 4.06 M/UL (ref 4.21–5.77)
RBC # BLD AUTO: 4.06 M/UL (ref 4.21–5.77)
REACTION TIME TEG W HEPARIN: 11.8 MIN (ref 4.3–8.3)
REACTION TIME TEG W HEPARIN: 7.3 MIN (ref 4.3–8.3)
REACTION TIME TEG: 10.3 MIN (ref 4.6–9.1)
REACTION TIME TEG: 11.5 MIN (ref 4.6–9.1)
SAMPLE SITE: ABNORMAL
SODIUM SERPL-SCNC: 134 MMOL/L (ref 136–145)
SODIUM SERPL-SCNC: 135 MMOL/L (ref 136–145)
SODIUM SERPL-SCNC: 136 MMOL/L (ref 136–145)
SODIUM SERPL-SCNC: 137 MMOL/L (ref 136–145)
WBC OTHER # BLD: 13.9 K/UL (ref 3.5–11.3)
WBC OTHER # BLD: 14.1 K/UL (ref 3.5–11.3)
WBC OTHER # BLD: 14.1 K/UL (ref 3.5–11.3)
WBC OTHER # BLD: 17.2 K/UL (ref 3.5–11.3)

## 2024-10-22 PROCEDURE — 6360000002 HC RX W HCPCS

## 2024-10-22 PROCEDURE — 33948 ECMO/ECLS DAILY MGMT-VENOUS: CPT | Performed by: INTERNAL MEDICINE

## 2024-10-22 PROCEDURE — 85014 HEMATOCRIT: CPT

## 2024-10-22 PROCEDURE — 6360000002 HC RX W HCPCS: Performed by: INTERNAL MEDICINE

## 2024-10-22 PROCEDURE — 85730 THROMBOPLASTIN TIME PARTIAL: CPT

## 2024-10-22 PROCEDURE — 33948 ECMO/ECLS DAILY MGMT-VENOUS: CPT

## 2024-10-22 PROCEDURE — 85576 BLOOD PLATELET AGGREGATION: CPT

## 2024-10-22 PROCEDURE — 94761 N-INVAS EAR/PLS OXIMETRY MLT: CPT

## 2024-10-22 PROCEDURE — 82803 BLOOD GASES ANY COMBINATION: CPT

## 2024-10-22 PROCEDURE — 84100 ASSAY OF PHOSPHORUS: CPT

## 2024-10-22 PROCEDURE — 83605 ASSAY OF LACTIC ACID: CPT

## 2024-10-22 PROCEDURE — 85027 COMPLETE CBC AUTOMATED: CPT

## 2024-10-22 PROCEDURE — 2700000000 HC OXYGEN THERAPY PER DAY

## 2024-10-22 PROCEDURE — 2500000003 HC RX 250 WO HCPCS

## 2024-10-22 PROCEDURE — 2500000003 HC RX 250 WO HCPCS: Performed by: INTERNAL MEDICINE

## 2024-10-22 PROCEDURE — 2580000003 HC RX 258

## 2024-10-22 PROCEDURE — 6370000000 HC RX 637 (ALT 250 FOR IP)

## 2024-10-22 PROCEDURE — 80076 HEPATIC FUNCTION PANEL: CPT

## 2024-10-22 PROCEDURE — 85055 RETICULATED PLATELET ASSAY: CPT

## 2024-10-22 PROCEDURE — 80048 BASIC METABOLIC PNL TOTAL CA: CPT

## 2024-10-22 PROCEDURE — 85347 COAGULATION TIME ACTIVATED: CPT

## 2024-10-22 PROCEDURE — 83051 HEMOGLOBIN PLASMA: CPT

## 2024-10-22 PROCEDURE — 6370000000 HC RX 637 (ALT 250 FOR IP): Performed by: INTERNAL MEDICINE

## 2024-10-22 PROCEDURE — 82947 ASSAY GLUCOSE BLOOD QUANT: CPT

## 2024-10-22 PROCEDURE — 71045 X-RAY EXAM CHEST 1 VIEW: CPT

## 2024-10-22 PROCEDURE — 82330 ASSAY OF CALCIUM: CPT

## 2024-10-22 PROCEDURE — 2000000000 HC ICU R&B

## 2024-10-22 PROCEDURE — 99291 CRITICAL CARE FIRST HOUR: CPT | Performed by: INTERNAL MEDICINE

## 2024-10-22 PROCEDURE — 94640 AIRWAY INHALATION TREATMENT: CPT

## 2024-10-22 PROCEDURE — 99232 SBSQ HOSP IP/OBS MODERATE 35: CPT

## 2024-10-22 PROCEDURE — 94668 MNPJ CHEST WALL SBSQ: CPT

## 2024-10-22 PROCEDURE — 85610 PROTHROMBIN TIME: CPT

## 2024-10-22 PROCEDURE — 85300 ANTITHROMBIN III ACTIVITY: CPT

## 2024-10-22 PROCEDURE — 85384 FIBRINOGEN ACTIVITY: CPT

## 2024-10-22 PROCEDURE — 83735 ASSAY OF MAGNESIUM: CPT

## 2024-10-22 PROCEDURE — 93005 ELECTROCARDIOGRAM TRACING: CPT

## 2024-10-22 RX ORDER — SODIUM CHLORIDE 9 MG/ML
25 INJECTION, SOLUTION INTRAVENOUS PRN
Status: DISCONTINUED | OUTPATIENT
Start: 2024-10-22 | End: 2024-10-29 | Stop reason: HOSPADM

## 2024-10-22 RX ORDER — PREDNISONE 10 MG/1
10 TABLET ORAL DAILY
Status: DISCONTINUED | OUTPATIENT
Start: 2024-10-29 | End: 2024-10-23 | Stop reason: SDUPTHER

## 2024-10-22 RX ORDER — ALPRAZOLAM 1 MG/1
1 TABLET ORAL ONCE
Status: COMPLETED | OUTPATIENT
Start: 2024-10-22 | End: 2024-10-22

## 2024-10-22 RX ORDER — PREDNISONE 20 MG/1
20 TABLET ORAL DAILY
Status: DISCONTINUED | OUTPATIENT
Start: 2024-10-26 | End: 2024-10-23 | Stop reason: SDUPTHER

## 2024-10-22 RX ORDER — PREDNISONE 5 MG/1
5 TABLET ORAL DAILY
Status: DISCONTINUED | OUTPATIENT
Start: 2024-11-01 | End: 2024-10-23 | Stop reason: SDUPTHER

## 2024-10-22 RX ORDER — CARVEDILOL 12.5 MG/1
12.5 TABLET ORAL 2 TIMES DAILY WITH MEALS
Status: DISCONTINUED | OUTPATIENT
Start: 2024-10-22 | End: 2024-10-29 | Stop reason: HOSPADM

## 2024-10-22 RX ORDER — METOPROLOL TARTRATE 1 MG/ML
5 INJECTION, SOLUTION INTRAVENOUS ONCE
Status: COMPLETED | OUTPATIENT
Start: 2024-10-22 | End: 2024-10-22

## 2024-10-22 RX ORDER — LIDOCAINE HYDROCHLORIDE 10 MG/ML
5 INJECTION, SOLUTION EPIDURAL; INFILTRATION; INTRACAUDAL; PERINEURAL ONCE
Status: DISCONTINUED | OUTPATIENT
Start: 2024-10-22 | End: 2024-10-29 | Stop reason: HOSPADM

## 2024-10-22 RX ORDER — SODIUM CHLORIDE 0.9 % (FLUSH) 0.9 %
10 SYRINGE (ML) INJECTION EVERY 12 HOURS SCHEDULED
Status: DISCONTINUED | OUTPATIENT
Start: 2024-10-22 | End: 2024-10-25

## 2024-10-22 RX ORDER — METOPROLOL TARTRATE 1 MG/ML
INJECTION, SOLUTION INTRAVENOUS
Status: COMPLETED
Start: 2024-10-22 | End: 2024-10-22

## 2024-10-22 RX ORDER — NALOXONE HYDROCHLORIDE 0.4 MG/ML
0.4 INJECTION, SOLUTION INTRAMUSCULAR; INTRAVENOUS; SUBCUTANEOUS PRN
Status: DISCONTINUED | OUTPATIENT
Start: 2024-10-22 | End: 2024-10-29 | Stop reason: HOSPADM

## 2024-10-22 RX ORDER — CARVEDILOL 12.5 MG/1
12.5 TABLET ORAL 2 TIMES DAILY WITH MEALS
Status: DISCONTINUED | OUTPATIENT
Start: 2024-10-22 | End: 2024-10-22

## 2024-10-22 RX ORDER — QUETIAPINE FUMARATE 25 MG/1
25 TABLET, FILM COATED ORAL NIGHTLY
Status: DISCONTINUED | OUTPATIENT
Start: 2024-10-22 | End: 2024-10-29 | Stop reason: HOSPADM

## 2024-10-22 RX ORDER — OXYCODONE HYDROCHLORIDE 5 MG/1
30 TABLET ORAL EVERY 6 HOURS
Status: DISCONTINUED | OUTPATIENT
Start: 2024-10-22 | End: 2024-10-23

## 2024-10-22 RX ORDER — FENTANYL CITRATE 50 UG/ML
50 INJECTION, SOLUTION INTRAMUSCULAR; INTRAVENOUS ONCE
Status: COMPLETED | OUTPATIENT
Start: 2024-10-22 | End: 2024-10-22

## 2024-10-22 RX ORDER — CARVEDILOL 12.5 MG/1
25 TABLET ORAL 2 TIMES DAILY WITH MEALS
Status: DISCONTINUED | OUTPATIENT
Start: 2024-10-22 | End: 2024-10-22

## 2024-10-22 RX ORDER — SODIUM CHLORIDE 0.9 % (FLUSH) 0.9 %
10 SYRINGE (ML) INJECTION PRN
Status: DISCONTINUED | OUTPATIENT
Start: 2024-10-22 | End: 2024-10-29 | Stop reason: HOSPADM

## 2024-10-22 RX ORDER — PREDNISONE 20 MG/1
40 TABLET ORAL DAILY
Status: DISCONTINUED | OUTPATIENT
Start: 2024-10-23 | End: 2024-10-23 | Stop reason: SDUPTHER

## 2024-10-22 RX ADMIN — IPRATROPIUM BROMIDE AND ALBUTEROL SULFATE 1 DOSE: 2.5; .5 SOLUTION RESPIRATORY (INHALATION) at 15:35

## 2024-10-22 RX ADMIN — LABETALOL HYDROCHLORIDE 5 MG: 5 INJECTION, SOLUTION INTRAVENOUS at 02:51

## 2024-10-22 RX ADMIN — OXYCODONE 30 MG: 5 TABLET ORAL at 19:28

## 2024-10-22 RX ADMIN — IPRATROPIUM BROMIDE AND ALBUTEROL SULFATE 1 DOSE: 2.5; .5 SOLUTION RESPIRATORY (INHALATION) at 04:55

## 2024-10-22 RX ADMIN — IPRATROPIUM BROMIDE AND ALBUTEROL SULFATE 1 DOSE: 2.5; .5 SOLUTION RESPIRATORY (INHALATION) at 08:15

## 2024-10-22 RX ADMIN — ARGATROBAN 3.48 MCG/KG/MIN: 250 INJECTION INTRAVENOUS at 06:40

## 2024-10-22 RX ADMIN — SENNOSIDES 8.8 MG: 8.8 LIQUID ORAL at 19:29

## 2024-10-22 RX ADMIN — LANSOPRAZOLE 30 MG: 30 TABLET, ORALLY DISINTEGRATING, DELAYED RELEASE ORAL at 08:18

## 2024-10-22 RX ADMIN — CARVEDILOL 6.25 MG: 6.25 TABLET, FILM COATED ORAL at 08:18

## 2024-10-22 RX ADMIN — Medication 30 UNITS: at 05:00

## 2024-10-22 RX ADMIN — SODIUM CHLORIDE, PRESERVATIVE FREE 10 ML: 5 INJECTION INTRAVENOUS at 21:08

## 2024-10-22 RX ADMIN — POLYETHYLENE GLYCOL 3350 17 G: 17 POWDER, FOR SOLUTION ORAL at 19:29

## 2024-10-22 RX ADMIN — SODIUM CHLORIDE, PRESERVATIVE FREE 10 ML: 5 INJECTION INTRAVENOUS at 21:07

## 2024-10-22 RX ADMIN — CHLORDIAZEPOXIDE HYDROCHLORIDE 25 MG: 25 CAPSULE ORAL at 16:11

## 2024-10-22 RX ADMIN — AMLODIPINE BESYLATE 10 MG: 10 TABLET ORAL at 08:18

## 2024-10-22 RX ADMIN — CEFEPIME 2000 MG: 2 INJECTION, POWDER, FOR SOLUTION INTRAVENOUS at 05:13

## 2024-10-22 RX ADMIN — DEXMEDETOMIDINE HYDROCHLORIDE 1.3 MCG/KG/HR: 4 INJECTION, SOLUTION INTRAVENOUS at 00:34

## 2024-10-22 RX ADMIN — SODIUM PHOSPHATE, MONOBASIC, MONOHYDRATE AND SODIUM PHOSPHATE, DIBASIC, ANHYDROUS 13.74 MMOL: 276; 142 INJECTION, SOLUTION INTRAVENOUS at 04:58

## 2024-10-22 RX ADMIN — FENTANYL CITRATE 50 MCG: 50 INJECTION, SOLUTION INTRAMUSCULAR; INTRAVENOUS at 03:34

## 2024-10-22 RX ADMIN — OXYCODONE 15 MG: 5 TABLET ORAL at 08:17

## 2024-10-22 RX ADMIN — DEXMEDETOMIDINE HYDROCHLORIDE 0.9 MCG/KG/HR: 4 INJECTION, SOLUTION INTRAVENOUS at 16:43

## 2024-10-22 RX ADMIN — IPRATROPIUM BROMIDE AND ALBUTEROL SULFATE 1 DOSE: 2.5; .5 SOLUTION RESPIRATORY (INHALATION) at 11:31

## 2024-10-22 RX ADMIN — CEFEPIME 2000 MG: 2 INJECTION, POWDER, FOR SOLUTION INTRAVENOUS at 22:55

## 2024-10-22 RX ADMIN — SODIUM CHLORIDE, PRESERVATIVE FREE 10 ML: 5 INJECTION INTRAVENOUS at 08:08

## 2024-10-22 RX ADMIN — CHLORDIAZEPOXIDE HYDROCHLORIDE 25 MG: 25 CAPSULE ORAL at 01:07

## 2024-10-22 RX ADMIN — DEXMEDETOMIDINE HYDROCHLORIDE 1.3 MCG/KG/HR: 4 INJECTION, SOLUTION INTRAVENOUS at 08:00

## 2024-10-22 RX ADMIN — DEXMEDETOMIDINE HYDROCHLORIDE 1.1 MCG/KG/HR: 4 INJECTION, SOLUTION INTRAVENOUS at 12:03

## 2024-10-22 RX ADMIN — CEFEPIME 2000 MG: 2 INJECTION, POWDER, FOR SOLUTION INTRAVENOUS at 14:14

## 2024-10-22 RX ADMIN — SENNOSIDES 8.8 MG: 8.8 LIQUID ORAL at 08:18

## 2024-10-22 RX ADMIN — DEXMEDETOMIDINE HYDROCHLORIDE 1.3 MCG/KG/HR: 4 INJECTION, SOLUTION INTRAVENOUS at 04:15

## 2024-10-22 RX ADMIN — DOCUSATE SODIUM LIQUID 100 MG: 100 LIQUID ORAL at 19:29

## 2024-10-22 RX ADMIN — NICARDIPINE HYDROCHLORIDE 7.5 MG/HR: 0.1 INJECTION INTRAVENOUS at 02:07

## 2024-10-22 RX ADMIN — HYDROMORPHONE HYDROCHLORIDE 0.5 MG: 1 INJECTION, SOLUTION INTRAMUSCULAR; INTRAVENOUS; SUBCUTANEOUS at 11:30

## 2024-10-22 RX ADMIN — METOPROLOL TARTRATE 5 MG: 1 INJECTION, SOLUTION INTRAVENOUS at 05:44

## 2024-10-22 RX ADMIN — OXYCODONE 30 MG: 5 TABLET ORAL at 14:15

## 2024-10-22 RX ADMIN — ALPRAZOLAM 1 MG: 1 TABLET ORAL at 05:56

## 2024-10-22 RX ADMIN — Medication 0.2 MG/KG/HR: at 05:01

## 2024-10-22 RX ADMIN — QUETIAPINE FUMARATE 25 MG: 25 TABLET ORAL at 21:07

## 2024-10-22 RX ADMIN — NICARDIPINE HYDROCHLORIDE 5 MG/HR: 0.1 INJECTION INTRAVENOUS at 11:22

## 2024-10-22 RX ADMIN — NICARDIPINE HYDROCHLORIDE 7.5 MG/HR: 0.1 INJECTION INTRAVENOUS at 04:54

## 2024-10-22 RX ADMIN — Medication 30 UNITS: at 16:17

## 2024-10-22 RX ADMIN — HYDROMORPHONE HYDROCHLORIDE 0.5 MG: 1 INJECTION, SOLUTION INTRAMUSCULAR; INTRAVENOUS; SUBCUTANEOUS at 02:52

## 2024-10-22 RX ADMIN — WATER 40 MG: 1 INJECTION INTRAMUSCULAR; INTRAVENOUS; SUBCUTANEOUS at 08:09

## 2024-10-22 RX ADMIN — CARVEDILOL 12.5 MG: 12.5 TABLET, FILM COATED ORAL at 16:11

## 2024-10-22 RX ADMIN — CHLORDIAZEPOXIDE HYDROCHLORIDE 25 MG: 25 CAPSULE ORAL at 08:17

## 2024-10-22 RX ADMIN — Medication 30 UNITS: at 16:16

## 2024-10-22 RX ADMIN — METOPROLOL TARTRATE 5 MG: 5 INJECTION INTRAVENOUS at 05:44

## 2024-10-22 RX ADMIN — POLYETHYLENE GLYCOL 3350 17 G: 17 POWDER, FOR SOLUTION ORAL at 08:18

## 2024-10-22 RX ADMIN — OXYCODONE 15 MG: 5 TABLET ORAL at 01:07

## 2024-10-22 RX ADMIN — IPRATROPIUM BROMIDE AND ALBUTEROL SULFATE 1 DOSE: 2.5; .5 SOLUTION RESPIRATORY (INHALATION) at 20:28

## 2024-10-22 RX ADMIN — DOCUSATE SODIUM LIQUID 100 MG: 100 LIQUID ORAL at 08:18

## 2024-10-22 ASSESSMENT — PAIN SCALES - GENERAL
PAINLEVEL_OUTOF10: 0
PAINLEVEL_OUTOF10: 6
PAINLEVEL_OUTOF10: 5
PAINLEVEL_OUTOF10: 10

## 2024-10-22 ASSESSMENT — PAIN - FUNCTIONAL ASSESSMENT: PAIN_FUNCTIONAL_ASSESSMENT: ACTIVITIES ARE NOT PREVENTED

## 2024-10-22 ASSESSMENT — PAIN DESCRIPTION - LOCATION
LOCATION: ABDOMEN
LOCATION: OTHER (COMMENT)

## 2024-10-22 ASSESSMENT — PAIN DESCRIPTION - DESCRIPTORS
DESCRIPTORS: ACHING
DESCRIPTORS: ACHING

## 2024-10-22 ASSESSMENT — PAIN SCALES - WONG BAKER
WONGBAKER_NUMERICALRESPONSE: NO HURT
WONGBAKER_NUMERICALRESPONSE: HURTS EVEN MORE

## 2024-10-22 NOTE — CARE COORDINATION
Transitional planning. Extubated 10/21, RA, A & O X 3, ECMO, NG for trickle TF, Palliative Care following. Daughters are NOK- Elaina has LTACH/SNF lists. PT/OT ordered.     Left HIPAA compliant message for pt's daughter Elaina [phone] requesting CB to - will ask for LTACH/SNF choices when she calls back    Left HIPAA compliant message for pt's daughter, Sangeeta [phone] requesting CB to CM     5542 Received CB from pt's daughter, Sangeeta, informed her that LTACH/SNF lists had been emailed to her sister. She will talk to her sister and call CM back tomorrow concerning choices.

## 2024-10-23 ENCOUNTER — APPOINTMENT (OUTPATIENT)
Dept: GENERAL RADIOLOGY | Age: 44
DRG: 009 | End: 2024-10-23
Payer: MEDICAID

## 2024-10-23 LAB
ACT BLD: 119 SEC (ref 79–149)
ALBUMIN SERPL-MCNC: 3.8 G/DL (ref 3.5–5.2)
ALBUMIN/GLOB SERPL: 2 {RATIO} (ref 1–2.5)
ALP SERPL-CCNC: 50 U/L (ref 40–129)
ALT SERPL-CCNC: 51 U/L (ref 10–50)
ANION GAP SERPL CALCULATED.3IONS-SCNC: 10 MMOL/L (ref 9–16)
ANION GAP SERPL CALCULATED.3IONS-SCNC: 11 MMOL/L (ref 9–16)
AST SERPL-CCNC: 24 U/L (ref 10–50)
BILIRUB DIRECT SERPL-MCNC: 0.2 MG/DL (ref 0–0.2)
BILIRUB INDIRECT SERPL-MCNC: 0.2 MG/DL (ref 0–1)
BILIRUB SERPL-MCNC: 0.4 MG/DL (ref 0–1.2)
BUN SERPL-MCNC: 23 MG/DL (ref 6–20)
BUN SERPL-MCNC: 25 MG/DL (ref 6–20)
CA-I BLD-SCNC: 1.2 MMOL/L (ref 1.13–1.33)
CA-I BLD-SCNC: 1.21 MMOL/L (ref 1.13–1.33)
CALCIUM SERPL-MCNC: 8.8 MG/DL (ref 8.6–10.4)
CALCIUM SERPL-MCNC: 8.9 MG/DL (ref 8.6–10.4)
CHLORIDE SERPL-SCNC: 102 MMOL/L (ref 98–107)
CHLORIDE SERPL-SCNC: 103 MMOL/L (ref 98–107)
CLOT ANGLE.KAOLIN INDUCED BLD RES TEG: 50.5 DEG (ref 63–78)
CO2 SERPL-SCNC: 22 MMOL/L (ref 20–31)
CO2 SERPL-SCNC: 23 MMOL/L (ref 20–31)
CREAT SERPL-MCNC: 0.8 MG/DL (ref 0.7–1.2)
CREAT SERPL-MCNC: 0.9 MG/DL (ref 0.7–1.2)
EKG ATRIAL RATE: 119 BPM
EKG P AXIS: 65 DEGREES
EKG P-R INTERVAL: 122 MS
EKG Q-T INTERVAL: 326 MS
EKG QRS DURATION: 82 MS
EKG QTC CALCULATION (BAZETT): 458 MS
EKG R AXIS: 33 DEGREES
EKG T AXIS: 54 DEGREES
EKG VENTRICULAR RATE: 119 BPM
ERYTHROCYTE [DISTWIDTH] IN BLOOD BY AUTOMATED COUNT: 13.2 % (ref 11.8–14.4)
ERYTHROCYTE [DISTWIDTH] IN BLOOD BY AUTOMATED COUNT: 13.2 % (ref 11.8–14.4)
FIBRINOGEN PPP-MCNC: 173 MG/DL (ref 203–521)
FIBRINOGEN PPP-MCNC: 285 MG/DL (ref 203–521)
FIBRINOGEN, FUNCTIONAL TEG: <4 MM (ref 15–32)
FIO2: 28
GFR, ESTIMATED: >90 ML/MIN/1.73M2
GFR, ESTIMATED: >90 ML/MIN/1.73M2
GLOBULIN SER CALC-MCNC: 1.9 G/DL
GLUCOSE BLD-MCNC: 109 MG/DL (ref 74–100)
GLUCOSE BLD-MCNC: 128 MG/DL (ref 75–110)
GLUCOSE SERPL-MCNC: 105 MG/DL (ref 74–99)
GLUCOSE SERPL-MCNC: 117 MG/DL (ref 74–99)
HCT VFR BLD AUTO: 30.4 % (ref 40.7–50.3)
HCT VFR BLD AUTO: 31.2 % (ref 40.7–50.3)
HGB BLD-MCNC: 10.9 G/DL (ref 13–17)
HGB BLD-MCNC: 11 G/DL (ref 13–17)
HGB FREE PLAS-MCNC: 3 MG/DL (ref 0–9.7)
INR PPP: 1.4
INR PPP: 1.4
KINETICS TEG: 4.4 MIN (ref 0.8–2.1)
LACTIC ACID, WHOLE BLOOD: 0.6 MMOL/L (ref 0.7–2.1)
LACTIC ACID, WHOLE BLOOD: 0.7 MMOL/L (ref 0.7–2.1)
MA (MAX CLOT) TEG: 41.4 MM (ref 52–69)
MA(MAX CLOT) RAPID TEG: 41.1 MM (ref 52–70)
MAGNESIUM SERPL-MCNC: 2 MG/DL (ref 1.6–2.6)
MAGNESIUM SERPL-MCNC: 2.1 MG/DL (ref 1.6–2.6)
MCH RBC QN AUTO: 27.4 PG (ref 25.2–33.5)
MCH RBC QN AUTO: 27.4 PG (ref 25.2–33.5)
MCHC RBC AUTO-ENTMCNC: 35.3 G/DL (ref 28.4–34.8)
MCHC RBC AUTO-ENTMCNC: 35.9 G/DL (ref 28.4–34.8)
MCV RBC AUTO: 76.4 FL (ref 82.6–102.9)
MCV RBC AUTO: 77.8 FL (ref 82.6–102.9)
NRBC BLD-RTO: 0 PER 100 WBC
NRBC BLD-RTO: 0 PER 100 WBC
O2 DELIVERY DEVICE: ABNORMAL
PARTIAL THROMBOPLASTIN TIME: 29.1 SEC (ref 23–36.5)
PARTIAL THROMBOPLASTIN TIME: 31.2 SEC (ref 23–36.5)
PERFORMING LOCATION: ABNORMAL
PHOSPHATE SERPL-MCNC: 2.6 MG/DL (ref 2.5–4.5)
PHOSPHATE SERPL-MCNC: 2.9 MG/DL (ref 2.5–4.5)
PLATELET # BLD AUTO: 107 K/UL (ref 138–453)
PLATELET # BLD AUTO: 76 K/UL (ref 138–453)
PMV BLD AUTO: 12.1 FL (ref 8.1–13.5)
PMV BLD AUTO: 12.4 FL (ref 8.1–13.5)
POC HCO3: 25.5 MMOL/L (ref 21–28)
POC O2 SATURATION: 98.2 % (ref 94–98)
POC PCO2: 35.9 MM HG (ref 35–48)
POC PH: 7.46 (ref 7.35–7.45)
POC PO2: 101.1 MM HG (ref 83–108)
POSITIVE BASE EXCESS, ART: 1.7 MMOL/L (ref 0–3)
POTASSIUM SERPL-SCNC: 3.3 MMOL/L (ref 3.7–5.3)
POTASSIUM SERPL-SCNC: 3.6 MMOL/L (ref 3.7–5.3)
PROT SERPL-MCNC: 5.7 G/DL (ref 6.6–8.7)
PROTHROMBIN TIME: 16.6 SEC (ref 11.7–14.9)
PROTHROMBIN TIME: 16.9 SEC (ref 11.7–14.9)
RBC # BLD AUTO: 3.98 M/UL (ref 4.21–5.77)
RBC # BLD AUTO: 4.01 M/UL (ref 4.21–5.77)
REACTION TIME TEG W HEPARIN: 6.1 MIN (ref 4.3–8.3)
REACTION TIME TEG: 6.7 MIN (ref 4.6–9.1)
SAMPLE SITE: ABNORMAL
SODIUM SERPL-SCNC: 135 MMOL/L (ref 136–145)
SODIUM SERPL-SCNC: 136 MMOL/L (ref 136–145)
WBC OTHER # BLD: 13.2 K/UL (ref 3.5–11.3)
WBC OTHER # BLD: 14.7 K/UL (ref 3.5–11.3)

## 2024-10-23 PROCEDURE — 33966 ECMO/ECLS RMVL PRPH CANNULA: CPT

## 2024-10-23 PROCEDURE — 83605 ASSAY OF LACTIC ACID: CPT

## 2024-10-23 PROCEDURE — 85576 BLOOD PLATELET AGGREGATION: CPT

## 2024-10-23 PROCEDURE — 82330 ASSAY OF CALCIUM: CPT

## 2024-10-23 PROCEDURE — 94761 N-INVAS EAR/PLS OXIMETRY MLT: CPT

## 2024-10-23 PROCEDURE — 80076 HEPATIC FUNCTION PANEL: CPT

## 2024-10-23 PROCEDURE — 82803 BLOOD GASES ANY COMBINATION: CPT

## 2024-10-23 PROCEDURE — 6370000000 HC RX 637 (ALT 250 FOR IP): Performed by: INTERNAL MEDICINE

## 2024-10-23 PROCEDURE — 97530 THERAPEUTIC ACTIVITIES: CPT

## 2024-10-23 PROCEDURE — 6370000000 HC RX 637 (ALT 250 FOR IP)

## 2024-10-23 PROCEDURE — 2500000003 HC RX 250 WO HCPCS

## 2024-10-23 PROCEDURE — 6360000002 HC RX W HCPCS: Performed by: INTERNAL MEDICINE

## 2024-10-23 PROCEDURE — 80048 BASIC METABOLIC PNL TOTAL CA: CPT

## 2024-10-23 PROCEDURE — 85300 ANTITHROMBIN III ACTIVITY: CPT

## 2024-10-23 PROCEDURE — 2580000003 HC RX 258

## 2024-10-23 PROCEDURE — 85730 THROMBOPLASTIN TIME PARTIAL: CPT

## 2024-10-23 PROCEDURE — 2500000003 HC RX 250 WO HCPCS: Performed by: THORACIC SURGERY (CARDIOTHORACIC VASCULAR SURGERY)

## 2024-10-23 PROCEDURE — 94668 MNPJ CHEST WALL SBSQ: CPT

## 2024-10-23 PROCEDURE — 71045 X-RAY EXAM CHEST 1 VIEW: CPT

## 2024-10-23 PROCEDURE — 85027 COMPLETE CBC AUTOMATED: CPT

## 2024-10-23 PROCEDURE — 83735 ASSAY OF MAGNESIUM: CPT

## 2024-10-23 PROCEDURE — 84100 ASSAY OF PHOSPHORUS: CPT

## 2024-10-23 PROCEDURE — 93010 ELECTROCARDIOGRAM REPORT: CPT | Performed by: INTERNAL MEDICINE

## 2024-10-23 PROCEDURE — 6360000002 HC RX W HCPCS

## 2024-10-23 PROCEDURE — 2000000000 HC ICU R&B

## 2024-10-23 PROCEDURE — 85610 PROTHROMBIN TIME: CPT

## 2024-10-23 PROCEDURE — 82947 ASSAY GLUCOSE BLOOD QUANT: CPT

## 2024-10-23 PROCEDURE — 85384 FIBRINOGEN ACTIVITY: CPT

## 2024-10-23 PROCEDURE — 2700000000 HC OXYGEN THERAPY PER DAY

## 2024-10-23 PROCEDURE — 83051 HEMOGLOBIN PLASMA: CPT

## 2024-10-23 PROCEDURE — 97163 PT EVAL HIGH COMPLEX 45 MIN: CPT

## 2024-10-23 PROCEDURE — 85347 COAGULATION TIME ACTIVATED: CPT

## 2024-10-23 PROCEDURE — 97535 SELF CARE MNGMENT TRAINING: CPT

## 2024-10-23 PROCEDURE — 97167 OT EVAL HIGH COMPLEX 60 MIN: CPT

## 2024-10-23 PROCEDURE — 94640 AIRWAY INHALATION TREATMENT: CPT

## 2024-10-23 PROCEDURE — 33948 ECMO/ECLS DAILY MGMT-VENOUS: CPT

## 2024-10-23 PROCEDURE — 99291 CRITICAL CARE FIRST HOUR: CPT | Performed by: INTERNAL MEDICINE

## 2024-10-23 RX ORDER — ENOXAPARIN SODIUM 100 MG/ML
40 INJECTION SUBCUTANEOUS DAILY
Status: DISCONTINUED | OUTPATIENT
Start: 2024-10-23 | End: 2024-10-29 | Stop reason: HOSPADM

## 2024-10-23 RX ORDER — CHLORDIAZEPOXIDE HYDROCHLORIDE 10 MG/1
10 CAPSULE, GELATIN COATED ORAL EVERY 8 HOURS
Status: DISCONTINUED | OUTPATIENT
Start: 2024-10-23 | End: 2024-10-26

## 2024-10-23 RX ORDER — PREDNISONE 20 MG/1
40 TABLET ORAL DAILY
Status: COMPLETED | OUTPATIENT
Start: 2024-10-24 | End: 2024-10-25

## 2024-10-23 RX ORDER — OXYCODONE HYDROCHLORIDE 5 MG/1
15 TABLET ORAL EVERY 6 HOURS
Status: DISCONTINUED | OUTPATIENT
Start: 2024-10-23 | End: 2024-10-23

## 2024-10-23 RX ORDER — PREDNISONE 10 MG/1
10 TABLET ORAL DAILY
Status: DISCONTINUED | OUTPATIENT
Start: 2024-11-01 | End: 2024-10-29 | Stop reason: HOSPADM

## 2024-10-23 RX ORDER — LIDOCAINE HYDROCHLORIDE 10 MG/ML
20 INJECTION, SOLUTION EPIDURAL; INFILTRATION; INTRACAUDAL; PERINEURAL ONCE
Status: COMPLETED | OUTPATIENT
Start: 2024-10-23 | End: 2024-10-23

## 2024-10-23 RX ORDER — PREDNISONE 20 MG/1
20 TABLET ORAL DAILY
Status: DISCONTINUED | OUTPATIENT
Start: 2024-10-29 | End: 2024-10-29 | Stop reason: HOSPADM

## 2024-10-23 RX ORDER — ENOXAPARIN SODIUM 100 MG/ML
40 INJECTION SUBCUTANEOUS 2 TIMES DAILY
Status: DISCONTINUED | OUTPATIENT
Start: 2024-10-23 | End: 2024-10-23

## 2024-10-23 RX ORDER — OXYCODONE HYDROCHLORIDE 5 MG/1
20 TABLET ORAL EVERY 6 HOURS
Status: DISCONTINUED | OUTPATIENT
Start: 2024-10-23 | End: 2024-10-24

## 2024-10-23 RX ADMIN — DEXMEDETOMIDINE HYDROCHLORIDE 0.5 MCG/KG/HR: 4 INJECTION, SOLUTION INTRAVENOUS at 00:05

## 2024-10-23 RX ADMIN — CARVEDILOL 12.5 MG: 12.5 TABLET, FILM COATED ORAL at 07:58

## 2024-10-23 RX ADMIN — IPRATROPIUM BROMIDE AND ALBUTEROL SULFATE 1 DOSE: 2.5; .5 SOLUTION RESPIRATORY (INHALATION) at 08:50

## 2024-10-23 RX ADMIN — ACETAMINOPHEN 650 MG: 325 TABLET ORAL at 23:46

## 2024-10-23 RX ADMIN — SODIUM CHLORIDE, PRESERVATIVE FREE 10 ML: 5 INJECTION INTRAVENOUS at 21:37

## 2024-10-23 RX ADMIN — IPRATROPIUM BROMIDE AND ALBUTEROL SULFATE 1 DOSE: 2.5; .5 SOLUTION RESPIRATORY (INHALATION) at 12:34

## 2024-10-23 RX ADMIN — OXYCODONE 30 MG: 5 TABLET ORAL at 01:04

## 2024-10-23 RX ADMIN — SODIUM CHLORIDE: 9 INJECTION, SOLUTION INTRAVENOUS at 03:45

## 2024-10-23 RX ADMIN — CARVEDILOL 12.5 MG: 12.5 TABLET, FILM COATED ORAL at 15:38

## 2024-10-23 RX ADMIN — CHLORDIAZEPOXIDE HYDROCHLORIDE 10 MG: 10 CAPSULE ORAL at 15:38

## 2024-10-23 RX ADMIN — SODIUM CHLORIDE, PRESERVATIVE FREE 10 ML: 5 INJECTION INTRAVENOUS at 07:59

## 2024-10-23 RX ADMIN — IPRATROPIUM BROMIDE AND ALBUTEROL SULFATE 1 DOSE: 2.5; .5 SOLUTION RESPIRATORY (INHALATION) at 15:17

## 2024-10-23 RX ADMIN — POLYETHYLENE GLYCOL 3350 17 G: 17 POWDER, FOR SOLUTION ORAL at 20:54

## 2024-10-23 RX ADMIN — AMLODIPINE BESYLATE 10 MG: 10 TABLET ORAL at 07:58

## 2024-10-23 RX ADMIN — IPRATROPIUM BROMIDE AND ALBUTEROL SULFATE 1 DOSE: 2.5; .5 SOLUTION RESPIRATORY (INHALATION) at 00:24

## 2024-10-23 RX ADMIN — SODIUM CHLORIDE, PRESERVATIVE FREE 10 ML: 5 INJECTION INTRAVENOUS at 07:58

## 2024-10-23 RX ADMIN — HYDROMORPHONE HYDROCHLORIDE 1 MG: 1 INJECTION, SOLUTION INTRAMUSCULAR; INTRAVENOUS; SUBCUTANEOUS at 11:30

## 2024-10-23 RX ADMIN — LANSOPRAZOLE 30 MG: 30 TABLET, ORALLY DISINTEGRATING, DELAYED RELEASE ORAL at 07:58

## 2024-10-23 RX ADMIN — IPRATROPIUM BROMIDE AND ALBUTEROL SULFATE 1 DOSE: 2.5; .5 SOLUTION RESPIRATORY (INHALATION) at 20:26

## 2024-10-23 RX ADMIN — IPRATROPIUM BROMIDE AND ALBUTEROL SULFATE 1 DOSE: 2.5; .5 SOLUTION RESPIRATORY (INHALATION) at 03:55

## 2024-10-23 RX ADMIN — POTASSIUM CHLORIDE 20 MEQ: 29.8 INJECTION, SOLUTION INTRAVENOUS at 03:50

## 2024-10-23 RX ADMIN — HYDROMORPHONE HYDROCHLORIDE 1 MG: 1 INJECTION, SOLUTION INTRAMUSCULAR; INTRAVENOUS; SUBCUTANEOUS at 18:26

## 2024-10-23 RX ADMIN — HYDROMORPHONE HYDROCHLORIDE 1 MG: 1 INJECTION, SOLUTION INTRAMUSCULAR; INTRAVENOUS; SUBCUTANEOUS at 03:54

## 2024-10-23 RX ADMIN — ACETAMINOPHEN 650 MG: 325 TABLET ORAL at 12:24

## 2024-10-23 RX ADMIN — CHLORDIAZEPOXIDE HYDROCHLORIDE 15 MG: 5 CAPSULE ORAL at 07:58

## 2024-10-23 RX ADMIN — OXYCODONE 30 MG: 5 TABLET ORAL at 07:57

## 2024-10-23 RX ADMIN — POLYETHYLENE GLYCOL 3350 17 G: 17 POWDER, FOR SOLUTION ORAL at 08:00

## 2024-10-23 RX ADMIN — OXYCODONE 30 MG: 5 TABLET ORAL at 12:25

## 2024-10-23 RX ADMIN — ENOXAPARIN SODIUM 40 MG: 100 INJECTION SUBCUTANEOUS at 15:38

## 2024-10-23 RX ADMIN — QUETIAPINE FUMARATE 25 MG: 25 TABLET ORAL at 20:54

## 2024-10-23 RX ADMIN — CEFEPIME 2000 MG: 2 INJECTION, POWDER, FOR SOLUTION INTRAVENOUS at 23:27

## 2024-10-23 RX ADMIN — PREDNISONE 40 MG: 20 TABLET ORAL at 08:05

## 2024-10-23 RX ADMIN — POTASSIUM CHLORIDE 20 MEQ: 29.8 INJECTION, SOLUTION INTRAVENOUS at 05:34

## 2024-10-23 RX ADMIN — LIDOCAINE HYDROCHLORIDE 15 ML: 10 INJECTION, SOLUTION EPIDURAL; INFILTRATION; INTRACAUDAL; PERINEURAL at 11:18

## 2024-10-23 RX ADMIN — DOCUSATE SODIUM LIQUID 100 MG: 100 LIQUID ORAL at 07:57

## 2024-10-23 RX ADMIN — SODIUM CHLORIDE, PRESERVATIVE FREE 10 ML: 5 INJECTION INTRAVENOUS at 21:36

## 2024-10-23 RX ADMIN — SENNOSIDES 8.8 MG: 8.8 LIQUID ORAL at 20:54

## 2024-10-23 RX ADMIN — CEFEPIME 2000 MG: 2 INJECTION, POWDER, FOR SOLUTION INTRAVENOUS at 06:51

## 2024-10-23 RX ADMIN — CEFEPIME 2000 MG: 2 INJECTION, POWDER, FOR SOLUTION INTRAVENOUS at 14:20

## 2024-10-23 RX ADMIN — OXYCODONE 20 MG: 5 TABLET ORAL at 20:54

## 2024-10-23 RX ADMIN — DOCUSATE SODIUM LIQUID 100 MG: 100 LIQUID ORAL at 20:54

## 2024-10-23 ASSESSMENT — PAIN SCALES - GENERAL
PAINLEVEL_OUTOF10: 0
PAINLEVEL_OUTOF10: 4
PAINLEVEL_OUTOF10: 4
PAINLEVEL_OUTOF10: 0
PAINLEVEL_OUTOF10: 4
PAINLEVEL_OUTOF10: 0
PAINLEVEL_OUTOF10: 0
PAINLEVEL_OUTOF10: 10
PAINLEVEL_OUTOF10: 0

## 2024-10-23 ASSESSMENT — PAIN DESCRIPTION - LOCATION
LOCATION: ABDOMEN
LOCATION: ABDOMEN

## 2024-10-23 ASSESSMENT — PAIN DESCRIPTION - DESCRIPTORS
DESCRIPTORS: ACHING
DESCRIPTORS: ACHING

## 2024-10-24 ENCOUNTER — APPOINTMENT (OUTPATIENT)
Dept: GENERAL RADIOLOGY | Age: 44
DRG: 009 | End: 2024-10-24
Payer: MEDICAID

## 2024-10-24 LAB
ANION GAP SERPL CALCULATED.3IONS-SCNC: 11 MMOL/L (ref 9–16)
BASOPHILS # BLD: 0 K/UL (ref 0–0.2)
BASOPHILS NFR BLD: 0 % (ref 0–2)
BUN SERPL-MCNC: 21 MG/DL (ref 6–20)
CALCIUM SERPL-MCNC: 8.9 MG/DL (ref 8.6–10.4)
CHLORIDE SERPL-SCNC: 101 MMOL/L (ref 98–107)
CO2 SERPL-SCNC: 24 MMOL/L (ref 20–31)
CREAT SERPL-MCNC: 1 MG/DL (ref 0.7–1.2)
EOSINOPHIL # BLD: 0 K/UL (ref 0–0.4)
EOSINOPHILS RELATIVE PERCENT: 0 % (ref 1–4)
ERYTHROCYTE [DISTWIDTH] IN BLOOD BY AUTOMATED COUNT: 13.8 % (ref 11.8–14.4)
GFR, ESTIMATED: >90 ML/MIN/1.73M2
GLUCOSE BLD-MCNC: 109 MG/DL (ref 75–110)
GLUCOSE BLD-MCNC: 128 MG/DL (ref 75–110)
GLUCOSE BLD-MCNC: 142 MG/DL (ref 75–110)
GLUCOSE BLD-MCNC: 98 MG/DL (ref 75–110)
GLUCOSE SERPL-MCNC: 118 MG/DL (ref 74–99)
HCT VFR BLD AUTO: 33.3 % (ref 40.7–50.3)
HGB BLD-MCNC: 11.3 G/DL (ref 13–17)
HGB FREE PLAS-MCNC: 3.4 MG/DL (ref 0–9.7)
HGB FREE PLAS-MCNC: 5.3 MG/DL (ref 0–9.7)
IMM GRANULOCYTES # BLD AUTO: 0 K/UL (ref 0–0.3)
IMM GRANULOCYTES NFR BLD: 0 %
INR PPP: 1.2
LYMPHOCYTES NFR BLD: 3.78 K/UL (ref 1–4.8)
LYMPHOCYTES RELATIVE PERCENT: 22 % (ref 24–44)
MCH RBC QN AUTO: 26.9 PG (ref 25.2–33.5)
MCHC RBC AUTO-ENTMCNC: 33.9 G/DL (ref 28.4–34.8)
MCV RBC AUTO: 79.3 FL (ref 82.6–102.9)
MICROORGANISM SPEC CULT: NORMAL
MICROORGANISM SPEC CULT: NORMAL
MONOCYTES NFR BLD: 0.69 K/UL (ref 0.1–0.8)
MONOCYTES NFR BLD: 4 % (ref 1–7)
MORPHOLOGY: ABNORMAL
NEUTROPHILS NFR BLD: 74 % (ref 36–66)
NEUTS SEG NFR BLD: 12.73 K/UL (ref 1.8–7.7)
NRBC BLD-RTO: 0 PER 100 WBC
PARTIAL THROMBOPLASTIN TIME: 24.8 SEC (ref 23–36.5)
PLATELET # BLD AUTO: 121 K/UL (ref 138–453)
PMV BLD AUTO: 12.8 FL (ref 8.1–13.5)
POTASSIUM SERPL-SCNC: 3.5 MMOL/L (ref 3.7–5.3)
PROTHROMBIN TIME: 15.2 SEC (ref 11.7–14.9)
RBC # BLD AUTO: 4.2 M/UL (ref 4.21–5.77)
SERVICE CMNT-IMP: NORMAL
SERVICE CMNT-IMP: NORMAL
SODIUM SERPL-SCNC: 136 MMOL/L (ref 136–145)
SPECIMEN DESCRIPTION: NORMAL
SPECIMEN DESCRIPTION: NORMAL
WBC OTHER # BLD: 17.2 K/UL (ref 3.5–11.3)

## 2024-10-24 PROCEDURE — 31720 CLEARANCE OF AIRWAYS: CPT

## 2024-10-24 PROCEDURE — 2580000003 HC RX 258

## 2024-10-24 PROCEDURE — 6370000000 HC RX 637 (ALT 250 FOR IP): Performed by: INTERNAL MEDICINE

## 2024-10-24 PROCEDURE — 71045 X-RAY EXAM CHEST 1 VIEW: CPT

## 2024-10-24 PROCEDURE — 82947 ASSAY GLUCOSE BLOOD QUANT: CPT

## 2024-10-24 PROCEDURE — 99291 CRITICAL CARE FIRST HOUR: CPT | Performed by: INTERNAL MEDICINE

## 2024-10-24 PROCEDURE — 85610 PROTHROMBIN TIME: CPT

## 2024-10-24 PROCEDURE — 2000000000 HC ICU R&B

## 2024-10-24 PROCEDURE — 74018 RADEX ABDOMEN 1 VIEW: CPT

## 2024-10-24 PROCEDURE — 6360000002 HC RX W HCPCS

## 2024-10-24 PROCEDURE — 2700000000 HC OXYGEN THERAPY PER DAY

## 2024-10-24 PROCEDURE — 85730 THROMBOPLASTIN TIME PARTIAL: CPT

## 2024-10-24 PROCEDURE — 6370000000 HC RX 637 (ALT 250 FOR IP)

## 2024-10-24 PROCEDURE — 80048 BASIC METABOLIC PNL TOTAL CA: CPT

## 2024-10-24 PROCEDURE — 85025 COMPLETE CBC W/AUTO DIFF WBC: CPT

## 2024-10-24 PROCEDURE — 36415 COLL VENOUS BLD VENIPUNCTURE: CPT

## 2024-10-24 PROCEDURE — 94640 AIRWAY INHALATION TREATMENT: CPT

## 2024-10-24 PROCEDURE — 94668 MNPJ CHEST WALL SBSQ: CPT

## 2024-10-24 PROCEDURE — 94761 N-INVAS EAR/PLS OXIMETRY MLT: CPT

## 2024-10-24 PROCEDURE — 6360000002 HC RX W HCPCS: Performed by: INTERNAL MEDICINE

## 2024-10-24 RX ORDER — MONTELUKAST SODIUM 10 MG/1
10 TABLET ORAL NIGHTLY
Status: DISCONTINUED | OUTPATIENT
Start: 2024-10-24 | End: 2024-10-29 | Stop reason: HOSPADM

## 2024-10-24 RX ORDER — POTASSIUM CHLORIDE 7.45 MG/ML
10 INJECTION INTRAVENOUS PRN
Status: DISCONTINUED | OUTPATIENT
Start: 2024-10-24 | End: 2024-10-29 | Stop reason: HOSPADM

## 2024-10-24 RX ORDER — POTASSIUM CHLORIDE 1500 MG/1
40 TABLET, EXTENDED RELEASE ORAL PRN
Status: DISCONTINUED | OUTPATIENT
Start: 2024-10-24 | End: 2024-10-29 | Stop reason: HOSPADM

## 2024-10-24 RX ORDER — SODIUM CHLORIDE 9 MG/ML
INJECTION, SOLUTION INTRAVENOUS CONTINUOUS
Status: ACTIVE | OUTPATIENT
Start: 2024-10-24 | End: 2024-10-25

## 2024-10-24 RX ORDER — OXYCODONE HYDROCHLORIDE 5 MG/1
15 TABLET ORAL EVERY 6 HOURS
Status: DISCONTINUED | OUTPATIENT
Start: 2024-10-24 | End: 2024-10-26

## 2024-10-24 RX ADMIN — IPRATROPIUM BROMIDE AND ALBUTEROL SULFATE 1 DOSE: 2.5; .5 SOLUTION RESPIRATORY (INHALATION) at 15:34

## 2024-10-24 RX ADMIN — IPRATROPIUM BROMIDE AND ALBUTEROL SULFATE 1 DOSE: 2.5; .5 SOLUTION RESPIRATORY (INHALATION) at 03:50

## 2024-10-24 RX ADMIN — IPRATROPIUM BROMIDE AND ALBUTEROL SULFATE 1 DOSE: 2.5; .5 SOLUTION RESPIRATORY (INHALATION) at 19:43

## 2024-10-24 RX ADMIN — CEFEPIME 2000 MG: 2 INJECTION, POWDER, FOR SOLUTION INTRAVENOUS at 09:37

## 2024-10-24 RX ADMIN — AMLODIPINE BESYLATE 10 MG: 10 TABLET ORAL at 09:54

## 2024-10-24 RX ADMIN — OXYCODONE 20 MG: 5 TABLET ORAL at 09:52

## 2024-10-24 RX ADMIN — CEFEPIME 2000 MG: 2 INJECTION, POWDER, FOR SOLUTION INTRAVENOUS at 23:23

## 2024-10-24 RX ADMIN — SENNOSIDES 8.8 MG: 8.8 LIQUID ORAL at 09:54

## 2024-10-24 RX ADMIN — ACETAMINOPHEN 650 MG: 325 TABLET ORAL at 06:49

## 2024-10-24 RX ADMIN — CHLORDIAZEPOXIDE HYDROCHLORIDE 10 MG: 10 CAPSULE ORAL at 17:05

## 2024-10-24 RX ADMIN — CARVEDILOL 12.5 MG: 12.5 TABLET, FILM COATED ORAL at 09:54

## 2024-10-24 RX ADMIN — PREDNISONE 40 MG: 20 TABLET ORAL at 10:00

## 2024-10-24 RX ADMIN — IPRATROPIUM BROMIDE AND ALBUTEROL SULFATE 1 DOSE: 2.5; .5 SOLUTION RESPIRATORY (INHALATION) at 00:21

## 2024-10-24 RX ADMIN — ENOXAPARIN SODIUM 40 MG: 100 INJECTION SUBCUTANEOUS at 09:53

## 2024-10-24 RX ADMIN — SODIUM CHLORIDE, PRESERVATIVE FREE 10 ML: 5 INJECTION INTRAVENOUS at 09:54

## 2024-10-24 RX ADMIN — OXYCODONE 20 MG: 5 TABLET ORAL at 02:02

## 2024-10-24 RX ADMIN — CHLORDIAZEPOXIDE HYDROCHLORIDE 10 MG: 10 CAPSULE ORAL at 09:54

## 2024-10-24 RX ADMIN — CHLORDIAZEPOXIDE HYDROCHLORIDE 10 MG: 10 CAPSULE ORAL at 01:06

## 2024-10-24 RX ADMIN — HYDROMORPHONE HYDROCHLORIDE 1 MG: 1 INJECTION, SOLUTION INTRAMUSCULAR; INTRAVENOUS; SUBCUTANEOUS at 19:46

## 2024-10-24 RX ADMIN — POTASSIUM BICARBONATE 40 MEQ: 782 TABLET, EFFERVESCENT ORAL at 13:48

## 2024-10-24 RX ADMIN — SODIUM CHLORIDE, PRESERVATIVE FREE 10 ML: 5 INJECTION INTRAVENOUS at 21:00

## 2024-10-24 RX ADMIN — LANSOPRAZOLE 30 MG: 30 TABLET, ORALLY DISINTEGRATING, DELAYED RELEASE ORAL at 09:52

## 2024-10-24 RX ADMIN — CARVEDILOL 12.5 MG: 12.5 TABLET, FILM COATED ORAL at 17:04

## 2024-10-24 RX ADMIN — DOCUSATE SODIUM LIQUID 100 MG: 100 LIQUID ORAL at 09:54

## 2024-10-24 RX ADMIN — CEFEPIME 2000 MG: 2 INJECTION, POWDER, FOR SOLUTION INTRAVENOUS at 15:26

## 2024-10-24 RX ADMIN — IPRATROPIUM BROMIDE AND ALBUTEROL SULFATE 1 DOSE: 2.5; .5 SOLUTION RESPIRATORY (INHALATION) at 11:58

## 2024-10-24 RX ADMIN — OXYCODONE 15 MG: 5 TABLET ORAL at 13:48

## 2024-10-24 RX ADMIN — IPRATROPIUM BROMIDE AND ALBUTEROL SULFATE 1 DOSE: 2.5; .5 SOLUTION RESPIRATORY (INHALATION) at 23:35

## 2024-10-24 RX ADMIN — IPRATROPIUM BROMIDE AND ALBUTEROL SULFATE 1 DOSE: 2.5; .5 SOLUTION RESPIRATORY (INHALATION) at 08:41

## 2024-10-24 ASSESSMENT — PAIN SCALES - GENERAL
PAINLEVEL_OUTOF10: 10
PAINLEVEL_OUTOF10: 7
PAINLEVEL_OUTOF10: 6

## 2024-10-24 ASSESSMENT — PAIN DESCRIPTION - LOCATION
LOCATION: BACK
LOCATION: ABDOMEN

## 2024-10-25 ENCOUNTER — APPOINTMENT (OUTPATIENT)
Dept: GENERAL RADIOLOGY | Age: 44
DRG: 009 | End: 2024-10-25
Payer: MEDICAID

## 2024-10-25 ENCOUNTER — APPOINTMENT (OUTPATIENT)
Dept: CT IMAGING | Age: 44
DRG: 009 | End: 2024-10-25
Payer: MEDICAID

## 2024-10-25 PROBLEM — J18.9 PNEUMONIA OF LOWER LOBE DUE TO INFECTIOUS ORGANISM: Status: ACTIVE | Noted: 2024-10-25

## 2024-10-25 PROBLEM — K56.0 PARALYTIC ILEUS OF LARGE INTESTINE (HCC): Status: ACTIVE | Noted: 2024-10-25

## 2024-10-25 PROBLEM — K63.5 CECAL POLYP: Status: ACTIVE | Noted: 2024-10-25

## 2024-10-25 PROBLEM — K59.39 DILATATION OF COLON: Status: ACTIVE | Noted: 2024-10-25

## 2024-10-25 LAB
ANION GAP SERPL CALCULATED.3IONS-SCNC: 12 MMOL/L (ref 9–16)
BASOPHILS # BLD: <0.03 K/UL (ref 0–0.2)
BASOPHILS NFR BLD: 0 % (ref 0–2)
BUN SERPL-MCNC: 18 MG/DL (ref 6–20)
CALCIUM SERPL-MCNC: 8.6 MG/DL (ref 8.6–10.4)
CHLORIDE SERPL-SCNC: 99 MMOL/L (ref 98–107)
CO2 SERPL-SCNC: 21 MMOL/L (ref 20–31)
CREAT SERPL-MCNC: 0.8 MG/DL (ref 0.7–1.2)
EOSINOPHIL # BLD: 0.14 K/UL (ref 0–0.44)
EOSINOPHILS RELATIVE PERCENT: 1 % (ref 1–4)
ERYTHROCYTE [DISTWIDTH] IN BLOOD BY AUTOMATED COUNT: 13.6 % (ref 11.8–14.4)
GFR, ESTIMATED: >90 ML/MIN/1.73M2
GLUCOSE BLD-MCNC: 101 MG/DL (ref 75–110)
GLUCOSE BLD-MCNC: 122 MG/DL (ref 75–110)
GLUCOSE BLD-MCNC: 125 MG/DL (ref 75–110)
GLUCOSE BLD-MCNC: 91 MG/DL (ref 75–110)
GLUCOSE SERPL-MCNC: 98 MG/DL (ref 74–99)
HCT VFR BLD AUTO: 30.1 % (ref 40.7–50.3)
HGB BLD-MCNC: 10.4 G/DL (ref 13–17)
HGB FREE PLAS-MCNC: 3.2 MG/DL (ref 0–9.7)
IMM GRANULOCYTES # BLD AUTO: 0.14 K/UL (ref 0–0.3)
IMM GRANULOCYTES NFR BLD: 1 %
INR PPP: 1.2
LYMPHOCYTES NFR BLD: 2.14 K/UL (ref 1.1–3.7)
LYMPHOCYTES RELATIVE PERCENT: 16 % (ref 24–43)
MCH RBC QN AUTO: 27.3 PG (ref 25.2–33.5)
MCHC RBC AUTO-ENTMCNC: 34.6 G/DL (ref 28.4–34.8)
MCV RBC AUTO: 79 FL (ref 82.6–102.9)
MONOCYTES NFR BLD: 1.22 K/UL (ref 0.1–1.2)
MONOCYTES NFR BLD: 9 % (ref 3–12)
NEUTROPHILS NFR BLD: 73 % (ref 36–65)
NEUTS SEG NFR BLD: 9.79 K/UL (ref 1.5–8.1)
NRBC BLD-RTO: 0 PER 100 WBC
PARTIAL THROMBOPLASTIN TIME: 25.3 SEC (ref 23–36.5)
PLATELET # BLD AUTO: 142 K/UL (ref 138–453)
PMV BLD AUTO: 12.5 FL (ref 8.1–13.5)
POTASSIUM SERPL-SCNC: 4.1 MMOL/L (ref 3.7–5.3)
PROTHROMBIN TIME: 14.8 SEC (ref 11.7–14.9)
RBC # BLD AUTO: 3.81 M/UL (ref 4.21–5.77)
RBC # BLD: ABNORMAL 10*6/UL
SODIUM SERPL-SCNC: 132 MMOL/L (ref 136–145)
WBC OTHER # BLD: 13.5 K/UL (ref 3.5–11.3)

## 2024-10-25 PROCEDURE — 6370000000 HC RX 637 (ALT 250 FOR IP)

## 2024-10-25 PROCEDURE — 85025 COMPLETE CBC W/AUTO DIFF WBC: CPT

## 2024-10-25 PROCEDURE — 85730 THROMBOPLASTIN TIME PARTIAL: CPT

## 2024-10-25 PROCEDURE — 80048 BASIC METABOLIC PNL TOTAL CA: CPT

## 2024-10-25 PROCEDURE — 2580000003 HC RX 258

## 2024-10-25 PROCEDURE — 6370000000 HC RX 637 (ALT 250 FOR IP): Performed by: INTERNAL MEDICINE

## 2024-10-25 PROCEDURE — 36415 COLL VENOUS BLD VENIPUNCTURE: CPT

## 2024-10-25 PROCEDURE — 94640 AIRWAY INHALATION TREATMENT: CPT

## 2024-10-25 PROCEDURE — 99222 1ST HOSP IP/OBS MODERATE 55: CPT | Performed by: SURGERY

## 2024-10-25 PROCEDURE — 6360000002 HC RX W HCPCS

## 2024-10-25 PROCEDURE — 94761 N-INVAS EAR/PLS OXIMETRY MLT: CPT

## 2024-10-25 PROCEDURE — 2700000000 HC OXYGEN THERAPY PER DAY

## 2024-10-25 PROCEDURE — 85610 PROTHROMBIN TIME: CPT

## 2024-10-25 PROCEDURE — 97530 THERAPEUTIC ACTIVITIES: CPT

## 2024-10-25 PROCEDURE — 2000000000 HC ICU R&B

## 2024-10-25 PROCEDURE — 94668 MNPJ CHEST WALL SBSQ: CPT

## 2024-10-25 PROCEDURE — 74177 CT ABD & PELVIS W/CONTRAST: CPT

## 2024-10-25 PROCEDURE — 6360000004 HC RX CONTRAST MEDICATION

## 2024-10-25 PROCEDURE — 94669 MECHANICAL CHEST WALL OSCILL: CPT

## 2024-10-25 PROCEDURE — 6360000002 HC RX W HCPCS: Performed by: INTERNAL MEDICINE

## 2024-10-25 PROCEDURE — 74018 RADEX ABDOMEN 1 VIEW: CPT

## 2024-10-25 PROCEDURE — 82947 ASSAY GLUCOSE BLOOD QUANT: CPT

## 2024-10-25 PROCEDURE — 99291 CRITICAL CARE FIRST HOUR: CPT | Performed by: INTERNAL MEDICINE

## 2024-10-25 PROCEDURE — 99254 IP/OBS CNSLTJ NEW/EST MOD 60: CPT | Performed by: INTERNAL MEDICINE

## 2024-10-25 PROCEDURE — 97535 SELF CARE MNGMENT TRAINING: CPT

## 2024-10-25 RX ORDER — IOPAMIDOL 755 MG/ML
75 INJECTION, SOLUTION INTRAVASCULAR
Status: COMPLETED | OUTPATIENT
Start: 2024-10-25 | End: 2024-10-25

## 2024-10-25 RX ADMIN — MONTELUKAST 10 MG: 10 TABLET, FILM COATED ORAL at 21:21

## 2024-10-25 RX ADMIN — ENOXAPARIN SODIUM 40 MG: 100 INJECTION SUBCUTANEOUS at 08:49

## 2024-10-25 RX ADMIN — CEFEPIME 2000 MG: 2 INJECTION, POWDER, FOR SOLUTION INTRAVENOUS at 15:03

## 2024-10-25 RX ADMIN — CARVEDILOL 12.5 MG: 12.5 TABLET, FILM COATED ORAL at 15:44

## 2024-10-25 RX ADMIN — CARVEDILOL 12.5 MG: 12.5 TABLET, FILM COATED ORAL at 08:49

## 2024-10-25 RX ADMIN — CEFEPIME 2000 MG: 2 INJECTION, POWDER, FOR SOLUTION INTRAVENOUS at 06:44

## 2024-10-25 RX ADMIN — PREDNISONE 40 MG: 20 TABLET ORAL at 08:55

## 2024-10-25 RX ADMIN — IOPAMIDOL 75 ML: 755 INJECTION, SOLUTION INTRAVENOUS at 01:23

## 2024-10-25 RX ADMIN — CHLORDIAZEPOXIDE HYDROCHLORIDE 10 MG: 10 CAPSULE ORAL at 08:49

## 2024-10-25 RX ADMIN — HYDROMORPHONE HYDROCHLORIDE 1 MG: 1 INJECTION, SOLUTION INTRAMUSCULAR; INTRAVENOUS; SUBCUTANEOUS at 06:34

## 2024-10-25 RX ADMIN — IOHEXOL 50 ML: 240 INJECTION, SOLUTION INTRATHECAL; INTRAVASCULAR; INTRAVENOUS; ORAL at 01:23

## 2024-10-25 RX ADMIN — SODIUM CHLORIDE, PRESERVATIVE FREE 10 ML: 5 INJECTION INTRAVENOUS at 21:21

## 2024-10-25 RX ADMIN — SODIUM CHLORIDE: 9 INJECTION, SOLUTION INTRAVENOUS at 01:56

## 2024-10-25 RX ADMIN — LANSOPRAZOLE 30 MG: 30 TABLET, ORALLY DISINTEGRATING, DELAYED RELEASE ORAL at 08:49

## 2024-10-25 RX ADMIN — IPRATROPIUM BROMIDE AND ALBUTEROL SULFATE 1 DOSE: 2.5; .5 SOLUTION RESPIRATORY (INHALATION) at 09:01

## 2024-10-25 RX ADMIN — IPRATROPIUM BROMIDE AND ALBUTEROL SULFATE 1 DOSE: 2.5; .5 SOLUTION RESPIRATORY (INHALATION) at 03:15

## 2024-10-25 RX ADMIN — SODIUM CHLORIDE, PRESERVATIVE FREE 10 ML: 5 INJECTION INTRAVENOUS at 08:52

## 2024-10-25 RX ADMIN — IPRATROPIUM BROMIDE AND ALBUTEROL SULFATE 1 DOSE: 2.5; .5 SOLUTION RESPIRATORY (INHALATION) at 21:02

## 2024-10-25 RX ADMIN — IPRATROPIUM BROMIDE AND ALBUTEROL SULFATE 1 DOSE: 2.5; .5 SOLUTION RESPIRATORY (INHALATION) at 12:33

## 2024-10-25 RX ADMIN — QUETIAPINE FUMARATE 25 MG: 25 TABLET ORAL at 21:21

## 2024-10-25 RX ADMIN — POLYETHYLENE GLYCOL 3350 17 G: 17 POWDER, FOR SOLUTION ORAL at 21:18

## 2024-10-25 RX ADMIN — OXYCODONE 15 MG: 5 TABLET ORAL at 08:49

## 2024-10-25 RX ADMIN — OXYCODONE 15 MG: 5 TABLET ORAL at 12:54

## 2024-10-25 RX ADMIN — CHLORDIAZEPOXIDE HYDROCHLORIDE 10 MG: 10 CAPSULE ORAL at 15:44

## 2024-10-25 RX ADMIN — AMLODIPINE BESYLATE 10 MG: 10 TABLET ORAL at 08:49

## 2024-10-25 RX ADMIN — IPRATROPIUM BROMIDE AND ALBUTEROL SULFATE 1 DOSE: 2.5; .5 SOLUTION RESPIRATORY (INHALATION) at 16:27

## 2024-10-25 ASSESSMENT — PAIN SCALES - GENERAL
PAINLEVEL_OUTOF10: 0
PAINLEVEL_OUTOF10: 4
PAINLEVEL_OUTOF10: 5
PAINLEVEL_OUTOF10: 8

## 2024-10-25 ASSESSMENT — PAIN DESCRIPTION - LOCATION
LOCATION: BACK
LOCATION: BACK
LOCATION: NECK

## 2024-10-25 NOTE — CONSULTS
Nutrition Note    Consulted for Tube Feedings Order and Management.  Pt remains intubated, sedated, and on pressor.  Propofol started - running at 13.5 mL/hr at visit.  Discussed with RN about starting TF of Peptide Based (Vital AF 1.2) formula at 10 mL/hr with slow advancement to goal 45 mL/hr.  Will provide 1296 kcals (+propofol kcals), 81 gm protein per day.  Monitor TF tolerance/rate of propofol.    For additional information, refer to full assessment from 10/9/24.    Electronically signed by Kristy Dhaliwal RD, LD on 10/10/24 at 10:47 AM EDT    Contact: [phone]  
    Nephrology Consult Note    Reason for Consult:  low urine output and DULCE MARIA  Requesting Physician:  Dr. Walker    Chief Complaint: Presented with acute asthma  History Obtained From:  electronic medical record, patient is intubated and sedated and no family member was present to provide history.    History of Present Illness:              This is a 44 y.o. male who has a past medical history significant for asthma.  His home medication includes Advair, albuterol and Singulair.  Patient is not diabetic or hypertensive to my knowledge.  Patient was brought into ER via EMS with acute shortness of breath due to acute extubation of asthma.  Patient was very restless and agitated he received Versed and also Combivent with the steroid without any improvement.  Due to severe wheezing and decreased breath sound it he was intubated.  And subsequently transferred to ICU.  His initial ABG shows pH of 7.1 and his most            And his most recent VBG from 10/9/2024 at 10:54 AM shows pH of 7.19 with pCO2 of 42 and pO2 of 41.  He has a anion gap of 18 with lactic acid of 7.5.  Patient is recently started on bicarbonate.  Due to low urine output he also received 2 L of IV fluid without any significant improvement in his urine output.  Since admission his urine output is 5 to 10 mL/h.  There has been significant increase in his creatinine.  His baseline creatinine is 1.0 and when he presented to ER his creatinine was 1.3 and most recent creatinines from 7:40 AM is 2.4 mg/dL.  Nephrology was consulted for acute renal failure and low urine output.  .  Past Medical History:        Diagnosis Date    DULCE MARIA (acute kidney injury) ([facility]) 6/23/2020    Asthma        Past Surgical History:    No past surgical history on file.    Current Medications:    propofol infusion, Continuous  ipratropium 0.5 mg-albuterol 2.5 mg (DUONEB) nebulizer solution 1 Dose, 4x Daily RT  albuterol (PROVENTIL) (2.5 MG/3ML) 0.083% nebulizer solution 2.5 mg, BID 
    [facility]ology  Consultation Note     .  Chief Complaint:  Asthma exacerbation    Reason for consult:    Decompressive colonoscopy for colonic ileus    History of present illness:   This is a 44 y.o. male with PMH including asthma who originally presented to the ED due to shortness of breath for which he required intubation.  Patient has had a prolonged hospitalization including Prolonged intubation, Ecmo.   Pt is now extubated and de cannulated.   Staff report pt has not had a bm in several days until he began to pass liquid brown stool. Pt had CT A/P that is concerning for dilated colon up to 9.4 cm without obstruction suggestive of ileus for which GI was consulted.   Also identified was small cecal polyp 13.6-6.9-7.7 mm. Multilobar pneumonia also appreciated.   Pt has been receive laxatives including Colace, Senakot and Miralax bid    Mild leukocytosis with WBC 13.5, Hgb 10.4, Plt improved from 121-142, INR 1.2  Urine tox positive for benzo's and cannabinoid    Previous GI history:   No previous EGD or colonoscopy on file in [facility] or Care Everywhere  No personal or family history of GI malignancy  No indication of cirrhosis on imaging or labs  Primary GI specialist:    Past Medical/Social/Family History:  Past Medical History:   Diagnosis Date    DULCE MARIA (acute kidney injury) ([facility]) 6/23/2020    Asthma      Past Surgical History:   Procedure Laterality Date    EXTRACORPOREAL CIRCULATION Right 10/14/2024    E2 EXTRA CORPOREAL MEMBRANE OXYGENATION performed by Adi Alford MD at [facility]     Family History   Problem Relation Age of Onset    High Blood Pressure Mother     Stroke Father      Previous records/history/ and notes were reviewed    Allergies:  Allergies   Allergen Reactions    Seasonal        Home medications:  Prior to Admission medications    Medication Sig Start Date End Date Taking? Authorizing Provider   albuterol sulfate HFA (PROVENTIL;VENTOLIN;PROAIR) 108 (90 Base) 
  General Surgery  Consult    PATIENT NAME: Austin Kwon  AGE: 44 y.o.  MEDICAL RECORD NO. [id number]  DATE: 10/25/2024  SURGEON: Dr. Cross  PRIMARY CARE PHYSICIAN: Javi Oden DO    Patient evaluated at the request of  Dr. Robledo  Reason for evaluation: Distended abdomen, x-ray concerning for colonic obstruction    Patient information was obtained from patient and past medical records.  History/Exam limitations: due to condition.    IMPRESSION:     Patient Active Problem List   Diagnosis    Severe persistent asthma in adult without complication    Severe persistent asthma with exacerbation    Mild persistent asthma without complication    Life threatening acute exacerbation of asthma    Lactic acidemia    Lactic acidosis    Acute respiratory failure with hypoxia and hypercapnia    DULCE MARIA (acute kidney injury) (HCC)    Respiratory acidosis with metabolic acidosis    ACP (advance care planning)    ARDS (adult respiratory distress syndrome)    Encounter for palliative care    Acute respiratory failure with hypercapnia     Diffuse colonic dilation up to 9.4 cm without signs of obstruction and patient without abdominal pain or nausea, vomiting and continues to have bowel function    PLAN:   Recommend GI consultation   Recommend maintaining mag > 2, K > 4, Phos > 3  Continue to monitor abdominal exam and bowel function  Medical management per primary      HISTORY:   History of Chief Complaint:    Austin Kwon is a 44 y.o. male who presented to the ED in and acute asthma exacerbation and required intubation, subsequently required cannulation for ECMO.  Patient has since been extubated several days ago and decannulated 2 days ago.  Upon speaking with nursing staff patient reportedly had not had a bowel movement for several days until yesterday he began having bowel function and continues to pass liquidy stools.  On exam patient denies nausea, vomiting, abdominal pain.  His abdomen is soft, distended, 
  Palliative Care Inpatient Consult    NAME:  Austin Kwon  MEDICAL RECORD NUMBER:  [id number]  AGE: 44 y.o.   GENDER: male  : 1980  TODAY'S DATE:  10/15/2024    Reasons for Consultation:    Symptom and/or pain management  Provision of information regarding PC and/or hospice philosophies  Complex, time-intensive communication and interdisciplinary psychosocial support  Clarification of goals of care and/or assistance with difficult decision-making  Guidance in regards to resources and transition(s)    Members of PC team contributing to this consultation are: Gilbert Alejandre, DO    Plan      Palliative Interaction:  Austin was seen today on palliative rounds per medical ICU's request.    He is currently intubated, sedated, and paralyzed. Started on ECMO.    Discussed care with RN, who informs me that the patient's sister has been making the calls regarding his care. She has been the one obtaining consent. However, it appears that the patient has adult children as well. His mother is also around, but is reportedly too overwhelmed to make medical decisions.     I did reach out to the patient's sister - Love.     She notes that she's \"hoping for the best.\"   She notes he has been a bad asthmatic over the years. He is noncompliant with his inhalers and often uses other people's inhalers.     Austin is single. He has 3 biologic children. Two of them are adults. Love notes that she was more involved with his children than he was at times. He was helping his older daughter, Michelle, with some financials while she was in college. He lives with his mother as well. He is one of four siblings.     His daughters names:  Elaina Luna ([phone]) and Michelle Kwon (unknown phone number at this time, and Elaina does not have it - Love is working on obtaining).    I have explained the next of kin hierarchy to Love and explained the importance of this. If decisions need to be made, obviously we would want 
Clinical Ethics Consultation Note    Ethics consulted for assistance in determining surrogate / locating Next of Kin.    This patient has no Advance Directive on file. Is unmarried. He has three children, two are adults. Palliative care reached out to [facility] Detectives to locate Elaina Luna ([phone]) and Michelle Kwon (as of this note, no number has been located). Per [facility], this patient's children are his legal surrogate decision-makers and need to be contacted for any and all informed consent decisions, including the continuation of current treatment.     Ethics recommends continuing with our due diligence requirements to locate Michelle Kwon; until she is located, Elaina Luna will lead all decisions.This patient also has a mother, Ingris Holman, and a sister, Love Kwon as well as three other siblings.     Ethics signing off. Please re-consult if any other questions arise.    Young Ruizkamala  Ethics Services      
VAT consulted for PICC placement for \"PRN Thanh.\"  After chart review and consulting with primary RN, she states they are removing the ECMO cath tomorrow and the patient does not require a PICC (current medications ordered do not require central access).  She further states that current right femoral TLC is functioning without complication and is sufficient access.  Please feel free to re-consult if further needs arise.   
  CBC:  Recent Labs     10/12/24  0334 10/13/24  0305 10/14/24  0544   WBC 8.9 7.3 7.7   RBC 4.61 4.67 4.58   HGB 12.3* 12.8* 12.5*   HCT 36.9* 37.1* 36.0*   MCV 80.0* 79.4* 78.6*   RDW 13.4 13.4 13.2    174 168     CHEMISTRIES:  Recent Labs     10/12/24  0334 10/13/24  0305 10/14/24  0544    139 141   K 5.3 4.9 4.7   * 107 107   CO2 25 26 27   BUN 20 24* 24*   CREATININE 1.2 1.1 1.0   GLUCOSE 145* 156* 129*   PHOS 3.4  --   --    MG 2.8*  --   --      PT/INR:No results for input(s): \"PROTIME\", \"INR\" in the last 72 hours.  APTT:No results for input(s): \"APTT\" in the last 72 hours.  LIVER PROFILE:No results for input(s): \"AST\", \"ALT\", \"BILIDIR\", \"BILITOT\", \"ALKPHOS\" in the last 72 hours.    Imaging/Diagnostics   XR ABDOMEN (KUB) (SINGLE AP VIEW)    Result Date: 10/14/2024  1. Nonobstructive bowel gas pattern. 2. Nasogastric tube well position in the stomach.     CT CHEST WO CONTRAST    Result Date: 10/13/2024  1. Central airway thickening suggesting bronchitis or reactive airways disease and mild mucous plugging in the right lower lobe.  No bronchiectasis is noted. 2. No acute findings elsewhere in the chest. 3. 6.3 cm mass in the superior mediastinum which measures smaller in maximum dimension consistent with a benign lesion.  This could represent a bronchogenic cyst. 4. Small amount of free fluid near the liver and spleen. 5. Endotracheal tube in place with the tip 4.9 cm above the sergey.     XR CHEST PORTABLE    Result Date: 10/12/2024  Stable appearance of the chest with no confluent airspace consolidation.     US RENAL COMPLETE    Result Date: 10/9/2024  No hydronephrosis.     XR ABDOMEN FOR NG/OG/NE TUBE PLACEMENT    Result Date: 10/9/2024  Enteric tube as above.     XR CHEST PORTABLE    Result Date: 10/9/2024  1. Support devices as above. 2. No radiographic evidence of an acute cardiopulmonary abnormality.       Assessment      Hospital Problems             Last Modified POA    *

## 2024-10-26 ENCOUNTER — APPOINTMENT (OUTPATIENT)
Dept: GENERAL RADIOLOGY | Age: 44
DRG: 009 | End: 2024-10-26
Payer: MEDICAID

## 2024-10-26 LAB
ANION GAP SERPL CALCULATED.3IONS-SCNC: 14 MMOL/L (ref 9–16)
BASOPHILS # BLD: <0.03 K/UL (ref 0–0.2)
BASOPHILS NFR BLD: 0 % (ref 0–2)
BUN SERPL-MCNC: 15 MG/DL (ref 6–20)
CALCIUM SERPL-MCNC: 8.7 MG/DL (ref 8.6–10.4)
CHLORIDE SERPL-SCNC: 100 MMOL/L (ref 98–107)
CO2 SERPL-SCNC: 21 MMOL/L (ref 20–31)
CREAT SERPL-MCNC: 0.9 MG/DL (ref 0.7–1.2)
EOSINOPHIL # BLD: 0.13 K/UL (ref 0–0.44)
EOSINOPHILS RELATIVE PERCENT: 1 % (ref 1–4)
ERYTHROCYTE [DISTWIDTH] IN BLOOD BY AUTOMATED COUNT: 13.7 % (ref 11.8–14.4)
GFR, ESTIMATED: >90 ML/MIN/1.73M2
GLUCOSE BLD-MCNC: 121 MG/DL (ref 75–110)
GLUCOSE BLD-MCNC: 135 MG/DL (ref 75–110)
GLUCOSE BLD-MCNC: 81 MG/DL (ref 75–110)
GLUCOSE SERPL-MCNC: 86 MG/DL (ref 74–99)
HCT VFR BLD AUTO: 29.5 % (ref 40.7–50.3)
HGB BLD-MCNC: 10.3 G/DL (ref 13–17)
IMM GRANULOCYTES # BLD AUTO: 0.12 K/UL (ref 0–0.3)
IMM GRANULOCYTES NFR BLD: 1 %
INR PPP: 1.2
LYMPHOCYTES NFR BLD: 2.32 K/UL (ref 1.1–3.7)
LYMPHOCYTES RELATIVE PERCENT: 20 % (ref 24–43)
MAGNESIUM SERPL-MCNC: 2.1 MG/DL (ref 1.6–2.6)
MCH RBC QN AUTO: 27.5 PG (ref 25.2–33.5)
MCHC RBC AUTO-ENTMCNC: 34.9 G/DL (ref 28.4–34.8)
MCV RBC AUTO: 78.9 FL (ref 82.6–102.9)
MONOCYTES NFR BLD: 1.06 K/UL (ref 0.1–1.2)
MONOCYTES NFR BLD: 9 % (ref 3–12)
NEUTROPHILS NFR BLD: 69 % (ref 36–65)
NEUTS SEG NFR BLD: 8.26 K/UL (ref 1.5–8.1)
NRBC BLD-RTO: 0 PER 100 WBC
PARTIAL THROMBOPLASTIN TIME: 24.5 SEC (ref 23–36.5)
PLATELET # BLD AUTO: 187 K/UL (ref 138–453)
PMV BLD AUTO: 11.8 FL (ref 8.1–13.5)
POTASSIUM SERPL-SCNC: 3.6 MMOL/L (ref 3.7–5.3)
PROTHROMBIN TIME: 14.9 SEC (ref 11.7–14.9)
RBC # BLD AUTO: 3.74 M/UL (ref 4.21–5.77)
RBC # BLD: ABNORMAL 10*6/UL
SODIUM SERPL-SCNC: 135 MMOL/L (ref 136–145)
WBC OTHER # BLD: 11.9 K/UL (ref 3.5–11.3)

## 2024-10-26 PROCEDURE — 97530 THERAPEUTIC ACTIVITIES: CPT

## 2024-10-26 PROCEDURE — 94640 AIRWAY INHALATION TREATMENT: CPT

## 2024-10-26 PROCEDURE — 6370000000 HC RX 637 (ALT 250 FOR IP): Performed by: INTERNAL MEDICINE

## 2024-10-26 PROCEDURE — 2580000003 HC RX 258

## 2024-10-26 PROCEDURE — 6370000000 HC RX 637 (ALT 250 FOR IP)

## 2024-10-26 PROCEDURE — 83735 ASSAY OF MAGNESIUM: CPT

## 2024-10-26 PROCEDURE — APPSS45 APP SPLIT SHARED TIME 31-45 MINUTES: Performed by: INTERNAL MEDICINE

## 2024-10-26 PROCEDURE — 51701 INSERT BLADDER CATHETER: CPT

## 2024-10-26 PROCEDURE — 80048 BASIC METABOLIC PNL TOTAL CA: CPT

## 2024-10-26 PROCEDURE — 74018 RADEX ABDOMEN 1 VIEW: CPT

## 2024-10-26 PROCEDURE — 36415 COLL VENOUS BLD VENIPUNCTURE: CPT

## 2024-10-26 PROCEDURE — 97110 THERAPEUTIC EXERCISES: CPT

## 2024-10-26 PROCEDURE — 6360000002 HC RX W HCPCS

## 2024-10-26 PROCEDURE — 97116 GAIT TRAINING THERAPY: CPT

## 2024-10-26 PROCEDURE — 85610 PROTHROMBIN TIME: CPT

## 2024-10-26 PROCEDURE — 99291 CRITICAL CARE FIRST HOUR: CPT | Performed by: INTERNAL MEDICINE

## 2024-10-26 PROCEDURE — 97535 SELF CARE MNGMENT TRAINING: CPT

## 2024-10-26 PROCEDURE — 94668 MNPJ CHEST WALL SBSQ: CPT

## 2024-10-26 PROCEDURE — 82947 ASSAY GLUCOSE BLOOD QUANT: CPT

## 2024-10-26 PROCEDURE — 85025 COMPLETE CBC W/AUTO DIFF WBC: CPT

## 2024-10-26 PROCEDURE — 51798 US URINE CAPACITY MEASURE: CPT

## 2024-10-26 PROCEDURE — 2000000000 HC ICU R&B

## 2024-10-26 PROCEDURE — 94761 N-INVAS EAR/PLS OXIMETRY MLT: CPT

## 2024-10-26 PROCEDURE — 85730 THROMBOPLASTIN TIME PARTIAL: CPT

## 2024-10-26 RX ORDER — CHLORDIAZEPOXIDE HYDROCHLORIDE 5 MG/1
5 CAPSULE, GELATIN COATED ORAL 2 TIMES DAILY
Status: DISCONTINUED | OUTPATIENT
Start: 2024-10-26 | End: 2024-10-27

## 2024-10-26 RX ORDER — DEXTROSE MONOHYDRATE AND SODIUM CHLORIDE 5; .9 G/100ML; G/100ML
INJECTION, SOLUTION INTRAVENOUS CONTINUOUS
Status: DISCONTINUED | OUTPATIENT
Start: 2024-10-26 | End: 2024-10-26

## 2024-10-26 RX ORDER — CHLORDIAZEPOXIDE HYDROCHLORIDE 5 MG/1
5 CAPSULE, GELATIN COATED ORAL EVERY 8 HOURS
Status: DISCONTINUED | OUTPATIENT
Start: 2024-10-26 | End: 2024-10-26

## 2024-10-26 RX ORDER — OXYCODONE HYDROCHLORIDE 5 MG/1
10 TABLET ORAL EVERY 6 HOURS
Status: DISCONTINUED | OUTPATIENT
Start: 2024-10-26 | End: 2024-10-27

## 2024-10-26 RX ADMIN — OXYCODONE 10 MG: 5 TABLET ORAL at 15:07

## 2024-10-26 RX ADMIN — CHLORDIAZEPOXIDE HYDROCHLORIDE 10 MG: 10 CAPSULE ORAL at 01:23

## 2024-10-26 RX ADMIN — AMLODIPINE BESYLATE 10 MG: 10 TABLET ORAL at 09:05

## 2024-10-26 RX ADMIN — SODIUM CHLORIDE, PRESERVATIVE FREE 10 ML: 5 INJECTION INTRAVENOUS at 20:00

## 2024-10-26 RX ADMIN — DOCUSATE SODIUM LIQUID 100 MG: 100 LIQUID ORAL at 21:27

## 2024-10-26 RX ADMIN — IPRATROPIUM BROMIDE AND ALBUTEROL SULFATE 1 DOSE: 2.5; .5 SOLUTION RESPIRATORY (INHALATION) at 03:51

## 2024-10-26 RX ADMIN — SODIUM CHLORIDE, PRESERVATIVE FREE 10 ML: 5 INJECTION INTRAVENOUS at 09:13

## 2024-10-26 RX ADMIN — MONTELUKAST 10 MG: 10 TABLET, FILM COATED ORAL at 21:40

## 2024-10-26 RX ADMIN — OXYCODONE 15 MG: 5 TABLET ORAL at 01:22

## 2024-10-26 RX ADMIN — LANSOPRAZOLE 30 MG: 30 TABLET, ORALLY DISINTEGRATING, DELAYED RELEASE ORAL at 09:06

## 2024-10-26 RX ADMIN — POTASSIUM BICARBONATE 40 MEQ: 782 TABLET, EFFERVESCENT ORAL at 09:06

## 2024-10-26 RX ADMIN — CHLORDIAZEPOXIDE HYDROCHLORIDE 5 MG: 5 CAPSULE ORAL at 09:05

## 2024-10-26 RX ADMIN — POLYETHYLENE GLYCOL 3350 17 G: 17 POWDER, FOR SOLUTION ORAL at 21:31

## 2024-10-26 RX ADMIN — PREDNISONE 30 MG: 20 TABLET ORAL at 09:12

## 2024-10-26 RX ADMIN — CARVEDILOL 12.5 MG: 12.5 TABLET, FILM COATED ORAL at 09:05

## 2024-10-26 RX ADMIN — OXYCODONE 10 MG: 5 TABLET ORAL at 20:15

## 2024-10-26 RX ADMIN — IPRATROPIUM BROMIDE AND ALBUTEROL SULFATE 1 DOSE: 2.5; .5 SOLUTION RESPIRATORY (INHALATION) at 20:39

## 2024-10-26 RX ADMIN — POLYETHYLENE GLYCOL 3350 17 G: 17 POWDER, FOR SOLUTION ORAL at 09:06

## 2024-10-26 RX ADMIN — IPRATROPIUM BROMIDE AND ALBUTEROL SULFATE 1 DOSE: 2.5; .5 SOLUTION RESPIRATORY (INHALATION) at 23:34

## 2024-10-26 RX ADMIN — SENNOSIDES 8.8 MG: 8.8 LIQUID ORAL at 09:06

## 2024-10-26 RX ADMIN — IPRATROPIUM BROMIDE AND ALBUTEROL SULFATE 1 DOSE: 2.5; .5 SOLUTION RESPIRATORY (INHALATION) at 17:13

## 2024-10-26 RX ADMIN — DEXTROSE AND SODIUM CHLORIDE: 5; 900 INJECTION, SOLUTION INTRAVENOUS at 11:42

## 2024-10-26 RX ADMIN — QUETIAPINE FUMARATE 25 MG: 25 TABLET ORAL at 20:19

## 2024-10-26 RX ADMIN — CHLORDIAZEPOXIDE HYDROCHLORIDE 5 MG: 5 CAPSULE ORAL at 21:27

## 2024-10-26 RX ADMIN — ENOXAPARIN SODIUM 40 MG: 100 INJECTION SUBCUTANEOUS at 09:07

## 2024-10-26 RX ADMIN — IPRATROPIUM BROMIDE AND ALBUTEROL SULFATE 1 DOSE: 2.5; .5 SOLUTION RESPIRATORY (INHALATION) at 10:38

## 2024-10-26 RX ADMIN — DOCUSATE SODIUM LIQUID 100 MG: 100 LIQUID ORAL at 09:06

## 2024-10-26 RX ADMIN — SENNOSIDES 8.8 MG: 8.8 LIQUID ORAL at 21:28

## 2024-10-26 RX ADMIN — IPRATROPIUM BROMIDE AND ALBUTEROL SULFATE 1 DOSE: 2.5; .5 SOLUTION RESPIRATORY (INHALATION) at 00:05

## 2024-10-26 RX ADMIN — CARVEDILOL 12.5 MG: 12.5 TABLET, FILM COATED ORAL at 18:07

## 2024-10-27 ENCOUNTER — APPOINTMENT (OUTPATIENT)
Dept: GENERAL RADIOLOGY | Age: 44
DRG: 009 | End: 2024-10-27
Payer: MEDICAID

## 2024-10-27 PROBLEM — K56.0 PARALYTIC ILEUS (HCC): Status: ACTIVE | Noted: 2024-10-27

## 2024-10-27 LAB
ANION GAP SERPL CALCULATED.3IONS-SCNC: 10 MMOL/L (ref 9–16)
BASOPHILS # BLD: <0.03 K/UL (ref 0–0.2)
BASOPHILS NFR BLD: 0 % (ref 0–2)
BUN SERPL-MCNC: 13 MG/DL (ref 6–20)
CALCIUM SERPL-MCNC: 8.7 MG/DL (ref 8.6–10.4)
CHLORIDE SERPL-SCNC: 103 MMOL/L (ref 98–107)
CO2 SERPL-SCNC: 24 MMOL/L (ref 20–31)
CREAT SERPL-MCNC: 0.8 MG/DL (ref 0.7–1.2)
EOSINOPHIL # BLD: 0.11 K/UL (ref 0–0.44)
EOSINOPHILS RELATIVE PERCENT: 1 % (ref 1–4)
ERYTHROCYTE [DISTWIDTH] IN BLOOD BY AUTOMATED COUNT: 13.8 % (ref 11.8–14.4)
GFR, ESTIMATED: >90 ML/MIN/1.73M2
GLUCOSE BLD-MCNC: 114 MG/DL (ref 75–110)
GLUCOSE BLD-MCNC: 134 MG/DL (ref 75–110)
GLUCOSE SERPL-MCNC: 110 MG/DL (ref 74–99)
HCT VFR BLD AUTO: 29.4 % (ref 40.7–50.3)
HGB BLD-MCNC: 10.3 G/DL (ref 13–17)
IMM GRANULOCYTES # BLD AUTO: 0.07 K/UL (ref 0–0.3)
IMM GRANULOCYTES NFR BLD: 1 %
LYMPHOCYTES NFR BLD: 2.05 K/UL (ref 1.1–3.7)
LYMPHOCYTES RELATIVE PERCENT: 21 % (ref 24–43)
MAGNESIUM SERPL-MCNC: 2.1 MG/DL (ref 1.6–2.6)
MCH RBC QN AUTO: 27.5 PG (ref 25.2–33.5)
MCHC RBC AUTO-ENTMCNC: 35 G/DL (ref 28.4–34.8)
MCV RBC AUTO: 78.4 FL (ref 82.6–102.9)
MONOCYTES NFR BLD: 0.9 K/UL (ref 0.1–1.2)
MONOCYTES NFR BLD: 9 % (ref 3–12)
NEUTROPHILS NFR BLD: 68 % (ref 36–65)
NEUTS SEG NFR BLD: 6.56 K/UL (ref 1.5–8.1)
NRBC BLD-RTO: 0 PER 100 WBC
PLATELET # BLD AUTO: 212 K/UL (ref 138–453)
PMV BLD AUTO: 11.4 FL (ref 8.1–13.5)
POTASSIUM SERPL-SCNC: 3.6 MMOL/L (ref 3.7–5.3)
RBC # BLD AUTO: 3.75 M/UL (ref 4.21–5.77)
RBC # BLD: ABNORMAL 10*6/UL
SODIUM SERPL-SCNC: 137 MMOL/L (ref 136–145)
WBC OTHER # BLD: 9.7 K/UL (ref 3.5–11.3)

## 2024-10-27 PROCEDURE — 94669 MECHANICAL CHEST WALL OSCILL: CPT

## 2024-10-27 PROCEDURE — 6370000000 HC RX 637 (ALT 250 FOR IP)

## 2024-10-27 PROCEDURE — 99231 SBSQ HOSP IP/OBS SF/LOW 25: CPT | Performed by: INTERNAL MEDICINE

## 2024-10-27 PROCEDURE — 36415 COLL VENOUS BLD VENIPUNCTURE: CPT

## 2024-10-27 PROCEDURE — 82947 ASSAY GLUCOSE BLOOD QUANT: CPT

## 2024-10-27 PROCEDURE — 94640 AIRWAY INHALATION TREATMENT: CPT

## 2024-10-27 PROCEDURE — 80048 BASIC METABOLIC PNL TOTAL CA: CPT

## 2024-10-27 PROCEDURE — 99291 CRITICAL CARE FIRST HOUR: CPT | Performed by: INTERNAL MEDICINE

## 2024-10-27 PROCEDURE — 94761 N-INVAS EAR/PLS OXIMETRY MLT: CPT

## 2024-10-27 PROCEDURE — 6370000000 HC RX 637 (ALT 250 FOR IP): Performed by: INTERNAL MEDICINE

## 2024-10-27 PROCEDURE — 83735 ASSAY OF MAGNESIUM: CPT

## 2024-10-27 PROCEDURE — 2060000000 HC ICU INTERMEDIATE R&B

## 2024-10-27 PROCEDURE — 2580000003 HC RX 258

## 2024-10-27 PROCEDURE — 6360000002 HC RX W HCPCS

## 2024-10-27 PROCEDURE — 99232 SBSQ HOSP IP/OBS MODERATE 35: CPT | Performed by: INTERNAL MEDICINE

## 2024-10-27 PROCEDURE — 74018 RADEX ABDOMEN 1 VIEW: CPT

## 2024-10-27 PROCEDURE — 94668 MNPJ CHEST WALL SBSQ: CPT

## 2024-10-27 PROCEDURE — 83036 HEMOGLOBIN GLYCOSYLATED A1C: CPT

## 2024-10-27 PROCEDURE — 99232 SBSQ HOSP IP/OBS MODERATE 35: CPT | Performed by: SURGERY

## 2024-10-27 PROCEDURE — 85025 COMPLETE CBC W/AUTO DIFF WBC: CPT

## 2024-10-27 RX ORDER — CHLORDIAZEPOXIDE HYDROCHLORIDE 5 MG/1
5 CAPSULE, GELATIN COATED ORAL DAILY
Status: DISCONTINUED | OUTPATIENT
Start: 2024-10-28 | End: 2024-10-28

## 2024-10-27 RX ORDER — OXYCODONE HYDROCHLORIDE 5 MG/1
10 TABLET ORAL EVERY 8 HOURS
Status: DISCONTINUED | OUTPATIENT
Start: 2024-10-27 | End: 2024-10-28

## 2024-10-27 RX ORDER — CHLORDIAZEPOXIDE HYDROCHLORIDE 5 MG/1
5 CAPSULE, GELATIN COATED ORAL DAILY
Status: DISCONTINUED | OUTPATIENT
Start: 2024-10-28 | End: 2024-10-27

## 2024-10-27 RX ORDER — CHLORDIAZEPOXIDE HYDROCHLORIDE 5 MG/1
5 CAPSULE, GELATIN COATED ORAL EVERY OTHER DAY
Status: DISCONTINUED | OUTPATIENT
Start: 2024-11-01 | End: 2024-10-28

## 2024-10-27 RX ADMIN — POLYETHYLENE GLYCOL 3350 17 G: 17 POWDER, FOR SOLUTION ORAL at 09:38

## 2024-10-27 RX ADMIN — CARVEDILOL 12.5 MG: 12.5 TABLET, FILM COATED ORAL at 09:39

## 2024-10-27 RX ADMIN — IPRATROPIUM BROMIDE AND ALBUTEROL SULFATE 1 DOSE: 2.5; .5 SOLUTION RESPIRATORY (INHALATION) at 16:43

## 2024-10-27 RX ADMIN — IPRATROPIUM BROMIDE AND ALBUTEROL SULFATE 1 DOSE: 2.5; .5 SOLUTION RESPIRATORY (INHALATION) at 20:23

## 2024-10-27 RX ADMIN — IPRATROPIUM BROMIDE AND ALBUTEROL SULFATE 1 DOSE: 2.5; .5 SOLUTION RESPIRATORY (INHALATION) at 23:34

## 2024-10-27 RX ADMIN — IPRATROPIUM BROMIDE AND ALBUTEROL SULFATE 1 DOSE: 2.5; .5 SOLUTION RESPIRATORY (INHALATION) at 13:12

## 2024-10-27 RX ADMIN — SENNOSIDES 8.8 MG: 8.8 LIQUID ORAL at 21:30

## 2024-10-27 RX ADMIN — SODIUM CHLORIDE, PRESERVATIVE FREE 10 ML: 5 INJECTION INTRAVENOUS at 21:34

## 2024-10-27 RX ADMIN — SENNOSIDES 8.8 MG: 8.8 LIQUID ORAL at 09:38

## 2024-10-27 RX ADMIN — PREDNISONE 30 MG: 20 TABLET ORAL at 09:38

## 2024-10-27 RX ADMIN — SODIUM CHLORIDE, PRESERVATIVE FREE 10 ML: 5 INJECTION INTRAVENOUS at 09:40

## 2024-10-27 RX ADMIN — AMLODIPINE BESYLATE 10 MG: 10 TABLET ORAL at 09:38

## 2024-10-27 RX ADMIN — OXYCODONE 10 MG: 5 TABLET ORAL at 15:25

## 2024-10-27 RX ADMIN — CHLORDIAZEPOXIDE HYDROCHLORIDE 5 MG: 5 CAPSULE ORAL at 09:39

## 2024-10-27 RX ADMIN — ENOXAPARIN SODIUM 40 MG: 100 INJECTION SUBCUTANEOUS at 09:30

## 2024-10-27 RX ADMIN — IPRATROPIUM BROMIDE AND ALBUTEROL SULFATE 1 DOSE: 2.5; .5 SOLUTION RESPIRATORY (INHALATION) at 09:50

## 2024-10-27 RX ADMIN — OXYCODONE 10 MG: 5 TABLET ORAL at 09:39

## 2024-10-27 RX ADMIN — CARVEDILOL 12.5 MG: 12.5 TABLET, FILM COATED ORAL at 15:24

## 2024-10-27 RX ADMIN — DOCUSATE SODIUM LIQUID 100 MG: 100 LIQUID ORAL at 21:30

## 2024-10-27 RX ADMIN — LANSOPRAZOLE 30 MG: 30 TABLET, ORALLY DISINTEGRATING, DELAYED RELEASE ORAL at 09:38

## 2024-10-27 RX ADMIN — QUETIAPINE FUMARATE 25 MG: 25 TABLET ORAL at 21:30

## 2024-10-27 RX ADMIN — OXYCODONE 10 MG: 5 TABLET ORAL at 21:31

## 2024-10-27 RX ADMIN — MONTELUKAST 10 MG: 10 TABLET, FILM COATED ORAL at 21:30

## 2024-10-27 RX ADMIN — IPRATROPIUM BROMIDE AND ALBUTEROL SULFATE 1 DOSE: 2.5; .5 SOLUTION RESPIRATORY (INHALATION) at 03:37

## 2024-10-27 RX ADMIN — DOCUSATE SODIUM LIQUID 100 MG: 100 LIQUID ORAL at 09:38

## 2024-10-27 RX ADMIN — OXYCODONE 10 MG: 5 TABLET ORAL at 02:12

## 2024-10-27 RX ADMIN — POLYETHYLENE GLYCOL 3350 17 G: 17 POWDER, FOR SOLUTION ORAL at 22:06

## 2024-10-27 ASSESSMENT — PAIN - FUNCTIONAL ASSESSMENT: PAIN_FUNCTIONAL_ASSESSMENT: ACTIVITIES ARE NOT PREVENTED

## 2024-10-27 ASSESSMENT — PAIN SCALES - GENERAL
PAINLEVEL_OUTOF10: 0

## 2024-10-27 NOTE — TRANSITION OF CARE
ICU transfer to Family Medicine Team    Patient is a 44-year-old -American male with past medical history significant for asthma on Advair, albuterol, and Singulair at home presented to the ED on 10/9 via EMS for acute asthma exacerbation.  Patient was intubated due to acute hypoxic and hypercapnic respiratory failure secondary to asthma exacerbation.  Initial ABG did show mixed respiratory and metabolic acidosis.  Patient tested positive for rhinovirus infection.  Patient initially received Rocephin and Zithromax due to patient having previous left basilar infiltrate and respiratory culture positive for MSSA.  Cefepime was started on 10/20 and continue till 10/25.    -On 10/27, patient states that he is doing fine and does not have any complaints of chest pain, shortness of breath, abdominal pain, nausea, or vomiting.  Patient was saturating well on room air.  According to patient, he does not smoke cigarettes however intermittently smoking marijuana.  He is an occasional drinker as well and denies any recreational drugs.  Patient states that he had inhalers with him prior to admission and he had no viral illness symptoms.    Patient states that he was never intubated before however was noted with chart reviewing that patient was previously intubated in 2020 for acute hypoxic respiratory failure.    10/9: admitted and intubated  10/14: cannulated for ECMO  10/21: extubated  10/23: ECMO decannulated  10/25: overnight CT showing ileus with GI, surgery recommending conservative management. Cefepime discontinued.   10/27: weaning of pain control continues with patient regaining strength and appropriate for stepdown. On tube feeds, tolerating well.    Acute hypoxic and hypercapnic respiratory failure 2/2 asthma exacerbation -resolved  -Currently saturating well on room air  -Last CXR on 10/24 did not show any acute process  -Continue prednisone taper  -DuoNeb 4 times daily, Singulair 10 mg oral nightly, and

## 2024-10-27 NOTE — TRANSITION OF CARE
Critical care team - Resident sign-out to medicine service      Date and time: 10/27/2024 4:13 PM  Patient's name:  Austin Kwon  Medical Record Number: [id number]  Patient's account/billing number: [id number]  Patient's YOB: 1980  Age: 44 y.o.  Date of Admission: 10/9/2024  2:19 AM  Length of stay during current admission: 18    Primary Care Physician: Javi Oden DO    Code Status: Full Code    Mode of physician to physician communication:        [x] Via telephone   [] In person     Date and time of sign-out: 10/27/2024 4:13 PM    Accepting Family Medicine resident: Dr. Roque    Accepting Medicine team: Family Practice    Accepting team's attending: Dr. Jenkins    Patient's current ICU Bed:  3009     Patient's assigned bed on floor:  2009        [] Med-Surg Monitored [x] Step-down       [] Psychiatry ICU       [] Psych floor     Reason for ICU admission:     Austin Kwon is a 44 y.o. male with a past medical history significant for asthma on Advair, albuterol and singular at home.  Presented to ER via EMS for exacerbation.  Per report from EMS patient was agitated and was given 4 mg of Versed also given Combivent magnesium, and steroids.  In the ED GCS was 7, severe wheezing and decreased breath sounds and decision was to intubate the etomidate and succinylcholine.  Was given Rocephin 1 g and azithromycin 500 mg, 1 mg of epi placed on continuous albuterol nebulizer.  Started on propofol fentanyl.  Pertinent labs showed initial VBG 7.10, 96, 179, 24 prior to intubation.  Postintubation ABG 7.19, 62, 674, 24  Vent setting initially 100/16/570/8 and subsequently 40/16/570/8     Creatinine 1.3 baseline normal, BUN 9, did not receive fluid.  Unremarkable liver function  White count 13.1 lactic acid 2.9     Chest x-ray showed no acute changes.    ICU course summary:     10/9: admitted and intubated  10/14: cannulated for ECMO  10/21: extubated  10/23: ECMO decannulated  10/25: overnight CT

## 2024-10-28 ENCOUNTER — APPOINTMENT (OUTPATIENT)
Dept: GENERAL RADIOLOGY | Age: 44
DRG: 009 | End: 2024-10-28
Payer: MEDICAID

## 2024-10-28 LAB
ANION GAP SERPL CALCULATED.3IONS-SCNC: 14 MMOL/L (ref 9–16)
AT III ACT/NOR PPP CHRO: 123 % (ref 83–122)
AT III ACT/NOR PPP CHRO: 68 % (ref 83–122)
BASOPHILS # BLD: <0.03 K/UL (ref 0–0.2)
BASOPHILS NFR BLD: 0 % (ref 0–2)
BUN SERPL-MCNC: 15 MG/DL (ref 6–20)
CALCIUM SERPL-MCNC: 9.1 MG/DL (ref 8.6–10.4)
CHLORIDE SERPL-SCNC: 101 MMOL/L (ref 98–107)
CO2 SERPL-SCNC: 23 MMOL/L (ref 20–31)
CREAT SERPL-MCNC: 0.8 MG/DL (ref 0.7–1.2)
EOSINOPHIL # BLD: 0.09 K/UL (ref 0–0.44)
EOSINOPHILS RELATIVE PERCENT: 1 % (ref 1–4)
ERYTHROCYTE [DISTWIDTH] IN BLOOD BY AUTOMATED COUNT: 13.9 % (ref 11.8–14.4)
EST. AVERAGE GLUCOSE BLD GHB EST-MCNC: 100 MG/DL
GFR, ESTIMATED: >90 ML/MIN/1.73M2
GLUCOSE BLD-MCNC: 100 MG/DL (ref 75–110)
GLUCOSE BLD-MCNC: 118 MG/DL (ref 75–110)
GLUCOSE BLD-MCNC: 166 MG/DL (ref 75–110)
GLUCOSE BLD-MCNC: 167 MG/DL (ref 75–110)
GLUCOSE SERPL-MCNC: 100 MG/DL (ref 74–99)
HBA1C MFR BLD: 5.1 % (ref 4–6)
HCT VFR BLD AUTO: 30.6 % (ref 40.7–50.3)
HGB BLD-MCNC: 10.7 G/DL (ref 13–17)
IMM GRANULOCYTES # BLD AUTO: 0.04 K/UL (ref 0–0.3)
IMM GRANULOCYTES NFR BLD: 1 %
LYMPHOCYTES NFR BLD: 2.14 K/UL (ref 1.1–3.7)
LYMPHOCYTES RELATIVE PERCENT: 25 % (ref 24–43)
MAGNESIUM SERPL-MCNC: 2.2 MG/DL (ref 1.6–2.6)
MCH RBC QN AUTO: 27.4 PG (ref 25.2–33.5)
MCHC RBC AUTO-ENTMCNC: 35 G/DL (ref 28.4–34.8)
MCV RBC AUTO: 78.5 FL (ref 82.6–102.9)
MONOCYTES NFR BLD: 0.74 K/UL (ref 0.1–1.2)
MONOCYTES NFR BLD: 9 % (ref 3–12)
NEUTROPHILS NFR BLD: 64 % (ref 36–65)
NEUTS SEG NFR BLD: 5.5 K/UL (ref 1.5–8.1)
NRBC BLD-RTO: 0 PER 100 WBC
PLATELET # BLD AUTO: 265 K/UL (ref 138–453)
PMV BLD AUTO: 12 FL (ref 8.1–13.5)
POTASSIUM SERPL-SCNC: 3.6 MMOL/L (ref 3.7–5.3)
RBC # BLD AUTO: 3.9 M/UL (ref 4.21–5.77)
RBC # BLD: ABNORMAL 10*6/UL
SODIUM SERPL-SCNC: 138 MMOL/L (ref 136–145)
WBC OTHER # BLD: 8.5 K/UL (ref 3.5–11.3)

## 2024-10-28 PROCEDURE — 6370000000 HC RX 637 (ALT 250 FOR IP)

## 2024-10-28 PROCEDURE — 36415 COLL VENOUS BLD VENIPUNCTURE: CPT

## 2024-10-28 PROCEDURE — 6360000002 HC RX W HCPCS

## 2024-10-28 PROCEDURE — 2580000003 HC RX 258

## 2024-10-28 PROCEDURE — 94640 AIRWAY INHALATION TREATMENT: CPT

## 2024-10-28 PROCEDURE — 99232 SBSQ HOSP IP/OBS MODERATE 35: CPT | Performed by: STUDENT IN AN ORGANIZED HEALTH CARE EDUCATION/TRAINING PROGRAM

## 2024-10-28 PROCEDURE — 2060000000 HC ICU INTERMEDIATE R&B

## 2024-10-28 PROCEDURE — 6370000000 HC RX 637 (ALT 250 FOR IP): Performed by: INTERNAL MEDICINE

## 2024-10-28 PROCEDURE — 83735 ASSAY OF MAGNESIUM: CPT

## 2024-10-28 PROCEDURE — 99231 SBSQ HOSP IP/OBS SF/LOW 25: CPT

## 2024-10-28 PROCEDURE — 74018 RADEX ABDOMEN 1 VIEW: CPT

## 2024-10-28 PROCEDURE — 85025 COMPLETE CBC W/AUTO DIFF WBC: CPT

## 2024-10-28 PROCEDURE — 94668 MNPJ CHEST WALL SBSQ: CPT

## 2024-10-28 PROCEDURE — 82947 ASSAY GLUCOSE BLOOD QUANT: CPT

## 2024-10-28 PROCEDURE — 80048 BASIC METABOLIC PNL TOTAL CA: CPT

## 2024-10-28 RX ORDER — CHLORDIAZEPOXIDE HYDROCHLORIDE 5 MG/1
5 CAPSULE, GELATIN COATED ORAL DAILY
Status: DISCONTINUED | OUTPATIENT
Start: 2024-10-28 | End: 2024-10-28

## 2024-10-28 RX ORDER — OXYCODONE HYDROCHLORIDE 5 MG/1
10 TABLET ORAL EVERY 8 HOURS PRN
Status: DISCONTINUED | OUTPATIENT
Start: 2024-10-28 | End: 2024-10-28

## 2024-10-28 RX ADMIN — OXYCODONE 10 MG: 5 TABLET ORAL at 06:08

## 2024-10-28 RX ADMIN — IPRATROPIUM BROMIDE AND ALBUTEROL SULFATE 1 DOSE: 2.5; .5 SOLUTION RESPIRATORY (INHALATION) at 08:58

## 2024-10-28 RX ADMIN — PREDNISONE 30 MG: 20 TABLET ORAL at 09:13

## 2024-10-28 RX ADMIN — SODIUM CHLORIDE, PRESERVATIVE FREE 10 ML: 5 INJECTION INTRAVENOUS at 09:14

## 2024-10-28 RX ADMIN — IPRATROPIUM BROMIDE AND ALBUTEROL SULFATE 1 DOSE: 2.5; .5 SOLUTION RESPIRATORY (INHALATION) at 03:34

## 2024-10-28 RX ADMIN — LANSOPRAZOLE 30 MG: 30 TABLET, ORALLY DISINTEGRATING, DELAYED RELEASE ORAL at 09:12

## 2024-10-28 RX ADMIN — MONTELUKAST 10 MG: 10 TABLET, FILM COATED ORAL at 20:59

## 2024-10-28 RX ADMIN — POTASSIUM BICARBONATE 40 MEQ: 782 TABLET, EFFERVESCENT ORAL at 13:33

## 2024-10-28 RX ADMIN — CARVEDILOL 12.5 MG: 12.5 TABLET, FILM COATED ORAL at 17:52

## 2024-10-28 RX ADMIN — IPRATROPIUM BROMIDE AND ALBUTEROL SULFATE 1 DOSE: 2.5; .5 SOLUTION RESPIRATORY (INHALATION) at 16:17

## 2024-10-28 RX ADMIN — CARVEDILOL 12.5 MG: 12.5 TABLET, FILM COATED ORAL at 09:13

## 2024-10-28 RX ADMIN — SODIUM CHLORIDE, PRESERVATIVE FREE 10 ML: 5 INJECTION INTRAVENOUS at 20:59

## 2024-10-28 RX ADMIN — QUETIAPINE FUMARATE 25 MG: 25 TABLET ORAL at 20:59

## 2024-10-28 RX ADMIN — ENOXAPARIN SODIUM 40 MG: 100 INJECTION SUBCUTANEOUS at 09:13

## 2024-10-28 RX ADMIN — IPRATROPIUM BROMIDE AND ALBUTEROL SULFATE 1 DOSE: 2.5; .5 SOLUTION RESPIRATORY (INHALATION) at 20:18

## 2024-10-28 RX ADMIN — AMLODIPINE BESYLATE 10 MG: 10 TABLET ORAL at 09:13

## 2024-10-28 RX ADMIN — IPRATROPIUM BROMIDE AND ALBUTEROL SULFATE 1 DOSE: 2.5; .5 SOLUTION RESPIRATORY (INHALATION) at 11:01

## 2024-10-28 ASSESSMENT — ENCOUNTER SYMPTOMS
DIARRHEA: 0
COUGH: 0
SHORTNESS OF BREATH: 0
RHINORRHEA: 0
SORE THROAT: 0
BACK PAIN: 0
NAUSEA: 0
ABDOMINAL PAIN: 0

## 2024-10-28 ASSESSMENT — PAIN SCALES - GENERAL: PAINLEVEL_OUTOF10: 0

## 2024-10-28 ASSESSMENT — PAIN - FUNCTIONAL ASSESSMENT: PAIN_FUNCTIONAL_ASSESSMENT: ACTIVITIES ARE NOT PREVENTED

## 2024-10-28 NOTE — ACP (ADVANCE CARE PLANNING)
Advance Care Planning     Palliative Team Advance Care Planning (ACP) Conversation      Advance Care Planning     Palliative Team Advance Care Planning (ACP) Conversation    Date of Conversation: 10/28/24    Individuals present for the conversation: Patient with decision making capacity and sister Trinidad     ACP documents on file prior to discussion:  -None    Previously completed document/s not on file: Patient / participant reports that there are no previously executed ACP documents.    Healthcare Decision Maker:     Primary Decision Maker: Trinidad Kwon - Brother/Sister - [phone]    Secondary Decision Maker: Mona Cao - Brother/Sister - [phone]    Supplemental (Other) Decision Maker: Ingris Holman - Parent - [phone]      Conversation Summary:  Met with patient's nurse and confirmed that he is alert and oriented and able to complete DPOAH.    Met with patient and introduced myself.  Confirmed pt's level of orientation.  Offered to help him complete DPOAH. Pt is interested in completing DPOAH.  He is able to tell me what DPOAH document does.  He expresses he does not want to do Living Will at this time.  I reviewed document with him and sister and allowed time for questions during and after completing document.  Austin named his sister Trinidad primary dm, sister Mona as secondary dm and his mom Ingris supplemental.       Confirmed with patient that he would want all medical treatment done at this time.    Encouraged him to keep his decision makers up to date with his wishes about his care so that if needed they can speak on his behalf.       Original and a copy given to patient.  Copies for 3 decision makers given.  All documents given to sister Trinidad to hold on to and disperse to other decision makers.  Copy placed in temporary chart on Car 2 to be scanned into medical record upon discharge.              Resuscitation Status:   Code Status: Full Code     Documentation Completed:  -Power

## 2024-10-28 NOTE — CARE COORDINATION
Met with patient to discuss transitional planning. He is returning to his mother's house which is address on facesheet. He is agreeable to home care. He declines IPR/SNF. Freedom of choice provided for home care. Referral sent to [facility]. He is requesting a rolling walker    1817 met with patient's sister to update on transitional planning. She is also requesting a bedside commode and shower chair

## 2024-10-29 VITALS
HEART RATE: 104 BPM | OXYGEN SATURATION: 97 % | WEIGHT: 162.04 LBS | RESPIRATION RATE: 20 BRPM | DIASTOLIC BLOOD PRESSURE: 87 MMHG | TEMPERATURE: 98.2 F | HEIGHT: 69 IN | SYSTOLIC BLOOD PRESSURE: 113 MMHG | BODY MASS INDEX: 24 KG/M2

## 2024-10-29 LAB
ANION GAP SERPL CALCULATED.3IONS-SCNC: 14 MMOL/L (ref 9–16)
BASOPHILS # BLD: <0.03 K/UL (ref 0–0.2)
BASOPHILS NFR BLD: 0 % (ref 0–2)
BUN SERPL-MCNC: 14 MG/DL (ref 6–20)
CALCIUM SERPL-MCNC: 9 MG/DL (ref 8.6–10.4)
CHLORIDE SERPL-SCNC: 101 MMOL/L (ref 98–107)
CO2 SERPL-SCNC: 23 MMOL/L (ref 20–31)
CREAT SERPL-MCNC: 0.9 MG/DL (ref 0.7–1.2)
EOSINOPHIL # BLD: 0.06 K/UL (ref 0–0.44)
EOSINOPHILS RELATIVE PERCENT: 1 % (ref 1–4)
ERYTHROCYTE [DISTWIDTH] IN BLOOD BY AUTOMATED COUNT: 13.9 % (ref 11.8–14.4)
GFR, ESTIMATED: >90 ML/MIN/1.73M2
GLUCOSE BLD-MCNC: 107 MG/DL (ref 75–110)
GLUCOSE BLD-MCNC: 116 MG/DL (ref 75–110)
GLUCOSE SERPL-MCNC: 93 MG/DL (ref 74–99)
HCT VFR BLD AUTO: 32.7 % (ref 40.7–50.3)
HGB BLD-MCNC: 11.2 G/DL (ref 13–17)
IMM GRANULOCYTES # BLD AUTO: 0.06 K/UL (ref 0–0.3)
IMM GRANULOCYTES NFR BLD: 1 %
LYMPHOCYTES NFR BLD: 2.47 K/UL (ref 1.1–3.7)
LYMPHOCYTES RELATIVE PERCENT: 30 % (ref 24–43)
MAGNESIUM SERPL-MCNC: 2.2 MG/DL (ref 1.6–2.6)
MCH RBC QN AUTO: 27.5 PG (ref 25.2–33.5)
MCHC RBC AUTO-ENTMCNC: 34.3 G/DL (ref 28.4–34.8)
MCV RBC AUTO: 80.3 FL (ref 82.6–102.9)
MONOCYTES NFR BLD: 0.67 K/UL (ref 0.1–1.2)
MONOCYTES NFR BLD: 8 % (ref 3–12)
NEUTROPHILS NFR BLD: 60 % (ref 36–65)
NEUTS SEG NFR BLD: 4.89 K/UL (ref 1.5–8.1)
NRBC BLD-RTO: 0 PER 100 WBC
PLATELET # BLD AUTO: 313 K/UL (ref 138–453)
PMV BLD AUTO: 11.8 FL (ref 8.1–13.5)
POTASSIUM SERPL-SCNC: 3.6 MMOL/L (ref 3.7–5.3)
RBC # BLD AUTO: 4.07 M/UL (ref 4.21–5.77)
RBC # BLD: ABNORMAL 10*6/UL
SODIUM SERPL-SCNC: 138 MMOL/L (ref 136–145)
WBC OTHER # BLD: 8.2 K/UL (ref 3.5–11.3)

## 2024-10-29 PROCEDURE — 6370000000 HC RX 637 (ALT 250 FOR IP)

## 2024-10-29 PROCEDURE — 36415 COLL VENOUS BLD VENIPUNCTURE: CPT

## 2024-10-29 PROCEDURE — 82947 ASSAY GLUCOSE BLOOD QUANT: CPT

## 2024-10-29 PROCEDURE — 97168 OT RE-EVAL EST PLAN CARE: CPT

## 2024-10-29 PROCEDURE — 85025 COMPLETE CBC W/AUTO DIFF WBC: CPT

## 2024-10-29 PROCEDURE — 94618 PULMONARY STRESS TESTING: CPT

## 2024-10-29 PROCEDURE — 97110 THERAPEUTIC EXERCISES: CPT

## 2024-10-29 PROCEDURE — 2580000003 HC RX 258

## 2024-10-29 PROCEDURE — 97535 SELF CARE MNGMENT TRAINING: CPT

## 2024-10-29 PROCEDURE — 94760 N-INVAS EAR/PLS OXIMETRY 1: CPT

## 2024-10-29 PROCEDURE — 6360000002 HC RX W HCPCS

## 2024-10-29 PROCEDURE — 94668 MNPJ CHEST WALL SBSQ: CPT

## 2024-10-29 PROCEDURE — 6370000000 HC RX 637 (ALT 250 FOR IP): Performed by: INTERNAL MEDICINE

## 2024-10-29 PROCEDURE — 83735 ASSAY OF MAGNESIUM: CPT

## 2024-10-29 PROCEDURE — 94640 AIRWAY INHALATION TREATMENT: CPT

## 2024-10-29 PROCEDURE — 99239 HOSP IP/OBS DSCHRG MGMT >30: CPT | Performed by: STUDENT IN AN ORGANIZED HEALTH CARE EDUCATION/TRAINING PROGRAM

## 2024-10-29 PROCEDURE — 99231 SBSQ HOSP IP/OBS SF/LOW 25: CPT

## 2024-10-29 PROCEDURE — 97530 THERAPEUTIC ACTIVITIES: CPT

## 2024-10-29 PROCEDURE — 80048 BASIC METABOLIC PNL TOTAL CA: CPT

## 2024-10-29 RX ORDER — CARVEDILOL 12.5 MG/1
12.5 TABLET ORAL 2 TIMES DAILY WITH MEALS
Qty: 60 TABLET | Refills: 3 | Status: SHIPPED | OUTPATIENT
Start: 2024-10-29

## 2024-10-29 RX ORDER — AMLODIPINE BESYLATE 10 MG/1
10 TABLET ORAL DAILY
Qty: 30 TABLET | Refills: 3 | Status: SHIPPED | OUTPATIENT
Start: 2024-10-30

## 2024-10-29 RX ORDER — POLYETHYLENE GLYCOL 3350 17 G/17G
17 POWDER, FOR SOLUTION ORAL 2 TIMES DAILY
Qty: 527 G | Refills: 1 | Status: SHIPPED | OUTPATIENT
Start: 2024-10-29 | End: 2024-11-28

## 2024-10-29 RX ORDER — ALBUTEROL SULFATE 90 UG/1
10 INHALANT RESPIRATORY (INHALATION) EVERY 4 HOURS PRN
Qty: 18 G | Refills: 3 | Status: SHIPPED | OUTPATIENT
Start: 2024-10-29 | End: 2024-10-29 | Stop reason: HOSPADM

## 2024-10-29 RX ORDER — PREDNISONE 10 MG/1
TABLET ORAL
Qty: 7 TABLET | Refills: 0 | Status: SHIPPED | OUTPATIENT
Start: 2024-10-30 | End: 2024-11-08

## 2024-10-29 RX ADMIN — IPRATROPIUM BROMIDE AND ALBUTEROL SULFATE 1 DOSE: 2.5; .5 SOLUTION RESPIRATORY (INHALATION) at 09:28

## 2024-10-29 RX ADMIN — IPRATROPIUM BROMIDE AND ALBUTEROL SULFATE 1 DOSE: 2.5; .5 SOLUTION RESPIRATORY (INHALATION) at 11:44

## 2024-10-29 RX ADMIN — IPRATROPIUM BROMIDE AND ALBUTEROL SULFATE 1 DOSE: 2.5; .5 SOLUTION RESPIRATORY (INHALATION) at 00:01

## 2024-10-29 RX ADMIN — POTASSIUM BICARBONATE 40 MEQ: 782 TABLET, EFFERVESCENT ORAL at 08:24

## 2024-10-29 RX ADMIN — SODIUM CHLORIDE, PRESERVATIVE FREE 10 ML: 5 INJECTION INTRAVENOUS at 08:21

## 2024-10-29 RX ADMIN — CARVEDILOL 12.5 MG: 12.5 TABLET, FILM COATED ORAL at 08:20

## 2024-10-29 RX ADMIN — ENOXAPARIN SODIUM 40 MG: 100 INJECTION SUBCUTANEOUS at 08:20

## 2024-10-29 RX ADMIN — LANSOPRAZOLE 30 MG: 30 TABLET, ORALLY DISINTEGRATING, DELAYED RELEASE ORAL at 08:20

## 2024-10-29 RX ADMIN — AMLODIPINE BESYLATE 10 MG: 10 TABLET ORAL at 08:20

## 2024-10-29 RX ADMIN — IPRATROPIUM BROMIDE AND ALBUTEROL SULFATE 1 DOSE: 2.5; .5 SOLUTION RESPIRATORY (INHALATION) at 15:00

## 2024-10-29 RX ADMIN — PREDNISONE 20 MG: 20 TABLET ORAL at 08:20

## 2024-10-29 ASSESSMENT — ENCOUNTER SYMPTOMS
SORE THROAT: 0
COUGH: 0
BACK PAIN: 0
SHORTNESS OF BREATH: 0
ABDOMINAL PAIN: 0
DIARRHEA: 0
NAUSEA: 0
RHINORRHEA: 0

## 2024-10-29 NOTE — CARE COORDINATION
Faxed DME order, face to face, and progress note to [facility]. Spoke to Deanna, informed they are not able to deliver the DME equipment. Pt. And family verbalized understanding.     1625 Spoke to Deanna from [facility] and was informed it is possible to have insurance cover some of the cost but might not meet requirements per insurance guidelines.     1430 Spoke to Sandra at [facility] and was informed they are able to accept the patient for home care skilled nursing. Patient updated, verbalized understanding.

## 2024-10-29 NOTE — DISCHARGE SUMMARY
Patient IV taken out. Patient gathered all belongings. Patient given discharge instructions and verbalized understanding.

## 2024-10-29 NOTE — PLAN OF CARE
Problem: Discharge Planning  Goal: Discharge to home or other facility with appropriate resources  10/10/2024 2312 by Pily Carson RN  Outcome: Progressing  Flowsheets (Taken 10/10/2024 2000)  Discharge to home or other facility with appropriate resources:   Arrange for needed discharge resources and transportation as appropriate   Identify discharge learning needs (meds, wound care, etc)  10/10/2024 1410 by Ny Osborne RN  Outcome: Progressing     Problem: Pain  Goal: Verbalizes/displays adequate comfort level or baseline comfort level  10/10/2024 2312 by Pily Carson RN  Outcome: Progressing  Flowsheets (Taken 10/10/2024 2000)  Verbalizes/displays adequate comfort level or baseline comfort level:   Assess pain using appropriate pain scale   Administer analgesics based on type and severity of pain and evaluate response  10/10/2024 1410 by Ny Osborne RN  Outcome: Progressing     Problem: Skin/Tissue Integrity  Goal: Absence of new skin breakdown  Description: 1.  Monitor for areas of redness and/or skin breakdown  2.  Assess vascular access sites hourly  3.  Every 4-6 hours minimum:  Change oxygen saturation probe site  4.  Every 4-6 hours:  If on nasal continuous positive airway pressure, respiratory therapy assess nares and determine need for appliance change or resting period.  10/10/2024 2312 by Pily Carson RN  Outcome: Progressing  10/10/2024 1410 by Ny Osborne RN  Outcome: Progressing     Problem: Safety - Adult  Goal: Free from fall injury  10/10/2024 2312 by Pily Crason RN  Outcome: Progressing  10/10/2024 1410 by Ny Osborne RN  Outcome: Progressing     Problem: Nutrition Deficit:  Goal: Optimize nutritional status  10/10/2024 2312 by Pily Carson RN  Outcome: Progressing  10/10/2024 1410 by Ny Osborne RN  Outcome: Progressing     Problem: Respiratory - Adult  Goal: Achieves optimal ventilation and oxygenation  10/10/2024 2312 by 
  Problem: Discharge Planning  Goal: Discharge to home or other facility with appropriate resources  10/11/2024 2106 by Inderjit Goode RN  Outcome: Progressing  10/11/2024 1454 by Ny Osborne RN  Outcome: Progressing     Problem: Pain  Goal: Verbalizes/displays adequate comfort level or baseline comfort level  10/11/2024 2106 by Inderjit Goode RN  Outcome: Progressing  10/11/2024 1454 by Ny Osborne RN  Outcome: Progressing     Problem: Skin/Tissue Integrity  Goal: Absence of new skin breakdown  Description: 1.  Monitor for areas of redness and/or skin breakdown  2.  Assess vascular access sites hourly  3.  Every 4-6 hours minimum:  Change oxygen saturation probe site  4.  Every 4-6 hours:  If on nasal continuous positive airway pressure, respiratory therapy assess nares and determine need for appliance change or resting period.  10/11/2024 2106 by Inderjit Goode RN  Outcome: Progressing  10/11/2024 1454 by Ny Osborne RN  Outcome: Progressing     Problem: Safety - Adult  Goal: Free from fall injury  10/11/2024 2106 by Inderjit Goode RN  Outcome: Progressing  10/11/2024 1454 by Ny Osborne RN  Outcome: Progressing     Problem: Nutrition Deficit:  Goal: Optimize nutritional status  10/11/2024 1454 by Ny Osborne RN  Outcome: Progressing  Flowsheets (Taken 10/11/2024 1053 by Caro Barnhart, SUSANA)  Nutrient intake appropriate for improving, restoring, or maintaining nutritional needs: Recommend, monitor, and adjust tube feedings and TPN/PPN based on assessed needs     Problem: Respiratory - Adult  Goal: Achieves optimal ventilation and oxygenation  10/11/2024 2106 by Inderjit Goode RN  Outcome: Progressing  10/11/2024 2010 by Shwetha Lopez RCP  Flowsheets (Taken 10/11/2024 2010)  Achieves optimal ventilation and oxygenation:   Assess for changes in respiratory status   Assess for changes in mentation and behavior   Position to facilitate oxygenation and 
  Problem: Discharge Planning  Goal: Discharge to home or other facility with appropriate resources  10/12/2024 2036 by Inderjit Goode RN  Outcome: Progressing  10/12/2024 1814 by Sandra Car RN  Outcome: Progressing     Problem: Pain  Goal: Verbalizes/displays adequate comfort level or baseline comfort level  10/12/2024 2036 by Inderjit Goode RN  Outcome: Progressing  10/12/2024 1814 by Sandra Car RN  Outcome: Progressing     Problem: Skin/Tissue Integrity  Goal: Absence of new skin breakdown  Description: 1.  Monitor for areas of redness and/or skin breakdown  2.  Assess vascular access sites hourly  3.  Every 4-6 hours minimum:  Change oxygen saturation probe site  4.  Every 4-6 hours:  If on nasal continuous positive airway pressure, respiratory therapy assess nares and determine need for appliance change or resting period.  10/12/2024 2036 by Inderjit Goode RN  Outcome: Progressing  10/12/2024 1814 by Sandra Car RN  Outcome: Progressing     Problem: Safety - Adult  Goal: Free from fall injury  10/12/2024 2036 by Inderjit Goode RN  Outcome: Progressing  10/12/2024 1814 by Sandra Car RN  Outcome: Progressing     Problem: Nutrition Deficit:  Goal: Optimize nutritional status  10/12/2024 1814 by Sandra Car RN  Outcome: Progressing     Problem: Respiratory - Adult  Goal: Achieves optimal ventilation and oxygenation  10/12/2024 2036 by Inderjit Goode RN  Outcome: Progressing  10/12/2024 1814 by Sandra Car RN  Outcome: Progressing  10/12/2024 0758 by Ramona Beck [facility]  Outcome: Progressing     Problem: Confusion  Goal: Confusion, delirium, dementia, or psychosis is improved or at baseline  Description: INTERVENTIONS:  1. Assess for possible contributors to thought disturbance, including medications, impaired vision or hearing, underlying metabolic abnormalities, dehydration, psychiatric diagnoses, and notify attending LIP  2. [place] high risk fall precautions, as indicated  3. Provide 
  Problem: Discharge Planning  Goal: Discharge to home or other facility with appropriate resources  10/13/2024 1957 by Inderjit Goode RN  Outcome: Progressing  10/13/2024 1751 by Sandra Car RN  Outcome: Progressing     Problem: Pain  Goal: Verbalizes/displays adequate comfort level or baseline comfort level  10/13/2024 1957 by Inderjit Goode RN  Outcome: Progressing  10/13/2024 1751 by Sandra Car RN  Outcome: Progressing     Problem: Skin/Tissue Integrity  Goal: Absence of new skin breakdown  Description: 1.  Monitor for areas of redness and/or skin breakdown  2.  Assess vascular access sites hourly  3.  Every 4-6 hours minimum:  Change oxygen saturation probe site  4.  Every 4-6 hours:  If on nasal continuous positive airway pressure, respiratory therapy assess nares and determine need for appliance change or resting period.  10/13/2024 1957 by Inderjit Goode RN  Outcome: Progressing  10/13/2024 1751 by Sandra Car RN  Outcome: Progressing     Problem: Safety - Adult  Goal: Free from fall injury  10/13/2024 1957 by Inderjit Goode RN  Outcome: Progressing  10/13/2024 1751 by Sandra Car RN  Outcome: Progressing     Problem: Nutrition Deficit:  Goal: Optimize nutritional status  10/13/2024 1751 by Sandra Car RN  Outcome: Progressing     Problem: Respiratory - Adult  Goal: Achieves optimal ventilation and oxygenation  10/13/2024 1957 by Inderjit Goode RN  Outcome: Progressing  10/13/2024 1953 by Gerald Claire RCP  Outcome: Progressing  10/13/2024 1751 by Sandra Car RN  Outcome: Progressing  10/13/2024 0716 by Ramona Beck RCP  Outcome: Progressing     Problem: Confusion  Goal: Confusion, delirium, dementia, or psychosis is improved or at baseline  Description: INTERVENTIONS:  1. Assess for possible contributors to thought disturbance, including medications, impaired vision or hearing, underlying metabolic abnormalities, dehydration, psychiatric diagnoses, and notify attending LIP  2. 
  Problem: Discharge Planning  Goal: Discharge to home or other facility with appropriate resources  10/14/2024 2208 by Brisa Goodwin RN  Outcome: Progressing  10/14/2024 1803 by Sandra Car RN  Outcome: Progressing     Problem: Pain  Goal: Verbalizes/displays adequate comfort level or baseline comfort level  10/14/2024 2208 by Brisa Goodwin RN  Outcome: Progressing  10/14/2024 1803 by Sandra Car RN  Outcome: Progressing     Problem: Skin/Tissue Integrity  Goal: Absence of new skin breakdown  Description: 1.  Monitor for areas of redness and/or skin breakdown  2.  Assess vascular access sites hourly  3.  Every 4-6 hours minimum:  Change oxygen saturation probe site  4.  Every 4-6 hours:  If on nasal continuous positive airway pressure, respiratory therapy assess nares and determine need for appliance change or resting period.  10/14/2024 2208 by Brisa Goodwin RN  Outcome: Progressing  10/14/2024 1803 by Sandra Car RN  Outcome: Progressing     Problem: Safety - Adult  Goal: Free from fall injury  10/14/2024 2208 by Brisa Goodwin RN  Outcome: Progressing  10/14/2024 1803 by Sandra Car RN  Outcome: Progressing     Problem: Nutrition Deficit:  Goal: Optimize nutritional status  10/14/2024 2208 by Brisa Goodwin RN  Outcome: Progressing  10/14/2024 1803 by Sandra Car RN  Outcome: Progressing     Problem: Respiratory - Adult  Goal: Achieves optimal ventilation and oxygenation  10/14/2024 2208 by Brisa Goodwin RN  Outcome: Progressing  10/14/2024 1803 by Sandra Car RN  Outcome: Progressing  Flowsheets (Taken 10/14/2024 0832 by Ashley Mccann RCP)  Achieves optimal ventilation and oxygenation:   Assess for changes in respiratory status   Assess for changes in mentation and behavior   Position to facilitate oxygenation and minimize respiratory effort   Oxygen supplementation based on oxygen saturation or arterial blood gases   Assess the need for suctioning and aspirate as needed   Assess and 
  Problem: Discharge Planning  Goal: Discharge to home or other facility with appropriate resources  10/16/2024 0025 by Renee Cason RN  Outcome: Progressing  10/15/2024 1215 by Rasheeda Jaramillo RN  Outcome: Progressing     Problem: Pain  Goal: Verbalizes/displays adequate comfort level or baseline comfort level  10/16/2024 0025 by Renee Cason RN  Outcome: Progressing  10/15/2024 1215 by Rasheeda Jaramillo RN  Outcome: Progressing     Problem: Skin/Tissue Integrity  Goal: Absence of new skin breakdown  Description: 1.  Monitor for areas of redness and/or skin breakdown  2.  Assess vascular access sites hourly  3.  Every 4-6 hours minimum:  Change oxygen saturation probe site  4.  Every 4-6 hours:  If on nasal continuous positive airway pressure, respiratory therapy assess nares and determine need for appliance change or resting period.  10/16/2024 0025 by Renee Cason RN  Outcome: Progressing  10/15/2024 1215 by Rasheeda Jaramillo RN  Outcome: Progressing     Problem: Safety - Adult  Goal: Free from fall injury  10/16/2024 0025 by Renee Cason RN  Outcome: Progressing  10/15/2024 1215 by Rasheeda Jaramillo RN  Outcome: Progressing     Problem: Nutrition Deficit:  Goal: Optimize nutritional status  10/16/2024 0025 by Renee Cason RN  Outcome: Progressing  10/15/2024 1215 by Rasheeda Jaramillo RN  Outcome: Progressing     Problem: Respiratory - Adult  Goal: Achieves optimal ventilation and oxygenation  10/16/2024 0025 by Renee Cason RN  Outcome: Progressing  10/15/2024 2030 by Shwetha Lopez RCP  Flowsheets  Taken 10/15/2024 2030 by Shwetha Lopez RCP  Achieves optimal ventilation and oxygenation:   Assess for changes in respiratory status   Assess for changes in mentation and behavior   Position to facilitate oxygenation and minimize respiratory effort   Oxygen supplementation based on oxygen saturation or arterial blood gases   Initiate smoking cessation protocol as indicated   Encourage 
  Problem: Discharge Planning  Goal: Discharge to home or other facility with appropriate resources  10/16/2024 0848 by Pratima Hutchinson RN  Outcome: Progressing     Problem: Pain  Goal: Verbalizes/displays adequate comfort level or baseline comfort level  10/16/2024 0848 by Pratima Hutchinson RN  Outcome: Progressing     Problem: Skin/Tissue Integrity  Goal: Absence of new skin breakdown  Description: 1.  Monitor for areas of redness and/or skin breakdown  2.  Assess vascular access sites hourly  3.  Every 4-6 hours minimum:  Change oxygen saturation probe site  4.  Every 4-6 hours:  If on nasal continuous positive airway pressure, respiratory therapy assess nares and determine need for appliance change or resting period.  10/16/2024 0848 by Pratima Hutchinson RN  Outcome: Progressing     Problem: Safety - Adult  Goal: Free from fall injury  10/16/2024 0848 by Pratima Hutchinson RN  Outcome: Progressing  Flowsheets (Taken 10/16/2024 0026 by Renee Cason RN)  Free From Fall Injury: Instruct family/caregiver on patient safety     Problem: Nutrition Deficit:  Goal: Optimize nutritional status  10/16/2024 0848 by Pratima Hutchinson RN  Outcome: Progressing     Problem: Respiratory - Adult  Goal: Achieves optimal ventilation and oxygenation  10/16/2024 0848 by Pratima Hutchinson RN  Outcome: Progressing     Problem: Confusion  Goal: Confusion, delirium, dementia, or psychosis is improved or at baseline  Description: INTERVENTIONS:  1. Assess for possible contributors to thought disturbance, including medications, impaired vision or hearing, underlying metabolic abnormalities, dehydration, psychiatric diagnoses, and notify attending LIP  2. [place] high risk fall precautions, as indicated  3. Provide frequent short contacts to provide reality reorientation, refocusing and direction  4. Decrease environmental stimuli, including noise as appropriate  5. Monitor and intervene to maintain adequate nutrition, hydration, 
  Problem: Discharge Planning  Goal: Discharge to home or other facility with appropriate resources  10/16/2024 2046 by Sofia De La Rosa RN  Outcome: Progressing  10/16/2024 0848 by Pratima Hutchinson RN  Outcome: Progressing     Problem: Pain  Goal: Verbalizes/displays adequate comfort level or baseline comfort level  10/16/2024 2046 by Sofia De La Rosa RN  Outcome: Progressing  10/16/2024 0848 by Pratima Hutchinson RN  Outcome: Progressing     Problem: Skin/Tissue Integrity  Goal: Absence of new skin breakdown  Description: 1.  Monitor for areas of redness and/or skin breakdown  2.  Assess vascular access sites hourly  3.  Every 4-6 hours minimum:  Change oxygen saturation probe site  4.  Every 4-6 hours:  If on nasal continuous positive airway pressure, respiratory therapy assess nares and determine need for appliance change or resting period.  10/16/2024 2046 by Sofia De La Rosa RN  Outcome: Progressing  10/16/2024 0848 by Pratima Hutchinson RN  Outcome: Progressing     Problem: Safety - Adult  Goal: Free from fall injury  10/16/2024 2046 by Sofia De La Rosa RN  Outcome: Progressing  Flowsheets (Taken 10/16/2024 2045)  Free From Fall Injury: Instruct family/caregiver on patient safety  10/16/2024 0848 by Pratima Hutchinsno RN  Outcome: Progressing  Flowsheets (Taken 10/16/2024 0026 by Renee Cason RN)  Free From Fall Injury: Instruct family/caregiver on patient safety     Problem: Nutrition Deficit:  Goal: Optimize nutritional status  10/16/2024 2046 by Sofia De La Rosa RN  Outcome: Progressing  10/16/2024 0848 by Pratima Hutchinson RN  Outcome: Progressing     Problem: Respiratory - Adult  Goal: Achieves optimal ventilation and oxygenation  10/16/2024 2046 by Sofia De La Rosa RN  Outcome: Progressing  10/16/2024 2027 by Kelly Ellis RCP  Flowsheets (Taken 10/16/2024 2027)  Achieves optimal ventilation and oxygenation:   Respiratory therapy support as indicated   Assess the need for suctioning and 
  Problem: Discharge Planning  Goal: Discharge to home or other facility with appropriate resources  10/17/2024 0931 by Sammie Espinosa, RN  Outcome: Not Progressing     Problem: Pain  Goal: Verbalizes/displays adequate comfort level or baseline comfort level  10/17/2024 0931 by Sammie Espinosa, RN  Outcome: Progressing     Problem: Skin/Tissue Integrity  Goal: Absence of new skin breakdown  Description: 1.  Monitor for areas of redness and/or skin breakdown  2.  Assess vascular access sites hourly  3.  Every 4-6 hours minimum:  Change oxygen saturation probe site  4.  Every 4-6 hours:  If on nasal continuous positive airway pressure, respiratory therapy assess nares and determine need for appliance change or resting period.  10/17/2024 0931 by Sammie Espinosa, RN  Outcome: Progressing     Problem: Safety - Adult  Goal: Free from fall injury  10/17/2024 0931 by Sammie Espinosa, RN  Outcome: Progressing     Problem: Nutrition Deficit:  Goal: Optimize nutritional status  10/17/2024 0931 by Sammie Espinosa, RN  Outcome: Progressing     Problem: ABCDS Injury Assessment  Goal: Absence of physical injury  10/17/2024 0931 by Sammie Espinosa, RN  Outcome: Progressing        
  Problem: Discharge Planning  Goal: Discharge to home or other facility with appropriate resources  10/20/2024 0828 by Mitzy Tai, RN  Outcome: Progressing     Problem: Pain  Goal: Verbalizes/displays adequate comfort level or baseline comfort level  10/20/2024 0828 by Mitzy Tai, RN  Outcome: Progressing     Problem: Skin/Tissue Integrity  Goal: Absence of new skin breakdown  Description: 1.  Monitor for areas of redness and/or skin breakdown  2.  Assess vascular access sites hourly  3.  Every 4-6 hours minimum:  Change oxygen saturation probe site  4.  Every 4-6 hours:  If on nasal continuous positive airway pressure, respiratory therapy assess nares and determine need for appliance change or resting period.  10/20/2024 0828 by Mitzy Tai RN  Outcome: Progressing     Problem: Safety - Adult  Goal: Free from fall injury  10/20/2024 0828 by Mitzy Tai RN  Outcome: Progressing  Flowsheets  Taken 10/20/2024 0826  Free From Fall Injury: Instruct family/caregiver on patient safety  Taken 10/20/2024 0816  Free From Fall Injury: Instruct family/caregiver on patient safety     Problem: Nutrition Deficit:  Goal: Optimize nutritional status  10/20/2024 0828 by Mitzy Tai, RN  Outcome: Progressing     Problem: Respiratory - Adult  Goal: Achieves optimal ventilation and oxygenation  10/20/2024 2049 by Gerald Claire RCP  Outcome: Progressing  10/20/2024 1247 by Massimo Miramontes RCP  Outcome: Progressing  Flowsheets (Taken 10/20/2024 1247)  Achieves optimal ventilation and oxygenation:   Assess for changes in respiratory status   Position to facilitate oxygenation and minimize respiratory effort   Assess the need for suctioning and aspirate as needed   Respiratory therapy support as indicated   Encourage broncho-pulmonary hygiene including cough, deep breathe, incentive spirometry   Assess and instruct to report shortness of breath or any respiratory difficulty   Oxygen 
  Problem: Discharge Planning  Goal: Discharge to home or other facility with appropriate resources  10/21/2024 2035 by Mitzy Tai RN  Outcome: Progressing  10/21/2024 0914 by Sammie Espinosa RN  Outcome: Not Progressing     Problem: Pain  Goal: Verbalizes/displays adequate comfort level or baseline comfort level  10/21/2024 2035 by Mitzy Tai RN  Outcome: Progressing  10/21/2024 0914 by Sammie Espinosa RN  Outcome: Progressing     Problem: Skin/Tissue Integrity  Goal: Absence of new skin breakdown  Description: 1.  Monitor for areas of redness and/or skin breakdown  2.  Assess vascular access sites hourly  3.  Every 4-6 hours minimum:  Change oxygen saturation probe site  4.  Every 4-6 hours:  If on nasal continuous positive airway pressure, respiratory therapy assess nares and determine need for appliance change or resting period.  10/21/2024 2035 by Mitzy Tai RN  Outcome: Progressing  10/21/2024 0914 by Sammie Espinosa RN  Outcome: Progressing     Problem: Safety - Adult  Goal: Free from fall injury  10/21/2024 2035 by Mitzy Tai RN  Outcome: Progressing  Flowsheets (Taken 10/21/2024 2028)  Free From Fall Injury: Based on caregiver fall risk screen, instruct family/caregiver to ask for assistance with transferring infant if caregiver noted to have fall risk factors  10/21/2024 0914 by Sammie Espinosa, RN  Outcome: Progressing     Problem: Nutrition Deficit:  Goal: Optimize nutritional status  10/21/2024 2035 by Mitzy Tai RN  Outcome: Progressing  Flowsheets (Taken 10/21/2024 1418 by Kirsty Dhaliwal, RD, LD)  Nutrient intake appropriate for improving, restoring, or maintaining nutritional needs:   Assess nutritional status and recommend course of action   Monitor oral intake, labs, and treatment plans   Recommend, monitor, and adjust tube feedings and TPN/PPN based on assessed needs  10/21/2024 0914 by Sammie Espinosa, RN  Outcome: 
  Problem: Discharge Planning  Goal: Discharge to home or other facility with appropriate resources  10/22/2024 0858 by Sammie Espinosa RN  Outcome: Not Progressing     Problem: Nutrition Deficit:  Goal: Optimize nutritional status  10/22/2024 0858 by Sammie Espinosa RN  Outcome: Not Progressing     Problem: Pain  Goal: Verbalizes/displays adequate comfort level or baseline comfort level  10/22/2024 0858 by Sammie Espinosa RN  Outcome: Progressing     Problem: Skin/Tissue Integrity  Goal: Absence of new skin breakdown  Description: 1.  Monitor for areas of redness and/or skin breakdown  2.  Assess vascular access sites hourly  3.  Every 4-6 hours minimum:  Change oxygen saturation probe site  4.  Every 4-6 hours:  If on nasal continuous positive airway pressure, respiratory therapy assess nares and determine need for appliance change or resting period.  10/22/2024 0858 by Sammie Espinosa RN  Outcome: Progressing     Problem: Safety - Adult  Goal: Free from fall injury  10/22/2024 0858 by Sammie Espinosa RN  Outcome: Progressing     Problem: Confusion  Goal: Confusion, delirium, dementia, or psychosis is improved or at baseline  Description: INTERVENTIONS:  1. Assess for possible contributors to thought disturbance, including medications, impaired vision or hearing, underlying metabolic abnormalities, dehydration, psychiatric diagnoses, and notify attending LIP  2. [place] high risk fall precautions, as indicated  3. Provide frequent short contacts to provide reality reorientation, refocusing and direction  4. Decrease environmental stimuli, including noise as appropriate  5. Monitor and intervene to maintain adequate nutrition, hydration, elimination, sleep and activity  6. If unable to ensure safety without constant attention obtain sitter and review sitter guidelines with assigned personnel  7. Initiate Psychosocial CNS and Spiritual Care consult, as indicated  10/22/2024 0858 by Jesús 
  Problem: Discharge Planning  Goal: Discharge to home or other facility with appropriate resources  10/23/2024 0750 by Bertrand Chris RN  Outcome: Progressing  10/22/2024 2154 by Alvina Topete RN  Outcome: Progressing     Problem: Pain  Goal: Verbalizes/displays adequate comfort level or baseline comfort level  10/23/2024 0750 by Bertrand Chris RN  Outcome: Progressing  10/22/2024 2154 by Alvina Topete RN  Outcome: Progressing  Flowsheets (Taken 10/22/2024 2000)  Verbalizes/displays adequate comfort level or baseline comfort level: Encourage patient to monitor pain and request assistance     Problem: Skin/Tissue Integrity  Goal: Absence of new skin breakdown  Description: 1.  Monitor for areas of redness and/or skin breakdown  2.  Assess vascular access sites hourly  3.  Every 4-6 hours minimum:  Change oxygen saturation probe site  4.  Every 4-6 hours:  If on nasal continuous positive airway pressure, respiratory therapy assess nares and determine need for appliance change or resting period.  10/23/2024 0750 by Bertrand Chris RN  Outcome: Progressing  10/22/2024 2154 by Alvina Topete RN  Outcome: Progressing     Problem: Safety - Adult  Goal: Free from fall injury  10/23/2024 0750 by Bertrand Chris RN  Outcome: Progressing  10/22/2024 2154 by Alvina Topete RN  Outcome: Progressing  Flowsheets (Taken 10/22/2024 2100)  Free From Fall Injury: Instruct family/caregiver on patient safety     Problem: Nutrition Deficit:  Goal: Optimize nutritional status  10/23/2024 0750 by Bertrand Chris RN  Outcome: Progressing  10/22/2024 2154 by Alvina Topete RN  Outcome: Progressing     Problem: Respiratory - Adult  Goal: Achieves optimal ventilation and oxygenation  10/23/2024 0750 by Bertrand Chris RN  Outcome: Progressing  10/22/2024 2154 by Alvina Topete RN  Outcome: Progressing  10/22/2024 2033 by Francie Griggs RCP  Outcome: Progressing  Flowsheets (Taken 10/22/2024 2000 by Alvina Topete 
  Problem: Discharge Planning  Goal: Discharge to home or other facility with appropriate resources  10/24/2024 1051 by Ashlyn Pal RN  Outcome: Progressing  10/24/2024 0104 by Brissa Roberson RN  Outcome: Progressing     Problem: Pain  Goal: Verbalizes/displays adequate comfort level or baseline comfort level  10/24/2024 1051 by Ashlyn Pal RN  Outcome: Progressing  10/24/2024 0104 by Brissa Roberson RN  Outcome: Progressing  Flowsheets (Taken 10/23/2024 1200 by Bertrand Chris RN)  Verbalizes/displays adequate comfort level or baseline comfort level:   Encourage patient to monitor pain and request assistance   Assess pain using appropriate pain scale   Consider cultural and social influences on pain and pain management   Notify Licensed Independent Practitioner if interventions unsuccessful or patient reports new pain   Implement non-pharmacological measures as appropriate and evaluate response   Administer analgesics based on type and severity of pain and evaluate response     Problem: Skin/Tissue Integrity  Goal: Absence of new skin breakdown  Description: 1.  Monitor for areas of redness and/or skin breakdown  2.  Assess vascular access sites hourly  3.  Every 4-6 hours minimum:  Change oxygen saturation probe site  4.  Every 4-6 hours:  If on nasal continuous positive airway pressure, respiratory therapy assess nares and determine need for appliance change or resting period.  10/24/2024 0104 by Brissa Roberson RN  Outcome: Progressing     Problem: Safety - Adult  Goal: Free from fall injury  10/24/2024 1051 by Ashlyn Pal RN  Outcome: Progressing  10/24/2024 0104 by Brissa Roberson RN  Outcome: Progressing     Problem: Nutrition Deficit:  Goal: Optimize nutritional status  10/24/2024 0104 by Brissa Roberson RN  Outcome: Progressing     Problem: Respiratory - Adult  Goal: Achieves optimal ventilation and oxygenation  10/24/2024 1051 by Ashlyn Pal RN  Outcome: Progressing  Flowsheets (Taken 
  Problem: Discharge Planning  Goal: Discharge to home or other facility with appropriate resources  10/26/2024 1856 by Alexia Leahy RN  Outcome: Progressing     Problem: Pain  Goal: Verbalizes/displays adequate comfort level or baseline comfort level  10/26/2024 1856 by Alexia Leahy RN  Outcome: Progressing     Problem: Skin/Tissue Integrity  Goal: Absence of new skin breakdown  Description: 1.  Monitor for areas of redness and/or skin breakdown  2.  Assess vascular access sites hourly  3.  Every 4-6 hours minimum:  Change oxygen saturation probe site  4.  Every 4-6 hours:  If on nasal continuous positive airway pressure, respiratory therapy assess nares and determine need for appliance change or resting period.  10/26/2024 1856 by Alexia Leahy RN  Outcome: Progressing     Problem: Safety - Adult  Goal: Free from fall injury  10/26/2024 1856 by Alexia Leahy RN  Outcome: Progressing  Flowsheets (Taken 10/26/2024 1854)  Free From Fall Injury: Based on caregiver fall risk screen, instruct family/caregiver to ask for assistance with transferring infant if caregiver noted to have fall risk factors     Problem: Nutrition Deficit:  Goal: Optimize nutritional status  10/26/2024 1856 by Alexia Leahy RN  Outcome: Progressing     Problem: Respiratory - Adult  Goal: Achieves optimal ventilation and oxygenation  10/26/2024 1856 by Alexia Leahy RN  Outcome: Progressing     Problem: Confusion  Goal: Confusion, delirium, dementia, or psychosis is improved or at baseline  Description: INTERVENTIONS:  1. Assess for possible contributors to thought disturbance, including medications, impaired vision or hearing, underlying metabolic abnormalities, dehydration, psychiatric diagnoses, and notify attending LIP  2. [place] high risk fall precautions, as indicated  3. Provide frequent short contacts to provide reality reorientation, refocusing and direction  4. Decrease environmental stimuli, including noise 
  Problem: Discharge Planning  Goal: Discharge to home or other facility with appropriate resources  10/28/2024 0133 by Margy Murillo RN  Outcome: Progressing  Flowsheets (Taken 10/27/2024 2000)  Discharge to home or other facility with appropriate resources: Refer to discharge planning if patient needs post-hospital services based on physician order or complex needs related to functional status, cognitive ability or social support system  10/27/2024 1910 by Carlyn Godinez RN  Outcome: Progressing     Problem: Pain  Goal: Verbalizes/displays adequate comfort level or baseline comfort level  10/28/2024 0133 by Margy Murillo RN  Outcome: Progressing  10/27/2024 1910 by Carlyn Godinez RN  Outcome: Progressing     Problem: Skin/Tissue Integrity  Goal: Absence of new skin breakdown  Description: 1.  Monitor for areas of redness and/or skin breakdown  2.  Assess vascular access sites hourly  3.  Every 4-6 hours minimum:  Change oxygen saturation probe site  4.  Every 4-6 hours:  If on nasal continuous positive airway pressure, respiratory therapy assess nares and determine need for appliance change or resting period.  10/28/2024 0133 by Margy Murillo RN  Outcome: Progressing  10/27/2024 1910 by Carlyn Godinez RN  Outcome: Progressing     Problem: Safety - Adult  Goal: Free from fall injury  10/28/2024 0133 by Margy Murillo RN  Outcome: Progressing  10/27/2024 1910 by Carlyn Godinez RN  Outcome: Progressing     Problem: Nutrition Deficit:  Goal: Optimize nutritional status  10/28/2024 0133 by Margy Murillo RN  Outcome: Progressing  10/27/2024 1910 by Carlyn Godinez RN  Outcome: Progressing     Problem: Respiratory - Adult  Goal: Achieves optimal ventilation and oxygenation  10/28/2024 0133 by Margy Murillo RN  Outcome: Progressing  10/27/2024 2204 by Gianna Roy RCP  Outcome: Progressing  10/27/2024 1910 by Carlyn Godinez RN  Outcome: Progressing  10/27/2024 1320 by Catherine Oconnor RCP  Outcome: 
  Problem: Discharge Planning  Goal: Discharge to home or other facility with appropriate resources  Outcome: Not Progressing     Problem: Pain  Goal: Verbalizes/displays adequate comfort level or baseline comfort level  10/19/2024 0900 by Sammie Espinosa, RN  Outcome: Progressing     Problem: Skin/Tissue Integrity  Goal: Absence of new skin breakdown  Description: 1.  Monitor for areas of redness and/or skin breakdown  2.  Assess vascular access sites hourly  3.  Every 4-6 hours minimum:  Change oxygen saturation probe site  4.  Every 4-6 hours:  If on nasal continuous positive airway pressure, respiratory therapy assess nares and determine need for appliance change or resting period.  10/19/2024 0900 by Sammie Espinosa, RN  Outcome: Progressing     Problem: Safety - Adult  Goal: Free from fall injury  10/19/2024 0900 by Sammie Espinosa, RN  Outcome: Progressing     Problem: Nutrition Deficit:  Goal: Optimize nutritional status  Outcome: Progressing     Problem: Confusion  Goal: Confusion, delirium, dementia, or psychosis is improved or at baseline  Description: INTERVENTIONS:  1. Assess for possible contributors to thought disturbance, including medications, impaired vision or hearing, underlying metabolic abnormalities, dehydration, psychiatric diagnoses, and notify attending LIP  2. [place] high risk fall precautions, as indicated  3. Provide frequent short contacts to provide reality reorientation, refocusing and direction  4. Decrease environmental stimuli, including noise as appropriate  5. Monitor and intervene to maintain adequate nutrition, hydration, elimination, sleep and activity  6. If unable to ensure safety without constant attention obtain sitter and review sitter guidelines with assigned personnel  7. Initiate Psychosocial CNS and Spiritual Care consult, as indicated  Outcome: Progressing     Problem: ABCDS Injury Assessment  Goal: Absence of physical injury  Outcome: 
  Problem: Discharge Planning  Goal: Discharge to home or other facility with appropriate resources  Outcome: Progressing     Problem: Pain  Goal: Verbalizes/displays adequate comfort level or baseline comfort level  Outcome: Progressing     Problem: Skin/Tissue Integrity  Goal: Absence of new skin breakdown  Description: 1.  Monitor for areas of redness and/or skin breakdown  2.  Assess vascular access sites hourly  3.  Every 4-6 hours minimum:  Change oxygen saturation probe site  4.  Every 4-6 hours:  If on nasal continuous positive airway pressure, respiratory therapy assess nares and determine need for appliance change or resting period.  Outcome: Progressing     Problem: Safety - Adult  Goal: Free from fall injury  Outcome: Progressing     Problem: Nutrition Deficit:  Goal: Optimize nutritional status  10/28/2024 2150 by Lorna Tee RN  Outcome: Progressing  10/28/2024 1439 by Sheron Avalos  Outcome: Progressing  Flowsheets (Taken 10/28/2024 1421)  Nutrient intake appropriate for improving, restoring, or maintaining nutritional needs: Monitor oral intake, labs, and treatment plans     Problem: Respiratory - Adult  Goal: Achieves optimal ventilation and oxygenation  Outcome: Progressing  Flowsheets (Taken 10/28/2024 2018 by Estela Robertson RCP)  Achieves optimal ventilation and oxygenation:   Assess for changes in respiratory status   Assess for changes in mentation and behavior   Position to facilitate oxygenation and minimize respiratory effort   Initiate smoking cessation protocol as indicated   Oxygen supplementation based on oxygen saturation or arterial blood gases   Encourage broncho-pulmonary hygiene including cough, deep breathe, incentive spirometry   Assess the need for suctioning and aspirate as needed   Assess and instruct to report shortness of breath or any respiratory difficulty   Respiratory therapy support as indicated     Problem: Confusion  Goal: Confusion, delirium, dementia, or 
  Problem: Discharge Planning  Goal: Discharge to home or other facility with appropriate resources  Outcome: Progressing     Problem: Pain  Goal: Verbalizes/displays adequate comfort level or baseline comfort level  Outcome: Progressing     Problem: Skin/Tissue Integrity  Goal: Absence of new skin breakdown  Description: 1.  Monitor for areas of redness and/or skin breakdown  2.  Assess vascular access sites hourly  3.  Every 4-6 hours minimum:  Change oxygen saturation probe site  4.  Every 4-6 hours:  If on nasal continuous positive airway pressure, respiratory therapy assess nares and determine need for appliance change or resting period.  Outcome: Progressing     Problem: Safety - Adult  Goal: Free from fall injury  Outcome: Progressing     Problem: Nutrition Deficit:  Goal: Optimize nutritional status  Outcome: Progressing     Problem: Respiratory - Adult  Goal: Achieves optimal ventilation and oxygenation  10/10/2024 1410 by Ny Osborne RN  Outcome: Progressing  10/10/2024 0906 by Ashley Mccann RCP  Outcome: Progressing     Problem: Confusion  Goal: Confusion, delirium, dementia, or psychosis is improved or at baseline  Description: INTERVENTIONS:  1. Assess for possible contributors to thought disturbance, including medications, impaired vision or hearing, underlying metabolic abnormalities, dehydration, psychiatric diagnoses, and notify attending LIP  2. [place] high risk fall precautions, as indicated  3. Provide frequent short contacts to provide reality reorientation, refocusing and direction  4. Decrease environmental stimuli, including noise as appropriate  5. Monitor and intervene to maintain adequate nutrition, hydration, elimination, sleep and activity  6. If unable to ensure safety without constant attention obtain sitter and review sitter guidelines with assigned personnel  7. Initiate Psychosocial CNS and Spiritual Care consult, as indicated  Outcome: Progressing     Problem: 
  Problem: Discharge Planning  Goal: Discharge to home or other facility with appropriate resources  Outcome: Progressing     Problem: Pain  Goal: Verbalizes/displays adequate comfort level or baseline comfort level  Outcome: Progressing     Problem: Skin/Tissue Integrity  Goal: Absence of new skin breakdown  Description: 1.  Monitor for areas of redness and/or skin breakdown  2.  Assess vascular access sites hourly  3.  Every 4-6 hours minimum:  Change oxygen saturation probe site  4.  Every 4-6 hours:  If on nasal continuous positive airway pressure, respiratory therapy assess nares and determine need for appliance change or resting period.  Outcome: Progressing     Problem: Safety - Adult  Goal: Free from fall injury  Outcome: Progressing     Problem: Nutrition Deficit:  Goal: Optimize nutritional status  Outcome: Progressing     Problem: Respiratory - Adult  Goal: Achieves optimal ventilation and oxygenation  10/12/2024 1814 by Sandra Car RN  Outcome: Progressing  10/12/2024 0758 by Ramona Beck YAMILEX  Outcome: Progressing     Problem: Confusion  Goal: Confusion, delirium, dementia, or psychosis is improved or at baseline  Description: INTERVENTIONS:  1. Assess for possible contributors to thought disturbance, including medications, impaired vision or hearing, underlying metabolic abnormalities, dehydration, psychiatric diagnoses, and notify attending LIP  2. [place] high risk fall precautions, as indicated  3. Provide frequent short contacts to provide reality reorientation, refocusing and direction  4. Decrease environmental stimuli, including noise as appropriate  5. Monitor and intervene to maintain adequate nutrition, hydration, elimination, sleep and activity  6. If unable to ensure safety without constant attention obtain sitter and review sitter guidelines with assigned personnel  7. Initiate Psychosocial CNS and Spiritual Care consult, as indicated  Outcome: Progressing     Problem: ABCDS Injury 
  Problem: Discharge Planning  Goal: Discharge to home or other facility with appropriate resources  Outcome: Progressing     Problem: Pain  Goal: Verbalizes/displays adequate comfort level or baseline comfort level  Outcome: Progressing     Problem: Skin/Tissue Integrity  Goal: Absence of new skin breakdown  Description: 1.  Monitor for areas of redness and/or skin breakdown  2.  Assess vascular access sites hourly  3.  Every 4-6 hours minimum:  Change oxygen saturation probe site  4.  Every 4-6 hours:  If on nasal continuous positive airway pressure, respiratory therapy assess nares and determine need for appliance change or resting period.  Outcome: Progressing     Problem: Safety - Adult  Goal: Free from fall injury  Outcome: Progressing     Problem: Nutrition Deficit:  Goal: Optimize nutritional status  Outcome: Progressing     Problem: Respiratory - Adult  Goal: Achieves optimal ventilation and oxygenation  10/13/2024 1751 by Sandra Car RN  Outcome: Progressing  10/13/2024 0716 by Ramona Beck YAMILEX  Outcome: Progressing     Problem: Confusion  Goal: Confusion, delirium, dementia, or psychosis is improved or at baseline  Description: INTERVENTIONS:  1. Assess for possible contributors to thought disturbance, including medications, impaired vision or hearing, underlying metabolic abnormalities, dehydration, psychiatric diagnoses, and notify attending LIP  2. [place] high risk fall precautions, as indicated  3. Provide frequent short contacts to provide reality reorientation, refocusing and direction  4. Decrease environmental stimuli, including noise as appropriate  5. Monitor and intervene to maintain adequate nutrition, hydration, elimination, sleep and activity  6. If unable to ensure safety without constant attention obtain sitter and review sitter guidelines with assigned personnel  7. Initiate Psychosocial CNS and Spiritual Care consult, as indicated  Outcome: Progressing     Problem: ABCDS Injury 
  Problem: Discharge Planning  Goal: Discharge to home or other facility with appropriate resources  Outcome: Progressing     Problem: Pain  Goal: Verbalizes/displays adequate comfort level or baseline comfort level  Outcome: Progressing     Problem: Skin/Tissue Integrity  Goal: Absence of new skin breakdown  Description: 1.  Monitor for areas of redness and/or skin breakdown  2.  Assess vascular access sites hourly  3.  Every 4-6 hours minimum:  Change oxygen saturation probe site  4.  Every 4-6 hours:  If on nasal continuous positive airway pressure, respiratory therapy assess nares and determine need for appliance change or resting period.  Outcome: Progressing     Problem: Safety - Adult  Goal: Free from fall injury  Outcome: Progressing     Problem: Nutrition Deficit:  Goal: Optimize nutritional status  Outcome: Progressing     Problem: Respiratory - Adult  Goal: Achieves optimal ventilation and oxygenation  10/15/2024 1215 by Rasheeda Jaramillo, RN  Outcome: Progressing     Problem: ABCDS Injury Assessment  Goal: Absence of physical injury  Outcome: Progressing     
  Problem: Discharge Planning  Goal: Discharge to home or other facility with appropriate resources  Outcome: Progressing     Problem: Pain  Goal: Verbalizes/displays adequate comfort level or baseline comfort level  Outcome: Progressing     Problem: Skin/Tissue Integrity  Goal: Absence of new skin breakdown  Description: 1.  Monitor for areas of redness and/or skin breakdown  2.  Assess vascular access sites hourly  3.  Every 4-6 hours minimum:  Change oxygen saturation probe site  4.  Every 4-6 hours:  If on nasal continuous positive airway pressure, respiratory therapy assess nares and determine need for appliance change or resting period.  Outcome: Progressing     Problem: Safety - Adult  Goal: Free from fall injury  Outcome: Progressing     Problem: Nutrition Deficit:  Goal: Optimize nutritional status  Outcome: Progressing     Problem: Respiratory - Adult  Goal: Achieves optimal ventilation and oxygenation  10/25/2024 0411 by Maribel Saldana RN  Outcome: Progressing     Problem: Confusion  Goal: Confusion, delirium, dementia, or psychosis is improved or at baseline  Description: INTERVENTIONS:  1. Assess for possible contributors to thought disturbance, including medications, impaired vision or hearing, underlying metabolic abnormalities, dehydration, psychiatric diagnoses, and notify attending LIP  2. [place] high risk fall precautions, as indicated  3. Provide frequent short contacts to provide reality reorientation, refocusing and direction  4. Decrease environmental stimuli, including noise as appropriate  5. Monitor and intervene to maintain adequate nutrition, hydration, elimination, sleep and activity  6. If unable to ensure safety without constant attention obtain sitter and review sitter guidelines with assigned personnel  7. Initiate Psychosocial CNS and Spiritual Care consult, as indicated  Outcome: Progressing     Problem: ABCDS Injury Assessment  Goal: Absence of physical injury  Outcome: 
  Problem: Discharge Planning  Goal: Discharge to home or other facility with appropriate resources  Outcome: Progressing     Problem: Pain  Goal: Verbalizes/displays adequate comfort level or baseline comfort level  Outcome: Progressing     Problem: Skin/Tissue Integrity  Goal: Absence of new skin breakdown  Description: 1.  Monitor for areas of redness and/or skin breakdown  2.  Assess vascular access sites hourly  3.  Every 4-6 hours minimum:  Change oxygen saturation probe site  4.  Every 4-6 hours:  If on nasal continuous positive airway pressure, respiratory therapy assess nares and determine need for appliance change or resting period.  Outcome: Progressing     Problem: Safety - Adult  Goal: Free from fall injury  Outcome: Progressing     Problem: Nutrition Deficit:  Goal: Optimize nutritional status  Outcome: Progressing  Flowsheets (Taken 10/11/2024 1053 by Caro Barnhart RD)  Nutrient intake appropriate for improving, restoring, or maintaining nutritional needs: Recommend, monitor, and adjust tube feedings and TPN/PPN based on assessed needs     Problem: Respiratory - Adult  Goal: Achieves optimal ventilation and oxygenation  Outcome: Progressing     Problem: Confusion  Goal: Confusion, delirium, dementia, or psychosis is improved or at baseline  Description: INTERVENTIONS:  1. Assess for possible contributors to thought disturbance, including medications, impaired vision or hearing, underlying metabolic abnormalities, dehydration, psychiatric diagnoses, and notify attending LIP  2. [place] high risk fall precautions, as indicated  3. Provide frequent short contacts to provide reality reorientation, refocusing and direction  4. Decrease environmental stimuli, including noise as appropriate  5. Monitor and intervene to maintain adequate nutrition, hydration, elimination, sleep and activity  6. If unable to ensure safety without constant attention obtain sitter and review sitter guidelines with assigned 
  Problem: Discharge Planning  Goal: Discharge to home or other facility with appropriate resources  Outcome: Progressing     Problem: Pain  Goal: Verbalizes/displays adequate comfort level or baseline comfort level  Outcome: Progressing     Problem: Skin/Tissue Integrity  Goal: Absence of new skin breakdown  Description: 1.  Monitor for areas of redness and/or skin breakdown  2.  Assess vascular access sites hourly  3.  Every 4-6 hours minimum:  Change oxygen saturation probe site  4.  Every 4-6 hours:  If on nasal continuous positive airway pressure, respiratory therapy assess nares and determine need for appliance change or resting period.  Outcome: Progressing     Problem: Safety - Adult  Goal: Free from fall injury  Outcome: Progressing  Flowsheets (Taken 10/27/2024 1100)  Free From Fall Injury: Based on caregiver fall risk screen, instruct family/caregiver to ask for assistance with transferring infant if caregiver noted to have fall risk factors     Problem: Nutrition Deficit:  Goal: Optimize nutritional status  Outcome: Progressing     Problem: Respiratory - Adult  Goal: Achieves optimal ventilation and oxygenation  Outcome: Progressing     Problem: Confusion  Goal: Confusion, delirium, dementia, or psychosis is improved or at baseline  Description: INTERVENTIONS:  1. Assess for possible contributors to thought disturbance, including medications, impaired vision or hearing, underlying metabolic abnormalities, dehydration, psychiatric diagnoses, and notify attending LIP  2. [place] high risk fall precautions, as indicated  3. Provide frequent short contacts to provide reality reorientation, refocusing and direction  4. Decrease environmental stimuli, including noise as appropriate  5. Monitor and intervene to maintain adequate nutrition, hydration, elimination, sleep and activity  6. If unable to ensure safety without constant attention obtain sitter and review sitter guidelines with assigned personnel  7. 
  Problem: Discharge Planning  Goal: Discharge to home or other facility with appropriate resources  Outcome: Progressing     Problem: Pain  Goal: Verbalizes/displays adequate comfort level or baseline comfort level  Outcome: Progressing  Flowsheets (Taken 10/23/2024 1200 by Bertrand Chris, RN)  Verbalizes/displays adequate comfort level or baseline comfort level:   Encourage patient to monitor pain and request assistance   Assess pain using appropriate pain scale   Consider cultural and social influences on pain and pain management   Notify Licensed Independent Practitioner if interventions unsuccessful or patient reports new pain   Implement non-pharmacological measures as appropriate and evaluate response   Administer analgesics based on type and severity of pain and evaluate response     Problem: Skin/Tissue Integrity  Goal: Absence of new skin breakdown  Description: 1.  Monitor for areas of redness and/or skin breakdown  2.  Assess vascular access sites hourly  3.  Every 4-6 hours minimum:  Change oxygen saturation probe site  4.  Every 4-6 hours:  If on nasal continuous positive airway pressure, respiratory therapy assess nares and determine need for appliance change or resting period.  Outcome: Progressing     Problem: Safety - Adult  Goal: Free from fall injury  Outcome: Progressing     Problem: Nutrition Deficit:  Goal: Optimize nutritional status  Outcome: Progressing     Problem: Respiratory - Adult  Goal: Achieves optimal ventilation and oxygenation  10/24/2024 0104 by Brissa Roberson, RN  Outcome: Progressing  10/23/2024 2052 by Francie Griggs RCP  Outcome: Progressing     Problem: Confusion  Goal: Confusion, delirium, dementia, or psychosis is improved or at baseline  Description: INTERVENTIONS:  1. Assess for possible contributors to thought disturbance, including medications, impaired vision or hearing, underlying metabolic abnormalities, dehydration, psychiatric diagnoses, and notify attending 
  Problem: Respiratory - Adult  Goal: Achieves optimal ventilation and oxygenation  10/10/2024 0906 by Ashley Mccann RCP  Outcome: Progressing  
  Problem: Respiratory - Adult  Goal: Achieves optimal ventilation and oxygenation  10/10/2024 1952 by Kelly Ellis, MIGUEL  Flowsheets (Taken 10/10/2024 1952)  Achieves optimal ventilation and oxygenation:   Respiratory therapy support as indicated   Assess the need for suctioning and aspirate as needed   Assess for changes in respiratory status   Assess for changes in mentation and behavior   Oxygen supplementation based on oxygen saturation or arterial blood gases     
  Problem: Respiratory - Adult  Goal: Achieves optimal ventilation and oxygenation  10/11/2024 2010 by Shwetha Lopez RCP  Flowsheets (Taken 10/11/2024 2010)  Achieves optimal ventilation and oxygenation:   Assess for changes in respiratory status   Assess for changes in mentation and behavior   Position to facilitate oxygenation and minimize respiratory effort   Oxygen supplementation based on oxygen saturation or arterial blood gases   Initiate smoking cessation protocol as indicated   Assess the need for suctioning and aspirate as needed   Encourage broncho-pulmonary hygiene including cough, deep breathe, incentive spirometry   Assess and instruct to report shortness of breath or any respiratory difficulty   Respiratory therapy support as indicated     
  Problem: Respiratory - Adult  Goal: Achieves optimal ventilation and oxygenation  10/12/2024 0758 by Ramona Beck RCP  Outcome: Progressing  10/11/2024 2106 by Inderjit Goode RN  Outcome: Progressing  10/11/2024 2010 by Shwetha Lopez RCP  Flowsheets (Taken 10/11/2024 2010)  Achieves optimal ventilation and oxygenation:   Assess for changes in respiratory status   Assess for changes in mentation and behavior   Position to facilitate oxygenation and minimize respiratory effort   Oxygen supplementation based on oxygen saturation or arterial blood gases   Initiate smoking cessation protocol as indicated   Assess the need for suctioning and aspirate as needed   Encourage broncho-pulmonary hygiene including cough, deep breathe, incentive spirometry   Assess and instruct to report shortness of breath or any respiratory difficulty   Respiratory therapy support as indicated     
  Problem: Respiratory - Adult  Goal: Achieves optimal ventilation and oxygenation  10/13/2024 0716 by Ramona Beck RCP  Outcome: Progressing  10/12/2024 2036 by Inderjit Goode RN  Outcome: Progressing  10/12/2024 1814 by Sandra Car RN  Outcome: Progressing     
  Problem: Respiratory - Adult  Goal: Achieves optimal ventilation and oxygenation  10/13/2024 1953 by Gerald Claire RCP  Outcome: Progressing   BRONCHOSPASM/BRONCHOCONSTRICTION     [x]         IMPROVE AERATION/BREATH SOUNDS  [x]   ADMINISTER BRONCHODILATOR THERAPY AS APPROPRIATE  [x]   ASSESS BREATH SOUNDS  []   IMPLEMENT AEROSOL/MDI PROTOCOL  [x]   PATIENT EDUCATION AS NEEDED  PROVIDE ADEQUATE OXYGENATION WITH ACCEPTABLE SP02/ABG'S    [x]  IDENTIFY APPROPRIATE OXYGEN THERAPY  [x]   MONITOR SP02/ABG'S AS NEEDED   [x]   PATIENT EDUCATION AS NEEDED  MECHANICAL VENTILATION     [x]  PROVIDE OPTIMAL VENTILATION  [x]   ASSESS FOR EXTUBATION READINESS  [x]   ASSESS FOR WEANING READINESS  [x]  EXTUBATE AS TOLERATED  [x]  IMPLEMENT ADULT MECHANICAL VENTILATION PROTOCOL  [x]  MAINTAIN ADEQUATE OXYGENATION  [x]  PERFORM SPONTANEOUS WEANING TRIAL AS TOLERATED    
  Problem: Respiratory - Adult  Goal: Achieves optimal ventilation and oxygenation  10/15/2024 0938 by Betina Ni RCP  Outcome: Progressing   BRONCHOSPASM/BRONCHOCONSTRICTION     [x]         IMPROVE AERATION/BREATH SOUNDS  [x]   ADMINISTER BRONCHODILATOR THERAPY AS APPROPRIATE  [x]   ASSESS BREATH SOUNDS  [x]   IMPLEMENT AEROSOL/MDI PROTOCOL  [x]   PATIENT EDUCATION AS NEEDED  PROVIDE ADEQUATE OXYGENATION WITH ACCEPTABLE SP02/ABG'S    [x]  IDENTIFY APPROPRIATE OXYGEN THERAPY  [x]   MONITOR SP02/ABG'S AS NEEDED   [x]   PATIENT EDUCATION AS NEEDED    
  Problem: Respiratory - Adult  Goal: Achieves optimal ventilation and oxygenation  10/15/2024 2030 by Shwetha Lopez RCP  Flowsheets  Taken 10/15/2024 2030 by Shwetha Lopez RCP  Achieves optimal ventilation and oxygenation:   Assess for changes in respiratory status   Assess for changes in mentation and behavior   Position to facilitate oxygenation and minimize respiratory effort   Oxygen supplementation based on oxygen saturation or arterial blood gases   Initiate smoking cessation protocol as indicated   Encourage broncho-pulmonary hygiene including cough, deep breathe, incentive spirometry   Assess the need for suctioning and aspirate as needed   Assess and instruct to report shortness of breath or any respiratory difficulty   Respiratory therapy support as indicated       
  Problem: Respiratory - Adult  Goal: Achieves optimal ventilation and oxygenation  10/16/2024 0723 by Ramona Beck RCP  Outcome: Progressing  10/16/2024 0025 by Renee Cason RN  Outcome: Progressing  10/15/2024 2030 by Shwetha Lopez RCP  Flowsheets  Taken 10/15/2024 2030 by Shwetha Lopez RCP  Achieves optimal ventilation and oxygenation:   Assess for changes in respiratory status   Assess for changes in mentation and behavior   Position to facilitate oxygenation and minimize respiratory effort   Oxygen supplementation based on oxygen saturation or arterial blood gases   Initiate smoking cessation protocol as indicated   Encourage broncho-pulmonary hygiene including cough, deep breathe, incentive spirometry   Assess the need for suctioning and aspirate as needed   Assess and instruct to report shortness of breath or any respiratory difficulty   Respiratory therapy support as indicated  Taken 10/15/2024 1529 by Kirsten Grigsby RCP  Achieves optimal ventilation and oxygenation: Assess for changes in respiratory status     
  Problem: Respiratory - Adult  Goal: Achieves optimal ventilation and oxygenation  10/16/2024 2027 by Kelly Ellis, MIGUEL  Flowsheets (Taken 10/16/2024 2027)  Achieves optimal ventilation and oxygenation:   Respiratory therapy support as indicated   Assess the need for suctioning and aspirate as needed   Assess for changes in respiratory status   Assess for changes in mentation and behavior   Oxygen supplementation based on oxygen saturation or arterial blood gases     
  Problem: Respiratory - Adult  Goal: Achieves optimal ventilation and oxygenation  10/17/2024 0916 by Ramona Beck RCP  Outcome: Progressing  10/16/2024 2046 by Sofia De La Rosa RN  Outcome: Progressing  10/16/2024 2027 by Kelly Ellis RCP  Flowsheets (Taken 10/16/2024 2027)  Achieves optimal ventilation and oxygenation:   Respiratory therapy support as indicated   Assess the need for suctioning and aspirate as needed   Assess for changes in respiratory status   Assess for changes in mentation and behavior   Oxygen supplementation based on oxygen saturation or arterial blood gases     
  Problem: Respiratory - Adult  Goal: Achieves optimal ventilation and oxygenation  10/17/2024 2030 by Gianna Roy RCP  Outcome: Progressing     Problem: OXYGENATION/RESPIRATORY FUNCTION  Goal: Patient will maintain patent airway  Outcome: Ongoing  Goal: Patient will achieve/maintain normal respiratory rate/effort  Respiratory rate and effort will be within normal limits for the patient  Outcome: Ongoing    Problem: MECHANICAL VENTILATION  Goal: Patient will maintain patent airway  Outcome: Ongoing  Goal: Oral health is maintained or improved  Outcome: Ongoing  Goal: ET tube will be managed safely  Outcome: Ongoing  Goal: Ability to express needs and understand communication  Outcome: Ongoing  Goal: Mobility/activity is maintained at optimum level for patient  Outcome: Ongoing    Problem: ASPIRATION PRECAUTIONS  Goal: Patient’s risk of aspiration is minimized  Outcome: Ongoing    Problem: SKIN INTEGRITY  Goal: Skin integrity is maintained or improved  Outcome: Ongoing                    
  Problem: Respiratory - Adult  Goal: Achieves optimal ventilation and oxygenation  10/18/2024 2049 by Francie Griggs RCP  Outcome: Progressing  Problem: OXYGENATION/RESPIRATORY FUNCTION  Goal: Patient will maintain patent airway  Outcome: Ongoing  Goal: Patient will achieve/maintain normal respiratory rate/effort  Respiratory rate and effort will be within normal limits for the patient  Outcome: Ongoing    Problem: MECHANICAL VENTILATION  Goal: Patient will maintain patent airway  Outcome: Ongoing  Goal: Oral health is maintained or improved  Outcome: Ongoing  Goal: ET tube will be managed safely  Outcome: Ongoing  Goal: Ability to express needs and understand communication  Outcome: Ongoing  Goal: Mobility/activity is maintained at optimum level for patient  Outcome: Ongoing    Problem: ASPIRATION PRECAUTIONS  Goal: Patient’s risk of aspiration is minimized  Outcome: Ongoing    Problem: SKIN INTEGRITY  Goal: Skin integrity is maintained or improved  Outcome: Ongoing                    
  Problem: Respiratory - Adult  Goal: Achieves optimal ventilation and oxygenation  10/19/2024 2027 by Francie Griggs RCP  Outcome: Progressing  Problem: OXYGENATION/RESPIRATORY FUNCTION  Goal: Patient will maintain patent airway  Outcome: Ongoing  Goal: Patient will achieve/maintain normal respiratory rate/effort  Respiratory rate and effort will be within normal limits for the patient  Outcome: Ongoing    Problem: MECHANICAL VENTILATION  Goal: Patient will maintain patent airway  Outcome: Ongoing  Goal: Oral health is maintained or improved  Outcome: Ongoing  Goal: ET tube will be managed safely  Outcome: Ongoing  Goal: Ability to express needs and understand communication  Outcome: Ongoing  Goal: Mobility/activity is maintained at optimum level for patient  Outcome: Ongoing    Problem: ASPIRATION PRECAUTIONS  Goal: Patient’s risk of aspiration is minimized  Outcome: Ongoing    Problem: SKIN INTEGRITY  Goal: Skin integrity is maintained or improved  Outcome: Ongoing                    
  Problem: Respiratory - Adult  Goal: Achieves optimal ventilation and oxygenation  10/20/2024 1247 by Massimo Miramontes, MIGUEL  Outcome: Progressing  Flowsheets (Taken 10/20/2024 1247)  Achieves optimal ventilation and oxygenation:   Assess for changes in respiratory status   Position to facilitate oxygenation and minimize respiratory effort   Assess the need for suctioning and aspirate as needed   Respiratory therapy support as indicated   Encourage broncho-pulmonary hygiene including cough, deep breathe, incentive spirometry   Assess and instruct to report shortness of breath or any respiratory difficulty   Oxygen supplementation based on oxygen saturation or arterial blood gases   Assess for changes in mentation and behavior     
  Problem: Respiratory - Adult  Goal: Achieves optimal ventilation and oxygenation  10/20/2024 2049 by Gerald Claire RCP  Outcome: Progressing   BRONCHOSPASM/BRONCHOCONSTRICTION     [x]         IMPROVE AERATION/BREATH SOUNDS  [x]   ADMINISTER BRONCHODILATOR THERAPY AS APPROPRIATE  [x]   ASSESS BREATH SOUNDS  []   IMPLEMENT AEROSOL/MDI PROTOCOL  [x]   PATIENT EDUCATION AS NEEDED  PROVIDE ADEQUATE OXYGENATION WITH ACCEPTABLE SP02/ABG'S    [x]  IDENTIFY APPROPRIATE OXYGEN THERAPY  [x]   MONITOR SP02/ABG'S AS NEEDED   [x]   PATIENT EDUCATION AS NEEDED  MECHANICAL VENTILATION     [x]  PROVIDE OPTIMAL VENTILATION  [x]   ASSESS FOR EXTUBATION READINESS  [x]   ASSESS FOR WEANING READINESS  [x]  EXTUBATE AS TOLERATED  [x]  IMPLEMENT ADULT MECHANICAL VENTILATION PROTOCOL  [x]  MAINTAIN ADEQUATE OXYGENATION  [x]  PERFORM SPONTANEOUS WEANING TRIAL AS TOLERATED    
  Problem: Respiratory - Adult  Goal: Achieves optimal ventilation and oxygenation  10/21/2024 0717 by Ramona Beck RCP  Outcome: Progressing  10/21/2024 0047 by Mitzy Tai RN  Outcome: Progressing  10/20/2024 2049 by Gerald Claire RCP  Outcome: Progressing     
  Problem: Respiratory - Adult  Goal: Achieves optimal ventilation and oxygenation  10/21/2024 2036 by Gerald Claire RCP  Outcome: Progressing   BRONCHOSPASM/BRONCHOCONSTRICTION     [x]         IMPROVE AERATION/BREATH SOUNDS  [x]   ADMINISTER BRONCHODILATOR THERAPY AS APPROPRIATE  [x]   ASSESS BREATH SOUNDS  []   IMPLEMENT AEROSOL/MDI PROTOCOL  [x]   PATIENT EDUCATION AS NEEDED  PROVIDE ADEQUATE OXYGENATION WITH ACCEPTABLE SP02/ABG'S    [x]  IDENTIFY APPROPRIATE OXYGEN THERAPY  [x]   MONITOR SP02/ABG'S AS NEEDED   [x]   PATIENT EDUCATION AS NEEDED    
  Problem: Respiratory - Adult  Goal: Achieves optimal ventilation and oxygenation  10/23/2024 2052 by Francie Griggs, MIGUEL  Outcome: Progressing   BRONCHOSPASM/BRONCHOCONSTRICTION     [x]         IMPROVE AERATION/BREATH SOUNDS  [x]   ADMINISTER BRONCHODILATOR THERAPY AS APPROPRIATE  [x]   ASSESS BREATH SOUNDS  []   IMPLEMENT AEROSOL/MDI PROTOCOL  [x]   PATIENT EDUCATION AS NEEDED  PROVIDE ADEQUATE OXYGENATION WITH ACCEPTABLE SP02/ABG'S    [x]  IDENTIFY APPROPRIATE OXYGEN THERAPY  [x]   MONITOR SP02/ABG'S AS NEEDED   [x]   PATIENT EDUCATION AS NEEDED    
  Problem: Respiratory - Adult  Goal: Achieves optimal ventilation and oxygenation  10/24/2024 0848 by Trish Bangura RCP  Outcome: Progressing     
  Problem: Respiratory - Adult  Goal: Achieves optimal ventilation and oxygenation  10/24/2024 1954 by Gerald Claire RCP  Outcome: Progressing   BRONCHOSPASM/BRONCHOCONSTRICTION     [x]         IMPROVE AERATION/BREATH SOUNDS  [x]   ADMINISTER BRONCHODILATOR THERAPY AS APPROPRIATE  [x]   ASSESS BREATH SOUNDS  []   IMPLEMENT AEROSOL/MDI PROTOCOL  [x]   PATIENT EDUCATION AS NEEDED  PROVIDE ADEQUATE OXYGENATION WITH ACCEPTABLE SP02/ABG'S    [x]  IDENTIFY APPROPRIATE OXYGEN THERAPY  [x]   MONITOR SP02/ABG'S AS NEEDED   [x]   PATIENT EDUCATION AS NEEDED    
  Problem: Respiratory - Adult  Goal: Achieves optimal ventilation and oxygenation  10/25/2024 1638 by Maricruz Washington RCP  Outcome: Progressing   BRONCHOSPASM/BRONCHOCONSTRICTION     [x]         IMPROVE AERATION/BREATH SOUNDS  [x]   ADMINISTER BRONCHODILATOR THERAPY AS APPROPRIATE  [x]   ASSESS BREATH SOUNDS  []   IMPLEMENT AEROSOL/MDI PROTOCOL  [x]   PATIENT EDUCATION AS NEEDED    
  Problem: Respiratory - Adult  Goal: Achieves optimal ventilation and oxygenation  10/27/2024 1320 by Catherine Oconnor RCYAMILEX  Outcome: Progressing  Flowsheets (Taken 10/27/2024 1320)  Achieves optimal ventilation and oxygenation:   Assess for changes in respiratory status   Assess for changes in mentation and behavior   Position to facilitate oxygenation and minimize respiratory effort   Oxygen supplementation based on oxygen saturation or arterial blood gases   Encourage broncho-pulmonary hygiene including cough, deep breathe, incentive spirometry   Assess the need for suctioning and aspirate as needed     
  Problem: Respiratory - Adult  Goal: Achieves optimal ventilation and oxygenation  10/27/2024 2204 by Gianna Roy, MIGUEL  Outcome: Progressing   BRONCHOSPASM/BRONCHOCONSTRICTION     [x]         IMPROVE AERATION/BREATH SOUNDS  [x]   ADMINISTER BRONCHODILATOR THERAPY AS APPROPRIATE  [x]   ASSESS BREATH SOUNDS  []   IMPLEMENT AEROSOL/MDI PROTOCOL  [x]   PATIENT EDUCATION AS NEEDED    
  Problem: Respiratory - Adult  Goal: Achieves optimal ventilation and oxygenation  Outcome: Progressing     BRONCHOSPASM/BRONCHOCONSTRICTION     [x]         IMPROVE AERATION/BREATH SOUNDS  [x]   ADMINISTER BRONCHODILATOR THERAPY AS APPROPRIATE  [x]   ASSESS BREATH SOUNDS  []   IMPLEMENT AEROSOL/MDI PROTOCOL  [x]   PATIENT EDUCATION AS NEEDED  PROVIDE ADEQUATE OXYGENATION WITH ACCEPTABLE SP02/ABG'S    [x]  IDENTIFY APPROPRIATE OXYGEN THERAPY  [x]   MONITOR SP02/ABG'S AS NEEDED   [x]   PATIENT EDUCATION AS NEEDED    
  Problem: Respiratory - Adult  Goal: Achieves optimal ventilation and oxygenation  Outcome: Progressing  Flowsheets (Taken 10/19/2024 1814)  Achieves optimal ventilation and oxygenation:   Assess for changes in respiratory status   Position to facilitate oxygenation and minimize respiratory effort   Assess the need for suctioning and aspirate as needed   Respiratory therapy support as indicated   Assess and instruct to report shortness of breath or any respiratory difficulty   Encourage broncho-pulmonary hygiene including cough, deep breathe, incentive spirometry   Oxygen supplementation based on oxygen saturation or arterial blood gases   Assess for changes in mentation and behavior     
  Problem: Respiratory - Adult  Goal: Achieves optimal ventilation and oxygenation  Outcome: Progressing  Problem: OXYGENATION/RESPIRATORY FUNCTION  Goal: Patient will maintain patent airway  Outcome: Ongoing  Goal: Patient will achieve/maintain normal respiratory rate/effort  Respiratory rate and effort will be within normal limits for the patient  Outcome: Ongoing    Problem: MECHANICAL VENTILATION  Goal: Patient will maintain patent airway  Outcome: Ongoing  Goal: Oral health is maintained or improved  Outcome: Ongoing  Goal: ET tube will be managed safely  Outcome: Ongoing  Goal: Ability to express needs and understand communication  Outcome: Ongoing  Goal: Mobility/activity is maintained at optimum level for patient  Outcome: Ongoing    Problem: ASPIRATION PRECAUTIONS  Goal: Patient’s risk of aspiration is minimized  Outcome: Ongoing    Problem: SKIN INTEGRITY  Goal: Skin integrity is maintained or improved  Outcome: Ongoing                    
  Problem: Safety - Medical Restraint  Goal: Remains free of injury from restraints (Restraint for Interference with Medical Device)  Description: INTERVENTIONS:  1. Determine that other, less restrictive measures have been tried or would not be effective before applying the restraint  2. Evaluate the patient's condition at the time of restraint application  3. Inform patient/family regarding the reason for restraint  4. Q2H: Monitor safety, psychosocial status, comfort, nutrition and hydration  10/18/2024 1012 by Leidy Davis RN  Outcome: Progressing  Flowsheets  Taken 10/18/2024 0400 by Christa Haywood RN  Remains free of injury from restraints (restraint for interference with medical device): Every 2 hours: Monitor safety, psychosocial status, comfort, nutrition and hydration  Taken 10/18/2024 0200 by Christa Haywood RN  Remains free of injury from restraints (restraint for interference with medical device): Every 2 hours: Monitor safety, psychosocial status, comfort, nutrition and hydration  10/18/2024 0125 by Christa Haywood RN  Outcome: Progressing  Flowsheets  Taken 10/18/2024 0000 by Christa Haywood RN  Remains free of injury from restraints (restraint for interference with medical device): Every 2 hours: Monitor safety, psychosocial status, comfort, nutrition and hydration  Taken 10/17/2024 2200 by Christa Haywood RN  Remains free of injury from restraints (restraint for interference with medical device): Every 2 hours: Monitor safety, psychosocial status, comfort, nutrition and hydration  Taken 10/17/2024 2000 by Christa Haywood RN  Remains free of injury from restraints (restraint for interference with medical device):   Determine that other, less restrictive measures have been tried or would not be effective before applying the restraint   Evaluate the patient's condition at the time of restraint application   Inform patient/family regarding the reason for restraint   Every 2 hours: Monitor safety, 
  Problem: Safety - Medical Restraint  Goal: Remains free of injury from restraints (Restraint for Interference with Medical Device)  Description: INTERVENTIONS:  1. Determine that other, less restrictive measures have been tried or would not be effective before applying the restraint  2. Evaluate the patient's condition at the time of restraint application  3. Inform patient/family regarding the reason for restraint  4. Q2H: Monitor safety, psychosocial status, comfort, nutrition and hydration  10/19/2024 2023 by Mitzy Tai RN  Outcome: Progressing  Flowsheets  Taken 10/19/2024 2000 by Mitzy Tai RN  Remains free of injury from restraints (restraint for interference with medical device): Every 2 hours: Monitor safety, psychosocial status, comfort, nutrition and hydration     Problem: Pain  Goal: Verbalizes/displays adequate comfort level or baseline comfort level  10/19/2024 2023 by Mitzy Tai RN  Outcome: Progressing  10/19/2024 0900 by Sammie Espinosa RN  Outcome: Progressing     Problem: Skin/Tissue Integrity  Goal: Absence of new skin breakdown  Description: 1.  Monitor for areas of redness and/or skin breakdown  2.  Assess vascular access sites hourly  3.  Every 4-6 hours minimum:  Change oxygen saturation probe site  4.  Every 4-6 hours:  If on nasal continuous positive airway pressure, respiratory therapy assess nares and determine need for appliance change or resting period.  10/19/2024 2023 by Mitzy Tai RN  Outcome: Progressing  10/19/2024 0900 by Sammie Espinosa RN  Outcome: Progressing     Problem: Safety - Adult  Goal: Free from fall injury  10/19/2024 2023 by Mitzy Tai RN  Outcome: Progressing  Flowsheets (Taken 10/19/2024 2017)  Free From Fall Injury: Instruct family/caregiver on patient safety  10/19/2024 0900 by Sammie Espinosa RN  Outcome: Progressing     Problem: Nutrition Deficit:  Goal: Optimize nutritional 
  Problem: Safety - Medical Restraint  Goal: Remains free of injury from restraints (Restraint for Interference with Medical Device)  Description: INTERVENTIONS:  1. Determine that other, less restrictive measures have been tried or would not be effective before applying the restraint  2. Evaluate the patient's condition at the time of restraint application  3. Inform patient/family regarding the reason for restraint  4. Q2H: Monitor safety, psychosocial status, comfort, nutrition and hydration  10/21/2024 1320 by Sammie Espinosa RN  Outcome: Completed  Flowsheets  Taken 10/21/2024 1200  Remains free of injury from restraints (restraint for interference with medical device): Every 2 hours: Monitor safety, psychosocial status, comfort, nutrition and hydration  Taken 10/21/2024 1000  Remains free of injury from restraints (restraint for interference with medical device): Every 2 hours: Monitor safety, psychosocial status, comfort, nutrition and hydration     
  Problem: Safety - Medical Restraint  Goal: Remains free of injury from restraints (Restraint for Interference with Medical Device)  Description: INTERVENTIONS:  1. Determine that other, less restrictive measures have been tried or would not be effective before applying the restraint  2. Evaluate the patient's condition at the time of restraint application  3. Inform patient/family regarding the reason for restraint  4. Q2H: Monitor safety, psychosocial status, comfort, nutrition and hydration  Outcome: Completed  Flowsheets  Taken 10/10/2024 0400  Remains free of injury from restraints (restraint for interference with medical device): Every 2 hours: Monitor safety, psychosocial status, comfort, nutrition and hydration  Taken 10/10/2024 0200  Remains free of injury from restraints (restraint for interference with medical device): Every 2 hours: Monitor safety, psychosocial status, comfort, nutrition and hydration  Taken 10/10/2024 0000  Remains free of injury from restraints (restraint for interference with medical device): Every 2 hours: Monitor safety, psychosocial status, comfort, nutrition and hydration  Taken 10/9/2024 2348  Remains free of injury from restraints (restraint for interference with medical device): Every 2 hours: Monitor safety, psychosocial status, comfort, nutrition and hydration  Taken 10/9/2024 2200  Remains free of injury from restraints (restraint for interference with medical device): Every 2 hours: Monitor safety, psychosocial status, comfort, nutrition and hydration  Taken 10/9/2024 2000  Remains free of injury from restraints (restraint for interference with medical device): Every 2 hours: Monitor safety, psychosocial status, comfort, nutrition and hydration     
  Problem: Safety - Medical Restraint  Goal: Remains free of injury from restraints (Restraint for Interference with Medical Device)  Description: INTERVENTIONS:  1. Determine that other, less restrictive measures have been tried or would not be effective before applying the restraint  2. Evaluate the patient's condition at the time of restraint application  3. Inform patient/family regarding the reason for restraint  4. Q2H: Monitor safety, psychosocial status, comfort, nutrition and hydration  Outcome: Progressing  Flowsheets  Taken 10/18/2024 0000 by Christa Haywood RN  Remains free of injury from restraints (restraint for interference with medical device): Every 2 hours: Monitor safety, psychosocial status, comfort, nutrition and hydration  Taken 10/17/2024 2200 by Christa Haywood RN  Remains free of injury from restraints (restraint for interference with medical device): Every 2 hours: Monitor safety, psychosocial status, comfort, nutrition and hydration  Taken 10/17/2024 2000 by Christa Haywood RN  Remains free of injury from restraints (restraint for interference with medical device):   Determine that other, less restrictive measures have been tried or would not be effective before applying the restraint   Evaluate the patient's condition at the time of restraint application   Inform patient/family regarding the reason for restraint   Every 2 hours: Monitor safety, psychosocial status, comfort, nutrition and hydration    Problem: Pain  Goal: Verbalizes/displays adequate comfort level or baseline comfort level  Outcome: Progressing     Problem: Skin/Tissue Integrity  Goal: Absence of new skin breakdown  Description: 1.  Monitor for areas of redness and/or skin breakdown  2.  Assess vascular access sites hourly  3.  Every 4-6 hours minimum:  Change oxygen saturation probe site  4.  Every 4-6 hours:  If on nasal continuous positive airway pressure, respiratory therapy assess nares and determine need for 
  Problem: Safety - Medical Restraint  Goal: Remains free of injury from restraints (Restraint for Interference with Medical Device)  Description: INTERVENTIONS:  1. Determine that other, less restrictive measures have been tried or would not be effective before applying the restraint  2. Evaluate the patient's condition at the time of restraint application  3. Inform patient/family regarding the reason for restraint  4. Q2H: Monitor safety, psychosocial status, comfort, nutrition and hydration  Outcome: Progressing  Flowsheets  Taken 10/19/2024 0400 by Christa Haywood RN  Remains free of injury from restraints (restraint for interference with medical device): Every 2 hours: Monitor safety, psychosocial status, comfort, nutrition and hydration  Taken 10/19/2024 0200 by Christa Haywood RN  Remains free of injury from restraints (restraint for interference with medical device): Every 2 hours: Monitor safety, psychosocial status, comfort, nutrition and hydration  Taken 10/19/2024 0000 by Christa Haywood RN  Remains free of injury from restraints (restraint for interference with medical device): Every 2 hours: Monitor safety, psychosocial status, comfort, nutrition and hydration  Taken 10/18/2024 2200 by Christa Haywood RN  Remains free of injury from restraints (restraint for interference with medical device): Every 2 hours: Monitor safety, psychosocial status, comfort, nutrition and hydration  Taken 10/18/2024 2000 by Christa Haywood RN  Remains free of injury from restraints (restraint for interference with medical device):   Determine that other, less restrictive measures have been tried or would not be effective before applying the restraint   Evaluate the patient's condition at the time of restraint application   Inform patient/family regarding the reason for restraint   Every 2 hours: Monitor safety, psychosocial status, comfort, nutrition and hydration  Taken 10/18/2024 1703 by Leidy Davis RN    Problem: 
  Problem: Safety - Medical Restraint  Goal: Remains free of injury from restraints (Restraint for Interference with Medical Device)  Description: INTERVENTIONS:  1. Determine that other, less restrictive measures have been tried or would not be effective before applying the restraint  2. Evaluate the patient's condition at the time of restraint application  3. Inform patient/family regarding the reason for restraint  4. Q2H: Monitor safety, psychosocial status, comfort, nutrition and hydration  Outcome: Progressing  Flowsheets (Taken 10/17/2024 1050)  Remains free of injury from restraints (restraint for interference with medical device): Every 2 hours: Monitor safety, psychosocial status, comfort, nutrition and hydration     
Austin Kwon requires a bedside commode due to being confined to a single room, and is physically incapable of utilizing regular toilet facilities. Current body weight: Weight - Scale: 73.5 kg (162 lb 0.6 oz).        Austin Kwon was evaluated today and a DME order was entered for a wheeled walker because he requires this to successfully complete daily living tasks of ambulating.  A wheeled walker is necessary due to the patient's unsteady gait, upper body weakness, and inability to  an ambulation device; and he can ambulate only by pushing a walker instead of a lesser assistive device such as a cane, crutch, or standard walker.  The need for this equipment was discussed with the patient and he understands and is in agreement.      Austin Kwon requires a shower chair due to having a prolonged hospitalized stay requiring intubation and needs to gain his strength back . Current body weight: Weight - Scale: 73.5 kg (162 lb 0.6 oz).      Javi Oden DO  Family Medicine Resident PGY3      
Restraint type: Soft restraint:  right wrist and left wrist  Reason for restraints: Pulling out devices:  Involuntary/Voluntary movements to harm self, pulling at lines/tubes  Duration: 24 hours    Restraints must be removed when an alternative is available and effective and/or patient no longer meets criteria.    Orders must be renewed every calendar day or when discontinued.    The MD must conduct a face to face assessment within 1 calendar day of initiation when initial restraint order is verbal.     Jean-Pierre Elliott,   PGY-3 Emergency Medicine  [facility]   10/9/2024, 2:44 AM     
Nutrition Deficit:  Goal: Optimize nutritional status  Outcome: Progressing     Problem: Respiratory - Adult  Goal: Achieves optimal ventilation and oxygenation  10/26/2024 0557 by Amy Melgar RN  Outcome: Progressing  Flowsheets (Taken 10/25/2024 2000)  Achieves optimal ventilation and oxygenation:   Assess for changes in respiratory status   Assess for changes in mentation and behavior   Position to facilitate oxygenation and minimize respiratory effort   Oxygen supplementation based on oxygen saturation or arterial blood gases   Encourage broncho-pulmonary hygiene including cough, deep breathe, incentive spirometry   Assess the need for suctioning and aspirate as needed   Assess and instruct to report shortness of breath or any respiratory difficulty   Respiratory therapy support as indicated  10/25/2024 1638 by Maricruz Washington RCP  Outcome: Progressing     Problem: Confusion  Goal: Confusion, delirium, dementia, or psychosis is improved or at baseline  Description: INTERVENTIONS:  1. Assess for possible contributors to thought disturbance, including medications, impaired vision or hearing, underlying metabolic abnormalities, dehydration, psychiatric diagnoses, and notify attending LIP  2. [place] high risk fall precautions, as indicated  3. Provide frequent short contacts to provide reality reorientation, refocusing and direction  4. Decrease environmental stimuli, including noise as appropriate  5. Monitor and intervene to maintain adequate nutrition, hydration, elimination, sleep and activity  6. If unable to ensure safety without constant attention obtain sitter and review sitter guidelines with assigned personnel  7. Initiate Psychosocial CNS and Spiritual Care consult, as indicated  Outcome: Progressing  Flowsheets (Taken 10/25/2024 2000)  Effect of thought disturbance (confusion, delirium, dementia, or psychosis) are managed with adequate functional status:   Assess for contributors to thought 
Sammie RICE RN  Outcome: Progressing     Problem: ABCDS Injury Assessment  Goal: Absence of physical injury  10/21/2024 0914 by Sammie Espinosa RN  Outcome: Progressing     Problem: Safety - Medical Restraint  Goal: Remains free of injury from restraints (Restraint for Interference with Medical Device)  Description: INTERVENTIONS:  1. Determine that other, less restrictive measures have been tried or would not be effective before applying the restraint  2. Evaluate the patient's condition at the time of restraint application  3. Inform patient/family regarding the reason for restraint  4. Q2H: Monitor safety, psychosocial status, comfort, nutrition and hydration  10/21/2024 0914 by Sammie Espinosa RN  Outcome: Progressing  Flowsheets  Taken 10/21/2024 0800 by Sammie Espinosa RN  Remains free of injury from restraints (restraint for interference with medical device): Every 2 hours: Monitor safety, psychosocial status, comfort, nutrition and hydration  Taken 10/21/2024 0600 by Catrachita Holloway RN  Remains free of injury from restraints (restraint for interference with medical device):   Determine that other, less restrictive measures have been tried or would not be effective before applying the restraint   Evaluate the patient's condition at the time of restraint application   Inform patient/family regarding the reason for restraint   Every 2 hours: Monitor safety, psychosocial status, comfort, nutrition and hydration  Taken 10/21/2024 0400 by Mitzy Tai RN  Remains free of injury from restraints (restraint for interference with medical device): Every 2 hours: Monitor safety, psychosocial status, comfort, nutrition and hydration  Taken 10/21/2024 0200 by Mitzy Tai RN  Remains free of injury from restraints (restraint for interference with medical device): Every 2 hours: Monitor safety, psychosocial status, comfort, nutrition and hydration     
short contacts to provide reality reorientation, refocusing and direction  4. Decrease environmental stimuli, including noise as appropriate  5. Monitor and intervene to maintain adequate nutrition, hydration, elimination, sleep and activity  6. If unable to ensure safety without constant attention obtain sitter and review sitter guidelines with assigned personnel  7. Initiate Psychosocial CNS and Spiritual Care consult, as indicated  10/25/2024 1034 by Julisa Roberson, RN  Outcome: Progressing  10/25/2024 0411 by Maribel Saldana RN  Outcome: Progressing     Problem: ABCDS Injury Assessment  Goal: Absence of physical injury  10/25/2024 1034 by Julisa Roberson, RN  Outcome: Progressing  10/25/2024 0411 by Maribel Saldana, RN  Outcome: Progressing     
stimuli, including noise as appropriate  5. Monitor and intervene to maintain adequate nutrition, hydration, elimination, sleep and activity  6. If unable to ensure safety without constant attention obtain sitter and review sitter guidelines with assigned personnel  7. Initiate Psychosocial CNS and Spiritual Care consult, as indicated  Outcome: Progressing     Problem: ABCDS Injury Assessment  Goal: Absence of physical injury  Outcome: Progressing     
  Problem: ABCDS Injury Assessment  Goal: Absence of physical injury  Outcome: Progressing  Flowsheets (Taken 10/21/2024 0044)  Absence of Physical Injury: Implement safety measures based on patient assessment     
Progressing     Problem: Nutrition Deficit:  Goal: Optimize nutritional status  10/22/2024 2154 by Alvina Topete, RN  Outcome: Progressing  10/22/2024 0858 by Sammie Espinosa, RN  Outcome: Not Progressing     
assessment      
vision or hearing, underlying metabolic abnormalities, dehydration, psychiatric diagnoses, and notify attending LIP  2. [place] high risk fall precautions, as indicated  3. Provide frequent short contacts to provide reality reorientation, refocusing and direction  4. Decrease environmental stimuli, including noise as appropriate  5. Monitor and intervene to maintain adequate nutrition, hydration, elimination, sleep and activity  6. If unable to ensure safety without constant attention obtain sitter and review sitter guidelines with assigned personnel  7. Initiate Psychosocial CNS and Spiritual Care consult, as indicated  10/27/2024 1910 by Carlyn Godinez RN  Outcome: Progressing  10/27/2024 1106 by Alexia Leahy RN  Outcome: Progressing  10/27/2024 1105 by Alexia Leahy RN  Outcome: Progressing  Flowsheets (Taken 10/27/2024 0800)  Effect of thought disturbance (confusion, delirium, dementia, or psychosis) are managed with adequate functional status: [place] high risk fall precautions, as indicated     Problem: ABCDS Injury Assessment  Goal: Absence of physical injury  10/27/2024 1910 by Carlyn Godinez RN  Outcome: Progressing  10/27/2024 1106 by Alexia Leahy RN  Outcome: Progressing  10/27/2024 1105 by Alexia Leahy RN  Outcome: Progressing  Flowsheets (Taken 10/27/2024 1100)  Absence of Physical Injury: Implement safety measures based on patient assessment     
fever/cough

## 2024-10-29 NOTE — DISCHARGE INSTR - COC
Continuity of Care Form    Patient Name: Austin Kwon   :  1980  MRN:  [id number]    Admit date:  10/9/2024  Discharge date:  10/29/2024    Code Status Order: Full Code   Advance Directives:   Advance Care Flowsheet Documentation             Admitting Physician:  Matt Walker MD  PCP: Javi Oden DO    Discharging Nurse: ANNA Almaraz  Discharging Hospital Unit/Room#:   Discharging Unit Phone Number: [phone]    Emergency Contact:   Extended Emergency Contact Information  Primary Emergency Contact: Elaina Luna  Mobile Phone: [phone]  Relation: Child   needed? No  Secondary Emergency Contact: Sangeeta Kwon  Mobile Phone: [phone]  Relation: Child   needed? No    Past Surgical History:  Past Surgical History:   Procedure Laterality Date    EXTRACORPOREAL CIRCULATION Right 10/14/2024    E2 EXTRA CORPOREAL MEMBRANE OXYGENATION performed by Adi Alford MD at [facility]       Immunization History:     There is no immunization history on file for this patient.    Active Problems:  Patient Active Problem List   Diagnosis Code    Severe persistent asthma in adult without complication J45.50    Severe persistent asthma with exacerbation J45.51    Mild persistent asthma without complication J45.30    Life threatening acute exacerbation of asthma J45.901    Lactic acidemia E87.20    Lactic acidosis E87.20    Acute respiratory failure with hypoxia and hypercapnia J96.01, J96.02    DULCE MARIA (acute kidney injury) (HCC) N17.9    Respiratory acidosis with metabolic acidosis E87.4    ACP (advance care planning) Z71.89    ARDS (adult respiratory distress syndrome) J80    Encounter for palliative care Z51.5    Acute respiratory failure with hypercapnia J96.02    Dilatation of colon K59.39    Cecal polyp K63.5    Paralytic ileus of large intestine (HCC) K56.0    Pneumonia of lower lobe due to infectious organism J18.9    Paralytic ileus (HCC) K56.0       Isolation/Infection:

## 2024-10-30 ENCOUNTER — TELEPHONE (OUTPATIENT)
Dept: FAMILY MEDICINE CLINIC | Age: 44
End: 2024-10-30

## 2024-10-30 NOTE — PROGRESS NOTES
ECMO End of Shift Note:       Cannulation Date/Time:  10/14/2024 @ 1908       ECMO type:  V/V     Cannula size/location:  30 Fr [place] Rt IJ     Current Flows:  3.87 LPM     Current FiO2:  21 %     Current Sweep:  0.5     Delta Pressure:  28     Clot [place]:         Oxygenator:  Large clot burden and fibrin aggregation at the 9/12/3 o'clock positions and along the borders between all 3 positions.          Circuit:  none     Anticoagulation:  Argatroban @ 3.481 mcg/kg/min     Products Given:         PRBCs:  0       PLTs:  0       FFP:  0       Cryo:  0       Albumin:  0     Pt extubated today to 2 L NC   Up to chair for a couple hours  Echo done today    
                     ECMO End of Shift Note:       Cannulation Date/Time:  10/14/24 @ 1908     ECMO type:  V/V     Cannula size/location:  30 Fr. [place] RIJ.     Current Flows:  4.26 LPM     Current FiO2:  60     Current Sweep:  2     Delta Pressure:  29     Clot [place]:         Oxygenator: Heavy clot burden and fibrin aggregation to the 12, 3, and 9 o'clock positions of MO.          Circuit:  None     Anticoagulation: Argatroban 3.481 mcg/kg/min (therapeutic)     Products Given:         PRBCs:  0       PLTs:  0       FFP:  0       Cryo:  0       Albumin:  0     Nimbex stopped.  
                   ECMO End of Shift:       ECMO DAY 1        Cannulation date/time: 10/14/2024 @ 1908    ECMO Type: V/V   Cannula Size/Location: 30 Fr. [place] R IJ              Current Flows: 4.27 LPM              Current FiO2: 100% (titrated up per ABG)              Current Sweep: 4 LPM (titrated up per ABG)              Delta Pressure: 30   Clot [place]:                Oxygenator: Moderate clot burden noted at 3/9/12 o'clock positions. Fibrin adhered to 12 o'clock position              Circuit: None   Anticoagulation: Argatroban not therapeutic (q2h PTTs)   Products given:                PRBC's: 0              PLT's: 0              FFP: 0              Cryo: 0              Albumin: 0          Shift events:    ~T&S sent  ~Per Dr Mar PC 6 and Peep of 12          
                   ECMO End of Shift:       ECMO DAY 1        Cannulation date/time: 10/14/2024 @ 1908    ECMO Type: V/V   Cannula Size/Location: 30 Fr. [place] R IJ              Current Flows: 4.27 LPM              Current FiO2: 100% (titrated up per ABG)              Current Sweep: 4 LPM (titrated up per ABG)              Delta Pressure: 30   Clot [place]:                Oxygenator: Moderate clot burden noted at 3/9/12 o'clock positions. Fibrin adhered to 12 o'clock position              Circuit: None   Anticoagulation: Argatroban @ 1.86 - still subtherapeutic PTT sent at 1830     Products given:                PRBC's: 0              PLT's: 0              FFP: 0              Cryo: 0              Albumin: 0          Shift events:    ~T&S sent  ~Per Dr Mar PC 6 and Peep of 12    Electronically signed by Adi Santacruz RN ECMO Specialist on 10/15/2024 at 6:34 PM            
          Pharmacy Note     Renal Dose Adjustment    Austin Kwon is a 44 y.o. male. Pharmacist assessment of renally cleared medications.    Recent Labs     10/20/24  0309 10/20/24  1000   BUN 23* 20       Recent Labs     10/20/24  0309 10/20/24  1000   CREATININE 0.7 0.7       Estimated Creatinine Clearance: 148 mL/min (based on SCr of 0.7 mg/dL).  Estimated CrCl using Ideal Body Weight: 148 mL/min (based on IBW 85.9 kg)    Height:   Ht Readings from Last 1 Encounters:   10/17/24 1.753 m (5' 9.02\")     Weight:  Wt Readings from Last 1 Encounters:   10/20/24 85.9 kg (189 lb 6 oz)       The following medication dose has been adjusted based upon renal function per P&T Guidelines:             Cefepime 2g q12h over 30min changed to cefepime loading dose then extended infusion q8h for sepsis.     Thank you,    ELLA MOSES [facility]       
      ECMO End of Shift:            Cannulation date/time: 10/14/2024 @ 1908    ECMO Type: V/V   Cannula Size/Location: 30 Fr. [place] R IJ              Current Flows:  4.21 LPM              Current FiO2:  21%              Current Sweep: 0.5 LPM              Delta Pressure: 27   Clot [place]:                Oxygenator: Large clot burden noted at 9 o'clock position. Large clot burden and fibrin aggregation extending from 12 to 3 o'clock positions.              Circuit: None   Anticoagulation: Argatroban @ 3.481 mcg/kg/min (therapeutic)   Products given:                PRBC's: 0              PLT's: 0              FFP: 0              Cryo: 0              Albumin: 0                    
      SUBJECTIVE    Patient was seen and examined.  Patient was intubated and on 40% FiO2.  Events from last 24 hours noted.  Patient is showing significant improvement.  His heart rate is better controlled and hemodynamically he is stable he is on 20 mcg of phenylephrine and maintaining his blood pressure.  He is also on bicarbonate drip.  Most recent pH is 7.3 with pCO2 of 60 and anion gap is now down to 7 and lactic acid is within normal limits.  His creatinine is also showing improvement and after reaching a peak of 2.7 it is down to 1.5.  Patient has good urine output.  Cultures are negative but positive for rhinovirus.    OBJECTIVE      CURRENT TEMPERATURE:  Temp: 98.4 °F (36.9 °C)  MAXIMUM TEMPERATURE OVER 24HRS:  Temp (24hrs), Av.4 °F (37.4 °C), Min:97.9 °F (36.6 °C), Max:100.6 °F (38.1 °C)    CURRENT RESPIRATORY RATE:  Respirations: 16  CURRENT PULSE:  Pulse: 84  CURRENT BLOOD PRESSURE:  BP: 120/61  24HR BLOOD PRESSURE RANGE:  Systolic (24hrs), Av , Min:91 , Max:135   ; Diastolic (24hrs), Av, Min:52, Max:100    24HR INTAKE/OUTPUT:    Intake/Output Summary (Last 24 hours) at 10/10/2024 0957  Last data filed at 10/10/2024 0900  Gross per 24 hour   Intake 2834.97 ml   Output 2265 ml   Net 569.97 ml     WEIGHT :Patient Vitals for the past 96 hrs (Last 3 readings):   Weight   10/10/24 0600 82.3 kg (181 lb 7 oz)   10/09/24 0545 79.8 kg (175 lb 14.8 oz)   10/09/24 0224 75 kg (165 lb 5.5 oz)     PHYSICAL EXAM      GENERAL APPEARANCE: Patient was intubated and sedated  SKIN: Warm to touch  EYES: Pupils are reactive to light   ENT: No thrush or pharyngeal congestion  NECK: No JVD or carotid bruit.  PULMONARY: Bilateral clear air entry patient still has bilateral wheezing  CADRDIOVASCULAR: S1 and S2 audible no S3 or murmur   ABDOMEN: Soft and nontender bowel sounds were positive    EXTREMITIES: no cyanosis, clubbing or edema     CURRENT MEDICATIONS      propofol infusion, Continuous  sodium chloride flush 
      SUBJECTIVE    Patient was seen and examined.  Patient was lying flat.  He was intubated.  Lab from this morning noted.  Creatinine is down to 1.1.  Patient is on lactated Ringer's at 75 mL/h  .  Cultures are negative but positive for rhinovirus.    OBJECTIVE      CURRENT TEMPERATURE:  Temp: 98.1 °F (36.7 °C)  MAXIMUM TEMPERATURE OVER 24HRS:  Temp (24hrs), Av.4 °F (36.9 °C), Min:97 °F (36.1 °C), Max:99.7 °F (37.6 °C)    CURRENT RESPIRATORY RATE:  Respirations: 16  CURRENT PULSE:  Pulse: 89  CURRENT BLOOD PRESSURE:  BP: 124/71  24HR BLOOD PRESSURE RANGE:  Systolic (24hrs), Av , Min:113 , Max:124   ; Diastolic (24hrs), Av, Min:59, Max:71    24HR INTAKE/OUTPUT:    Intake/Output Summary (Last 24 hours) at 10/11/2024 1141  Last data filed at 10/11/2024 1104  Gross per 24 hour   Intake 4096.3 ml   Output 2635 ml   Net 1461.3 ml     WEIGHT :Patient Vitals for the past 96 hrs (Last 3 readings):   Weight   10/11/24 0422 83.6 kg (184 lb 4.9 oz)   10/10/24 0600 82.3 kg (181 lb 7 oz)   10/09/24 0545 79.8 kg (175 lb 14.8 oz)     PHYSICAL EXAM      GENERAL APPEARANCE: Intubated and sedated   SKIN: Warm to touch  EYES: Pupils are reactive to light   ENT: No thrush or pharyngeal congestion  NECK: No JVD or carotid bruit t.  PULMONARY: Bilateral air entry and clear  CADRDIOVASCULAR: S1 and S2 audible no S3 or murmur   ABDOMEN: Soft and nontender bowel sounds were positive    EXTREMITIES: No edema    CURRENT MEDICATIONS      albuterol sulfate HFA (PROVENTIL;VENTOLIN;PROAIR) 108 (90 Base) MCG/ACT inhaler 10 puff, Q4H PRN  propofol infusion, Continuous  fentaNYL 2500 mcg/ 50 mL IV infusion, Continuous  midazolam (VERSED) 100mg/100mL in NS infusion, Continuous  insulin lispro (HUMALOG,ADMELOG) injection vial 0-4 Units, Q6H  azithromycin (ZITHROMAX) 500 mg in sodium chloride 0.9 % 250 mL IVPB ([id number]), Q24H  lactated ringers IV soln infusion, Continuous  sodium chloride flush 0.9 % injection 5-40 mL, 2 times per 
     Nutrition Note    Discussed Tube Feedings with RN.  Pt extubated yesterday but not appropriate for swallow study per RN.  Pt continues to receive feedings of Peptide Based (Vital AF 1.2) formula at 20 mL/hr + 1 Protein modular daily.  Discussed with Critical Care Resident about plans to advance TF - Resident okay with advancing.    Discussed with RN about increasing TF to 30 mL/hr and providing 2 protein modulars.  Will monitor tolerance and continue to slowly advance TF to goal 65 mL/hr as able.    Electronically signed by Kristy Dhaliwal RD, LD on 10/22/24 at 12:12 PM EDT    Contact: [phone] / [phone]  
     Nutrition Note    Pt to be decannulated from ECMO today.  RN reports TF currently off but pt was tolerating feedings of Peptide Based formula at 30 mL/hr + 2 Protein modulars.    As TF restarted, suggest continuing to advance TF slowly - recommend increasing to 40 mL/hr today + 2 Protein modulars.  Monitor tolerance and slowly advance to goal 65 mL/hr as able.    Electronically signed by Kristy Dhaliwal RD, LD on 10/23/24 at 12:00 PM EDT    Contact: [phone] / [phone]  
    Automatic Dose Adjustment of Prophylactic Enoxaparin      Austin Kwon is a 44 y.o. male.     Recent Labs     10/22/24  2113 10/23/24  0309 10/23/24  0903   CREATININE 0.7 0.8 0.9       Estimated Creatinine Clearance: 106 mL/min (based on SCr of 0.9 mg/dL).    Recent Labs     10/23/24  0309 10/23/24  0903   HGB 10.9* 11.0*   HCT 30.4* 31.2*   * 76*     Recent Labs     10/23/24  0309 10/23/24  0903   INR 1.4 1.4       Height:   Ht Readings from Last 1 Encounters:   10/21/24 1.753 m (5' 9.02\")     Weight:  Wt Readings from Last 1 Encounters:   10/23/24 81.8 kg (180 lb 5.4 oz)               Plan: Based upon the patient's weight and renal function    Ordered: Enoxaparin 40mg SUBQ BID    Changed/converted to    New Order: Enoxaparin 40mg SUBQ Daily      Thank you,  Catherine Bower [facility]  10/23/2024, 2:33 PM   
    ECMO End of Shift:            Cannulation date/time: 10/14/2024 @ 1908    ECMO Type: V/V   Cannula Size/Location: 30 Fr. [place] R IJ              Current Flows:  4.06 LPM              Current FiO2:  21%              Current Sweep: 0.5 LPM              Delta Pressure: 29   Clot [place]:                Oxygenator: Large clot burden noted at 9 o'clock position. Large clot burden and fibrin aggregation extending from 12 to 3 o'clock positions.              Circuit: None   Anticoagulation: Argatroban @ 3.481 mcg/kg/min (therapeutic)   Products given:                PRBC's: 0              PLT's: 0              FFP: 0              Cryo: 0              Albumin: 0                
    Palliative Care Progress Note    NAME:  Austin Kwon  MEDICAL RECORD NUMBER:  [id number]  AGE: 44 y.o.   GENDER: male  : 1980  TODAY'S DATE:  10/28/2024    Reason for Consult:  goals of care      Plan        Palliative Interaction: Patient seen on rounds. He is resting comfortably in bed. He states he is feeling much better and is happy that his NG tube has been removed. He is not in any pain and denies any complaints at this time.     Now that patient is able to participate in his own decision making we discuss this further. He shares that he would like to name his sister as his power of  as he feels his daughters are too young to make decisions like that. He request we complete the paperwork once his sister arrives. Will have palliative nurse follow up to get this completed.       Principle Problem/Diagnosis:  Life threatening acute exacerbation of asthma    Additional Assessments:    1- Symptom management/ pain control     Pain Assessment:  The patient is not having any pain.               Anxiety:  none                          Dyspnea:  none                          Fatigue:   generalized         We feel the patient symptoms are being controlled. his current regimen is reviewed by myself and discussed with the staff.       2- Goals of care evaluation   The patient goals of care are improve or maintain function/quality of life   Goals of care discussed with:    [x] Patient independently    [] Patient and Family    [] Family or Healthcare DPOA independently    [] Unable to discuss with patient, family/DPOA not present    3- Code Status  Full Code    4- Other recommendations  - We will continue to provide comfort and support to the patient and the family      History of Present Illness     The patient is a 44 y.o.  Non- / non  male who presents with Respiratory Distress      Referred to Palliative Care by  [x] Physician   [] Nursing  [] Family Request   [] Other:       Patient is 
   10/09/24 2047   Vent Information   Vent Mode (S)  AC/PRVC   Ventilator Settings   FiO2  (S)  40 %  (per resident wants sat to be 93-95%)   Resp Rate (Set) 10 bpm   PEEP/CPAP (cmH2O) 12   Vt (Set, mL) 570 mL     Placed back on PRVC per resident. FiO2 increased to 40% since pt was sating 88-90% consistently. Pt currently sating 93%.   
   10/10/24 0819   Ventilator Settings   Resp Rate (Set) (S)  16 bpm   PEEP/CPAP (cmH2O) (S)  8  (SPO2 98%)       
   10/10/24 0819   Ventilator Settings   Vt (Set, mL) (S)  500 mL  (7cc/kg due to high pip)       
   10/10/24 2331   Treatment   Treatment Type Aerosol generator   $Treatment Type $Inhaled Therapy/Meds   Medications Albuterol   Pre-Tx Pulse 75   Pre-Tx Resps 16   Breath Sounds Pre-Tx ROSALINE Inspiratory wheezes;Expiratory wheezes   Breath Sounds Pre-Tx LLL Inspiratory wheezes;Expiratory wheezes;Diminished   Breath Sounds Pre-Tx RUL Inspiratory wheezes;Expiratory wheezes   Breath Sounds Pre-Tx RML Inspiratory wheezes;Expiratory wheezes   Breath Sounds Pre-Tx RLL Inspiratory wheezes;Expiratory wheezes;Diminished   Breath Sounds Post-Tx ROSALINE Inspiratory wheezes;Expiratory wheezes   Breath Sounds Post-Tx LLL Inspiratory wheezes;Expiratory wheezes;Diminished   Breath Sounds Post-Tx RUL Inspiratory wheezes;Expiratory wheezes   Breath Sounds Post-Tx RML Inspiratory wheezes;Expiratory wheezes   Breath Sounds Post-Tx RLL Inspiratory wheezes;Expiratory wheezes;Diminished   Post-Tx Pulse 72   Post-Tx Resps 17   Delivery Source In-line nebulizer   Position Semi-Gabriel's   Treatment Tolerance Well   Duration (S)  3  (treatment stopped due to increase peak pressures)   Is patient on O2? Y       
   10/11/24 185   Ventilator Settings   PEEP/CPAP (cmH2O) (S)  8  (SpO2 98%)       
   10/14/24 1221   Vent Information   Vent Mode (S)  AC/PC       
   10/14/24 1221   Ventilator Settings   PEEP/CPAP (cmH2O) (S)  8       
   10/14/24 1221   Ventilator Settings   Set Pressure (S)  34       
   10/14/24 1557   Patient Observation   Pulse (S)  78   Respirations (S)  14   SpO2 (S)  93 %   Ventilator Settings   FiO2  (S)  50 %   Resp Rate (Set) (S)  14 bpm   PEEP/CPAP (cmH2O) (S)  8   Vent Patient Data (Readings)   Vt (Measured) (S)  505 mL   Peak Inspiratory Pressure (cmH2O) (S)  34 cmH2O   Rate Measured (S)  14 br/min   Minute Volume (L/min) (S)  7.1 Liters   Mean Airway Pressure (cmH2O) (S)  13.3 cmH20   Plateau Pressure (cm H2O) (S)  18.9 cm H2O   Driving Pressure 10.9   I:E Ratio (S)  1:4   I Time/ I Time % (S)  0.85 s   Vent Alarm Settings   High Pressure (cmH2O) (S)  60 cmH2O   Low Minute Volume (lpm) (S)  3 L/min   RR High (bpm) (S)  50 br/min       
   10/15/24 1027   Ventilator Settings   PEEP/CPAP (cmH2O) (S)  12   Set Pressure (S)  6     Vent changes made due auto cycling per Dr Mar  
   10/18/24 8657   Care Plan - Respiratory Goals   Achieves optimal ventilation and oxygenation Assess for changes in respiratory status;Assess for changes in mentation and behavior;Respiratory therapy support as indicated;Assess and instruct to report shortness of breath or any respiratory difficulty;Assess the need for suctioning and aspirate as needed;Encourage broncho-pulmonary hygiene including cough, deep breathe, incentive spirometry;Position to facilitate oxygenation and minimize respiratory effort;Oxygen supplementation based on oxygen saturation or arterial blood gases       
   10/21/24 0946   Vent Information   Vent Mode (S)  CPAP/PS       
   10/28/24 2018   Care Plan - Respiratory Goals   Achieves optimal ventilation and oxygenation Assess for changes in respiratory status;Assess for changes in mentation and behavior;Position to facilitate oxygenation and minimize respiratory effort;Initiate smoking cessation protocol as indicated;Oxygen supplementation based on oxygen saturation or arterial blood gases;Encourage broncho-pulmonary hygiene including cough, deep breathe, incentive spirometry;Assess the need for suctioning and aspirate as needed;Assess and instruct to report shortness of breath or any respiratory difficulty;Respiratory therapy support as indicated       
   Signed           ECMO End of Shift:            Cannulation date/time: 10/14/2024 @ 1908    ECMO Type: V/V   Cannula Size/Location: 30 Fr. [place] R IJ              Current Flows: 4.20 LPM              Current FiO2: 30              Current Sweep:   0.5              Delta Pressure: 28-30   Clot [place]:                Oxygenator: Large clot burden noted at 9 o'clock position. Large clot burden and fibrin aggregation extending from 12 to 3 o'clock positions.              Circuit: None   Anticoagulation: Argatroban 3.481 mcg/kg/min (therapeutic @ this time)   Products given:                PRBC's: 0              PLT's: 0              FFP: 0              Cryo: 0              Albumin: 1          Shift Events:                 
  ECMO DAILY ROUNDING NOTE     Patient:  Austin Kwon  MRN: [id number]  Admit date: 10/9/2024  Primary Care Physician: Javi Oden DO  Consulting Physician: Matt Walker MD  CODE Status: Full Code  LOS: 10    [x] Team present at bedside   [] Family updated    INDICATION:   Status asthmaticus       CANNULATION:  VenoVeno ECMO cannulation on 10/14/24    TREATMENT GOALS:  DO2 :   VO2:  PUMP  FLOW: 3-5L/min  pH: 7.35-7.45   SvO2: >70%  SaO2:>97%  ACT: 180-200  Sedation : RASS -1 to+1    CURRENT ECMO PARAMETERS:  PUMP Patient on ECMO: On  Hours on ECMO: 116  Pump Flow (L/min): 4.12 LPM  Pump Speed (Rotations/min): 3200 RPM  ECMO Mode: V/V  Pump Mode: Cardiohelp   SWEEP GAS Sweep Flow (L/min): 0 LPM   FiO2 (%): 21 %   CIRCUIT PRESSURES Inlet Pressure (Bladder): -86 mmHg  Pre-Membrane Pressure: 191 mmHg  Post-Membrane Pressure: 165 mmHg  Delta P: 26 mmHg  SVO2 (%): 74.6 %  ECMO blood flow temperature: 98.8 °F (37.1 °C)   CIRCUIT CHECK Heat Exchg. Water Temp Set: 37  Heat Exchanger Water Temp Actual: 37.1  Heat Exchanger Water Level: Full  Unused Pigtails Cleared: Done  System Plugged to Red Outlet: Yes  Emergency Backup in Use: No  Hand Crank Available: Yes  Backup Unit Blood Avail: Done (upon request)  Cart Checked and Stocked: Done  Pump Battery Charged: 100 Volts  Pressure Monitors Zeroed: Done (when dry primed)  Pressure Limits Checked: Done  Circuit Number: 1  Back Up Circuit: Done  Back Up Console/Motor: Done  Emergency O2 Tank Available: Done  Circuits and Cannulation Sites: Done  CMAG Flow Probe Repositioned: Not done  High/Low flow alarm set?: Yes  High/Low temp alarm set?: Yes  High/Low venous alarm set?: Yes  Pre-MO alarm limit: 400  Post-MO alarm limit: 400     CURRENT VENTILATOR SETTINGS:  Vent Information  Ventilator ID: [email or web address]  Equipment Changed: Expiratory Filter, Suction catheter  Ventilator Initiate: Yes  Vent Mode: AC/PC     RECENT LABS:  ABG Recent Labs     10/18/24  3609 
  ECMO DAILY ROUNDING NOTE     Patient:  Austin Kwon  MRN: [id number]  Admit date: 10/9/2024  Primary Care Physician: Javi Oden DO  Consulting Physician: Matt Walker MD  CODE Status: Full Code  LOS: 9    [x] Team present at bedside   [] Family updated    INDICATION:   Status asthmaticus       CANNULATION:  VenoVeno ECMO cannulation on 10/14/24    TREATMENT GOALS:  DO2 :   VO2:  PUMP  FLOW: 3-5L/min  pH: 7.35-7.45   SvO2: >70%  SaO2:>97%  ACT: 180-200  Sedation : RASS -1 to+1    CURRENT ECMO PARAMETERS:  PUMP Patient on ECMO: On  Hours on ECMO: 98  Pump Flow (L/min): 4.14 LPM  Pump Speed (Rotations/min): 3300 RPM  ECMO Mode: V/V  Pump Mode: Cardiohelp   SWEEP GAS Sweep Flow (L/min): 0 LPM   FiO2 (%): 21 %   CIRCUIT PRESSURES Inlet Pressure (Bladder): -92 mmHg  Pre-Membrane Pressure: 203 mmHg  Post-Membrane Pressure: 171 mmHg  Delta P: 32 mmHg  SVO2 (%): 82.6 %  ECMO blood flow temperature: 99.1 °F (37.3 °C)   CIRCUIT CHECK Heat Exchg. Water Temp Set: 37  Heat Exchanger Water Temp Actual: 37.1  Heat Exchanger Water Level: Full  Unused Pigtails Cleared: Done  System Plugged to Red Outlet: Yes  Emergency Backup in Use: No  Hand Crank Available: Yes  Backup Unit Blood Avail: Done (available upon request)  Cart Checked and Stocked: Done  Pump Battery Charged: 100 Volts  Pressure Monitors Zeroed: Not done (done when dry primed)  Pressure Limits Checked: Done  Circuit Number: 1  Back Up Circuit: Done  Back Up Console/Motor: Done  Emergency O2 Tank Available: Done  Circuits and Cannulation Sites: Done  CMAG Flow Probe Repositioned: Not done  High/Low flow alarm set?: Yes  High/Low temp alarm set?: Yes  High/Low venous alarm set?: Yes  Pre-MO alarm limit: 400  Post-MO alarm limit: 400     CURRENT VENTILATOR SETTINGS:  Vent Information  Ventilator ID: [email or web address]  Equipment Changed: Expiratory Filter  Ventilator Initiate: Yes  Vent Mode: AC/PC     RECENT LABS:  ABG Recent Labs     10/18/24  0735 
  ECMO DAILY ROUNDING NOTE     Patient:  Austin Kwon  MRN: [id number]  Admit date: 10/9/2024  Primary Care Physician: Javi Oden DO  Consulting Physician: Matt Walker MD  CODE Status: Full Code  LOS: 11    [x] Team present at bedside   [] Family updated    INDICATION:   Status asthmaticus       CANNULATION:  VenoVeno ECMO cannulation on 10/14/24    TREATMENT GOALS:  DO2 :   VO2:  PUMP  FLOW: 3-5L/min  pH: 7.35-7.45   SvO2: >70%  SaO2:>97%  ACT: 180-200  Sedation : RASS -1 to+1    CURRENT ECMO PARAMETERS:  PUMP Patient on ECMO: On  Hours on ECMO: 141  Pump Flow (L/min): 4 LPM  Pump Speed (Rotations/min): -77 RPM  ECMO Mode: V/V  Pump Mode: Cardiohelp   SWEEP GAS Sweep Flow (L/min): 0.5 LPM   FiO2 (%): (S) 30 % (post abg)   CIRCUIT PRESSURES Inlet Pressure (Bladder): -77 mmHg  Pre-Membrane Pressure: 199 mmHg  Post-Membrane Pressure: 167 mmHg  Delta P: 30 mmHg  SVO2 (%): 68.14 %  ECMO blood flow temperature: 99 °F (37.2 °C)   CIRCUIT CHECK Heat Exchg. Water Temp Set: 37.1  Heat Exchanger Water Temp Actual: 37.1  Heat Exchanger Water Level: Full  Unused Pigtails Cleared: Done  System Plugged to Red Outlet: Yes  Emergency Backup in Use: No  Hand Crank Available: Yes  Backup Unit Blood Avail: Done  Cart Checked and Stocked: Done  Pump Battery Charged: 99 Volts  Pressure Monitors Zeroed: Done  Pressure Limits Checked: Done  Circuit Number: 1  Back Up Circuit: Done  Back Up Console/Motor: Done  Emergency O2 Tank Available: Done  Circuits and Cannulation Sites: Done  CMAG Flow Probe Repositioned: Not done  High/Low flow alarm set?: Yes  High/Low temp alarm set?: Yes  High/Low venous alarm set?: Yes  Pre-MO alarm limit: 400  Post-MO alarm limit: 400     CURRENT VENTILATOR SETTINGS:  Vent Information  Ventilator ID: [email or web address]  Equipment Changed: Airway securing device  Ventilator Initiate: Yes  Vent Mode: CPAP/PS     RECENT LABS:  ABG Recent Labs     10/20/24  0301 10/20/24  0423 10/20/24  0818 
  ECMO End of Shift:            Cannulation date/time: 10/14/2024 @ 1908    ECMO Type: V/V   Cannula Size/Location: 30 Fr. [place] R IJ              Current Flows:  4.12 LPM              Current FiO2:  21%              Current Sweep: 0.5 LPM              Delta Pressure: 26   Clot [place]:                Oxygenator: Large clot burden noted at 9 o'clock position. Large clot burden and fibrin aggregation extending from 12 to 3 o'clock positions.              Circuit: None   Anticoagulation: Argatroban (therapeutic)   Products given:                PRBC's: 0              PLT's: 0              FFP: 0              Cryo: 0              Albumin: 1 (chugging of circuit)        Pt unclamped and placed on sweep 0.5 LPM and FiO2 of 21% per Dr Landin  
  ECMO End of Shift:            Cannulation date/time: 10/14/2024 @ 1908    ECMO Type: V/V   Cannula Size/Location: 30 Fr. [place] R IJ              Current Flows: 4.20 LPM              Current FiO2: Clamped              Current Sweep: Clamped              Delta Pressure: 30    Clot [place]:                Oxygenator: Large clot burden noted at 9 o'clock position. Large clot burden and fibrin aggregation extending from 12 to 3 o'clock positions.              Circuit: None   Anticoagulation: Argatroban 3.481 mcg/kg/min (therapeutic @ this time)   Products given:                PRBC's: 0              PLT's: 0              FFP: 0              Cryo: 0              Albumin: 0          Shift Events:    Pt clamped per Dr Mar  T&S due 10/18/24 @ 6977  
  General Surgery  Progress    PATIENT NAME: Austin Kwon  AGE: 44 y.o.  MEDICAL RECORD NO. [id number]  DATE: 10/27/2024  SURGEON: Dr. Cross  PRIMARY CARE PHYSICIAN: Javi Oden DO      IMPRESSION:     Patient Active Problem List   Diagnosis    Severe persistent asthma in adult without complication    Severe persistent asthma with exacerbation    Mild persistent asthma without complication    Life threatening acute exacerbation of asthma    Lactic acidemia    Lactic acidosis    Acute respiratory failure with hypoxia and hypercapnia    DULCE MARIA (acute kidney injury) (HCC)    Respiratory acidosis with metabolic acidosis    ACP (advance care planning)    ARDS (adult respiratory distress syndrome)    Encounter for palliative care    Acute respiratory failure with hypercapnia    Dilatation of colon    Cecal polyp    Paralytic ileus of large intestine (HCC)    Pneumonia of lower lobe due to infectious organism     Diffuse colonic dilation up to 9.4 cm without signs of obstruction and patient without abdominal pain or nausea, vomiting and continues to have bowel function    PLAN:   Patient clinically improved  No acute surgical intervention indicated at this time.  Continues to have daily bowel function, diarrhea.  Recommend GI consultation   Recommend maintaining mag > 2, K > 4, Phos > 3  Continue to monitor abdominal exam and bowel function.  Medical management per primary  Okay to remove NGT if not using for tube feeding.  No longer needs to be decompressed proximally.  Anticipate colon is dilated secondary to the patient's overall clinical picture and course.  He is not demonstrating any obstructive pattern.   General surgery to sign off at this time. Please contact with any further questions or concerns.       Subjective:     Patient seen examined at bedside, no overnight events.  Afebrile, vital signs stable and within normal limits.  Patient continues to pass flatus and had little bowel movements.  Patient 
  General Surgery  Progress    PATIENT NAME: Austin Kwon  AGE: 44 y.o.  MEDICAL RECORD NO. [id number]  DATE: 10/26/2024  SURGEON: Dr. Cross  PRIMARY CARE PHYSICIAN: Javi Oden DO      IMPRESSION:     Patient Active Problem List   Diagnosis    Severe persistent asthma in adult without complication    Severe persistent asthma with exacerbation    Mild persistent asthma without complication    Life threatening acute exacerbation of asthma    Lactic acidemia    Lactic acidosis    Acute respiratory failure with hypoxia and hypercapnia    DULCE MARIA (acute kidney injury) (HCC)    Respiratory acidosis with metabolic acidosis    ACP (advance care planning)    ARDS (adult respiratory distress syndrome)    Encounter for palliative care    Acute respiratory failure with hypercapnia    Dilatation of colon    Cecal polyp    Paralytic ileus of large intestine (HCC)    Pneumonia of lower lobe due to infectious organism     Diffuse colonic dilation up to 9.4 cm without signs of obstruction and patient without abdominal pain or nausea, vomiting and continues to have bowel function    PLAN:   Patient clinically improved  Continues to have daily bowel function, diarrhea  Recommend GI consultation   Recommend maintaining mag > 2, K > 4, Phos > 3  Continue to monitor abdominal exam and bowel function.  Medical management per primary  Okay to remove NGT if not using for tube feeding.  No longer needs to be decompressed proximally.  Anticipate colon is dilated secondary to the patient's overall clinical picture and course.  He is not demonstrating any obstructive pattern.  Will continue to monitor.  He does not need a surgical intervention at this time.      Subjective:     Patient seen examined at bedside, no overnight events.  Patient sitting up in his chair this morning.  NGT to LIWS.      PHYSICAL:   VITALS:  height is 1.753 m (5' 9.02\") and weight is 70.6 kg (155 lb 10.3 oz). His oral temperature is 98.9 °F (37.2 °C). His 
  PALLIATIVE CARE PROGRESS NOTE     NAME:  Austin Kwon  MEDICAL RECORD NUMBER:  [id number]  AGE: 44 y.o.   GENDER: male  : 1980  TODAY'S DATE:  10/16/2024  Room: 3009/3009-01    Reason For Consult:  Goals of care evaluation  Distress management  Symptom Management  Guidance and support  Facilitate communications  Assistance in coordinating care  Recommendations for the above    Plan      Palliative Interaction:  Austin was seen today on rounds.  No acute events overnight.  No family at bedside.  Discussed with ICU staff.  Going to have bronch today. Consent given by daughter, Elaina.    I was able to reach Elaina today.   After introductions, I explained my role in her father's treatment team.  We discussed her right to medical decision making with the patient's other daughter.  Discussed that she also has a right to wait for right.  She notes that she is going to speak further with family today to discuss next steps.    She did consent to bronchoscopy with critical care team earlier..    I am in the process of tracking down the patient's other daughter, Michelle. Elaina notes that she and the rest of the family have attempted to locate and discussed with her.  She does note that she believes she is in [place] somewhere.    Will continue to work to finding the other daughter.  Together, they both are healthcare power of  for the patient.      IMPRESSION/ PLAN  Symptom management/pain control    We feel the patient's symptoms are being controlled. Their current regimen has been reviewed by myself and discussed with the staff. We recommend adjusting their medications as follows: none    Goals of care evaluation  The patient goals of care are support for family/caregiver  Long discussion to ensure the patient's and family's understanding of goals of care, and theconcept of palliative care.    Code Status:  FULL CODE    History of Present Illness     HISTORY OF PRESENT ILLNESS:   The patient is a 44 y.o. 
 attempted to visit patient during rounding visits on MICU. Patient was \"intubated\" at time of visit. Patient is also receiving \"ECMO.\" Per bedside nurse, patient resides with his mother and his sister has also been present at bedside throughout patient's stay. Patient has \"two adult children, Elaina and Sangeeta.\" Per nurse, patient's daughter, Elaina, will be coming to the hospital from [place], however, Sangeeta's location and phone number remains unknown. Per next of kin hierarchy, Elaian and Sangeeta are patient's primary decision makers.  attempted to identify Sangeeta's place of residence and phone number, with no success. Chaplains can make follow-up visit, per request. Chaplains can be reached 24/7 via [employer].    Chaplain Smith     10/16/24 0572   Encounter Summary   Encounter Overview/Reason Attempted Encounter   Service Provided For Patient not available   Support System Parent;Family members;Children   Spiritual/Emotional needs   Type Spiritual Support       
CLINICAL PHARMACY NOTE: MEDS TO BEDS    Total # of Prescriptions Filled: 4   The following medications were delivered to the patient:  Carvedilol  Amlodipine  Prednisone  Polyethylene glycol pwd    Additional Documentation: Telyssa del  
Comprehensive Nutrition Assessment    Type and Reason for Visit:  Initial (Vent Check)    Nutrition Recommendations/Plan:   Continue NPO.  Monitor plans for nutrition and plan of care.  If nutrition support requested, suggest Tube Feedings of Peptide Based formula goal 55 mL/hr.  Monitor rate of propofol.     Malnutrition Assessment:  Malnutrition Status:  At risk for malnutrition (10/09/24 4124)    Context:  Acute Illness     Findings of the 6 clinical characteristics of malnutrition:  Energy Intake:  Mild decrease in energy intake  Weight Loss:  No significant weight loss     Body Fat Loss:  No significant body fat loss   Muscle Mass Loss:  No significant muscle mass loss  Fluid Accumulation:  No significant fluid accumulation   Strength:  Not Performed    Nutrition Assessment:    Admitted for respiratory distress/asthma exacerbation.  Pt currently intubated and sedated.  Propofol running at 4.5 mL/hr.  PMH: asthma.  Will monitor plans for nutrition and plan of care.  Labs reviewed: Glucose 172-178 mg/dL, Lactic Acid 6.1 mmol/L.  Meds include: Solu-Medrol, Protonix.    Nutrition Related Findings:    labs/meds reviewed. Wound Type: None       Current Nutrition Intake & Therapies:    Average Meal Intake: NPO  Average Supplements Intake: NPO  No diet orders on file  Additional Calorie Sources:  Propofol at 4.5 mL/hr = 119 kcals/day.    Anthropometric Measures:  Height: 175.3 cm (5' 9.02\")  Ideal Body Weight (IBW): 160 lbs (73 kg)    Admission Body Weight: 75 kg (165 lb 5.5 oz)  Current Body Weight: 79.8 kg (175 lb 14.8 oz), 110 % IBW. Weight Source: Bed Scale  Current BMI (kg/m2): 26        Weight Adjustment For: No Adjustment                 BMI Categories: Overweight (BMI 25.0-29.9)    Estimated Daily Nutrient Needs:  Energy Requirements Based On: Kcal/kg  Weight Used for Energy Requirements: Admission  Energy (kcal/day): 1874-5284 kcals/day  Weight Used for Protein Requirements: Admission  Protein (g/day): 
Comprehensive Nutrition Assessment    Type and Reason for Visit:  Reassess    Nutrition Recommendations/Plan:   Continue NPO.  Monitor plans for nutrition.  Suggest having a swallow study completed before attempting oral diet.  As needed, suggest continuing Tube Feedings of Peptide Based (Vital AF 1.2) slowly advancing to goal rate 65 mL/hr = 1872 kcals, 117 gm protein per day.  Will monitor labs, weights, and plan of care.     Malnutrition Assessment:  Malnutrition Status:  At risk for malnutrition (10/15/24 1508)    Context:  Acute Illness       Nutrition Assessment:    ECMO continues.  Pt remains intubated and sedated.  Continues to tolerate Peptide Based TF at 20 mL/hr with 1 Protein modular daily.  LBM 10/20.  Noted pt extubated today.  NGT replaced.  Weights reviewed - gain/fluctuations likely due to fluids.  Labs  reviewed: Na 135 mmol/L, K 3.6 mmol/L, Phos 2.4 mg/dL, Glucose 125-147 mg/dL.  Meds include: Colace, Prevacid, Solu-Medrol, Glycolax, Senokot, Dulcolax PRN; 13.74 mmol NaPhos replacement, 40 mEq KCl replacement.    Nutrition Related Findings:    Hypoactive bowel sounds.  +1 non-pitting generalized edema.  LBM 10/20. Wound Type: None       Current Nutrition Intake & Therapies:    Average Meal Intake: NPO  Average Supplements Intake: NPO  Diet NPO  ADULT TUBE FEEDING; Orogastric; Peptide Based; Continuous; 20; No; 50; Q 4 hours; Protein; 1 Dose; Daily  Current Tube Feeding (TF) Orders:  Feeding Route: Nasogastric  Formula: Peptide Based  Schedule: Continuous  Feeding Regimen: TF held as pt extubated.  NGT replaced.  Additives/Modulars: None  Water Flushes: 50 mL q 4 hrs  Current TF & Flush Orders Provides: 0 kcals, 0 gm protein  Goal TF & Flush Orders Provides: Peptide Based (Vital AF 1.2) at 20 mL/hr + 1 Protein modular = 680 kcals, 62 gm protein    Additional Calorie Sources:  None    Anthropometric Measures:  Height: 175.3 cm (5' 9.02\")  Ideal Body Weight (IBW): 160 lbs (73 kg)    Admission Body 
Comprehensive Nutrition Assessment    Type and Reason for Visit:  Reassess    Nutrition Recommendations/Plan:   Continue TF of Peptide Based (Vital AF 1.2) at 20 mL/hr along with 1 Protein modular daily.    As able, suggest slowly advancing to goal 60 mL/hr.  Will monitor TF tolerance, labs, weights, and plan of care.  Consider starting an increased bowel regimen d/t no recent BM since admission.     Malnutrition Assessment:  Malnutrition Status:  At risk for malnutrition (10/15/24 1508)    Context:  Acute Illness       Nutrition Assessment:    Pt remains intubated and heavily sedated.  RN reports paralytic stopped this morning.  ECMO continues.  Pt tolerating Peptide Based TF at 20 mL/hr.  RN unsure of plans for TF advancement.  Discussed continuing at 20 mL/hr along with providing 1 Protein modular daily.  No recorded BM since admission - RN reports giving bowel meds this morning.  Weight fluctuations noted - ? due to fluids.  Labs reviewed: Na 134 mmol/L, Glucose 133-160 mg/dL.  Meds include: Colace, Solu-Medrol, Glycolax, Senokot.    Nutrition Related Findings:    Hypoactive bowel sounds.  +1 BUE edema.  No recorded BM since admission.   Wound Type: None       Current Nutrition Intake & Therapies:    Average Meal Intake: NPO  Average Supplements Intake: NPO  Diet NPO  ADULT TUBE FEEDING; Orogastric; Peptide Based; Continuous; 20; No; 50; Q 4 hours  Current Tube Feeding (TF) Orders:  Feeding Route: Orogastric  Formula: Peptide Based  Schedule: Continuous  Feeding Regimen: 20 mL/hr  Additives/Modulars: None  Water Flushes: 50 mL q 4 hrs  Current TF & Flush Orders Provides: At 20 mL/hr = 576 kcals, 36 gm protein  Goal TF & Flush Orders Provides: Peptide Based (Vital AF 1.2) at 20 mL/hr + 1 Protein modular = 680 kcals, 62 gm protein    Additional Calorie Sources:  Propofol off.  None.    Anthropometric Measures:  Height: 175.3 cm (5' 9.02\")  Ideal Body Weight (IBW): 160 lbs (73 kg)    Admission Body Weight: 75 kg 
Comprehensive Nutrition Assessment    Type and Reason for Visit:  Reassess    Nutrition Recommendations/Plan:   Start trickle feedings of Peptide Based (Vital AF 1.2) at 10 mL/hr.  As tolerated, suggest slowly advancing to goal rate of 60 mL/hr (without propofol) = 1728 kcals, 108 gm protein.  Monitor TF tolerance, labs, weights, and plan of care.  Consider starting pt on a bowel regimen d/t no recent BM.     Malnutrition Assessment:  Malnutrition Status:  At risk for malnutrition (10/15/24 1508)    Context:  Acute Illness     Findings of the 6 clinical characteristics of malnutrition:  Energy Intake:  Mild decrease in energy intake  Weight Loss:  No significant weight loss     Body Fat Loss:  No significant body fat loss   Muscle Mass Loss:  No significant muscle mass loss  Fluid Accumulation:  Mild (to Moderate) Extremities   Strength:  Not Performed    Nutrition Assessment:    Pt started on ECMO yesterday.  Remains intubated, sedated, and paralyzed.  Propofol running at 22.5 mL/hr at visit.  Pt previously tolerating Peptide Based TF at goal 45 mL/hr.  At visit TF off but being restarted at a trickle rate of 10 mL/hr.  RN reports propofol being weaned off.  No recorded BM since admission.  Weight fluctuations since admission - ? due to fluids.  Labs reviewed: Glucose 108-138 mg/dL.  Meds include: Colace, Prevacid, Solu-Medrol, Glycolax.    Nutrition Related Findings:    Hypoactive bowel sounds.  +2 non-pitting RUE and +1 non-pitting LUE edema.  No recorded BM since admission. Wound Type: None       Current Nutrition Intake & Therapies:    Average Meal Intake: NPO  Average Supplements Intake: NPO  Diet NPO  ADULT TUBE FEEDING; Orogastric; Peptide Based; Continuous; 10; No; 50; Q 4 hours  Current Tube Feeding (TF) Orders:  Feeding Route: Orogastric  Formula: Peptide Based  Schedule: Continuous  Feeding Regimen: trickle feedings at 10 mL/hr  Additives/Modulars: None  Water Flushes: 50 mL q 4 hrs  Current TF & 
Comprehensive Nutrition Assessment    Type and Reason for Visit:  Reassess    Nutrition Recommendations/Plan:   Upgrade diet as medically able.   Will monitor labs, weights, intake, GI status, and plan of care.     Malnutrition Assessment:  Malnutrition Status:  At risk for malnutrition (Comment) (10/15/24 5203)    Context:  Acute Illness     Findings of the 6 clinical characteristics of malnutrition:  Energy Intake:  Mild decrease in energy intake (Comment)  Weight Loss:  No significant weight loss     Body Fat Loss:  No significant body fat loss     Muscle Mass Loss:  No significant muscle mass loss    Fluid Accumulation:  Mild (to Moderate) Extremities   Strength:  Not Performed    Nutrition Assessment:    F/U. Pt passed swallow assessment and NG tube has been removed pt on full liquid diet. Pt states he ate 50-75% of his lunch, no complaints of nausea or vomiting.    Nutrition Related Findings:    Trace edema generalized and in extremities. LBM 10/28. Labs: K 3.6. Meds: colace, humalog, senokot, Potassium bicarb. Wound Type: None       Current Nutrition Intake & Therapies:    Average Meal Intake: 51-75%  Average Supplements Intake: None Ordered  ADULT TUBE FEEDING; Nasogastric; Peptide Based; Continuous; 30; No; 50; Q 4 hours; Protein; 1 Dose; BID  ADULT DIET; Full Liquid    Anthropometric Measures:  Height: 175.3 cm (5' 9.02\")  Ideal Body Weight (IBW): 160 lbs (73 kg)    Admission Body Weight: 75 kg (165 lb 5.5 oz)  Current Body Weight: 88.4 kg (194 lb 14.2 oz), 121.8 % IBW. Weight Source: Bed Scale  Current BMI (kg/m2): 28.8        Weight Adjustment For: No Adjustment     BMI Categories: Overweight (BMI 25.0-29.9)    Estimated Daily Nutrient Needs:  Energy Requirements Based On: Kcal/kg  Weight Used for Energy Requirements: Admission  Energy (kcal/day): 1469-5599 kcals/day  Weight Used for Protein Requirements: Admission  Protein (g/day):  gm pro/day  Method Used for Fluid Requirements: ml/Kg  Fluid 
[facility] Gastroenterology Progress Note    Austin Kwon is a 44 y.o. male patient.  Hospitalization Day:17      Chief consult reason: Decompressive colonoscopy for colonic ileus      Subjective: Patient seen and examined.  Patient reports having a bowel movement last night.  Passing flatus.  Up in chair at bedside.   KUB shows gaseous distention of intestinal tract with slight decrease in air volume since prior study consistent with slowly improving LE    Objective:   VITALS:  /76   Pulse 95   Temp 98.9 °F (37.2 °C) (Oral)   Resp 15   Ht 1.753 m (5' 9.02\")   Wt 70.6 kg (155 lb 10.3 oz)   SpO2 97%   BMI 22.97 kg/m²   TEMPERATURE:  Current - Temp: 98.9 °F (37.2 °C); Max - Temp  Av °F (37.2 °C)  Min: 98 °F (36.7 °C)  Max: 99.9 °F (37.7 °C)    Physical Assessment:  General appearance:  alert, cooperative and no distress  Mental Status:  oriented to person, place and time and normal affect  Lungs:  clear to auscultation bilaterally, normal effort  Heart:  regular rate and rhythm, no murmur  Abdomen:  soft, nontender, distended, NG tube left nares  Extremities:  no edema, no redness, No clubbing  Skin:  warm, dry, no gross lesions or rashes    CURRENT MEDICATIONS:  Scheduled Meds:   chlordiazePOXIDE  5 mg Oral Q8H    oxyCODONE  10 mg Per NG tube Q6H    montelukast  10 mg Oral Nightly    predniSONE  30 mg Oral Daily    Followed by    [START ON 10/29/2024] predniSONE  20 mg Oral Daily    Followed by    [START ON 2024] predniSONE  10 mg Oral Daily    enoxaparin  40 mg SubCUTAneous Daily    QUEtiapine  25 mg Oral Nightly    lidocaine 1 % injection  5 mL IntraDERmal Once    carvedilol  12.5 mg Oral BID WC    [Held by provider] metoprolol tartrate  25 mg Orogastric BID    amLODIPine  10 mg Oral Daily    ipratropium 0.5 mg-albuterol 2.5 mg  1 Dose Inhalation Q4H RT    polyethylene glycol  17 g Oral BID    senna  5 mL Oral BID    docusate  100 mg Oral BID    insulin lispro  0-4 Units 
Critical Care Team - Daily Progress Note      Date and time: 10/10/2024 8:35 AM  Patient's name:  Austin Kwon  Medical Record Number: [id number]  Patient's account/billing number: [id number]  Patient's YOB: 1980  Age: 44 y.o.  Date of Admission: 10/9/2024  2:19 AM  Length of stay during current admission: 1      Primary Care Physician: Javi Oden DO  ICU Attending Physician:      Code Status: Full Code    Reason for ICU admission:   Chief Complaint   Patient presents with    Respiratory Distress       Subjective:     OVERNIGHT EVENTS:       overnight, his respiratory acidosis worsened, vent dyssynchrony and was paralyzed  Increased sedation    Today:  Patient seen and examined bedside this morning  Intubated and paralyzed   PRVC 16/500/10/40%, ABG this morning 7.31/61.7/31.1  Sedated with fentanyl @ 200, propofol @ 30, versed @ 10  On román @ 20 mcg  Tachycardia improved    On IVF bicarb @ 100 ml/hr. Switch to LR  Urine output has improved. 1.6 L/24 hr    Blood glucose levels in target limits    Labs this morning reviewed  DULCE MARIA improving  Lactic acidosis resolved  CK elevated    AWAKE & FOLLOWING COMMANDS:  [x] No   [] Yes    CURRENT VENTILATION STATUS:     [x] Ventilator  [] BIPAP  [] Nasal Cannula [] Room Air      IF INTUBATED, ET TUBE MARKING AT LOWER LIP:       cms    SECRETIONS Amount:  [x] Small [] Moderate  [] Large  [] None  Color:     [] White [] Colored  [] Bloody    SEDATION:  RAAS Score:  [x] Propofol gtt  [x] Versed gtt  [] Ativan gtt   [] No Sedation    PARALYZED:  [] No    [x] Yes    DIARRHEA:                [x] No                [] Yes  (C. Difficile status: [] positive                                                                                                                       [] negative                                                                                                                     [] pending)    VASOPRESSORS:  [] No    [] Yes    If yes -   [] 
Critical Care Team - Daily Progress Note      Date and time: 10/13/2024 8:27 AM  Patient's name:  Austin Kwon  Medical Record Number: [id number]  Patient's account/billing number: [id number]  Patient's YOB: 1980  Age: 44 y.o.  Date of Admission: 10/9/2024  2:19 AM  Length of stay during current admission: 4      Primary Care Physician: Javi Oden DO  ICU Attending Physician:      Code Status: Full Code    Reason for ICU admission:   Chief Complaint   Patient presents with    Respiratory Distress       Subjective:     OVERNIGHT EVENTS:       patient remains the same    Today:  Patient seen and examined bedside this morning  Intubated and paralyzed   PRVC 18/500/10/40%, ABG this morning 7.32/60.9/31.7  Sedated with fentanyl @ 200, propofol @ 50, versed @ 10    IVF LR @ 50 ml/hr  Tube feeds @ 45 ml/hr  Urine output has improved. 3.1 L/24 hr    Rodriguez placed due to retention    Blood glucose levels in target limits    Labs this morning reviewed    Will get CT chest, total IgE levels, aspergillus IgG and IgE levels      AWAKE & FOLLOWING COMMANDS:  [x] No   [] Yes    CURRENT VENTILATION STATUS:     [x] Ventilator  [] BIPAP  [] Nasal Cannula [] Room Air      IF INTUBATED, ET TUBE MARKING AT LOWER LIP:       cms    SECRETIONS Amount:  [x] Small [] Moderate  [] Large  [] None  Color:     [] White [] Colored  [] Bloody    SEDATION:  RAAS Score:  [x] Propofol gtt  [x] Versed gtt  [] Ativan gtt   [] No Sedation    PARALYZED:  [] No    [x] Yes    DIARRHEA:                [x] No                [] Yes  (C. Difficile status: [] positive                                                                                                                       [] negative                                                                                                                     [] pending)    VASOPRESSORS:  [] No    [] Yes    If yes -   [] Levophed       [] Dopamine     [] Vasopressin       [] 
Critical Care Team - Daily Progress Note      Date and time: 10/14/2024 7:53 AM  Patient's name:  Austin Kwon  Medical Record Number: [id number]  Patient's account/billing number: [id number]  Patient's YOB: 1980  Age: 44 y.o.  Date of Admission: 10/9/2024  2:19 AM  Length of stay during current admission: 5      Primary Care Physician: Javi Oden DO  ICU Attending Physician:      Code Status: Full Code    Reason for ICU admission:   Chief Complaint   Patient presents with    Respiratory Distress       Subjective:     OVERNIGHT EVENTS:     No acute events overnight    Today:  Patient seen and examined bedside this morning  Intubated and paralyzed   PRVC 18/500/10/40%, ABG this morning 7.41/51.1/32.9  High Peak pressures 40  Sedated with fentanyl @ 125, ketamine @ 53, propofol @ 50, versed @ 10    Tube feeds @ 45 ml/hr. Tolerating well  No bowel movement in the past 4 days. Distended, soft abdomen, sluggish bowel sounds.    Urine output has improved. 1.8 L/24 hr    Blood glucose levels in target limits    Labs this morning reviewed  CT chest showed reactive airway disease, mild mucus plugging and bronchogenic cyst  Elevated IgE 717    AWAKE & FOLLOWING COMMANDS:  [x] No   [] Yes    CURRENT VENTILATION STATUS:     [x] Ventilator  [] BIPAP  [] Nasal Cannula [] Room Air      IF INTUBATED, ET TUBE MARKING AT LOWER LIP:       cms    SECRETIONS Amount:  [x] Small [] Moderate  [] Large  [] None  Color:     [] White [] Colored  [] Bloody    SEDATION:  RAAS Score:  [x] Propofol gtt  [x] Versed gtt  [] Ativan gtt   [] No Sedation    PARALYZED:  [] No    [x] Yes    DIARRHEA:                [x] No                [] Yes  (C. Difficile status: [] positive                                                                                                                       [] negative                                                                                                                     [] 
Critical Care Team - Daily Progress Note      Date and time: 10/15/2024 11:06 AM  Patient's name:  Austin Kwon  Medical Record Number: [id number]  Patient's account/billing number: [id number]  Patient's YOB: 1980  Age: 44 y.o.  Date of Admission: 10/9/2024  2:19 AM  Length of stay during current admission: 6      Primary Care Physician: Javi Oden DO  ICU Attending Physician:      Code Status: Full Code    Reason for ICU admission:   Chief Complaint   Patient presents with    Respiratory Distress       Subjective:     OVERNIGHT EVENTS:       No acute events overnight    Today:  Patient seen and examined bedside this morning  Intubated,sedated and paralyzed   PRVC 10/500/10/30%, ABG this morning 7.39/55.8/471.8/37.3  High Peak pressures 40  Sedated with fentanyl @ 200, ketamine @ 49.4, propofol @ 60, versed @ 10  -hemodynamically stable with /66, pulse 93 with no pressor support.    Tube feeds @ 45 ml/hr. Tolerating well  No bowel movement in the past 5 days. Distended, soft abdomen, sluggish bowel sounds.    Urine output has improved. 1.7 L/24 hr    Blood glucose levels in target limits,    Labs this morning reviewed  BMP: unremarkable with slight elevation of ALT up to 110  CBC: WBC trending up probably secondary to methylprednisolone induced de margination .  CT chest showed reactive airway disease, mild mucus plugging and bronchogenic cyst  Elevated IgE 717    AWAKE & FOLLOWING COMMANDS:  [x] No   [] Yes    CURRENT VENTILATION STATUS:     [x] Ventilator  [] BIPAP  [] Nasal Cannula [] Room Air      IF INTUBATED, ET TUBE MARKING AT LOWER LIP:       cms    SECRETIONS Amount:  [x] Small [] Moderate  [] Large  [] None  Color:     [] White [] Colored  [] Bloody    SEDATION:  RAAS Score:  [x] Propofol gtt  [x] Versed gtt  [] Ativan gtt   [] No Sedation    PARALYZED:  [] No    [x] Yes    DIARRHEA:                [x] No                [] Yes  (C. Difficile status: [] positive           
Critical Care Team - Daily Progress Note      Date and time: 10/16/2024 11:55 AM  Patient's name:  Austin Kwon  Medical Record Number: [id number]  Patient's account/billing number: [id number]  Patient's YOB: 1980  Age: 44 y.o.  Date of Admission: 10/9/2024  2:19 AM  Length of stay during current admission: 7      Primary Care Physician: Javi Oden DO  ICU Attending Physician:      Code Status: Full Code    Reason for ICU admission:   Chief Complaint   Patient presents with    Respiratory Distress       Subjective:     OVERNIGHT EVENTS:       No acute events overnight    Today:  Patient seen and examined bedside this morning    -Intubated with below mentioned vent settings and paralyzed with cisatracurium (NIMBEX)  AC/PC 12/12/30%, ABG this morning 7.41/36.6/577.8/24.4    - RAAS score -5, Sedated with fentanyl @ 200, ketamine @ 14.1, versed @ 10 and precedex @ 0.2    -Bi spectral index is 32    -patient is hemodynamically stable with /65, Pulse 80, with no pressor support.    -urine output is 2940 ml/24 hr, with intake of 1513 ml /24 hr leading to total positive fluid balance of 4306.7 since admission. Started diamox 250 mg yesterday.    -currently on peptide based continuous Tube feeds @ 10 ml/hr with water flushes of 50 ml Q 4 H. Tolerating well    -No bowel movement in the past 5 days. Distended, soft abdomen, sluggish bowel sounds, added colace, senokot and glycolax BiD.    -Blood glucose levels in target limits, with no evidence of hypoglycemia.    Labs this morning reviewed.  BMP:   Na: 140  K+ : 4.8  Cl- : 109  HCO3:24  ALT trending down  CBC: H/H: 11.3/30, , WBC 15.4<    Chest Xray:  CT chest showed reactive airway disease, mild mucus plugging and bronchogenic cyst  Elevated IgE 717    Plan:  - Discontinue diamox for concern for alkalosis  - Possible bronchoscopy today after taking consent from biological children today.    OBJECTIVE:     VITAL SIGNS:  /71   
Critical Care Team - Daily Progress Note      Date and time: 10/18/2024 9:12 PM  Patient's name:  Austin Kwon  Medical Record Number: [id number]  Patient's account/billing number: [id number]  Patient's YOB: 1980  Age: 44 y.o.  Date of Admission: 10/9/2024  2:19 AM  Length of stay during current admission: 9      Primary Care Physician: Javi Oden DO  ICU Attending Physician:      Code Status: Full Code    Reason for ICU admission:   Chief Complaint   Patient presents with    Respiratory Distress       Subjective:     OVERNIGHT EVENTS:      Hypertensive BP 160s, concern for undersedation   Started dilaudid gtt as propofol is filtered out from ECMO    Today:  Patient seen and examined bedside this morning    -Intubated with below mentioned vent settings and paralyzed with cisatracurium (NIMBEX)  AC/PC 8/480/30%, ABG this morning 7.47/28.8/334.9/21.4    - RAAS score -5, Sedated with fentanyl @ 200, ketamine @ 14.1, versed @ 10 and precedex @ 1.4    -Bi spectral index is 32    -patient is hemodynamically stable with BP  Pulse 80, with no pressor support.    -urine output is 3420 ml/24 hr, with intake of 2346 ml /24 hr leading to total positive fluid balance of 3233.5 since admission.     -currently on peptide based continuous Tube feeds @ 20 ml/hr with water flushes of 50 ml Q 4 H. Tolerating well    -No bowel movement in the past 5 days. Distended, soft abdomen, sluggish bowel sounds, added colace, senokot and glycolax BiD.    -Blood glucose levels in target limits, with no evidence of hypoglycemia.    Labs this morning reviewed.  BMP:   Na: 134  K+ : 4.4  Cl- : 105  HCO3:22  ALT trending down  CBC: H/H: 10.4/28, , WBC 12.5    Chest Xray:  CT chest showed reactive airway disease, mild mucus plugging and bronchogenic cyst  Elevated IgE 717    Plan:  - monitor BP, and start nicardipine drip if there is a need after patient is weaned off from nimbex.      OBJECTIVE:     VITAL 
Critical Care Team - Daily Progress Note      Date and time: 10/18/2024 9:35 AM  Patient's name:  Austin Kwon  Medical Record Number: [id number]  Patient's account/billing number: [id number]  Patient's YOB: 1980  Age: 44 y.o.  Date of Admission: 10/9/2024  2:19 AM  Length of stay during current admission: 9      Primary Care Physician: Javi Oden DO  ICU Attending Physician:      Code Status: Full Code    Reason for ICU admission:   Chief Complaint   Patient presents with    Respiratory Distress       Subjective:     OVERNIGHT EVENTS:   No acute events overnight.       Today:  Patient seen and examined bedside this morning    -Intubated with below mentioned vent settings   AC/PC 8/460/30%, ABG this morning 7.46/36.7/88.9/26.5    - RAAS score -4, Sedated with fentanyl @ 200, ketamine @ 14.1, versed @ 10 and precedex @ 1.4    -Bi spectral index is 30    -patient is hemodynamically stable with /61 pulse 90, MAP 89 with no pressor support.    -urine output is 3795 ml/24 hr, with intake of 2499 ml /24 hr leading to total positive fluid balance of one 1938.1 since admission.     -currently on peptide based continuous Tube feeds @ 20 ml/hr with water flushes of 50 ml Q 4 H. Tolerating well    -No bowel movement in the past 6 days. Distended, soft abdomen, sluggish bowel sounds, added colace, senokot and glycolax BiD.    -Blood glucose levels in target limits, with no evidence of hypoglycemia.    Labs this morning reviewed.  BMP:   Na: 135  K+ : 4.4  Cl- : 102  HCO3:27  ALT trending down  CBC: H/H: 10.7/28, , WBC 10.5    Chest Xray:Stable appearance of the lungs.  Small left effusion with left basal axis.  No pneumothorax.  No new airspace disease. Cardiomediastinal silhouette and bony thorax are unchanged.  CT chest showed reactive airway disease, mild mucus plugging and bronchogenic cyst  Elevated IgE 717    Plan:  - wean off versed  - plan to extubate tomorrow      OBJECTIVE: 
Critical Care Team - Daily Progress Note      Date and time: 10/19/2024 3:23 PM  Patient's name:  Austin Kwon  Medical Record Number: [id number]  Patient's account/billing number: [id number]  Patient's YOB: 1980  Age: 44 y.o.  Date of Admission: 10/9/2024  2:19 AM  Length of stay during current admission: 10      Primary Care Physician: Javi Oden DO  ICU Attending Physician:      Code Status: Full Code    Reason for ICU admission:   Chief Complaint   Patient presents with    Respiratory Distress       Subjective:     OVERNIGHT EVENTS:   No acute events overnight.       Today:  Patient seen and examined bedside this morning    -Intubated with below mentioned vent settings   AC/PC 8/460/5/30%, ABG this morning 7.48/31.2/86.2/23.3    - RAAS score +2, Sedated with fentanyl @ 200, ketamine @ 14.1, versed @ 6 and precedex @ 1.5    -Bi spectral index is 30    -patient is hemodynamically stable with /64 pulse 84, currently on nicardipine drip.    -urine output is 3305 ml/24 hr, with intake of 2861 ml /24 hr leading to total positive fluid balance of one 1494.1 ml since admission.     -currently on peptide based continuous Tube feeds @ 20 ml/hr with water flushes of 50 ml Q 4 H. Tolerating well    -No bowel movement in the past 7 days. Distended, soft abdomen, sluggish bowel sounds, on colace, senokot and glycolax BiD.    -Blood glucose levels in target limits, with no evidence of hypoglycemia.    Labs this morning reviewed.  BMP:   Na: 130<133<135  K+ : 4.3  Cl- : 98  HCO3: 22  Lactate: 2.3  ALT trending down    CBC:   H/H: 11.6/33.2,  ,   WBC 17.2<13.6<10.5    Chest Xray:Stable appearance of the lungs.  Small left effusion with left basal axis.  No pneumothorax.  No new airspace disease. Cardiomediastinal silhouette and bony thorax are unchanged.  CT chest showed reactive airway disease, mild mucus plugging and bronchogenic cyst  Elevated IgE 717    Plan:  -Follow-up on blood 
Critical Care Team - Daily Progress Note      Date and time: 10/23/2024 6:52 AM  Patient's name:  Austin Kwon  Medical Record Number: [id number]  Patient's account/billing number: [id number]  Patient's YOB: 1980  Age: 44 y.o.  Date of Admission: 10/9/2024  2:19 AM  Length of stay during current admission: 14      Primary Care Physician: Javi Oden DO  ICU Attending Physician:      Code Status: Full Code    Reason for ICU admission:   Chief Complaint   Patient presents with    Respiratory Distress       Subjective:     OVERNIGHT EVENTS:   Patient needed no acute orders overnight. ECMO clamped.       Today:  Patient seen and examined bedside this morning    -extubated on room air, ABG this morning 7.459/35.9/101.1/25.9    - RAAS score 0, Sedated with fentanyl @ 0, ketamine @ 0, versed @ 0 and precedex @ 0 for decannulation    -Bi spectral index is 30    -patient is hemodynamically stable with /89 pulse 115, currently off nicardipine drip for decannulation    -urine output is 4010 ml/24 hr, with intake of 2152 ml /24 hr leading to total negative fluid balance of 1858 ml since admission.     -currently off peptide based continuous Tube feeds @ 20 ml/hr with water flushes of 50 ml Q 4 H for decannulation    -Bowel movement x2 10/21/24. Soft abdomen, sluggish bowel sounds, on colace, senokot and glycolax BiD.    -Blood glucose levels in target limits, with no evidence of hypoglycemia.    Labs this morning reviewed.  BMP:   Na: 135  K+ : 3.3  Cl- : 102  HCO3: 22  Lactate: 0.6  ALT normalized    CBC:   H/H: 10.9/30.4,    WBC 13.2 from 17.2    Chest Xray: The endotracheal tube is no longer visualized.  The enteric tube courses off  the field of view in the upper abdomen.  Right ECMO catheter in place.The  cardiac and mediastinal contours appear unchanged. Shallow inflation with  minimal linear opacities in the lung bases again noted.  No new airspace  disease identified in the 
Date: 10/9/2024  Time: 0235  Patient identity confirmed:  Yes  Indications: airway protection  Preoxygenation: yes    Laryngoscope size and type Glidescope  Airway introducer used: No  Evac: No  ETT size:a 7.5 cuffed  Number of attempts:1   Cords visualized:  [x] Clearly  [] Poorly  Breath sounds present bilaterally: Yes   ETCO2   [x] Positive   ETT secured at  25 cm at the teeth   ETT secured with alessandra  Chest x-ray ordered: Yes     Difficult airway:    No          BP: 116/68        Procedure performed by: Dr. Lexi Fay, P [id number]                  
Decreased PEEP to 8 SPO2 98%  
ECMO DECANNULATION NOTE:     Patient decannulated at bedside with 2 ECMO specialists present and assisting (including the person documenting) Dr. Landin with the removal of the 30 fr [place] cannula after adequate cleansing to the site, two 0-silk sutures used in a purse-string fashion with 20 minutes of manual pressure held. Hemostasis achieved with no complications. Patient tolerated with no incidence. No hematoma or bleeding noted. Dry gauze w/tegaderm to the site and will continue to monitor.      OFF ECMO TIME: 1106    Electronically signed by Adi Santacruz RN ECMO Specialist on 10/23/2024 at 11:59 AM      
ECMO End of Shift Note        Cannulation Date/Time: 10/14/2024 @ 1908    ECMO type: V/V   Cannula size/location: 30 Fr. [place] R IJ    Current Flows: 2.36 LPM   Current FiO2: Clamped 10/21 @2000   Current Sweep: Clamped    Delta Pressure: 17   Clot [place]:         Oxygenator: Heavy clot burden at 9/12/3 o'clock positions, clot burden extends from 9 o'clock up to 12 o'clock down to 3 o'clock, fibrin adhered to all clots at all positions streaking downward        Circuit: Passive fill all pigtails   Anticoagulation: Argatroban turned off at midnight (for decannulation)   Products Given:         PRBCs: 0       PLTs: 0       FFP: 0       Cryo: 0       Albumin: 0     Plan for decannulation today with Dr Landin  
ECMO End of Shift Note        Cannulation Date/Time: 10/14/2024 @ 1908    ECMO type: V/V   Cannula size/location: 30 Fr. [place] R IJ    Current Flows: 4.21 LPM   Current FiO2: Clamped 10/21 @2000   Current Sweep: Clamped    Delta Pressure: 17   Clot [place]:         Oxygenator: Heavy clot burden at 9/12/3 o'clock positions, clot burden extends from 9 o'clock up to 12 o'clock down to 3 o'clock, fibrin adhered to all clots at all positions streaking downward        Circuit: Passive fill all pigtails   Anticoagulation: Argatroban @ 3.481 mcg/kg/min (therapeutic)   Products Given:         PRBCs: 0       PLTs: 0       FFP: 0       Cryo: 0       Albumin: 0     T/S Expires tonight at [place]. Plan for decannulation tomorrow AM with Dr. Landin at 6345q.     Electronically signed by Adi Santacruz RN on 10/22/2024 at 6:35 PM    
ECMO End of Shift Note      Cannulation Date/Time: 10/14/2024 @ 1908    ECMO type: V/V   Cannula size/location: 30 Fr. [employer] R IJ    Current Flows: 4.20 LPM   Current FiO2: 100%   Current Sweep: 6 LPM   Delta Pressure: 30   Clot [place]:        Oxygenator: Heavy clot burden at 9/12/3 o'clock positions, clot burden extends from 9 up to 12 down to 3 o'clock, fibrin adhered to all clots at all positions        Circuit: Passive fill    Anticoagulation: Argatroban @ 3.481 (redraw@0853) therapeutic x1   Products Given:        PRBCs: 0       PLTs: 0       FFP: 0       Cryo: 0       Albumin: 0       Significant Shift Events:   See LABS and ABGS for titrations  Per Dr Mar PC 6 PEEP 12  
ECMO End of Shift Note      Cannulation Date/Time: 10/14/2024 @ 1908    ECMO type: V/V   Cannula size/location: 30 Fr. [place] R IJ    Current Flows: 4.21 LPM   Current FiO2: Clamped 10/21 @2000   Current Sweep: Clamped    Delta Pressure: 29   Clot [place]:        Oxygenator: Heavy clot burden at 9/12/3 o'clock positions, clot burden extends from 9 o'clock up to 12 o'clock down to 3 o'clock, fibrin adhered to all clots at all positions streaking downward        Circuit: Passive fill all pigtails   Anticoagulation: Argatroban @ 3.481 mcg/kg/min (therapeutic)   Products Given:        PRBCs: 0       PLTs: 0       FFP: 0       Cryo: 0       Albumin: 0       Significant Shift Events:   See LABS, ABGS  and orders for titrations  Bronched 10/16, cultures sent  ECHO completed 10/21  T&S exp 10/22@8338  Extubated 10/21    
ECMO End of Shift Note      Cannulation Date/Time: 10/14/2024 @ 1908    ECMO type: V/V   Cannula size/location: 30 Fr. [place] R IJ    Current Flows: 4.21 LPM   Current FiO2: 80%   Current Sweep: 6 LPM   Delta Pressure: 29   Clot [place]:        Oxygenator: Heavy clot burden at 9/12/3 o'clock positions, clot burden extends from 9 up to 12 down to 3 o'clock, fibrin adhered to all clots at all positions        Circuit: Passive fill all pigtails   Anticoagulation: Argatroban @ 3.481 mcg/kg/min (therapeutic)   Products Given:        PRBCs: 0       PLTs: 0       FFP: 0       Cryo: 0       Albumin: 0       Significant Shift Events:   See LABS, ABGS  and orders for titrations  Bronched 10/16, cultures sent  ECHO due 10/21/2024  BB expires 10/18/2024 @0783  Paralytic remains on, propofol restarted  
ECMO End of Shift:         Cannulation date/time: 10/14/2024 @ 1908    ECMO Type: V/V   Cannula Size/Location: 30 Fr. [place] R IJ              Current Flows: 4.20 LPM              Current FiO2: 40%              Current Sweep: 0.5 LPM              Delta Pressure: 30    Clot [place]:               Oxygenator: Large clot burden noted at 9 o'clock position. Large clot burden and fibrin aggregation extending from 12 to 3 o'clock positions.              Circuit: None   Anticoagulation: Argatroban - therapeutic   Products given:               PRBC's: 0              PLT's: 0              FFP: 0              Cryo: 0              Albumin: 0     Significant Shift Events:   - PaO2 goal > 65    Electronically signed by Shwetha Arzate RN ECMO Specialist on 10/18/2024  
ECMO End of Shift:         Cannulation date/time: 10/14/2024 @ 1908    ECMO Type: V/V   Cannula Size/Location: 30 Fr. [place] R IJ              Current Flows: 4.20 LPM              Current FiO2: Clamped              Current Sweep: Clamped              Delta Pressure: 30    Clot [place]:               Oxygenator: Large clot burden noted at 9 o'clock position. Large clot burden and fibrin aggregation extending from 12 to 3 o'clock positions.              Circuit: None   Anticoagulation: Argatroban (therapeutic)   Products given:               PRBC's: 0              PLT's: 0              FFP: 0              Cryo: 0              Albumin: 0     Significant Shift Events:    - T&S sent 10/19/2024   - remained clamped on ECMO overnight, tolerated well per ABG results   - Crit Care team added scheduled oxycodone   - Nicardipine added for SBP goal < 160 mmHg    Electronically signed by Shwetha Arzate RN ECMO Specialist on 10/19/2024  
ECMO End of Shift:         Cannulation date/time: 10/14/2024 @ 1908    ECMO Type: V/V   Cannula Size/Location: 30 Fr. [place] R IJ              Current Flows: 4.20 LPM              Current FiO2: 90%              Current Sweep: 2.5 LPM              Delta Pressure: 28    Clot [place]:               Oxygenator: Moderate clot burden noted at 3/9/12 o'clock positions. Fibrin adhered to 12 o'clock position              Circuit: None   Anticoagulation: Argatroban not therapeutic (q2h PTTs)   Products given:               PRBC's: 0              PLT's: 0              FFP: 0              Cryo: 0              Albumin: 0     Significant Shift Events:    - Cannulated without complications   - Argatroban infusion began (see eMAR)   - Argatroban last titrated at 0551; aPTT order placed for 0745    Electronically signed by Shwetha Arzate RN ECMO Specialist on 10/15/2024  
ECMO End of Shift:     ECMO Day 2        Cannulation date/time: 10/14/2024 @1908    ECMO Type: Veno-venous   Cannula Size/Location: 30 Fr. [place] R IJ              Current Flows: 4.1 LPM              Current FiO2: 100%              Current Sweep: 6 LPM              Delta Pressure: 28    Clot [place]:               Oxygenator: Moderate clot burden at all three corners of the MO and extending down perimeter of MO. Fibrin adhered to all clots and fanning down face of MO.               Circuit: None   Anticoagulation: Argatroban @ 3.481 mcg/kg/min - paused for bronchoscopy. Restarted @ 1630, ptt @ 1830.   Products given:               PRBC's: 0              PLT's: 0              FFP: 0              Cryo: 0              Albumin: 1 - 500 cc, chugging, diuretic dc'd.      Significant Shift Events: Bronchoscopy, culture sent. Echo due 10/21/2024. BB expires 10/18/2024 @ 3841. Paralytic remains.     Electronically signed by Adi Santacruz RN ECMO Specialist on 10/16/2024 at 6:05 PM     
ECMO NOTE:     Call received from Dr. Mar regarding potential need for ECMO, Pt seen and chart reviewed with Dr. Alford as well and patient is determined a good candidate and will proceed with ECMO cannulation this evening. Consent obtained from pts sister, all questions answered and she denies any further questions at this time.     Tari Abraham   ECMO Manager/Coordinator  
Facility/Department: [facility] CAR 3- MICU   Daily Treatment Note  Patient Name: Austin Kwon        MRN: [id number]    : 1980    Date of Service: 10/25/2024    Discharge Recommendations   Further therapy recommended at discharge.The patient should be able to tolerate at least 3 hours of therapy per day over 5 days or 15 hours over 7 days.   This patient may benefit from a Physical Medicine and Rehab consult.            Assessment  Performance deficits / Impairments: Decreased functional mobility ;Decreased ADL status;Decreased ROM;Decreased strength;Decreased balance;Decreased endurance;Decreased high-level IADLs;Decreased coordination;Decreased fine motor control;Decreased cognition  Assessment: pt  demonstrated improved B UE ROM and activity tolerance this date, but continues to be significantly limited in ability to participate in meaningful tasks. pt would benefit from intensive therapy in order to return to prior level of function.  Prognosis: Good  Decision Making: High Complexity  REQUIRES OT FOLLOW-UP: Yes  Activity Tolerance  Activity Tolerance: Patient Tolerated treatment well;Patient limited by fatigue  Activity Tolerance Comments: pt fatigues quickly but exhibited improved activity tolerance from prior session  Safety Devices  Type of Devices: Call light within reach;Nurse notified;Gait belt;Left in chair;Chair alarm in place;Patient at risk for falls  Restraints  Restraints Initially in Place: No    Restrictions/Precautions  Restrictions/Precautions  Restrictions/Precautions: Fall Risk  Required Braces or Orthoses?: No  Position Activity Restriction  Other position/activity restrictions: ECMO 10/14-10/23.    Subjective  General  Patient assessed for rehabilitation services?: Yes  Family / Caregiver Present: No  General Comment  Comments: RN ok'd for therapy this date. pt agreeable to participate in session and cooperative throughout/pleasant. pt reported 5/10 head pain, RN aware. pt provided with 
Home Oxygen Evaluation    Home Oxygen Evaluation completed.    Patient is on 0  liters per minute via room Air.  Resting SpO2 on room air = 97%    SpO2 on room air with exercise = 99%      Whit Tristan RCP  2:50 PM  
Morning ABG   PH: 7.33 CO2 60.9 Hco3 31.7   No changes per Dr. Hines.     
Nutrition Assessment     Type and Reason for Visit: Reassess    Nutrition Recommendations/Plan:   Recommend continue current TF regimen: Peptide Based @ 45 ml/hr + FWF 50 ml q4h  Consider adding bowel regimen 2/2 no BM x 3 days  Will monitor labs, GI status, wt, and POC     Malnutrition Assessment:  Malnutrition Status: At risk for malnutrition (Comment)    Nutrition Assessment:  Pt remains intubated/sedated. Propofol @ 15.8 ml/hr. Peptide Based TF @ goal rate, 45 ml/hr. No BM documented since admission (3 days). Consider adding bowel regimen. Per chart, pt's wt trending up during x several months. Meds: versed, fentanyl, insulin, LR @ 75 ml/hr. -166 mg/dL x 24 hrs.    Estimated Daily Nutrient Needs:  Energy (kcal):  5207-9313 kcals/day Weight Used for Energy Requirements: Admission     Protein (g):   gm pro/day Weight Used for Protein Requirements: Admission        Fluid (ml/day):  30 mL/kg = 8314-7031 mL/day or per MD Method Used for Fluid Requirements: ml/Kg    Nutrition Related Findings:   No BM since admission Wound Type: None    Current Nutrition Therapies:    Diet NPO  ADULT TUBE FEEDING; Orogastric; Peptide Based; Continuous; 10; Yes; 10; Q 4 hours; 45; 50; Q 4 hours    Anthropometric Measures:  Height: 175.3 cm (5' 9.02\")  Current Body Wt: 79.8 kg (175 lb 14.8 oz)   BMI: 26    Nutrition Diagnosis:   Inadequate oral intake related to impaired respiratory function (asthma exacerbation) as evidenced by NPO or clear liquid status due to medical condition    Nutrition Interventions:   Food and/or Nutrient Delivery: Continue Current Tube Feeding  Nutrition Education/Counseling: No recommendation at this time  Coordination of Nutrition Care: Continue to monitor while inpatient  Plan of Care discussed with: RN    Goals:  Previous Goal Met: Goal(s) Achieved  Goals: Tolerate nutrition support at goal rate, Meet at least 75% of estimated needs       Nutrition Monitoring and Evaluation: 
Occupational Therapy    [facility]  Occupational Therapy Not Seen Note    DATE: 10/22/2024    NAME: Austin Kwon  MRN: [id number]   : 1980      Patient not seen this date for Occupational Therapy due to:    Other: Per ECMO RN, plans to be decannulated tomorrow. OT will check back tomorrow afternoon    Next Scheduled Treatment: 10/23/2024    Electronically signed by TIFFANIE Escoto on 10/22/2024 at 2:50 PM    
Occupational Therapy  Occupational Therapy Re- Evaluation  Facility/Department: [facility] CAR 2- STEPDOWN     Patient Name: Austin Kwon        MRN: [id number]    : 1980    Date of Service: 10/29/2024    Discharge Recommendations  Discharge Recommendations: Patient would benefit from continued therapy after discharge  OT Equipment Recommendations  Equipment Needed: Yes  Mobility Devices: Walker  Walker: Rolling    Chief Complaint   Patient presents with    Respiratory Distress     Past Medical History:  has a past medical history of DULCE MARIA (acute kidney injury) (HCC) and Asthma.  Past Surgical History:  has a past surgical history that includes extracorporeal circulation (Right, 10/14/2024).    Assessment  Performance deficits / Impairments: Decreased functional mobility ;Decreased ADL status;Decreased strength;Decreased balance;Decreased endurance;Decreased high-level IADLs;Decreased coordination;Decreased fine motor control;Decreased cognition;Decreased safe awareness  Assessment: Pt agreed to OT this date. Pt exhibited SBA for bed mobility, CGA for functional sit <> stand and functional mobility w/ Min A for RW management. Transfers/mobility completed utilizing RW requring Min-Mod VC for hand placement. Pt tolerating functional activity for ~18 min. w/ good standing balance, requring 1x rest break seated for ~8 min. Per report, pt is typically IND for ADLs, IADLs, and functional mobility tasks without reliance on AE. Pt demo. functional activities including grooming Mod I, UE dressing SBA, UE bathing Min A, and feeding Mod I. Following bed mobility pt BP measuring 125/88 (99) and following session BP measuring 106/85 (94), no c/o dizziness however pt c/o being warm. Based on the patient occupational performance throughout evaluation, it is expected continued OT services are required during their acute hospitalization to promote increased IND and safety with ADL/IADLs and functional 
Occupational Therapy Initial Evaluation  Facility/Department: [facility] CAR 3- MICU     Patient Name: Austin Kwon        MRN: [id number]    : 1980    Date of Service: 10/23/2024    Discharge Recommendations   Further therapy recommended at discharge.         Chief Complaint   Patient presents with    Respiratory Distress     Past Medical History:  has a past medical history of DULCE MARIA (acute kidney injury) (HCC) and Asthma.  Past Surgical History:  has a past surgical history that includes extracorporeal circulation (Right, 10/14/2024).    Assessment  Performance deficits / Impairments: Decreased functional mobility ;Decreased ADL status;Decreased ROM;Decreased strength;Decreased balance;Decreased endurance;Decreased high-level IADLs;Decreased coordination;Decreased fine motor control;Decreased cognition  Assessment: pt demonstrated above deficits impacting occupational performance. pt significantly limited by decreased strength and balance impacting all participation. pt would benefit from continued acute OT, as well as at discharge in order to increase ability to participate in meaningful tasks.  Prognosis: Good  Decision Making: High Complexity  REQUIRES OT FOLLOW-UP: Yes  Activity Tolerance  Activity Tolerance: Patient Tolerated treatment well;Patient limited by fatigue  Activity Tolerance Comments: pt fatigued quickly but motivated to participate in section  Safety Devices  Type of Devices: Call light within reach;Left in bed;Nurse notified;Gait belt  Restraints  Restraints Initially in Place: No    Restrictions/Precautions  Restrictions/Precautions  Restrictions/Precautions: Fall Risk  Required Braces or Orthoses?: No  Position Activity Restriction  Other position/activity restrictions: ECMO 10/14-10/23.    Subjective  General  Patient assessed for rehabilitation services?: Yes  Family / Caregiver Present: No  General Comment  Comments: RN ok'd for therapy this date. pt agreeable to participate in session and 
Order obtained for extubation.  SpO2 of 99 on 30% FiO2.   Patient extubated and placed on room air.   Post extubation SpO2 is 98% with HR  99 bpm and RR 21 breaths/min.    Patient had moderate cough that was productive of yellow and bloody sputum.  Extubation Well tolerated by patient..       Lin Calderón RCP   1:17 PM  
PALLIATIVE CARE NURSING ASSESSMENT    Patient: Austin Kwon  Room: 2009/2009-01    Reason For Consult   Goals of care evaluation  Distress management  Guidance and support  Facilitate communications  Assistance in coordinating care    Code Status:  Full Code    Summary:  Palliative Care visit to bedside for support.   Offered assistance to complete DPOAH if patient would like to.  DPOAH completed.   Pt is not interested in completing Living Will at this time.   Pt and sister are asking if he can have home health nurse and therapy.  I let them know I will let  RN know of their request.  Ct notified and will meet with patient and sister.   Palliative care will continue to follow.           Impression: Austin Kwon is a 44 y.o. year old male  has a past medical history of DULCE MARIA (acute kidney injury) (HCC) and Asthma..  Currently hospitalized for the management of acute exacerbation of asthma.  The Palliative Care Team is following to assist with patient and family support, goals of care, advanced care planning.     Vital Signs  Blood pressure 130/79, pulse 100, temperature 98 °F (36.7 °C), temperature source Oral, resp. rate 13, height 1.753 m (5' 9.02\"), weight 72.6 kg (160 lb 0.9 oz), SpO2 99%.    Patient Active Problem List   Diagnosis    Severe persistent asthma in adult without complication    Severe persistent asthma with exacerbation    Mild persistent asthma without complication    Life threatening acute exacerbation of asthma    Lactic acidemia    Lactic acidosis    Acute respiratory failure with hypoxia and hypercapnia    DULCE MARIA (acute kidney injury) (HCC)    Respiratory acidosis with metabolic acidosis    ACP (advance care planning)    ARDS (adult respiratory distress syndrome)    Encounter for palliative care    Acute respiratory failure with hypercapnia    Dilatation of colon    Cecal polyp    Paralytic ileus of large intestine (HCC)    Pneumonia of lower lobe due to infectious organism 
Patient cannulated for V/V ECMO in hybrid OR with Dr. Alford. 30 Fr. [place] Cannula placed in right IJ. Patient tolerated with no incidents.    ON ECMO TIME: 1908    Electronically signed by Shwetha Arzate RN on 10/14/2024    
Patient stated he is okay if his sister Trinidad is given medical updates/information without him present. Primary notified.   
Patient transferred via unit PCA to stepdown room 2009. Personal belongings transferred with patient.   Sister Love Kwon  notified and update on room change.    JOSE ARMANDO Hale RN    
Physical Therapy  Facility/Department: [facility] CAR 3- MICU  Physical Therapy Treatment Note    Name: Austin Kwon  : 1980  MRN: [id number]  Date of Service: 10/25/2024    Discharge Recommendations:  Further therapy recommended at discharge.The patient should be able to tolerate at least 3 hours of therapy per day over 5 days or 15 hours over 7 days.   This patient may benefit from a Physical Medicine and Rehab consult.    PT Equipment Recommendations  Other: CTA pending progress with PT      Patient Diagnosis(es): The primary encounter diagnosis was Severe persistent asthma with exacerbation. Diagnoses of Acute respiratory failure with hypercapnia, Life threatening acute exacerbation of asthma, and Respiratory acidosis with metabolic acidosis were also pertinent to this visit.  Past Medical History:  has a past medical history of DULCE MARIA (acute kidney injury) (HCC) and Asthma.  Past Surgical History:  has a past surgical history that includes extracorporeal circulation (Right, 10/14/2024).    Assessment  Body Structures, Functions, Activity Limitations Requiring Skilled Therapeutic Intervention: Decreased functional mobility ;Decreased strength;Decreased endurance;Decreased balance  Assessment: Pt required modA x2 for BM, modA for sit to stand from EOB to ijeoma stedy.. Pt has profound generalized weakness from extensive period of immobility. Pt is motivated to improve, fatigued rapidly in today's session. Pt reports IND PLOF, does not use ambulatory aid. Pt is currently unsafe to return to prior living arrangements. Pt would benefit from continued acute PT to address deficits.  Therapy Prognosis: Good  Requires PT Follow-Up: Yes  Activity Tolerance  Activity Tolerance: Patient limited by fatigue;Patient limited by endurance    Plan  Physical Therapy Plan  General Plan: 6-7 times per week  Current Treatment Recommendations: Strengthening, Balance training, Functional mobility training, Transfer training, Endurance 
Physical Therapy  Facility/Department: [facility] 3- MICU  Physical Therapy Initial Assessment    Name: Austin Kwon  : 1980  MRN: [id number]  Date of Service: 10/23/2024  Chief Complaint   Patient presents with    Respiratory Distress       Discharge Recommendations:   Further therapy recommended at discharge.The patient should be able to tolerate at least 3 hours of therapy per day over 5 days or 15 hours over 7 days.   This patient may benefit from a Physical Medicine and Rehab consult.      PT Equipment Recommendations  Equipment Needed: No (Continue to assess)      Patient Diagnosis(es): The primary encounter diagnosis was Severe persistent asthma with exacerbation. Diagnoses of Acute respiratory failure with hypercapnia, Life threatening acute exacerbation of asthma, and Respiratory acidosis with metabolic acidosis were also pertinent to this visit.  Past Medical History:  has a past medical history of DULCE MARIA (acute kidney injury) (HCC) and Asthma.  Past Surgical History:  has a past surgical history that includes extracorporeal circulation (Right, 10/14/2024).    Assessment  Body Structures, Functions, Activity Limitations Requiring Skilled Therapeutic Intervention: Decreased functional mobility ;Decreased strength;Decreased endurance;Decreased balance  Assessment: Pt maxA+2 BM, maxA+1 to maintain EOB sitting balance. Pt has profound generalized weakness from extensive period of immobility. Pt is motivated to improve though fatigues rapidly in today's session. Pt is IND PLOF, does not use ambulatory aid. Pt is currently unsafe to return to prior living arrangements. Pt would benefit from continued acute PT to address deficits.  Therapy Prognosis: Good  Decision Making: High Complexity  Requires PT Follow-Up: Yes  Activity Tolerance  Activity Tolerance: Patient limited by fatigue;Patient limited by endurance    Plan  Physical Therapy Plan  General Plan:  (6-7x/wk)  Current Treatment Recommendations: 
Physical Therapy  Facility/Department: [facility] CAR 2- STEPDOWN  Physical Therapy Daily Treatment Note  Name: Austin Kwon  : 1980  MRN: [id number]  Date of Service: 10/29/2024    Discharge Recommendations:  Further therapy recommended at discharge.The patient should be able to tolerate at least 3 hours of therapy per day over 5 days or 15 hours over 7 days.   This patient may benefit from a Physical Medicine and Rehab consult.     PT Equipment Recommendations  Equipment Needed: Yes  Mobility Devices: Walker  Walker: Rolling      Patient Diagnosis(es): The primary encounter diagnosis was Severe persistent asthma with exacerbation. Diagnoses of Acute respiratory failure with hypercapnia, Life threatening acute exacerbation of asthma, and Respiratory acidosis with metabolic acidosis were also pertinent to this visit.  Past Medical History:  has a past medical history of DULCE MARIA (acute kidney injury) (HCC) and Asthma.  Past Surgical History:  has a past surgical history that includes extracorporeal circulation (Right, 10/14/2024).    Assessment  Body Structures, Functions, Activity Limitations Requiring Skilled Therapeutic Intervention: Decreased functional mobility ;Decreased strength;Decreased endurance;Decreased balance  Assessment: Pt amb 80' Raghavendra RW. Pt is limited by impaired activity tolerance. Pt is motivated, provided exercises to be done at home to continue improvements in strength. Pt is currently unsafe to return to prior living arrangements. Pt would benefit from continued acute PT to address deficits.  Therapy Prognosis: Good  Decision Making: Low Complexity  Requires PT Follow-Up: Yes  Activity Tolerance  Activity Tolerance: Patient limited by fatigue;Patient limited by endurance    Plan  Physical Therapy Plan  General Plan: 6-7 times per week  Current Treatment Recommendations: Strengthening, Balance training, Functional mobility training, Transfer training, Endurance training, Gait training, Stair 
Problem: OXYGENATION/RESPIRATORY FUNCTION  Goal: Patient will maintain patent airway  Outcome: Ongoing  Goal: Patient will achieve/maintain normal respiratory rate/effort  Respiratory rate and effort will be within normal limits for the patient  Outcome: Ongoing    Problem: MECHANICAL VENTILATION  Goal: Patient will maintain patent airway  Outcome: Ongoing  Goal: Oral health is maintained or improved  Outcome: Ongoing  Goal: ET tube will be managed safely  Outcome: Ongoing  Goal: Ability to express needs and understand communication  Outcome: Ongoing  Goal: Mobility/activity is maintained at optimum level for patient  Outcome: Ongoing    Problem: ASPIRATION PRECAUTIONS  Goal: Patient’s risk of aspiration is minimized  Outcome: Ongoing    Problem: SKIN INTEGRITY  Goal: Skin integrity is maintained or improved  Outcome: Ongoing                    
Spiritual Health History and Assessment/Progress Note  [facility]    Palliative Care,  ,  ,      Name: Austin Kwon MRN: [id number]    Age: 44 y.o.     Sex: male   Language: English   Orthodox: None   Life threatening acute exacerbation of asthma     Date: 10/17/2024            Total Time Calculated: (P) 10 min              Spiritual Assessment began in [facility] CAR 3- MICU        Referral/Consult From: Rounding   Encounter Overview/Reason: Palliative Care  Service Provided For: Friend (Pt intubated)    Writer visited per palliative care list. Pt intubated and on ECMO. Two of patient's coworkers were present and appeared calm. They shared that pt is a very funny person and how much they enjoy working with him. Friend stated that they go to [employer] together. Writer provided support through active listening and words of affirmation, and prayed at bedside.     Kelly, Belief, Meaning:   Patient unable to assess at this time  Family/Friends are connected with a kelly tradition or spiritual practice      Importance and Influence:  Patient unable to assess at this time  Family/Friends have no beliefs influential to healthcare decision-making identified during this visit    Community:  Patient Other:    Family/Friends Other:      Assessment and Plan of Care:     Patient Interventions include: Other: Prayed at bedside  Family/Friends Interventions include: Facilitated expression of thoughts and feelings and Other: Prayed at bedside    Patient Plan of Care: Spiritual Care available upon further referral  Family/Friends Plan of Care: Spiritual Care available upon further referral    Electronically signed by MARIAMA Sepulveda on 10/17/2024 at 3:09 PM   
[facility] Inpatient Service  [facility]  Progress Note    Date:   10/29/2024  Patient name:  Austin Kwon  Date of admission:  10/9/2024  2:19 AM  MRN:   [id number]  YOB: 1980    SUBJECTIVE/Last 24 hours update:       Patient seen and examined at bedside  NGT removed yesterday, patient tolerating clear liquid diet well.  Will advance to regular diet.  Status post extubated on 10/21, currently on room air  Denies any abdominal pain, is passing gas.    HPI:       ICU transfer to Family Medicine Team     Patient is a 44-year-old -American male with past medical history significant for asthma on Advair, albuterol, and Singulair at home presented to the ED on 10/9 via EMS for acute asthma exacerbation.  Patient was intubated due to acute hypoxic and hypercapnic respiratory failure secondary to asthma exacerbation.  Initial ABG did show mixed respiratory and metabolic acidosis.  Patient tested positive for rhinovirus infection.  Patient initially received Rocephin and Zithromax due to patient having previous left basilar infiltrate and respiratory culture positive for MSSA.  Cefepime was started on 10/20 and continue till 10/25.     -On 10/27, patient states that he is doing fine and does not have any complaints of chest pain, shortness of breath, abdominal pain, nausea, or vomiting.  Patient was saturating well on room air.  According to patient, he does not smoke cigarettes however intermittently smoking marijuana.  He is an occasional drinker as well and denies any recreational drugs.  Patient states that he had inhalers with him prior to admission and he had no viral illness symptoms.     Patient states that he was never intubated before however was noted with chart reviewing that patient was previously intubated in 2020 for acute hypoxic respiratory failure.     10/9: admitted and intubated  10/14: cannulated for ECMO  10/21: extubated  10/23: ECMO 
  POCPH 7.484* 7.454* 7.452* 7.484* 7.445   POCPCO2 36.1 36.3 36.6 30.9* 38.1   POCPO2 83.4 72.6* 75.6* 85.9 85.8   POCHCO3 27.1 25.4 25.5 23.2 26.2   EKOC9OKA 97.0 95.2 95.7 97.4 96.9      VBG Recent Labs     10/19/24  0358 10/19/24  1627 10/20/24  0415 10/20/24  1759 10/21/24  0443   PHVEN 7.482* 7.418 7.436* 7.434* 7.435*   XZJ2FAL 35.7* 42.0 40.8* 41.2 37.0*   ANQ1RXX 26.7 27.1 27.4 27.6 24.8   K5JXQMDS 81.0 83.7 85.6* 85.5* 88.1*      CBC Recent Labs     10/20/24  1000 10/20/24  1600 10/20/24  2121 10/21/24  0327 10/21/24  0911   WBC 19.3* 18.2* 16.2* 21.3* 13.4*   HGB 11.4* 10.8* 10.1* 11.3* 10.3*   HCT 32.5* 30.2* 28.5* 31.3* 29.1*   * 118* See Reflexed IPF Result 134* 104*      COAGS Recent Labs     10/20/24  1000 10/20/24  1600 10/20/24  2121 10/21/24  0327 10/21/24  0911   INR 2.1 2.2 3.2 2.8 2.8   PROTIME 22.7* 23.7* 31.5* 28.6* 28.3*   APTT 51.9* 49.4* 62.7* 59.1* 61.4*      TEG Recent Labs     10/20/24  1037 10/20/24  2008 10/21/24  0909   REACTTIMETEG 9.8* 11.1* 12.8*   KINETICSTEG 2.1 2.3* 2.7*   ANGLETEG 66.4 64.8 <39.0*   MAXCLOTTEG 57.7 55.8 58.3      BMP Recent Labs     10/20/24  1000 10/20/24  1600 10/20/24  2121 10/21/24  0327 10/21/24  0911   * 133* 134* 136 135*   K 4.2 3.9 3.3* 3.9 3.6*   CL 99 100 103 104 104   CO2 23 24 23 22 24   BUN 20 19 16 17 16   CREATININE 0.7 0.6* 0.6* 0.7 0.7   GLUCOSE 156* 138* 123* 112* 125*   MG 2.1 1.9 1.8 1.9 2.0   PHOS 2.6 2.2* 1.7* 2.4* 2.4*      LFT Recent Labs     10/19/24  0312 10/19/24  1544 10/20/24  0309 10/20/24  1600 10/21/24  0327   ALT 78* 63* 69* 59* 53*   AST 45 29 29 22 24   ALKPHOS 59 47 52 51 48   BILITOT 0.4 0.3 0.4 0.3 0.5      INFLAMMATORY MARKERS No results for input(s): \"CRP\", \"ESR\", \"FERRITIN\", \"LDH\" in the last 72 hours.   CARDIAC No results for input(s): \"CKTOTAL\", \"CKMB\", \"CKMBINDEX\", \"TROPONINI\", \"BNP\", \"PROBNP\" in the last 72 hours.    Invalid input(s): \"TROPONIN\", \"HSTROP\"   LACTIC ACID No results for input(s): \"LACTA\" 
10/15/24  1249 10/15/24  1532 10/15/24  2033   POCPH 7.268* 7.357 7.391 7.393 7.291*   POCPCO2 81.8* 61.0* 57.1* 53.8* 63.8*   POCPO2 77.3* 492.5* 136.4* 184.7* 136.0*   POCHCO3 37.4* 34.2* 34.6* 32.8* 30.8*   BKQL7JZU 92.2* 100.0* 99.0* 99.6* 98.6*      VBG Recent Labs     10/15/24  0401 10/15/24  1212   PHVEN 7.395 7.319*   PCW8BHI 60.8* 63.9*   WLB1KIS 37.3* 32.8*   V4ZFFCPV 76.1 80.4      CBC Recent Labs     10/14/24  2210 10/15/24  0308 10/15/24  0959 10/15/24  1537 10/15/24  2030   WBC 14.6* 16.4* 15.2* 17.6* 17.4*   HGB 11.8* 11.6* 11.4* 11.7* 12.0*   HCT 33.3* 33.3* 33.2* 33.6* 35.2*    163 150 143 137*      COAGS Recent Labs     10/14/24  2210 10/15/24  0030 10/15/24  0308 10/15/24  0528 10/15/24  0754 10/15/24  1118 10/15/24  1537 10/15/24  1846 10/15/24  2030   INR 1.4  --  1.1  --   --  1.5 1.8  --  2.0   PROTIME 16.6*  --  13.7  --   --  18.1* 20.1*  --  21.8*   APTT 108.9*   < > 24.9 28.2 33.5 38.2* 38.2* 39.7*  --     < > = values in this interval not displayed.      TEG Recent Labs     10/14/24  1720 10/14/24  2207 10/15/24  1015   REACTTIMETEG 5.2 >17.0* 7.0   KINETICSTEG 1.4 >5.0* 1.5   ANGLETEG 71.1 <39.0* 71.2   MAXCLOTTEG 59.6 Can not be calculated 59.8      BMP Recent Labs     10/14/24  2342 10/15/24  0308 10/15/24  0959 10/15/24  1537 10/15/24  2030    140 142 140 138   K 4.7 4.7 4.7 4.3 4.6    104 105 106 105   CO2 29 31 32* 28 28   BUN 24* 25* 26* 28* 28*   CREATININE 0.9 0.9 0.9 1.0 1.0   GLUCOSE 137* 121* 108* 115* 128*   MG 2.5 2.4 2.4 2.3 2.4   PHOS 2.5 2.5 2.9 2.7 3.5      LFT Recent Labs     10/15/24  0308 10/15/24  1537   * 91*   AST 32 18   ALKPHOS 39* 37*   BILITOT 0.2 0.2      INFLAMMATORY MARKERS No results for input(s): \"CRP\", \"ESR\", \"FERRITIN\", \"LDH\" in the last 72 hours.   CARDIAC No results for input(s): \"CKTOTAL\", \"CKMB\", \"CKMBINDEX\", \"TROPONINI\", \"BNP\", \"PROBNP\" in the last 72 hours.    Invalid input(s): \"TROPONIN\", \"HSTROP\"   LACTIC ACID No results 
Phenylephrine         [] Epinephrine    CENTRAL LINES:     [] No   [] Yes   (Date of Insertion:   )           If yes -     [] Right IJ     [] Left IJ [] Right Femoral [] Left Femoral                   [] Right Subclavian [] Left Subclavian       ORDOÑEZ'S CATHETER:   [] No   [x] Yes  (Date of Insertion:   )     URINE OUTPUT:            [x] Good   [] Low              [] Anuric    OBJECTIVE:     VITAL SIGNS:  /78   Pulse 58   Temp 98.2 °F (36.8 °C) (Oral)   Resp 20   Ht 1.753 m (5' 9.02\")   Wt 84.1 kg (185 lb 6.5 oz)   SpO2 98%   BMI 27.37 kg/m²   Tmax over 24 hours:  Temp (24hrs), Av.1 °F (36.7 °C), Min:97.5 °F (36.4 °C), Max:98.5 °F (36.9 °C)      Patient Vitals for the past 8 hrs:   BP Temp Temp src Pulse Resp SpO2 Weight   10/12/24 0803 -- -- -- 57 20 98 % --   10/12/24 0700 -- -- -- 60 20 99 % --   10/12/24 0603 -- -- -- 58 20 97 % --   10/12/24 0600 -- -- -- 58 20 99 % --   10/12/24 0552 -- -- -- 57 20 99 % 84.1 kg (185 lb 6.5 oz)   10/12/24 0500 -- -- -- 58 20 98 % --   10/12/24 0456 -- -- -- 58 20 98 % --   10/12/24 0400 126/78 98.2 °F (36.8 °C) Oral 64 16 97 % --   10/12/24 0335 -- -- -- 64 16 97 % --   10/12/24 0300 -- -- -- 60 16 100 % --   10/12/24 0200 -- -- -- 61 16 98 % --   10/12/24 0100 -- -- -- 62 16 100 % --         Intake/Output Summary (Last 24 hours) at 10/12/2024 0820  Last data filed at 10/12/2024 0612  Gross per 24 hour   Intake 3554.73 ml   Output 2665 ml   Net 889.73 ml     Date 10/12/24 0000 - 10/12/24 2359   Shift 8331-4543 8955-1802 7004-3046 24 Hour Total   INTAKE   I.V.(mL/kg) 847.8(10.1)   847.8(10.1)   NG/GT(mL/kg) 460(5.5)   460(5.5)   Shift Total(mL/kg) 1307.8(15.6)   1307.8(15.6)   OUTPUT   Urine(mL/kg/hr) 1200(1.8)   1200   Emesis/NG output(mL/kg) 0(0)   0(0)   Shift Total(mL/kg) 1200(14.3)   1200(14.3)   Weight (kg) 84.1 84.1 84.1 84.1     Wt Readings from Last 3 Encounters:   10/12/24 84.1 kg (185 lb 6.5 oz)   23 75 kg (165 lb 6.4 oz)   09/15/22 77.1 kg (170 
Plan  General Plan: 6-7 times per week  Current Treatment Recommendations: Strengthening, Balance training, Functional mobility training, Transfer training, Endurance training, Gait training, Stair training, Neuromuscular re-education, Home exercise program, Safety education & training, Patient/Caregiver education & training, Equipment evaluation, education, & procurement, Therapeutic activities  Safety Devices  Type of Devices: Call light within reach, Nurse notified, Gait belt, Left in chair, Chair alarm in place, Patient at risk for falls, Heels elevated for pressure relief  Restraints  Restraints Initially in Place: No    Restrictions  Restrictions/Precautions  Restrictions/Precautions: Fall Risk  Required Braces or Orthoses?: No  Position Activity Restriction  Other position/activity restrictions: ECMO 10/14-10/23. NG tube     Subjective  Pain: pt initially has no c/o pain, after transfer to recliner pt rates pain 5/10 \"all over\". Pt offered increased activity and distraction for pain control, positioned in recliner for comfort after mobility  General  Chart Reviewed: No  Patient assessed for rehabilitation services?: Yes  Response To Previous Treatment: Patient reporting fatigue but able to participate.  Family / Caregiver Present: No  Follows Commands: Within Functional Limits  General Comment  Comments: Pt left seated in  recliner with call light within reach, alarm activated and lift pad left under pt for dependent transfer back to bed. RN aware  Subjective  Subjective: Pt and RN agreeable to PT. Pt in bed upon arrival, required max encouragement this date for participation.       Cognition   Orientation  Overall Orientation Status: Impaired  Orientation Level: Oriented to place;Oriented to situation;Oriented to person;Disoriented to time  Cognition  Overall Cognitive Status: Exceptions  Arousal/Alertness: Appropriate responses to stimuli  Following Commands: Follows one step commands with 
Weight    Discharge Planning:    Too soon to determine     Caro Barnhart RD  Contact: [phone]    
AM    HEPCAB NONREACTIVE 06/22/2020 07:35 AM    HEPBIGM NONREACTIVE 06/22/2020 07:35 AM    HEPAIGM NONREACTIVE 06/22/2020 07:35 AM       HCV Genotype   No components found for: \"HEPATITISCGENOTYPE\"    HCV Quantitative   No results found for: \"HCVQNT\"    LIVER WORK UP:    AFP  No results found for: \"AFP\"    Alpha 1 antitrypsin   No results found for: \"A1A\"    Anti - Liver/Kidney Ab  No results found for: \"LIVER-KIDNEYMICROSOMALAB\"    LOAN  No results found for: \"LOAN\"    AMA  No results found for: \"MITOAB\"    ASMA  No results found for: \"SMOOTHMUSCAB\"    Ceruloplasmin  No results found for: \"CERULOPLSM\"    Celiac panel  No results found for: \"TISSTRNTIIGG\", \"TTGIGA\", \"IGA\"    IgG  No results found for: \"IGG\"    IgM  No results found for: \"IGM\"    GGT   No results found for: \"GGT\"    PT/INR  Recent Labs     10/25/24  0713 10/26/24  0428   PROTIME 14.8 14.9   INR 1.2 1.2       Cancer Markers:  CEA:  No results found for: \"CEA\"  Ca 125:  No results found for: \"\"  Ca 19-9:   No results found for: \"\"  AFP: No results found for: \"AFP\"    Lactic acid:  No results for input(s): \"LACTACIDWB\" in the last 72 hours.        Radiology Review:    KUB   IMPRESSION:  Gaseous distension of the intestinal tract with slight decrease in air volume  since prior study.  Findings consistent with slowly improving ileus.              Exam Ended: 10/26/24 06:40 EDT              ENDOSCOPY     Principal Problem:    Life threatening acute exacerbation of asthma  Active Problems:    Severe persistent asthma with exacerbation    Lactic acidemia    Lactic acidosis    Acute respiratory failure with hypoxia and hypercapnia    DULCE MARIA (acute kidney injury) (HCC)    Respiratory acidosis with metabolic acidosis    ACP (advance care planning)    ARDS (adult respiratory distress syndrome)    Encounter for palliative care    Acute respiratory failure with hypercapnia    Dilatation of colon    Cecal polyp    Paralytic ileus of large intestine (HCC)    
hours.    Invalid input(s): \"TROPONIN\", \"HSTROP\"   LACTIC ACID No results for input(s): \"LACTA\" in the last 72 hours.   TRIGLYCERIDE No results for input(s): \"TRIG\" in the last 72 hours.       ASSESSMENT AND SYSTEM BASED PLAN (MEDICAL DECISION MAKING):    Neurologic                                                  [x] Sedation/paralytic requirement reviewed                              [x] Neurological assessment    Cardiovascular                               [x] Cannulas secured, dsg dry and intact, no drainage or hematoma noted                             [x] ECMO Pump flow/pressures reviewed                             [x] Telemetry reviewed                             [x] Hemodynamic parameters stable    Pulmonary                             [x] Blood gases (Patient/Circuit) reviewed                             [x] Sweep/ settings reviewed                             [x] Vent Settings reviewed                             [x] Chest X-ray reviewed    Genitourinary                               [x] Intake/Output reviewed                             [x] Need for continuous IV fluids assessed                             [x] Need for diuresis/renal replacement therapy reviewed    Gastrointestinal                              [x] GI Prophylaxis reviewed                              [x] Enteral nutrition reviewed    Hematology                             [x] Anticoagulation: argatroban                            [x] Reviewed CBC, coagulation profile, ACT, TEG and platelet count                            [x] Transfusion needs reviewed                    Infectious disease                           [x] Reviewed T-max, white count and cultures results                           [x] Antimicrobials reviewed    Endocrine                            [x] Reviewed glycemic control                           [x] Reviewed need for steroids    Others                           [x] Reviewed need for restrains                    
                [x] Reviewed need for restrains                           [x] Reviewed need for lines/drains/invasive devices                           [x] Skin/Wound care                           [x] Reviewed current Medications list/orders                           [x] Reviewed goals of care                   OVERALL CONDITION:   Vent adjusted , Pi 22 , will increase Pi mike   Cont wean sweep as farooq   Bronchoscopy today   Farooq nebulized albuterol without bronchospasm     ANUSHKA DAVILA MD  Pulmonary & Critical care Medicine  10/16/2024            
[x] Reviewed need for lines/drains/invasive devices                           [x] Skin/Wound care                           [x] Reviewed current Medications list/orders                           [x] Reviewed goals of care                   OVERALL CONDITION:   Vent adjusted   Wean sweep and fio2   bronchodilator   Steroids   ANUSHKA DAVILA MD  Pulmonary & Critical care Medicine  10/17/2024            
for extension and R rotation in order to increase functional range (goal added by Shwetha MORENO on 10/25)  Long Term Goals  Time Frame for Long Term Goals : NOTIFY OTR TO UPDATE GOALS AS APPROPRIATE    Plan  Occupational Therapy Plan  Times Per Week: 4-5x/wk  Current Treatment Recommendations: Strengthening, ROM, Balance training, Functional mobility training, Endurance training, Patient/Caregiver education & training, Safety education & training, Equipment evaluation, education, & procurement, Positioning, Self-Care / ADL, Coordination training    AM-[facility] Daily Activities Inpatient  AM-P[facility] Daily Activity - Inpatient   How much help is needed for putting on and taking off regular lower body clothing?: Total  How much help is needed for bathing (which includes washing, rinsing, drying)?: A Lot  How much help is needed for toileting (which includes using toilet, bedpan, or urinal)?: Total  How much help is needed for putting on and taking off regular upper body clothing?: A Lot  How much help is needed for taking care of personal grooming?: A Lot  How much help for eating meals?: Total  AM-PAC Inpatient Daily Activity Raw Score: 9  AM-[facility]AC Inpatient ADL T-Scale Score : 25.33  ADL Inpatient CMS 0-100% Score: 79.59  ADL Inpatient CMS G-Code Modifier : CL    Minutes  OT Individual Minutes  Time In: 1034  Time Out: 1114  Minutes: 40  Time Code Minutes   Timed Code Treatment Minutes: 38 Minutes    Electronically signed by GIRISH Lantigua on 10/26/24 at 12:21 PM EDT     Co- treatment with PT warranted secondary to decreased patient safety and independence with functional mobility requiring skilled physical assistance of two professionals to simultaneously address individualized discipline goals. OT is addressing sitting balance and standing tolerance/balance to participate in ADLs, while PT is addressing their individualized functional mobility task.   
RBC 3.90 (L) 4.21 - 5.77 m/uL    Hemoglobin 10.7 (L) 13.0 - 17.0 g/dL    Hematocrit 30.6 (L) 40.7 - 50.3 %    MCV 78.5 (L) 82.6 - 102.9 fL    MCH 27.4 25.2 - 33.5 pg    MCHC 35.0 (H) 28.4 - 34.8 g/dL    RDW 13.9 11.8 - 14.4 %    Platelets 265 138 - 453 k/uL    MPV 12.0 8.1 - 13.5 fL    NRBC Automated 0.0 0.0 per 100 WBC    Neutrophils % 64 36 - 65 %    Lymphocytes % 25 24 - 43 %    Monocytes % 9 3 - 12 %    Eosinophils % 1 1 - 4 %    Basophils % 0 0 - 2 %    Immature Granulocytes % 1 (H) 0 %    Neutrophils Absolute 5.50 1.50 - 8.10 k/uL    Lymphocytes Absolute 2.14 1.10 - 3.70 k/uL    Monocytes Absolute 0.74 0.10 - 1.20 k/uL    Eosinophils Absolute 0.09 0.00 - 0.44 k/uL    Basophils Absolute <0.03 0.00 - 0.20 k/uL    Immature Granulocytes Absolute 0.04 0.00 - 0.30 k/uL    RBC Morphology MICROCYTOSIS PRESENT    Magnesium    Collection Time: 10/28/24  6:03 AM   Result Value Ref Range    Magnesium 2.2 1.6 - 2.6 mg/dL         Imaging/Diagonstics:  EKG: normal sinus rhythm.    XR ABDOMEN (KUB) (SINGLE AP VIEW)    Result Date: 10/28/2024  EXAMINATION: ONE SUPINE XRAY VIEW(S) OF THE ABDOMEN 10/28/2024 4:35 am COMPARISON: 10/27/2024 HISTORY: ORDERING SYSTEM PROVIDED HISTORY: interval changes in ileus TECHNOLOGIST PROVIDED HISTORY: interval changes in ileus Reason for Exam: ileus  supine port FINDINGS: Nasogastric tube extends to the right upper quadrant.  Gaseous distension of colon is again seen, notably transverse colon, measuring approximately 9.5 cm in caliber which is similar to slightly decreased as compared to prior.  Air is again seen within bowel within the pelvis to a lesser extent once again.     Gaseous distension of colon, notably transverse colon, similar to slightly decreased as compared to prior.     XR ABDOMEN (KUB) (SINGLE AP VIEW)    Result Date: 10/27/2024  EXAMINATION: ONE SUPINE XRAY VIEW(S) OF THE ABDOMEN 10/27/2024 5:36 am COMPARISON: Yesterday HISTORY: ORDERING SYSTEM PROVIDED HISTORY: interval 
      ASSESSMENT:     Principal Problem:    Life threatening acute exacerbation of asthma  Active Problems:    Severe persistent asthma with exacerbation    Lactic acidemia    Lactic acidosis    Acute respiratory failure with hypoxia and hypercapnia    DULCE MARIA (acute kidney injury) (HCC)    Respiratory acidosis with metabolic acidosis    ACP (advance care planning)    ARDS (adult respiratory distress syndrome)    Encounter for palliative care    Acute respiratory failure with hypercapnia    Dilatation of colon    Cecal polyp    Paralytic ileus of large intestine (HCC)    Pneumonia of lower lobe due to infectious organism    Paralytic ileus (HCC)  Resolved Problems:    * No resolved hospital problems. *      This is a 44 y.o. male with history of asthma presented emergency department with shortness of breath and agitation received Versed and magnesium eventually got intubated for acute severe asthma/status asthmaticus    Left lower lobe atelectasis   PLAN:       PLAN/MEDICAL DECISION MAKING:    NEUROLOGIC:  - Mental status: awake, alert  - Imaging: None  - Sedation: Precedex, ketamine off   - Pain control: roxicodone, dilaudid prn, and Tylenol  - Versed prn    CARDIOVASCULAR:  BP Range: Systolic (24hrs), Av , Min:103 , Max:138     Diastolic (24hrs), Av, Min:62, Max:97    Pulse Range: Pulse  Av.2  Min: 78  Max: 108  - Dx: Sinus tachycardia  - resolved  - EKG: showed sinus tachycardia  - Goal SBP> 110  - ECMO clamped, decannulated    PULMONARY:  Respiration Range: Resp  Avg: 15.7  Min: 13  Max: 24  Current Pulse Ox: SpO2: 96 %  24HR Pulse Ox Range: SpO2  Av.4 %  Min: 91 %  Max: 98 %  - Dx: Acute hypoxic hypercapnic respiratory failure secondary to status asthmaticus           Rhinovirus infection  - Vent Settings: extubated  - CXR stable appearance of the lungs with no acute new process  - DuoNeb 4 times daily  - Continue prednisone taper  - off ketamine infusion  - CT chest showed reactive airway 
ABDOMEN FOR NG/OG/NE TUBE PLACEMENT   Final Result   Enteric tube as above.              ECHOCARDIOGRAM:   Results for orders placed during the hospital encounter of 10/09/24    Echo (TTE) complete (PRN contrast/bubble/strain/3D)    Interpretation Summary    Left Ventricle: Normal left ventricular systolic function with a visually estimated EF of 60 - 65%. EF by 2D Simpsons Biplane is 67%. Left ventricle size is normal. Normal wall thickness. Normal wall motion. Normal diastolic function.    Aortic Valve: Not well visualized. Trileaflet valve.    Tricuspid Valve: Mild regurgitation. Mildly elevated RVSP, consistent with mild pulmonary hypertension. The estimated RVSP is 45 mmHg Based on TR doppler only).    Image quality is technically difficult. Technically difficult study due to low parasternal window.       ASSESSMENT AND PLAN     PROBLEM LIST:    Patient Active Problem List   Diagnosis    Severe persistent asthma in adult without complication    Severe persistent asthma with exacerbation    Mild persistent asthma without complication    Life threatening acute exacerbation of asthma    Lactic acidemia    Lactic acidosis    Acute respiratory failure with hypoxia and hypercapnia    DULCE MARIA (acute kidney injury) (HCC)    Respiratory acidosis with metabolic acidosis    ACP (advance care planning)    ARDS (adult respiratory distress syndrome)    Encounter for palliative care    Acute respiratory failure with hypercapnia    Dilatation of colon    Cecal polyp    Paralytic ileus of large intestine (HCC)    Pneumonia of lower lobe due to infectious organism    Paralytic ileus (HCC)       ASSESSMENT:    Acute hypoxic respiratory failure      PLAN:    I personally interviewed/examined the patient; reviewed interval history, interpreted all available radiographic and laboratory data at the time of service.    Patient is hemodynamically stable  Currently saturating well on supplemental oxygen with nasal cannula @No data 
\"LABIRON\"      Cultures during this admission:     Blood cultures:                 [] None drawn      [x] Negative             []  Positive (Details:  )  Urine Culture:                   [] None drawn      [x] Negative             []  Positive (Details:  )  Sputum Culture:               [] None drawn       [] Negative             [x]  Positive (Details: MSSA )   Endotracheal aspirate:     [] None drawn       [] Negative             []  Positive (Details:  )        ASSESSMENT:     Principal Problem:    Life threatening acute exacerbation of asthma  Active Problems:    Severe persistent asthma with exacerbation    Lactic acidemia    Lactic acidosis    Acute respiratory failure with hypoxia and hypercapnia    DULCE MARIA (acute kidney injury) (HCC)    Respiratory acidosis with metabolic acidosis    ACP (advance care planning)    ARDS (adult respiratory distress syndrome)    Encounter for palliative care    Acute respiratory failure with hypercapnia  Resolved Problems:    * No resolved hospital problems. *      This is a 44 y.o. male with history of asthma presented emergency department with shortness of breath and agitation received Versed and magnesium eventually got intubated for acute severe asthma/status asthmaticus    Left lower lobe atelectasis   PLAN:       PLAN/MEDICAL DECISION MAKING:    NEUROLOGIC:  - Mental status: awake, alert  - Imaging: None  - Sedation: Precedex, ketamine off   - Pain control: roxicodone, dilaudid prn, and Tylenol  - Versed prn    CARDIOVASCULAR:  BP Range: Systolic (24hrs), Av , Min:122 , Max:163     Diastolic (24hrs), Av, Min:77, Max:109    Pulse Range: Pulse  Av.6  Min: 98  Max: 128  - Dx: Sinus tachycardia  - resolved  - EKG: showed sinus tachycardia  - Goal SBP> 110  - ECMO clamped, to decannulate today    PULMONARY:  Respiration Range: Resp  Av.7  Min: 16  Max: 26  Current Pulse Ox: SpO2: 98 %  24HR Pulse Ox Range: SpO2  Av.4 %  Min: 92 %  Max: 100 %  - Dx: Acute 
Min:73, Max:83    Pulse Range: Pulse  Av.7  Min: 63  Max: 108  - Dx: Sinus tachycardia  - resolved  - EKG: showed sinus tachycardia  - Goal SBP> 110    PULMONARY:  Respiration Range: Resp  Av.6  Min: 0  Max: 16  Current Pulse Ox: SpO2: 95 %  24HR Pulse Ox Range: SpO2  Av.5 %  Min: 91 %  Max: 100 %  - Dx: Acute hypoxic hypercapnic respiratory failure secondary to status asthmaticus           Rhinovirus infection  - Vent Settings: PRVC 16/500/10/40%  - CXR negative for focal process  - DuoNeb 4 times daily  -Continue Solu-Medrol 80 Mg every 8 hours  - on ketamine infusion  - CT chest showed eactive airway disease, mild mucus plugging and bronchogenic cyst  - ECMO evaluation today    RENAL/FLUID/ELECTROLYTE:  - Dx: Acute kidney injury secondary to ATN. resolved           Lactic acidosis -improved    - BUN/Cr: 16/0.5  - Electrolytes: Monitor and replace as needed  - I/O: In: 7.2 [I.V.:1855.2; NG/GT:222]  Out: 2780 [Urine:2730]  -Strict I's and O's    GI/NUTRITION:  Diet NPO  ADULT TUBE FEEDING; Orogastric; Peptide Based; Continuous; 10; No; 50; Q 4 hours  - Bowel regimen: GlycoLax as needed  - GI prophylaxis: Protonix  -on tube feeds   - x-ray KUB negative for ileus  - add colace and senna     ID:  Tmax: Temp (24hrs), Av.9 °F (37.2 °C), Min:98.1 °F (36.7 °C), Max:99.5 °F (37.5 °C)    Temperature Range: Temp: 98.2 °F (36.8 °C) Temp  Av.9 °F (37.2 °C)  Min: 98.1 °F (36.7 °C)  Max: 99.5 °F (37.5 °C)  - WBC   Lab Results   Component Value Date    WBC 17.4 (H) 10/15/2024     - Dx: leukocytosis secondary to steroids, reactive           Rhinovirus infection  - Antimicrobials: not indicated     HEME:   Recent Labs     10/15/24  0959 10/15/24  1537 10/15/24  2030   HGB 11.4* 11.7* 12.0*    trending down  - Platelets:  trending down  - monitor CBC daily    ENDOCRINE:  - Dx: hyperglycemia secondary to steroids - resolved  - low dose sliding scale  - Continue to monitor blood glucose, goal <180  - Most 
acidosis -improved           Combined respiratory and metabolic acidosis  - BUN/Cr: 16/0.5  - Electrolytes: Monitor and replace as needed  - I/O: In: 3806.5 [I.V.:3278.5; NG/GT:528]  Out: 3055 [Urine:2955]  - IVF: LR @ 75 ml/hr  -Nephrology on board  -Strict I's and O's    GI/NUTRITION:  Diet NPO  ADULT TUBE FEEDING; Orogastric; Peptide Based; Continuous; 10; Yes; 10; Q 4 hours; 45; 50; Q 4 hours  - Bowel regimen: GlycoLax as needed  - GI prophylaxis: Protonix  -Start tube feeds     ID:  Tmax: Temp (24hrs), Av.5 °F (36.9 °C), Min:97 °F (36.1 °C), Max:99.7 °F (37.6 °C)    Temperature Range: Temp: 97.3 °F (36.3 °C) Temp  Av.5 °F (36.9 °C)  Min: 97 °F (36.1 °C)  Max: 99.7 °F (37.6 °C)  - WBC   Lab Results   Component Value Date    WBC 10.3 10/11/2024     - Dx: leukocytosis secondary to steroids, reactive           Rhinovirus infection  - Antimicrobials:  not indicated     HEME:   Recent Labs     10/09/24  0740 10/10/24  0541 10/11/24  0432   HGB 15.1 12.5* 11.9*    trending down  - Platelets:  trending down  - monitor CBC daily    ENDOCRINE:  - Dx: hyperglycemia secondary to steroids  - low dose sliding scale  - Continue to monitor blood glucose, goal <180  - Most recent BGL is   Recent Labs     10/10/24  0541 10/10/24  1805 10/11/24  0432   GLUCOSE 154* 134* 159*       OTHER:  - PT/OT/ST not consulted  - Code Status: Full Code    PROPHYLAXIS:  - Stress ulcer: PPI  - DVT: SCDs, heparin         FAMILY UPDATED:                [] No  [] Yes     HOME MEDICATIONS RECONCILED: [x] No  [] Yes    CONSULTATION NEEDED:                 [x] No  [] Yes         Darryn Robledo MD  Department of Internal Medicine/ Critical care  [place], [place]  10/11/2024 8:02 AM    Attending Physician Statement  I have discussed the care of Austin J Cher, including pertinent history and exam findings,  with the resident. I have seen and examined the patient and the key elements of all parts of the encounter 
appearance of the lungs with no acute new process  - DuoNeb 4 times daily  - Continue Solu-Medrol 40 Mg every 8 hours  - off ketamine infusion  - CT chest showed reactive airway disease, mild mucus plugging and bronchogenic cyst  - ECMO decannulated    RENAL/FLUID/ELECTROLYTE:  - Dx: Acute kidney injury secondary to ATN. resolved           Lactic acidosis -improved    - BUN/Cr: 21/1.0  - Electrolytes: Monitor and replace as needed  - I/O: In: 659 [I.V.:4.1; NG/GT:559]  Out: 1610 [Urine:1610]  -Strict I's and O's    GI/NUTRITION:  ADULT TUBE FEEDING; Nasogastric; Peptide Based; Continuous; 30; No; 50; Q 4 hours; Protein; 1 Dose; BID  - Bowel regimen: GlycoLax, senna and docusate BiD  - GI prophylaxis: Prevacid  - on tube feeds   - x-ray KUB negative for ileus    ID:  Tmax: Temp (24hrs), Av.2 °F (37.9 °C), Min:99.9 °F (37.7 °C), Max:101.1 °F (38.4 °C)    Temperature Range: Temp: 100.4 °F (38 °C) Temp  Av.2 °F (37.9 °C)  Min: 99.9 °F (37.7 °C)  Max: 101.1 °F (38.4 °C)  - WBC   Lab Results   Component Value Date    WBC 17.2 (H) 10/24/2024     - Dx: leukocytosis secondary to steroids, reactive           Rhinovirus infection   Left basilar infiltrate           STAPHYLOCOCCUS AUREUS 50 to 100,000 CFU/ML This isolate is methicillin susceptible. Abnormal    - Antimicrobials: cefepime day 5 of 7    HEME:   Recent Labs     10/23/24  0309 10/23/24  0903 10/24/24  0454   HGB 10.9* 11.0* 11.3*    trending down  - Platelets:  trending up  - monitor CBC daily    ENDOCRINE:  - Dx: hyperglycemia secondary to steroids - resolved  - low dose sliding scale  - Continue to monitor blood glucose, goal <180  - Most recent BGL is 123  Recent Labs     10/23/24  0309 10/23/24  0903 10/24/24  0454   GLUCOSE 105* 117* 118*       OTHER:  - PT/OT/ST not consulted  - Code Status: Full Code    PROPHYLAXIS:  - Stress ulcer: PPI  - DVT: SCDs, Lovenox        FAMILY UPDATED:                [] No  [x] Yes     HOME MEDICATIONS RECONCILED: [] 
spleen.  5. Endotracheal tube in place with the tip 4.9 cm above the sergey.       Xray Result (most recent):  XR CHEST PORTABLE 10/17/2024    Narrative  EXAMINATION:  ONE XRAY VIEW OF THE CHEST    10/17/2024 6:45 am    COMPARISON:  16 October 2024    HISTORY:  ORDERING SYSTEM PROVIDED HISTORY: ECMO  TECHNOLOGIST PROVIDED HISTORY:  ECMO    FINDINGS:  AP portable view of the chest time stamped at 608 hours demonstrates  endotracheal tube terminating 5.3 cm above the sergey, overlying cardiac  monitoring electrodes, intestinal tube extending to the body of the stomach  and a right-sided ECMO catheter, stable.  Heart size is stable.  Continued  left effusion and atelectasis noted with left basilar infiltrate.  No  extrapleural air.    Impression  1. Stable support tubes and lines.  2. Continued left effusion and atelectasis with left basilar infiltrate.  3. No extrapleural air.       MRI Result (most recent):  No results found for this or any previous visit from the past 3650 days.      10/09/24    ECHO (TTE) COMPLETE (PRN CONTRAST/BUBBLE/STRAIN/3D) 10/14/2024  5:23 PM (Final)    Interpretation Summary    Left Ventricle: Normal left ventricular systolic function with a visually estimated EF of 60 - 65%. EF by 2D Simpsons Biplane is 67%. Left ventricle size is normal. Normal wall thickness. Normal wall motion. Normal diastolic function.    Aortic Valve: Not well visualized. Trileaflet valve.    Tricuspid Valve: Mild regurgitation. Mildly elevated RVSP, consistent with mild pulmonary hypertension. The estimated RVSP is 45 mmHg Based on TR doppler only).    Image quality is technically difficult. Technically difficult study due to low parasternal window.    Signed by: Ana Cruz MD on 10/14/2024  5:23 PM       Encounter Date: 10/09/24   EKG 12 Lead   Result Value    Ventricular Rate 109    Atrial Rate 109    P-R Interval 120    QRS Duration 94    Q-T Interval 340    QTc Calculation (Bazett) 457    P Axis 80    R Axis 
[]  Weight loss   [] Other:     Respiratory:   []  Cough    []  Shortness of breath    []  Chest pain    [] Other:     Cardiovascular:   []  Chest pain  []  Dyspnea    []  Exertional chest pressure/discomfort     [] Fatigue      []  Palpitations    []  Syncope   [] Other:     Gastrointestinal:   []  Abdominal pain   []  Constipation    []  Diarrhea    []   Dysphagia   []  Reflux             []  Vomiting   [] Other:     Genitourinary:  []  Dysuria     []  Frequency   []  Hematuria   [] Nocturia   []  Urinary incontinence   [] Other:     Musculoskeletal:   [] Back pain    []  Muscle weakness   []  Myalgias    []  Neck pain   []  Stiff joints   []  Other:     Behavioral/Psych:   [] Anxiety    []  Depression     []  Mood swings   [] Other:     PHYSICAL ASSESSMENT:     General: [x]  Oriented x3      [] well appearing      [] Intubated      [] ill appearing      [] Other:    Mental Status: [x] normal mental status exam      [] drowsy      [] Confused      [] Other:     Cardiovascular: [x]  Regular rate/rhythm      [] Arrhythmia      [] Other:     Chest: [x] Effort normal      [] lungs clear      [] respiratory distress      [] Tachypnea      []  Other:    Abdomen: [x] Soft/non-tender      []  Normal appearance      [] Distended      [] Ascites      [] Other:    Neurological: [x] Normal Speech      [] Normal Sensation      []  Deficits present:      Extremity:  [x] normal skin color/temp      [] clubbing/cyanosis      []  No edema      [] Other:     Palliative Performance Scale:  __x_60%  Ambulation reduced; Significant disease; Can't do hobbies/housework; intake normal or reduced; occasional assist; LOC full/confusion  ___50%  Mainly sit/lie; Extensive disease; Can't do any work; Considerable assist; intake normal or reduced; LOC full/confusion  ___40%  Mainly in bed; Extensive disease; Mainly assist; intake normal or reduced; LOC full/confusion   ___30%  Bed Bound; Extensive disease; Total care; intake reduced; 
asthmaticus           Rhinovirus infection  - Vent Settings: PRVC 14/5/30%  - CXR stable appearance of the lungs with no acute new process  - DuoNeb 4 times daily  - Continue Solu-Medrol 40 Mg every 8 hours  - on ketamine infusion  - CT chest showed reactive airway disease, mild mucus plugging and bronchogenic cyst  - currently on ECMO    RENAL/FLUID/ELECTROLYTE:  - Dx: Acute kidney injury secondary to ATN. resolved           Lactic acidosis -improved    - BUN/Cr: 17/0.7  - Electrolytes: Monitor and replace as needed  - I/O: In: 3750.3 [I.V.:2497.7; NG/GT:702]  Out: 6660 [Urine:6660]  -Strict I's and O's    GI/NUTRITION:  ADULT TUBE FEEDING; Orogastric; Peptide Based; Continuous; 20; No; 50; Q 4 hours; Protein; 1 Dose; Daily  - Bowel regimen: GlycoLax, senna and docusate BiD  - GI prophylaxis: Protonix  - on tube feeds   - x-ray KUB negative for ileus    ID:  Tmax: Temp (24hrs), Av.2 °F (37.3 °C), Min:98.6 °F (37 °C), Max:99.7 °F (37.6 °C)    Temperature Range: Temp: 99.3 °F (37.4 °C) Temp  Av.2 °F (37.3 °C)  Min: 98.6 °F (37 °C)  Max: 99.7 °F (37.6 °C)  - WBC   Lab Results   Component Value Date    WBC 17.2 (H) 10/22/2024     - Dx: leukocytosis secondary to steroids, reactive           Rhinovirus infection   Left basilar infiltrate           STAPHYLOCOCCUS AUREUS 50 to 100,000 CFU/ML This isolate is methicillin susceptible. Abnormal    - Antimicrobials: cefepime    HEME:   Recent Labs     10/21/24  1551 10/21/24  2042 10/22/24  0325   HGB 12.0* 11.6* 11.0*    trending down  - Platelets:  trending up  - monitor CBC daily    ENDOCRINE:  - Dx: hyperglycemia secondary to steroids - resolved  - low dose sliding scale  - Continue to monitor blood glucose, goal <180  - Most recent BGL is 123  Recent Labs     10/21/24  1551 10/21/24  2042 10/22/24  0325   GLUCOSE 133* 122* 111*       OTHER:  - PT/OT/ST not consulted  - Code Status: Full Code    PROPHYLAXIS:  - Stress ulcer: PPI  - DVT: SCDs, Lovenox        FAMILY 
including imaging and labs, discussions with other team members and physicians at least 40   Min so far today, excluding procedures.            Electronically signed by ANUSHKA DAVILA MD on   10/17/24 at 6:27 PM EDT    Please note that this chart was generated using voice recognition Dragon dictation software.  Although every effort was made to ensure the accuracy of this automated transcription, some errors in transcription may have occurred.   
occasional assist; LOC full/confusion  ___50%  Mainly sit/lie; Extensive disease; Can't do any work; Considerable assist; intake normal or reduced; LOC full/confusion  ___40%  Mainly in bed; Extensive disease; Mainly assist; intake normal or reduced; LOC full/confusion   ___30%  Bed Bound; Extensive disease; Total care; intake reduced; LOCfull/confusion  ___20%  Bed Bound; Extensive disease; Total care; intake minimal; Drowsy/coma  ___10%  Bed Bound; Extensive disease; Total care; Mouth care only; Drowsy/coma  ___0       Death        Please call with any palliative questions or concerns.  Palliative Care Team is available via perfect serve or via phone.       Palliative Care will continue to follow Mr. Kwon's care as needed.    The note has been dictated by dragon, typing errors may be a possibility     Thank you for allowing Palliative Care to participate in the care of Mr. Kwon .    The total time I spent in seeing the patient, discussing goals of care, advanced directives, code status and other major issues was more than 36 minutes     Electronically signed by   JESSI Kumar CNP  Palliative Care Team  on 10/22/2024 at 1:43 PM    Palliative care office: [phone]    
substitutions which may escape proof reading.  It such instances, actual meaning can be extrapolated by contextual diversion.     Matt Walker MD  10/26/2024 2:22 PM   
this chart was generated using voice recognition Dragon dictation software.  Although every effort was made to ensure the accuracy of this automated transcription, some errors in transcription may have occurred.   
and sound-alike substitutions which may escape proof reading.  It such instances, actual meaning can be extrapolated by contextual diversion.     Matt Walker MD  10/21/2024 12:44 PM

## 2024-10-30 NOTE — TELEPHONE ENCOUNTER
Care Transitions Initial Follow Up Call    Outreach made within 2 business days of discharge: Yes    Patient: Austin Kwon Patient : 1980   MRN: [id number]  Reason for Admission: Life threatening acute exacerbation of asthma  Discharge Date: 10/29/24       Spoke with: Patient    Discharge department/facility: [facility] Interactive Patient Contact:  Was patient able to fill all prescriptions: Yes  Was patient instructed to bring all medications to the follow-up visit: Yes  Is patient taking all medications as directed in the discharge summary? Yes  Does patient understand their discharge instructions: Yes  Does patient have questions or concerns that need addressed prior to 7-14 day follow up office visit: no    Additional needs identified to be addressed with provider  No needs identified             Scheduled appointment with PCP within 7-14 days    Follow Up  Future Appointments   Date Time Provider Department Center   10/30/2024  4:00 PM Nancy Gill DO [facility]

## 2024-11-08 ENCOUNTER — OFFICE VISIT (OUTPATIENT)
Dept: FAMILY MEDICINE CLINIC | Age: 44
End: 2024-11-08
Payer: MEDICAID

## 2024-11-08 VITALS
SYSTOLIC BLOOD PRESSURE: 119 MMHG | DIASTOLIC BLOOD PRESSURE: 87 MMHG | BODY MASS INDEX: 22.44 KG/M2 | WEIGHT: 152 LBS | TEMPERATURE: 97.3 F | HEART RATE: 112 BPM

## 2024-11-08 DIAGNOSIS — Z09 HOSPITAL DISCHARGE FOLLOW-UP: ICD-10-CM

## 2024-11-08 DIAGNOSIS — I10 ESSENTIAL HYPERTENSION: ICD-10-CM

## 2024-11-08 DIAGNOSIS — Z00.00 HEALTHCARE MAINTENANCE: ICD-10-CM

## 2024-11-08 DIAGNOSIS — J45.51 SEVERE PERSISTENT ASTHMA WITH EXACERBATION: Primary | ICD-10-CM

## 2024-11-08 DIAGNOSIS — N17.9 AKI (ACUTE KIDNEY INJURY) (HCC): ICD-10-CM

## 2024-11-08 DIAGNOSIS — G44.209 TENSION HEADACHE: ICD-10-CM

## 2024-11-08 PROCEDURE — 99211 OFF/OP EST MAY X REQ PHY/QHP: CPT

## 2024-11-08 RX ORDER — ACETAMINOPHEN 325 MG/1
325 TABLET ORAL EVERY 6 HOURS PRN
Qty: 40 TABLET | Refills: 0 | Status: SHIPPED | OUTPATIENT
Start: 2024-11-08

## 2024-11-08 SDOH — ECONOMIC STABILITY: FOOD INSECURITY: WITHIN THE PAST 12 MONTHS, YOU WORRIED THAT YOUR FOOD WOULD RUN OUT BEFORE YOU GOT MONEY TO BUY MORE.: NEVER TRUE

## 2024-11-08 SDOH — ECONOMIC STABILITY: FOOD INSECURITY: WITHIN THE PAST 12 MONTHS, THE FOOD YOU BOUGHT JUST DIDN'T LAST AND YOU DIDN'T HAVE MONEY TO GET MORE.: NEVER TRUE

## 2024-11-08 SDOH — ECONOMIC STABILITY: INCOME INSECURITY: HOW HARD IS IT FOR YOU TO PAY FOR THE VERY BASICS LIKE FOOD, HOUSING, MEDICAL CARE, AND HEATING?: NOT HARD AT ALL

## 2024-11-08 ASSESSMENT — PATIENT HEALTH QUESTIONNAIRE - PHQ9
SUM OF ALL RESPONSES TO PHQ QUESTIONS 1-9: 0
SUM OF ALL RESPONSES TO PHQ QUESTIONS 1-9: 0
1. LITTLE INTEREST OR PLEASURE IN DOING THINGS: NOT AT ALL
SUM OF ALL RESPONSES TO PHQ QUESTIONS 1-9: 0
SUM OF ALL RESPONSES TO PHQ QUESTIONS 1-9: 0
2. FEELING DOWN, DEPRESSED OR HOPELESS: NOT AT ALL
SUM OF ALL RESPONSES TO PHQ9 QUESTIONS 1 & 2: 0

## 2024-11-08 NOTE — PROGRESS NOTES
Attending Physician Statement  I  have discussed the care of Austin Kwon including pertinent history and exam findings with the resident. I agree with the assessment, plan and orders as documented by the resident.      /87 (Site: Left Upper Arm, Position: Sitting, Cuff Size: Medium Adult)   Pulse (!) 112   Temp 97.3 °F (36.3 °C) (Oral)   Wt 68.9 kg (152 lb)   BMI 22.44 kg/m²    BP Readings from Last 3 Encounters:   11/08/24 119/87   10/29/24 113/87   09/12/23 (!) 138/99     Wt Readings from Last 3 Encounters:   11/08/24 68.9 kg (152 lb)   10/29/24 73.5 kg (162 lb 0.6 oz)   09/12/23 75 kg (165 lb 6.4 oz)          Diagnosis Orders   1. Severe persistent asthma with exacerbation  Beau Fofana MD, Pulmonology, [place]      2. Hospital discharge follow-up  [place] DISCHARGE MEDS RECONCILED W/ CURRENT OUTPATIENT MED LIST      3. Essential hypertension  DME Order for (Specify) as OP      4. Healthcare maintenance  Lipid Panel    HIV Screen      5. DULCE MARIA (acute kidney injury) (HCC)  Basic Metabolic Panel      6. Tension headache  acetaminophen (TYLENOL) 325 MG tablet              Rudi Stone DO 11/8/2024 2:29 PM      
Visit Information    Have you changed or started any medications since your last visit including any over-the-counter medicines, vitamins, or herbal medicines? no   Have you stopped taking any of your medications? Is so, why? -  no  Are you having any side effects from any of your medications? - no    Have you seen any other physician or provider since your last visit?  no   Have you had any other diagnostic tests since your last visit?  no   Have you been seen in the emergency room and/or had an admission in a hospital since we last saw you?  no   Have you had your routine dental cleaning in the past 6 months?  no     Do you have an active MyChart account? If no, what is the barrier?  No:     Patient Care Team:  Javi Oden DO as PCP - General (Family Medicine)    Medical History Review  Past Medical, Family, and Social History reviewed and does not contribute to the patient presenting condition    Health Maintenance   Topic Date Due    Pneumococcal 0-64 years Vaccine (1 of 2 - PCV) Never done    Varicella vaccine (1 of 2 - 13+ 2-dose series) Never done    HIV screen  Never done    Hepatitis B vaccine (1 of 3 - 19+ 3-dose series) Never done    DTaP/Tdap/Td vaccine (1 - Tdap) Never done    Lipids  Never done    Flu vaccine (1) Never done    COVID-19 Vaccine (1 - 2023-24 season) Never done    Depression Screen  09/12/2024    Hepatitis C screen  Completed    Hepatitis A vaccine  Aged Out    Hib vaccine  Aged Out    HPV vaccine  Aged Out    Polio vaccine  Aged Out    Meningococcal (ACWY) vaccine  Aged Out    GFR test (Diabetes, CKD 3-4, OR last GFR 15-59)  Discontinued    Diabetes screen  Discontinued               
by Does not apply route daily as needed (wheezing) 1 kit 0        Medications patient taking as of now reconciled against medications ordered at time of hospital discharge: Yes      Objective:    /87 (Site: Left Upper Arm, Position: Sitting, Cuff Size: Medium Adult)   Pulse (!) 112   Temp 97.3 °F (36.3 °C) (Oral)   Wt 68.9 kg (152 lb)   BMI 22.44 kg/m²   General Appearance: alert and oriented to person, place and time, well developed and well- nourished, in no acute distress  Skin: warm and dry, no rash or erythema  Head: normocephalic and atraumatic  Pulmonary/Chest: clear to auscultation bilaterally- no wheezes, rales or rhonchi, normal air movement, no respiratory distress  Cardiovascular: tachycardic, regular rhythm, normal S1 and S2, no murmurs, rubs, clicks, or gallops, distal pulses intact, no carotid bruits  Abdomen: soft, non-tender, non-distended, normal bowel sounds, no masses or organomegaly    An electronic signature was used to authenticate this note.  --Andres Solo MD

## 2024-11-11 DIAGNOSIS — J45.30 MILD PERSISTENT ASTHMA WITHOUT COMPLICATION: ICD-10-CM

## 2024-11-11 RX ORDER — FLUTICASONE PROPIONATE AND SALMETEROL 100; 50 UG/1; UG/1
POWDER RESPIRATORY (INHALATION)
Qty: 10 EACH | Refills: 5 | Status: SHIPPED | OUTPATIENT
Start: 2024-11-11

## 2024-11-11 RX ORDER — ALBUTEROL SULFATE 90 UG/1
1-2 INHALANT RESPIRATORY (INHALATION) EVERY 4 HOURS PRN
Qty: 1 EACH | Refills: 5 | Status: SHIPPED | OUTPATIENT
Start: 2024-11-11 | End: 2024-12-11

## 2024-11-11 NOTE — TELEPHONE ENCOUNTER
Writer called the patient to inform him to call his [employer] to find out what DME company cover his blood pressure cuff.  
kidney injury) (HCC)     Respiratory acidosis with metabolic acidosis     ACP (advance care planning)     ARDS (adult respiratory distress syndrome)     Encounter for palliative care     Acute respiratory failure with hypercapnia     Dilatation of colon     Cecal polyp     Paralytic ileus of large intestine (HCC)     Pneumonia of lower lobe due to infectious organism     Paralytic ileus (HCC)           .medi

## 2024-11-20 ENCOUNTER — OFFICE VISIT (OUTPATIENT)
Dept: FAMILY MEDICINE CLINIC | Age: 44
End: 2024-11-20
Payer: MEDICAID

## 2024-11-20 VITALS
DIASTOLIC BLOOD PRESSURE: 81 MMHG | WEIGHT: 156.8 LBS | HEART RATE: 83 BPM | HEIGHT: 69 IN | SYSTOLIC BLOOD PRESSURE: 118 MMHG | BODY MASS INDEX: 23.22 KG/M2

## 2024-11-20 DIAGNOSIS — R29.898 BILATERAL ARM WEAKNESS: ICD-10-CM

## 2024-11-20 DIAGNOSIS — J45.51 SEVERE PERSISTENT ASTHMA WITH EXACERBATION: Primary | ICD-10-CM

## 2024-11-20 DIAGNOSIS — J45.30 MILD PERSISTENT ASTHMA WITHOUT COMPLICATION: ICD-10-CM

## 2024-11-20 DIAGNOSIS — Z23 INFLUENZA VACCINE NEEDED: ICD-10-CM

## 2024-11-20 PROCEDURE — 90656 IIV3 VACC NO PRSV 0.5 ML IM: CPT

## 2024-11-20 RX ORDER — MONTELUKAST SODIUM 10 MG/1
10 TABLET ORAL NIGHTLY
Qty: 30 TABLET | Refills: 5 | Status: SHIPPED | OUTPATIENT
Start: 2024-11-20 | End: 2025-05-19

## 2024-11-20 ASSESSMENT — PATIENT HEALTH QUESTIONNAIRE - PHQ9
SUM OF ALL RESPONSES TO PHQ QUESTIONS 1-9: 0
SUM OF ALL RESPONSES TO PHQ QUESTIONS 1-9: 0
2. FEELING DOWN, DEPRESSED OR HOPELESS: NOT AT ALL
SUM OF ALL RESPONSES TO PHQ QUESTIONS 1-9: 0
SUM OF ALL RESPONSES TO PHQ QUESTIONS 1-9: 0
1. LITTLE INTEREST OR PLEASURE IN DOING THINGS: NOT AT ALL
SUM OF ALL RESPONSES TO PHQ9 QUESTIONS 1 & 2: 0

## 2024-11-20 NOTE — PROGRESS NOTES
Attending Physician Statement  I have discussed the care of TerranceGraceincluding pertinent history and exam findings,  with the resident. I have reviewed the key elements of all parts of the encounter with the resident.  I agree with the assessment, plan and orders as documented by the resident.  (GE Modifier)    S/p Pneumonia- doing better- using inhalers  Tachycardia- Amlodipine - HA. DCd  HM- Flu vaccine given  Paper work done

## 2024-11-20 NOTE — PROGRESS NOTES
Subjective:    Austin Kwon is a 44 y.o. male with  has a past medical history of DULCE MARIA (acute kidney injury) (HCC) and Asthma.    Presented to the office today for:  Chief Complaint   Patient presents with    Asthma       HPI    44 year old male who presents for asthma follow up. He has been taking the advair twice a day and states his breathing has been well controlled. He has not required his albuterol as a rescue inhaler since being discharged from the ICU.  Today he denies any shortness of breath, cough, chest pain.    His blood pressure today is 118/81.  While he was admitted, he was on Norvasc and Coreg however now he has been using only the Coreg since his norvasc was giving him headaches    He is also requesting paperwork be filled out for short-term disability from work while he regains strength in his arms and legs.    The patient has a   Family History   Problem Relation Age of Onset    High Blood Pressure Mother     Stroke Father        Objective:    /81 (Site: Right Upper Arm, Position: Sitting, Cuff Size: Medium Adult)   Pulse 83   Ht 1.753 m (5' 9.02\")   Wt 71.1 kg (156 lb 12.8 oz)   BMI 23.14 kg/m²    BP Readings from Last 3 Encounters:   11/20/24 118/81   11/08/24 119/87   10/29/24 113/87       Physical Exam    Constitutional:       General: He is not in acute distress.     Appearance: Normal appearance.   Cardiovascular:      Rate and Rhythm: Normal rate and regular rhythm.      Pulses: Normal pulses.      Heart sounds: Normal heart sounds. No murmur heard.  Pulmonary:      Effort: Pulmonary effort is normal.      Breath sounds: Normal breath sounds. No wheezing, rhonchi or rales.   Abdominal:      General: Abdomen is flat. Bowel sounds are normal.      Palpations: Abdomen is soft. No tenderness  Musculoskeletal:      Right lower leg: No edema.      Left lower leg: No edema.   Neurological:      Mental Status: He is alert and oriented to person, place, and time.       Lab Results

## 2024-11-20 NOTE — PATIENT INSTRUCTIONS
Thank you for letting us take care of you today. We hope all your questions were addressed. If a question was overlooked or something else comes to mind after you return home, please contact a member of your Care Team listed below.      Your Care Team at [facility] is Team #1  Sully Galvez M.D. (Faculty)  Apollo Alicia M.D. (Resident)  Justine Souza D.O. (Resident)  Andres Solo M.D. (Resident)  Nena Hoyt M.D. (Resident)  Melvina Bhakta, [facility]  Ced Rivas, [facility]  Jackie Diallo, [facility]  Julissa Small, [facility]  Alvina Call, [facility]  Patti Carrillo, [facility]  Stephanie Zarco, [facility]  Ulises (LJ) Althea,   Meenu Osorio [facility] (Clinical Pharmacist)     Office phone number: [phone]    If you need to get in right away due to illness, please be advised we have \"Same Day\" appointments available Monday-Friday. Please call us at [phone] option #3 to schedule your \"Same Day\" appointment.

## 2024-11-20 NOTE — PROGRESS NOTES
Visit Information    Have you changed or started any medications since your last visit including any over-the-counter medicines, vitamins, or herbal medicines? no   Are you having any side effects from any of your medications? -  no  Have you stopped taking any of your medications? Is so, why? -  no    Have you seen any other physician or provider since your last visit? Yes - Records Obtained  Have you had any other diagnostic tests since your last visit? Yes - Records Obtained  Have you been seen in the emergency room and/or had an admission to a hospital since we last saw you? Yes - Records Obtained  Have you had your routine dental cleaning in the past 6 months? no    Have you activated your [employer] account? If not, what are your barriers? Yes     Patient Care Team:  Javi Oden DO as PCP - General (Family Medicine)    Medical History Review  Past Medical, Family, and Social History reviewed and does not contribute to the patient presenting condition    Health Maintenance   Topic Date Due    Pneumococcal 0-64 years Vaccine (1 of 2 - PCV) Never done    Varicella vaccine (1 of 2 - 13+ 2-dose series) Never done    HIV screen  Never done    Hepatitis B vaccine (1 of 3 - 19+ 3-dose series) Never done    DTaP/Tdap/Td vaccine (1 - Tdap) Never done    Lipids  Never done    Flu vaccine (1) Never done    COVID-19 Vaccine (1 - 2023-24 season) Never done    Depression Screen  11/08/2025    Hepatitis C screen  Completed    Hepatitis A vaccine  Aged Out    Hib vaccine  Aged Out    HPV vaccine  Aged Out    Polio vaccine  Aged Out    Meningococcal (ACWY) vaccine  Aged Out    GFR test (Diabetes, CKD 3-4, OR last GFR 15-59)  Discontinued    Diabetes screen  Discontinued

## 2024-11-27 ENCOUNTER — TELEPHONE (OUTPATIENT)
Dept: FAMILY MEDICINE CLINIC | Age: 44
End: 2024-11-27

## 2024-11-27 NOTE — TELEPHONE ENCOUNTER
Care Transitions Initial Follow Up Call    Outreach made within 2 business days of discharge: Yes    Patient: Austin Kwon Patient : 1980   MRN: [id number]  Reason for Admission: blood clot  Discharge Date: 10/29/24       Spoke with: Sari at [facility]     Discharge department/facility[facility] Interactive Patient Contact:  Was patient able to fill all prescriptions: No: not sure yet  Was patient instructed to bring all medications to the follow-up visit: Yes  Is patient taking all medications as directed in the discharge summary? Yes  Does patient understand their discharge instructions: Yes  Does patient have questions or concerns that need addressed prior to 7-14 day follow up office visit: no    Additional needs identified to be addressed with provider  No needs identified             Scheduled appointment with PCP within 7-14 days    Follow Up  Future Appointments   Date Time Provider Department Center   12/3/2024 10:15 AM Javi Oden DO [facility] DEP   2025  9:40 AM Andres Solo MD [facility]       Stephanie Zarco MA

## 2024-12-03 ENCOUNTER — OFFICE VISIT (OUTPATIENT)
Dept: FAMILY MEDICINE CLINIC | Age: 44
End: 2024-12-03

## 2024-12-03 VITALS
OXYGEN SATURATION: 98 % | HEART RATE: 76 BPM | WEIGHT: 164.8 LBS | HEIGHT: 69 IN | BODY MASS INDEX: 24.41 KG/M2 | SYSTOLIC BLOOD PRESSURE: 110 MMHG | DIASTOLIC BLOOD PRESSURE: 70 MMHG

## 2024-12-03 DIAGNOSIS — Z12.5 ENCOUNTER FOR SCREENING PROSTATE SPECIFIC ANTIGEN (PSA) MEASUREMENT: Primary | ICD-10-CM

## 2024-12-03 DIAGNOSIS — I82.890 JUGULAR VEIN THROMBOSIS, RIGHT: ICD-10-CM

## 2024-12-03 RX ORDER — POLYETHYLENE GLYCOL 3350 17 G/17G
POWDER ORAL
COMMUNITY
Start: 2024-10-29

## 2024-12-03 RX ORDER — RIVAROXABAN 15 MG-20MG
20 KIT ORAL DAILY
COMMUNITY
Start: 2024-11-26

## 2024-12-03 ASSESSMENT — PATIENT HEALTH QUESTIONNAIRE - PHQ9
SUM OF ALL RESPONSES TO PHQ QUESTIONS 1-9: 0
1. LITTLE INTEREST OR PLEASURE IN DOING THINGS: NOT AT ALL
SUM OF ALL RESPONSES TO PHQ QUESTIONS 1-9: 0
2. FEELING DOWN, DEPRESSED OR HOPELESS: NOT AT ALL
SUM OF ALL RESPONSES TO PHQ9 QUESTIONS 1 & 2: 0

## 2024-12-03 ASSESSMENT — ENCOUNTER SYMPTOMS
BACK PAIN: 0
DIARRHEA: 0
NAUSEA: 0
RHINORRHEA: 0
ABDOMINAL PAIN: 0
COUGH: 0
SHORTNESS OF BREATH: 0
SORE THROAT: 0

## 2024-12-03 NOTE — PROGRESS NOTES
[facility] - Outpatient Note      Subjective:    Austin Kwon is a 44 y.o. male with  has a past medical history of DULCE MARIA (acute kidney injury) (HCC) and Asthma.    Presented to the office today for:  Chief Complaint   Patient presents with    Follow-Up from [facility] - Blood clot. Patient requesting prostate exam       HPI    44-year-old male with past medical history of severe asthma, requiring hospitalizations and was intubated, required ECMO.  - Patient was discharged on Advair and montelukast and albuterol for rescue inhaler  - States that his asthma symptoms are well-controlled, patient has not needed his albuterol inhaler  - Does need to be set up with Dr. 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   HENT:  Negative for rhinorrhea and sore throat.    Eyes:  Negative for visual disturbance.   Respiratory:  Negative for cough and shortness of breath.    Cardiovascular:  Negative for chest pain and leg swelling.   Gastrointestinal:  Negative for abdominal pain, diarrhea and nausea.   Genitourinary:  Negative for dysuria and hematuria.   Musculoskeletal:  Negative for back pain.   Skin:  Negative for rash.   Neurological:  Negative for numbness and headaches.   Psychiatric/Behavioral:  Negative for agitation.                  The patient has a   Family History   Problem Relation Age of Onset    High Blood Pressure Mother     Stroke Father        Objective:    /70 (Site: Right Upper Arm, Position: Sitting, Cuff Size: Medium Adult)   Pulse 76   Ht 1.753 m (5' 9\")   Wt 74.8 kg (164 lb 12.8 oz)   SpO2 98%   BMI 24.34 kg/m²    BP Readings from Last 3 Encounters:   12/03/24 110/70   11/20/24 118/81   11/08/24

## 2024-12-03 NOTE — PATIENT INSTRUCTIONS
Thank you for letting us take care of you today. We hope all your questions were addressed. If a question was overlooked or something else comes to mind after you return home, please contact a member of your Care Team listed below.      Your Care Team at [facility] is Team #1  Sully Galvez M.D. (Faculty)  Apollo Alicia M.D. (Resident)  Justine Souza D.O. (Resident)  Andres Solo M.D. (Resident)  Nean Hoyt M.D. (Resident)  Melvina Bhakta, [facility]  Ced Rivas, [facility]  Jackie Diallo, [facility]  Julissa Small, [facility]  Alvina Call, [facility]  Patti Carrillo, [facility]  Stephanie Zarco, [facility]  Ulises (LJ) Althea,   Meenu Osorio [facility] (Clinical Pharmacist)     Office phone number: [phone]    If you need to get in right away due to illness, please be advised we have \"Same Day\" appointments available Monday-Friday. Please call us at [phone] option #3 to schedule your \"Same Day\" appointment.

## 2024-12-03 NOTE — PROGRESS NOTES
Visit Information    Have you changed or started any medications since your last visit including any over-the-counter medicines, vitamins, or herbal medicines? No   Have you stopped taking any of your medications? Is so, why? -  no  Are you having any side effects from any of your medications? - no    Have you seen any other physician or provider since your last visit?  no   Have you had any other diagnostic tests since your last visit?  yes - Labs, MRI, CTV, CTA   Have you been seen in the emergency room and/or had an admission in a hospital since we last saw you?  yes - [facility]   Have you had your routine dental cleaning in the past 6 months?  no     Do you have an active MyChart account? If no, what is the barrier?  Yes    Patient Care Team:  Javi Oden DO as PCP - General (Family Medicine)    Medical History Review  Past Medical, Family, and Social History reviewed and does contribute to the patient presenting condition    Health Maintenance   Topic Date Due    Pneumococcal 0-64 years Vaccine (1 of 2 - PCV) Never done    Varicella vaccine (1 of 2 - 13+ 2-dose series) Never done    HIV screen  Never done    Hepatitis B vaccine (1 of 3 - 19+ 3-dose series) Never done    DTaP/Tdap/Td vaccine (1 - Tdap) Never done    Lipids  Never done    COVID-19 Vaccine (1 - 2023-24 season) Never done    Depression Screen  11/20/2025    Flu vaccine  Completed    Hepatitis C screen  Completed    Hepatitis A vaccine  Aged Out    Hib vaccine  Aged Out    HPV vaccine  Aged Out    Polio vaccine  Aged Out    Meningococcal (ACWY) vaccine  Aged Out    GFR test (Diabetes, CKD 3-4, OR last GFR 15-59)  Discontinued    Diabetes screen  Discontinued

## 2024-12-03 NOTE — PROGRESS NOTES
Attending Physician Statement  I  have discussed the care of Austin Kwon including pertinent history and exam findings with the resident. I agree with the assessment, plan and orders as documented by the resident.      /70 (Site: Right Upper Arm, Position: Sitting, Cuff Size: Medium Adult)   Pulse 76   Ht 1.753 m (5' 9\")   Wt 74.8 kg (164 lb 12.8 oz)   SpO2 98%   BMI 24.34 kg/m²    BP Readings from Last 3 Encounters:   12/03/24 110/70   11/20/24 118/81   11/08/24 119/87     Wt Readings from Last 3 Encounters:   12/03/24 74.8 kg (164 lb 12.8 oz)   11/20/24 71.1 kg (156 lb 12.8 oz)   11/08/24 68.9 kg (152 lb)          Diagnosis Orders   1. Encounter for screening prostate specific antigen (PSA) measurement  PSA Screening      2. Jugular vein thrombosis, right  Alecia Draper MD, Vascular Surgery, [facility]        Severe persistent asthma- hospitalized ICU stay with [facility]- jugular vv thrombosis at [facility] site on xeralto- 3 months with US. Follow up with pulmonology.       Nancy Gill DO 12/3/2024 11:04 AM       Impression: Hypermetropia, bilateral: H52.03. Plan: New glasses Rx was given today. Patient also given update CLs today. Advised patient of condition.

## 2025-03-13 ENCOUNTER — OFFICE VISIT (OUTPATIENT)
Dept: FAMILY MEDICINE CLINIC | Age: 45
End: 2025-03-13
Payer: MEDICAID

## 2025-03-13 VITALS
DIASTOLIC BLOOD PRESSURE: 86 MMHG | HEIGHT: 69 IN | TEMPERATURE: 97.1 F | HEART RATE: 73 BPM | SYSTOLIC BLOOD PRESSURE: 131 MMHG | WEIGHT: 175 LBS | OXYGEN SATURATION: 97 % | BODY MASS INDEX: 25.92 KG/M2

## 2025-03-13 DIAGNOSIS — R51.9 NONINTRACTABLE EPISODIC HEADACHE, UNSPECIFIED HEADACHE TYPE: Primary | ICD-10-CM

## 2025-03-13 DIAGNOSIS — J30.1 SEASONAL ALLERGIC RHINITIS DUE TO POLLEN: ICD-10-CM

## 2025-03-13 DIAGNOSIS — J45.51 SEVERE PERSISTENT ASTHMA WITH EXACERBATION (HCC): ICD-10-CM

## 2025-03-13 DIAGNOSIS — J45.30 MILD PERSISTENT ASTHMA WITHOUT COMPLICATION: ICD-10-CM

## 2025-03-13 PROCEDURE — 99214 OFFICE O/P EST MOD 30 MIN: CPT

## 2025-03-13 RX ORDER — FLUTICASONE PROPIONATE AND SALMETEROL 250; 50 UG/1; UG/1
1 POWDER RESPIRATORY (INHALATION) 2 TIMES DAILY
Qty: 60 EACH | Refills: 3 | Status: SHIPPED | OUTPATIENT
Start: 2025-03-13

## 2025-03-13 RX ORDER — CETIRIZINE HYDROCHLORIDE 10 MG/1
10 TABLET ORAL DAILY
Qty: 30 TABLET | Refills: 2 | Status: SHIPPED | OUTPATIENT
Start: 2025-03-13

## 2025-03-13 ASSESSMENT — PATIENT HEALTH QUESTIONNAIRE - PHQ9
SUM OF ALL RESPONSES TO PHQ QUESTIONS 1-9: 0
2. FEELING DOWN, DEPRESSED OR HOPELESS: NOT AT ALL
1. LITTLE INTEREST OR PLEASURE IN DOING THINGS: NOT AT ALL

## 2025-03-13 NOTE — ADDENDUM NOTE
Addended by: JORDAN DEMARCO on: 3/13/2025 11:01 AM     Modules accepted: Orders, Level of Service

## 2025-03-13 NOTE — PROGRESS NOTES
Subjective:      Chief Complaint   Patient presents with    Asthma     Follow up        HPI  Austin Kwon is a 45 y.o. male with a  has a past medical history of DULCE MARIA (acute kidney injury) and Asthma. who is presenting today for a new headache. He has been doing well overall.  He first noticed having a diffuse pain in the left side of his cranium around the time of his severe asthma exacerbation that required ECMO in October 2024. He developed right jugular trombosis at the time, was seen by a vascular surgeon, started on a 6-month course of rivaroxaban.  Since then, these headaches have increased in severity. Headaches last ~1 hours, start in the evening, around 9 PM, and are sometimes provoked by exercise. There are no associated signs or symptoms such as nausea, tingling, numbness, visual changes, or irradiation of that pain. The pain is not affected or brought about by any change of position.  His asthma symptoms have been well-controlled recently. His main concern is this new type of headache.    Patient Active Problem List   Diagnosis    Severe persistent asthma in adult without complication (HCC)    Severe persistent asthma with exacerbation (HCC)    Mild persistent asthma without complication    Life threatening acute exacerbation of asthma    Lactic acidemia    Lactic acidosis    Acute respiratory failure with hypoxia and hypercapnia (HCC)    DULCE MARIA (acute kidney injury)    Respiratory acidosis with metabolic acidosis    ACP (advance care planning)    ARDS (adult respiratory distress syndrome) (HCC)    Encounter for palliative care    Acute respiratory failure with hypercapnia (HCC)    Dilatation of colon    Cecal polyp    Paralytic ileus of large intestine (HCC)    Pneumonia of lower lobe due to infectious organism    Paralytic ileus (HCC)       Prior to Visit Medications    Medication Sig Taking? Authorizing Provider   melatonin (MELATONIN MAXIMUM STRENGTH) 5 MG TABS tablet Take 1 tablet by mouth nightly 
Visit Information    Have you changed or started any medications since your last visit including any over-the-counter medicines, vitamins, or herbal medicines? no   Have you stopped taking any of your medications? Is so, why? -  no  Are you having any side effects from any of your medications? - no    Have you seen any other physician or provider since your last visit?  no   Have you had any other diagnostic tests since your last visit?  no   Have you been seen in the emergency room and/or had an admission in a hospital since we last saw you?  no   Have you had your routine dental cleaning in the past 6 months?  no     Do you have an active MyChart account? If no, what is the barrier?  Yes    Patient Care Team:  Javi Oden DO as PCP - General (Family Medicine)    Medical History Review  Past Medical, Family, and Social History reviewed and does not contribute to the patient presenting condition    Health Maintenance   Topic Date Due    Varicella vaccine (1 of 2 - 13+ 2-dose series) Never done    HIV screen  Never done    Hepatitis B vaccine (1 of 3 - 19+ 3-dose series) Never done    DTaP/Tdap/Td vaccine (1 - Tdap) Never done    Pneumococcal 0-49 years Vaccine (1 of 2 - PCV) Never done    Lipids  Never done    COVID-19 Vaccine (1 - 2024-25 season) Never done    Depression Screen  12/03/2025    Flu vaccine  Completed    Hepatitis C screen  Completed    Hepatitis A vaccine  Aged Out    Hib vaccine  Aged Out    HPV vaccine  Aged Out    Polio vaccine  Aged Out    Meningococcal (ACWY) vaccine  Aged Out    Meningococcal B vaccine  Aged Out    GFR test (Diabetes, CKD 3-4, OR last GFR 15-59)  Discontinued    Diabetes screen  Discontinued             
alcohol use medications at this time.  He also denies any history of withdrawals, DTs, any high risk features of alcohol use disorder.  Patient likely benefit from further alcohol use disorder restratification a future visit.  Patient also given ER precautions at length as well.  Patient likely to benefit from being started on a spacer for albuterol in the future and reviewing inhaler technique at a future visit as well.  Lastly, patient instructed to avoid heavy lifting or anything that would increase intrathoracic pressure at this time pending MRI studies, he also to make sure he is drinking 64 ounces of water per day.    More than 25 minutes spent  in face to face encounter with the patient and more than half in counseling. Patient's questions were answered.   Patient Voiced understanding to the counseling.  Return in about 4 weeks (around 4/10/2025) for Close follow-up for asthma and headache.   (GC Modifier)-Dr. JORDAN DEMARCO MD

## 2025-05-06 RX ORDER — CEPHALEXIN 250 MG/1
CAPSULE ORAL
Qty: 60 EACH | Refills: 3 | Status: SHIPPED | OUTPATIENT
Start: 2025-05-06

## 2025-07-18 ENCOUNTER — HOSPITAL ENCOUNTER (EMERGENCY)
Age: 45
Discharge: HOME | End: 2025-07-18
Attending: EMERGENCY MEDICINE

## 2025-07-18 VITALS
TEMPERATURE: 97.9 F | DIASTOLIC BLOOD PRESSURE: 92 MMHG | RESPIRATION RATE: 17 BRPM | SYSTOLIC BLOOD PRESSURE: 138 MMHG | OXYGEN SATURATION: 98 % | HEART RATE: 87 BPM

## 2025-07-18 DIAGNOSIS — K08.89 PAIN, DENTAL: Primary | ICD-10-CM

## 2025-07-18 PROCEDURE — 99283 EMERGENCY DEPT VISIT LOW MDM: CPT | Performed by: EMERGENCY MEDICINE

## 2025-07-18 PROCEDURE — 6370000000 HC RX 637 (ALT 250 FOR IP)

## 2025-07-18 RX ORDER — ACETAMINOPHEN 500 MG
1000 TABLET ORAL ONCE
Status: COMPLETED | OUTPATIENT
Start: 2025-07-18 | End: 2025-07-18

## 2025-07-18 RX ORDER — PENICILLIN V POTASSIUM 500 MG/1
500 TABLET, FILM COATED ORAL 4 TIMES DAILY
Qty: 28 TABLET | Refills: 0 | Status: SHIPPED | OUTPATIENT
Start: 2025-07-18 | End: 2025-07-25

## 2025-07-18 RX ORDER — PENICILLIN V POTASSIUM 250 MG/1
500 TABLET, FILM COATED ORAL ONCE
Status: COMPLETED | OUTPATIENT
Start: 2025-07-18 | End: 2025-07-18

## 2025-07-18 RX ORDER — ACETAMINOPHEN 500 MG
1000 TABLET ORAL 4 TIMES DAILY PRN
Qty: 40 TABLET | Refills: 0 | Status: SHIPPED | OUTPATIENT
Start: 2025-07-18 | End: 2025-07-23

## 2025-07-18 RX ADMIN — ACETAMINOPHEN 1000 MG: 500 TABLET ORAL at 16:17

## 2025-07-18 RX ADMIN — PENICILLIN V POTASSIUM 500 MG: 250 TABLET, FILM COATED ORAL at 16:17

## 2025-07-18 ASSESSMENT — PAIN - FUNCTIONAL ASSESSMENT: PAIN_FUNCTIONAL_ASSESSMENT: 0-10

## 2025-07-18 ASSESSMENT — PAIN SCALES - GENERAL: PAINLEVEL_OUTOF10: 10

## 2025-07-18 NOTE — ED PROVIDER NOTES
[facility]     Emergency Department     Faculty Attestation    I performed a history and physical examination of the patient and discussed management with the resident. I reviewed the resident’s note and agree with the documented findings and plan of care. Any areas of disagreement are noted on the chart. I was personally present for the key portions of any procedures. I have documented in the chart those procedures where I was not present during the key portions. I have reviewed the emergency nurses triage note. I agree with the chief complaint, past medical history, past surgical history, allergies, medications, social and family history as documented unless otherwise noted below. Documentation of the HPI, Physical Exam and Medical Decision Making performed by medical students or scribes is based on my personal performance of the HPI, PE and MDM. For Physician Assistant/ Nurse Practitioner cases/documentation I have personally evaluated this patient and have completed at least one if not all key elements of the E/M (history, physical exam, and MDM). Additional findings are as noted.    Primary Care Physician: Javi Oden DO    Note Started: 4:17 PM EDT     History: This is a 45 y.o. male who presents to the Emergency Department with complaint of Dental pain. Is been ongoing for last 2 days (persistent issue for 1 year). Patient denies any fever, chills or sweats. The patient denies any difficulty swallowing or shortness of breath    Physical:   temperature is 97.9 °F (36.6 °C). His blood pressure is 138/92 (abnormal) and his pulse is 87. His respiration is 17 and oxygen saturation is 98%.  The patient has multiple dental caries noted. There is no tongue elevation noted. The patient has no abscess. Airways patent there is no pooling of oral secretions.     Impression: Dental caries    Plan: Antibiotics, analgesia and dentist follow-up    Bryce

## 2025-07-18 NOTE — ED NOTES
Pt to ed w/ c/o dental pain.   Pt states pain has been ongoing for the last year but has worsened the last few weeks.   Pt states he's had trouble getting in to a dentist. .  Per pt, dental chitra is scheduled for August 22nd.   Pt state pain is 10/10.   Pt resting in cot and call light is in reach.

## 2025-07-18 NOTE — DISCHARGE INSTRUCTIONS
Low Cost Dental Clinics in [place], [place]     Looking for a free dental clinic in [place], [place]? You're not alone. Millions of people struggle to afford dental care, but there are a number of free dental clinics available in [place] to help.     Free dental clinics offer a variety of dental services, including preventive care (cleanings, checkups, and X-rays), restorative care (fillings, crowns, and bridges), extractions, and emergency dental care.     What types of dental dental clinics are listed below?     1. Free Dental Clinics: Dental services free for low-income individuals and families.  2. Sliding Fee Scale: Offers dental services at a discounted rate based on the patient's income.  3. Low Cost Affordable: Dental clinic that offers dental services at a lower cost than regular dental clinics.  4. Non-Profit: Funded by donations and grants, so they are not profit-driven.     Please note: This is not an exhaustive list of all free dental clinics in [place]. For more information, please contact your local health department.     Here is a list of low cost dental clinics in [place], [place]     DENTAL CARE AND DENTAL CLINICS     [facility]  [address] Entrance  [address] [place]. [address] [phone]  [email or web address]     [facility] (scheduling 2024) [phone]  [address] [place] [address].  [facility], [id number]  [email or web address]     [facility] the [facility]  Located inside of [address] (Clinic entrance on [address])  [facility], [id number] [phone]  [email or web address]     [facility]  [address]  [place], [place] - [address] [phone]     [address]  [place], [place] - [id number] [phone]  [email or web address]

## 2025-07-21 NOTE — ED PROVIDER NOTES
[facility] EMERGENCY DEPARTMENT  Emergency Department Encounter  Emergency Medicine Resident     Pt Name:Austin Kwon  MRN: [id number]  Birthdate 1980  Date of evaluation: 25  PCP:  Javi Oden DO  Note Started: 9:55 PM EDT      CHIEF COMPLAINT       Chief Complaint   Patient presents with    Dental Pain       HISTORY OF PRESENT ILLNESS  (Location/Symptom, Timing/Onset, Context/Setting, Quality, Duration, Modifying Factors, Severity.)      Austin Kwon is a 45 y.o. male who presents with.  Patient reports that he has had dental pain for the past year.  His pain is worsened over the past few weeks.  He reports that he was unable to go to work today because of his pain.  Already has an appointment with his dentist in .  He denies any other swellings, discharge, difficulty eating, change in phonation, tongue swelling, fever, or chills.    PAST MEDICAL / SURGICAL / SOCIAL / FAMILY HISTORY      has a past medical history of DULCE MARIA (acute kidney injury) and Asthma.     has a past surgical history that includes extracorporeal circulation (Right, 10/14/2024).    Social History     Socioeconomic History    Marital status: Single     Spouse name: Not on file    Number of children: Not on file    Years of education: Not on file    Highest education level: Not on file   Occupational History    Not on file   Tobacco Use    Smoking status: Former     Current packs/day: 0.00     Types: Cigarettes     Quit date: 2000     Years since quittin.4    Smokeless tobacco: Never   Substance and Sexual Activity    Alcohol use: Yes     Comment: occ    Drug use: No    Sexual activity: Yes     Partners: Female   Other Topics Concern    Not on file   Social History Narrative    Not on file     Social Drivers of Health     Financial Resource Strain: Low Risk  (2024)    Overall Financial Resource Strain (CARDIA)     Difficulty of Paying Living Expenses: Not hard at all   Food Insecurity: No

## 2025-08-05 DIAGNOSIS — J45.30 MILD PERSISTENT ASTHMA WITHOUT COMPLICATION: ICD-10-CM

## 2025-08-05 RX ORDER — ALBUTEROL SULFATE 90 UG/1
1-2 INHALANT RESPIRATORY (INHALATION) EVERY 4 HOURS PRN
Qty: 1 EACH | Refills: 5 | Status: SHIPPED | OUTPATIENT
Start: 2025-08-05 | End: 2025-09-04

## 2025-08-21 ENCOUNTER — HOSPITAL ENCOUNTER (EMERGENCY)
Age: 45
Discharge: HOME | End: 2025-08-21
Attending: EMERGENCY MEDICINE
Payer: MEDICAID

## 2025-08-21 VITALS
TEMPERATURE: 97.7 F | OXYGEN SATURATION: 99 % | DIASTOLIC BLOOD PRESSURE: 96 MMHG | SYSTOLIC BLOOD PRESSURE: 144 MMHG | HEART RATE: 72 BPM | RESPIRATION RATE: 16 BRPM

## 2025-08-21 DIAGNOSIS — K08.89 PAIN, DENTAL: Primary | ICD-10-CM

## 2025-08-21 PROCEDURE — 99283 EMERGENCY DEPT VISIT LOW MDM: CPT

## 2025-08-21 RX ORDER — HYDROCODONE BITARTRATE AND ACETAMINOPHEN 5; 325 MG/1; MG/1
1 TABLET ORAL EVERY 4 HOURS PRN
Qty: 8 TABLET | Refills: 0 | Status: SHIPPED | OUTPATIENT
Start: 2025-08-21 | End: 2025-08-24

## 2025-08-21 ASSESSMENT — ENCOUNTER SYMPTOMS
SORE THROAT: 0
SHORTNESS OF BREATH: 0
TROUBLE SWALLOWING: 0
VOICE CHANGE: 0
CHOKING: 0

## 2025-08-21 ASSESSMENT — PAIN SCALES - GENERAL: PAINLEVEL_OUTOF10: 8

## (undated) DEVICE — GLOVE SURG SZ 7 L12IN FNGR THK79MIL GRN LTX FREE

## (undated) DEVICE — RADIFOCUS GLIDEWIRE ADVANTAGE GUIDEWIRE: Brand: GLIDEWIRE ADVANTAGE

## (undated) DEVICE — SVMMC ADULT ECMO PACK: Brand: MEDLINE INDUSTRIES, INC.

## (undated) DEVICE — Device

## (undated) DEVICE — GUIDEWIRE VASC L260CM DIA0.035IN L7CM DIA3MM J TIP PTFE S

## (undated) DEVICE — CATHETER BERN 4FR 65CM (MIN ORDER 2 BX)

## (undated) DEVICE — GLOVE SURG SZ 7.5 L11.73IN FNGR THK9.8MIL STRW LTX POLYMER

## (undated) DEVICE — PROVE COVER: Brand: UNBRANDED

## (undated) DEVICE — GOWN,SIRUS,NONRNF,SETINSLV,2XL,18/CS: Brand: MEDLINE

## (undated) DEVICE — GOWN,SIRUS,NONRNF,SETINSLV,XL,20/CS: Brand: MEDLINE

## (undated) DEVICE — GLOVE SURG SZ 6 CRM LTX FREE POLYISOPRENE POLYMER BEAD ANTI